# Patient Record
Sex: FEMALE | Race: WHITE | NOT HISPANIC OR LATINO | Employment: UNEMPLOYED | URBAN - METROPOLITAN AREA
[De-identification: names, ages, dates, MRNs, and addresses within clinical notes are randomized per-mention and may not be internally consistent; named-entity substitution may affect disease eponyms.]

---

## 2017-01-05 DIAGNOSIS — G89.29 CHRONIC MIDLINE THORACIC BACK PAIN: ICD-10-CM

## 2017-01-05 DIAGNOSIS — M54.5 CHRONIC LOW BACK PAIN, UNSPECIFIED BACK PAIN LATERALITY, WITH SCIATICA PRESENCE UNSPECIFIED: Primary | ICD-10-CM

## 2017-01-05 DIAGNOSIS — G89.29 CHRONIC LOW BACK PAIN, UNSPECIFIED BACK PAIN LATERALITY, WITH SCIATICA PRESENCE UNSPECIFIED: Primary | ICD-10-CM

## 2017-01-05 DIAGNOSIS — M54.6 CHRONIC MIDLINE THORACIC BACK PAIN: ICD-10-CM

## 2017-01-05 NOTE — TELEPHONE ENCOUNTER
ibuprofen (ADVIL,MOTRIN) 600 MG tablet      Last Written Prescription Date: 4/18/16  Last Quantity: 180, # refills: 1  Last Office Visit with G, P or [facility] prescribing provider: 11/10/16       CREATININE   Date Value Ref Range Status   12/23/2015 0.74 0.52 - 1.04 mg/dL Final     AST       67   12/22/2015  ALT      104   12/22/2015  BP Readings from Last 3 Encounters:   11/10/16 104/62   10/17/16 112/76   10/05/16 122/74

## 2017-01-06 RX ORDER — IBUPROFEN 600 MG/1
600 TABLET, FILM COATED ORAL 2 TIMES DAILY WITH MEALS
Qty: 180 TABLET | Refills: 0 | Status: SHIPPED | OUTPATIENT
Start: 2017-01-06 | End: 2017-04-04

## 2017-01-06 NOTE — TELEPHONE ENCOUNTER
Routing refill request to provider for review/approval because:  -Labs not current:  Creatinine, AST, ALT  -AST and ALT abnormal    Labs ordered.    Routing to PCP and Reception.    Sepideh-Please sign if agree.    Reception-Please call patient to schedule non-fasting lab only appt for monitoring labs for Ibuprofen.    Thank you!  JEANA Noble, BIENVENIDON, RN

## 2017-01-18 ENCOUNTER — TRANSFERRED RECORDS (OUTPATIENT)
Dept: HEALTH INFORMATION MANAGEMENT | Facility: CLINIC | Age: 37
End: 2017-01-18

## 2017-04-04 DIAGNOSIS — G89.29 CHRONIC MIDLINE THORACIC BACK PAIN: ICD-10-CM

## 2017-04-04 DIAGNOSIS — G89.29 CHRONIC LOW BACK PAIN, UNSPECIFIED BACK PAIN LATERALITY, WITH SCIATICA PRESENCE UNSPECIFIED: ICD-10-CM

## 2017-04-04 DIAGNOSIS — M54.6 CHRONIC MIDLINE THORACIC BACK PAIN: ICD-10-CM

## 2017-04-04 DIAGNOSIS — M54.5 CHRONIC LOW BACK PAIN, UNSPECIFIED BACK PAIN LATERALITY, WITH SCIATICA PRESENCE UNSPECIFIED: ICD-10-CM

## 2017-04-04 NOTE — TELEPHONE ENCOUNTER
Medication Detail      Disp Refills Start End TJ   ibuprofen (ADVIL/MOTRIN) 600 MG tablet 180 tablet 0 1/6/2017  No   Sig: Take 1 tablet (600 mg) by mouth 2 times daily (with meals)       Last Office Visit with G, P or [facility] prescribing provider: 11/10/16       Creatinine   Date Value Ref Range Status   12/23/2015 0.74 0.52 - 1.04 mg/dL Final     Lab Results   Component Value Date    AST 67 12/22/2015     Lab Results   Component Value Date     12/22/2015     BP Readings from Last 3 Encounters:   11/10/16 104/62   10/17/16 112/76   10/05/16 122/74

## 2017-04-06 RX ORDER — IBUPROFEN 600 MG/1
TABLET, FILM COATED ORAL
Qty: 180 TABLET | Refills: 0 | Status: SHIPPED | OUTPATIENT
Start: 2017-04-06 | End: 2017-06-23

## 2017-04-06 NOTE — TELEPHONE ENCOUNTER
Routing refill request to provider for review/approval because:  Labs not current:  Patient needs AST/ALT/Cr    Thanks! Shea Benjamin RN

## 2017-05-12 DIAGNOSIS — G89.29 CHRONIC MIDLINE LOW BACK PAIN WITH SCIATICA, SCIATICA LATERALITY UNSPECIFIED: ICD-10-CM

## 2017-05-12 DIAGNOSIS — M54.40 CHRONIC MIDLINE LOW BACK PAIN WITH SCIATICA, SCIATICA LATERALITY UNSPECIFIED: ICD-10-CM

## 2017-05-12 NOTE — TELEPHONE ENCOUNTER
Medication Detail      Disp Refills Start End TJ   methocarbamol (ROBAXIN) 750 MG tablet 180 tablet 5 11/7/2016  No   Sig: Take 2 tablets (1,500 mg) by mouth 3 times daily as needed for muscle spasms   Class: E-Prescribe   Route: Oral   Order: [id number]       Last [facility] Visit with [facility] or [facility] prescribing provider: 1/9/2017  Future Office visit:       Routing refill request to provider for review/approval because:  Drug not on the [facility], [facility] or [facility] refill protocol or controlled substance

## 2017-05-17 RX ORDER — METHOCARBAMOL 750 MG/1
TABLET, FILM COATED ORAL
Qty: 180 TABLET | Refills: 0 | Status: SHIPPED | OUTPATIENT
Start: 2017-05-17 | End: 2017-06-13

## 2017-06-13 DIAGNOSIS — M54.40 CHRONIC MIDLINE LOW BACK PAIN WITH SCIATICA, SCIATICA LATERALITY UNSPECIFIED: ICD-10-CM

## 2017-06-13 DIAGNOSIS — G89.29 CHRONIC MIDLINE LOW BACK PAIN WITH SCIATICA, SCIATICA LATERALITY UNSPECIFIED: ICD-10-CM

## 2017-06-13 RX ORDER — METHOCARBAMOL 750 MG/1
TABLET, FILM COATED ORAL
Qty: 180 TABLET | Refills: 0 | Status: SHIPPED | OUTPATIENT
Start: 2017-06-13 | End: 2017-06-23

## 2017-06-13 NOTE — TELEPHONE ENCOUNTER
Medication Detail      Disp Refills Start End JT   methocarbamol (ROBAXIN) 750 MG tablet 180 tablet 0 5/17/2017  No   Sig: TAKE 2 TABLETS(1500 MG) BY MOUTH THREE TIMES DAILY AS NEEDED FOR MUSCLE SPASMS       Last Office Visit with [facility], [facility] or [facility] prescribing provider: 1/9/17  Future Office visit:       Routing refill request to provider for review/approval because:  Drug not on the [facility], [facility] or [facility] refill protocol or controlled substance

## 2017-06-23 DIAGNOSIS — G89.29 CHRONIC MIDLINE THORACIC BACK PAIN: ICD-10-CM

## 2017-06-23 DIAGNOSIS — M54.6 CHRONIC MIDLINE THORACIC BACK PAIN: ICD-10-CM

## 2017-06-23 DIAGNOSIS — M54.40 CHRONIC MIDLINE LOW BACK PAIN WITH SCIATICA, SCIATICA LATERALITY UNSPECIFIED: ICD-10-CM

## 2017-06-23 DIAGNOSIS — M54.5 CHRONIC LOW BACK PAIN, UNSPECIFIED BACK PAIN LATERALITY, WITH SCIATICA PRESENCE UNSPECIFIED: ICD-10-CM

## 2017-06-23 DIAGNOSIS — G89.29 CHRONIC MIDLINE LOW BACK PAIN WITH SCIATICA, SCIATICA LATERALITY UNSPECIFIED: ICD-10-CM

## 2017-06-23 DIAGNOSIS — G89.29 CHRONIC LOW BACK PAIN, UNSPECIFIED BACK PAIN LATERALITY, WITH SCIATICA PRESENCE UNSPECIFIED: ICD-10-CM

## 2017-06-24 NOTE — TELEPHONE ENCOUNTER
Medication Detail      Disp Refills Start End TJ   ibuprofen (ADVIL/MOTRIN) 600 MG tablet 180 tablet 0 4/6/2017  No   Sig: TAKE 1 TABLET BY MOUTH TWICE DAILY WITH MEALS   Class: E-Prescribe   Order: [id number]       Last Office Visit with G, P or [facility] prescribing provider: 11/10/2016       Creatinine   Date Value Ref Range Status   12/23/2015 0.74 0.52 - 1.04 mg/dL Final     Lab Results   Component Value Date    AST 67 12/22/2015     Lab Results   Component Value Date     12/22/2015     BP Readings from Last 3 Encounters:   11/10/16 104/62   10/17/16 112/76   10/05/16 122/74

## 2017-06-26 RX ORDER — METHOCARBAMOL 750 MG/1
TABLET, FILM COATED ORAL
Qty: 180 TABLET | Refills: 0 | Status: SHIPPED | OUTPATIENT
Start: 2017-06-26 | End: 2017-07-31

## 2017-06-26 RX ORDER — IBUPROFEN 600 MG/1
TABLET, FILM COATED ORAL
Qty: 180 TABLET | Refills: 0 | Status: SHIPPED | OUTPATIENT
Start: 2017-06-26 | End: 2017-10-20

## 2017-06-26 NOTE — TELEPHONE ENCOUNTER
Routing refill request to provider for review/approval because:  Labs not current:  Creatinine, AST and ALT    Labs ordered.    Sepideh-Please sign if agree.    Team coordinators-Please contact patient to schedule non-fasting lab appt.  These are monitoring labs for Ibuprofen.    Thank you!  JAENA Noble, BSN, RN

## 2017-06-26 NOTE — TELEPHONE ENCOUNTER
Medication not on RN protocol.     Robaxin       Last Written Prescription Date:  6/13/17  Last Fill Quantity: 180,   # refills: 0  Last Office Visit with [facility], [facility] or [facility] prescribing provider: 11/10/16  Future Office visit:       Routing refill request to provider for review/approval because:  Drug not on the [facility], [facility] or [facility] refill protocol or controlled substance

## 2017-07-13 PROBLEM — F11.21 NARCOTIC DEPENDENCE, IN REMISSION (H): Status: ACTIVE | Noted: 2017-07-13

## 2017-07-14 ENCOUNTER — OFFICE VISIT (OUTPATIENT)
Dept: FAMILY MEDICINE | Facility: CLINIC | Age: 37
End: 2017-07-14
Payer: COMMERCIAL

## 2017-07-14 ENCOUNTER — RADIANT APPOINTMENT (OUTPATIENT)
Dept: GENERAL RADIOLOGY | Facility: CLINIC | Age: 37
End: 2017-07-14
Attending: FAMILY MEDICINE
Payer: COMMERCIAL

## 2017-07-14 VITALS
TEMPERATURE: 98.5 F | DIASTOLIC BLOOD PRESSURE: 77 MMHG | BODY MASS INDEX: 25.77 KG/M2 | SYSTOLIC BLOOD PRESSURE: 125 MMHG | HEART RATE: 88 BPM | WEIGHT: 122.75 LBS | OXYGEN SATURATION: 98 % | HEIGHT: 58 IN

## 2017-07-14 DIAGNOSIS — N89.8 VAGINAL IRRITATION: ICD-10-CM

## 2017-07-14 DIAGNOSIS — N92.0 EXCESSIVE OR FREQUENT MENSTRUATION: ICD-10-CM

## 2017-07-14 DIAGNOSIS — M54.40 CHRONIC MIDLINE LOW BACK PAIN WITH SCIATICA, SCIATICA LATERALITY UNSPECIFIED: ICD-10-CM

## 2017-07-14 DIAGNOSIS — Z98.82 H/O BREAST IMPLANT: ICD-10-CM

## 2017-07-14 DIAGNOSIS — N76.0 BACTERIAL VAGINOSIS: ICD-10-CM

## 2017-07-14 DIAGNOSIS — N94.6 DYSMENORRHEA: ICD-10-CM

## 2017-07-14 DIAGNOSIS — R19.7 DIARRHEA, UNSPECIFIED TYPE: ICD-10-CM

## 2017-07-14 DIAGNOSIS — F41.9 ANXIETY: ICD-10-CM

## 2017-07-14 DIAGNOSIS — Z11.3 SCREEN FOR STD (SEXUALLY TRANSMITTED DISEASE): ICD-10-CM

## 2017-07-14 DIAGNOSIS — Z13.1 SCREENING FOR DIABETES MELLITUS: ICD-10-CM

## 2017-07-14 DIAGNOSIS — Z00.00 ROUTINE GENERAL MEDICAL EXAMINATION AT A HEALTH CARE FACILITY: Primary | ICD-10-CM

## 2017-07-14 DIAGNOSIS — R05.9 COUGH: ICD-10-CM

## 2017-07-14 DIAGNOSIS — N63.0 LUMP OR MASS IN BREAST: ICD-10-CM

## 2017-07-14 DIAGNOSIS — F19.10 SUBSTANCE ABUSE (H): ICD-10-CM

## 2017-07-14 DIAGNOSIS — Z13.6 ENCOUNTER FOR SCREENING FOR CARDIOVASCULAR DISORDERS: ICD-10-CM

## 2017-07-14 DIAGNOSIS — F33.1 MODERATE RECURRENT MAJOR DEPRESSION (H): ICD-10-CM

## 2017-07-14 DIAGNOSIS — Z13.0 SCREENING, ANEMIA, DEFICIENCY, IRON: ICD-10-CM

## 2017-07-14 DIAGNOSIS — Z23 NEED FOR TDAP VACCINATION: ICD-10-CM

## 2017-07-14 DIAGNOSIS — G89.29 CHRONIC MIDLINE LOW BACK PAIN WITH SCIATICA, SCIATICA LATERALITY UNSPECIFIED: ICD-10-CM

## 2017-07-14 DIAGNOSIS — F17.200 TOBACCO USE DISORDER: ICD-10-CM

## 2017-07-14 DIAGNOSIS — F11.20 NARCOTIC DEPENDENCE (H): ICD-10-CM

## 2017-07-14 DIAGNOSIS — J45.20 INTERMITTENT ASTHMA, UNCOMPLICATED: ICD-10-CM

## 2017-07-14 DIAGNOSIS — G43.109 MIGRAINE WITH AURA AND WITHOUT STATUS MIGRAINOSUS, NOT INTRACTABLE: ICD-10-CM

## 2017-07-14 DIAGNOSIS — B96.89 BACTERIAL VAGINOSIS: ICD-10-CM

## 2017-07-14 LAB
BASOPHILS # BLD AUTO: 0 10E9/L (ref 0–0.2)
BASOPHILS NFR BLD AUTO: 0.2 %
DIFFERENTIAL METHOD BLD: NORMAL
EOSINOPHIL # BLD AUTO: 0.1 10E9/L (ref 0–0.7)
EOSINOPHIL NFR BLD AUTO: 0.8 %
ERYTHROCYTE [DISTWIDTH] IN BLOOD BY AUTOMATED COUNT: 14.3 % (ref 10–15)
HCT VFR BLD AUTO: 35.3 % (ref 35–47)
HGB BLD-MCNC: 12 G/DL (ref 11.7–15.7)
LYMPHOCYTES # BLD AUTO: 3.3 10E9/L (ref 0.8–5.3)
LYMPHOCYTES NFR BLD AUTO: 32.9 %
MCH RBC QN AUTO: 29.9 PG (ref 26.5–33)
MCHC RBC AUTO-ENTMCNC: 34 G/DL (ref 31.5–36.5)
MCV RBC AUTO: 88 FL (ref 78–100)
MICRO REPORT STATUS: ABNORMAL
MONOCYTES # BLD AUTO: 0.6 10E9/L (ref 0–1.3)
MONOCYTES NFR BLD AUTO: 5.6 %
NEUTROPHILS # BLD AUTO: 6.1 10E9/L (ref 1.6–8.3)
NEUTROPHILS NFR BLD AUTO: 60.5 %
PLATELET # BLD AUTO: 406 10E9/L (ref 150–450)
RBC # BLD AUTO: 4.01 10E12/L (ref 3.8–5.2)
SPECIMEN SOURCE: ABNORMAL
WBC # BLD AUTO: 10 10E9/L (ref 4–11)
WET PREP SPEC: ABNORMAL

## 2017-07-14 PROCEDURE — 87210 SMEAR WET MOUNT SALINE/INK: CPT | Performed by: FAMILY MEDICINE

## 2017-07-14 PROCEDURE — 86803 HEPATITIS C AB TEST: CPT | Performed by: FAMILY MEDICINE

## 2017-07-14 PROCEDURE — 87591 N.GONORRHOEAE DNA AMP PROB: CPT | Performed by: FAMILY MEDICINE

## 2017-07-14 PROCEDURE — 90715 TDAP VACCINE 7 YRS/> IM: CPT | Performed by: FAMILY MEDICINE

## 2017-07-14 PROCEDURE — 87340 HEPATITIS B SURFACE AG IA: CPT | Performed by: FAMILY MEDICINE

## 2017-07-14 PROCEDURE — 36415 COLL VENOUS BLD VENIPUNCTURE: CPT | Performed by: FAMILY MEDICINE

## 2017-07-14 PROCEDURE — 87389 HIV-1 AG W/HIV-1&-2 AB AG IA: CPT | Performed by: FAMILY MEDICINE

## 2017-07-14 PROCEDURE — 87491 CHLMYD TRACH DNA AMP PROBE: CPT | Performed by: FAMILY MEDICINE

## 2017-07-14 PROCEDURE — 86706 HEP B SURFACE ANTIBODY: CPT | Performed by: FAMILY MEDICINE

## 2017-07-14 PROCEDURE — 71020 XR CHEST 2 VW: CPT

## 2017-07-14 PROCEDURE — 86780 TREPONEMA PALLIDUM: CPT | Performed by: FAMILY MEDICINE

## 2017-07-14 PROCEDURE — 80061 LIPID PANEL: CPT | Performed by: FAMILY MEDICINE

## 2017-07-14 PROCEDURE — 99213 OFFICE O/P EST LOW 20 MIN: CPT | Mod: 25 | Performed by: FAMILY MEDICINE

## 2017-07-14 PROCEDURE — 99395 PREV VISIT EST AGE 18-39: CPT | Mod: 25 | Performed by: FAMILY MEDICINE

## 2017-07-14 PROCEDURE — 90471 IMMUNIZATION ADMIN: CPT | Performed by: FAMILY MEDICINE

## 2017-07-14 PROCEDURE — 84439 ASSAY OF FREE THYROXINE: CPT | Performed by: FAMILY MEDICINE

## 2017-07-14 PROCEDURE — 80050 GENERAL HEALTH PANEL: CPT | Performed by: FAMILY MEDICINE

## 2017-07-14 RX ORDER — METRONIDAZOLE 7.5 MG/G
1 GEL VAGINAL AT BEDTIME
Qty: 70 G | Refills: 0 | Status: SHIPPED | OUTPATIENT
Start: 2017-07-14 | End: 2017-08-29

## 2017-07-14 RX ORDER — FLUCONAZOLE 150 MG/1
150 TABLET ORAL ONCE
Qty: 1 TABLET | Refills: 0 | Status: CANCELLED | OUTPATIENT
Start: 2017-07-14 | End: 2017-07-14

## 2017-07-14 RX ORDER — LOPERAMIDE HYDROCHLORIDE 2 MG/1
TABLET ORAL
Qty: 60 TABLET | Refills: 0 | Status: SHIPPED | OUTPATIENT
Start: 2017-07-14 | End: 2019-05-15

## 2017-07-14 RX ORDER — METRONIDAZOLE 500 MG/1
500 TABLET ORAL 2 TIMES DAILY
Qty: 14 TABLET | Refills: 0 | Status: CANCELLED | OUTPATIENT
Start: 2017-07-14

## 2017-07-14 RX ORDER — PREDNISONE 20 MG/1
20 TABLET ORAL DAILY
Qty: 5 TABLET | Refills: 0 | Status: SHIPPED | OUTPATIENT
Start: 2017-07-14 | End: 2017-08-07

## 2017-07-14 RX ORDER — BENZOCAINE/MENTHOL 6 MG-10 MG
LOZENGE MUCOUS MEMBRANE 2 TIMES DAILY
Status: CANCELLED | OUTPATIENT
Start: 2017-07-14

## 2017-07-14 ASSESSMENT — PATIENT HEALTH QUESTIONNAIRE - PHQ9
SUM OF ALL RESPONSES TO PHQ QUESTIONS 1-9: 13
SUM OF ALL RESPONSES TO PHQ QUESTIONS 1-9: 13
10. IF YOU CHECKED OFF ANY PROBLEMS, HOW DIFFICULT HAVE THESE PROBLEMS MADE IT FOR YOU TO DO YOUR WORK, TAKE CARE OF THINGS AT HOME, OR GET ALONG WITH OTHER PEOPLE: VERY DIFFICULT

## 2017-07-14 NOTE — LETTER
My Depression Action Plan  Name: Alisia Lin   Date of Birth 1980  Date: 7/13/2017    My doctor: Sepideh Hines   My clinic: [address] [address] [phone]          GREEN    ZONE   Good Control    What it looks like:     Things are going generally well. You have normal up s and down s. You may even feel depressed from time to time, but bad moods usually last less than a day.   What you need to do:  1. Continue to care for yourself (see self care plan)  2. Check your depression survival kit and update it as needed  3. Follow your physician s recommendations including any medication.  4. Do not stop taking medication unless you consult with your physician first.           YELLOW         ZONE Getting Worse    What it looks like:     Depression is starting to interfere with your life.     It may be hard to get out of bed; you may be starting to isolate yourself from others.    Symptoms of depression are starting to last most all day and this has happened for several days.     You may have suicidal thoughts but they are not constant.   What you need to do:     1. Call your care team, your response to treatment will improve if you keep your care team informed of your progress. Yellow periods are signs an adjustment may need to be made.     2. Continue your self-care, even if you have to fake it!    3. Talk to someone in your support network    4. Open up your depression survival kit           RED    ZONE Medical Alert - Get Help    What it looks like:     Depression is seriously interfering with your life.     You may experience these or other symptoms: You can t get out of bed most days, can t work or engage in other necessary activities, you have trouble taking care of basic hygiene, or basic responsibilities, thoughts of suicide or death that will not go away, self-injurious behavior.     What you need to do:  1. Call your care  team and request a same-day appointment. If they are not available (weekends or after hours) call your local crisis line, emergency room or 911.      Electronically signed by: Reina Boles, July 13, 2017    Depression Self Care Plan / Survival Kit    Self-Care for Depression  Here s the deal. Your body and mind are really not as separate as most people think.  What you do and think affects how you feel and how you feel influences what you do and think. This means if you do things that people who feel good do, it will help you feel better.  Sometimes this is all it takes.  There is also a place for medication and therapy depending on how severe your depression is, so be sure to consult with your medical provider and/ or Behavioral Health Consultant if your symptoms are worsening or not improving.     In order to better manage my stress, I will:    Exercise  Get some form of exercise, every day. This will help reduce pain and release endorphins, the  feel good  chemicals in your brain. This is almost as good as taking antidepressants!  This is not the same as joining a gym and then never going! (they count on that by the way ) It can be as simple as just going for a walk or doing some gardening, anything that will get you moving.      Hygiene   Maintain good hygiene (Get out of bed in the morning, Make your bed, Brush your teeth, Take a shower, and Get dressed like you were going to work, even if you are unemployed).  If your clothes don't fit try to get ones that do.    Diet  I will strive to eat foods that are good for me, drink plenty of water, and avoid excessive sugar, caffeine, alcohol, and other mood-altering substances.  Some foods that are helpful in depression are: complex carbohydrates, B vitamins, flaxseed, fish or fish oil, fresh fruits and vegetables.    Psychotherapy  I agree to participate in Individual Therapy (if recommended).    Medication  If prescribed medications, I agree to take them.  Missing  doses can result in serious side effects.  I understand that drinking alcohol, or other illicit drug use, may cause potential side effects.  I will not stop my medication abruptly without first discussing it with my provider.    Staying Connected With Others  I will stay in touch with my friends, family members, and my primary care provider/team.    Use your imagination  Be creative.  We all have a creative side; it doesn t matter if it s oil painting, sand castles, or mud pies! This will also kick up the endorphins.    Witness Beauty  (AKA stop and smell the roses) Take a look outside, even in mid-winter. Notice colors, textures. Watch the squirrels and birds.     Service to others  Be of service to others.  There is always someone else in need.  By helping others we can  get out of ourselves  and remember the really important things.  This also provides opportunities for practicing all the other parts of the program.    Humor  Laugh and be silly!  Adjust your TV habits for less news and crime-drama and more comedy.    Control your stress  Try breathing deep, massage therapy, biofeedback, and meditation. Find time to relax each day.     My support system    Clinic Contact:  Phone number:    Contact 1:  Phone number:    Contact 2:  Phone number:    Jainism/:  Phone number:    Therapist:  Phone number:    Local crisis center:    Phone number:    Other community support:  Phone number:

## 2017-07-14 NOTE — LETTER
My Asthma Action Plan  Name: Alisia Lin   YOB: 1980  Date: 7/13/2017   My doctor: Reina Boles MD   My clinic: [facility] [place]        My Control Medicine: None  My Rescue Medicine: Albuterol (Proair/Ventolin/Proventil) inhaler 2 puffs every 6 hrs as needed   My Asthma Severity: intermittent  Avoid your asthma triggers:                GREEN ZONE   Good Control    I feel good    No cough or wheeze    Can work, sleep and play without asthma symptoms       Take your asthma control medicine every day.     1. If exercise triggers your asthma, take your rescue medication    15 minutes before exercise or sports, and    During exercise if you have asthma symptoms  2. Spacer to use with inhaler: If you have a spacer, make sure to use it with your inhaler             YELLOW ZONE Getting Worse  I have ANY of these:    I do not feel good    Cough or wheeze    Chest feels tight    Wake up at night   1. Keep taking your Green Zone medications  2. Start taking your rescue medicine:    every 20 minutes for up to 1 hour. Then every 4 hours for 24-48 hours.  3. If you stay in the Yellow Zone for more than 12-24 hours, contact your doctor.  4. If you do not return to the Green Zone in 12-24 hours or you get worse, start taking your oral steroid medicine if prescribed by your provider.           RED ZONE Medical Alert - Get Help  I have ANY of these:    I feel awful    Medicine is not helping    Breathing getting harder    Trouble walking or talking    Nose opens wide to breathe       1. Take your rescue medicine NOW  2. If your provider has prescribed an oral steroid medicine, start taking it NOW  3. Call your doctor NOW  4. If you are still in the Red Zone after 20 minutes and you have not reached your doctor:    Take your rescue medicine again and    Call 911 or go to the emergency room right away    See your regular doctor within 2 weeks of an Emergency Room or Urgent Care visit for follow-up  treatment.        Electronically signed by: Reina Boles, July 13, 2017    Annual Reminders:  Meet with Asthma Educator,  Flu Shot in the Fall, consider Pneumonia Vaccination for patients with asthma (aged 19 and older).    Pharmacy:    [id number] - [place], [place] - [address] AT [facility] & [address] [place] [address] [facility] [place] - [place], [place] - [address]  [id number] - [place], [place] - [address] [facility]  [id number] - [place], [place] - [address]  WRITTEN PRESCRIPTION REQUESTED  [facility]  [id number] - [place], [place] - [address] AT [address] [place] [address] & [facility]                    Asthma Triggers  How To Control Things That Make Your Asthma Worse    Triggers are things that make your asthma worse.  Look at the list below to help you find your triggers and what you can do about them.  You can help prevent asthma flare-ups by staying away from your triggers.      Trigger                                                          What you can do   Cigarette Smoke  Tobacco smoke can make asthma worse. Do not allow smoking in your home, car or around you.  Be sure no one smokes at a child s day care or school.  If you smoke, ask your health care provider for ways to help you quit.  Ask family members to quit too.  Ask your health care provider for a referral to Quit Plan to help you quit smoking, or call 2-051-692-PLAN.     Colds, Flu, Bronchitis  These are common triggers of asthma. Wash your hands often.  Don t touch your eyes, nose or mouth.  Get a flu shot every year.     Dust Mites  These are tiny bugs that live in cloth or carpet. They are too small to see. Wash sheets and blankets in hot water every week.   Encase pillows and mattress in dust mite proof covers.  Avoid having carpet if you can. If you have carpet, vacuum weekly.   Use a dust mask and HEPA vacuum.   Pollen and Outdoor Mold  Some people are allergic to  trees, grass, or weed pollen, or molds. Try to keep your windows closed.  Limit time out doors when pollen count is high.   Ask you health care provider about taking medicine during allergy season.     Animal Dander  Some people are allergic to skin flakes, urine or saliva from pets with fur or feathers. Keep pets with fur or feathers out of your home.    If you can t keep the pet outdoors, then keep the pet out of your bedroom.  Keep the bedroom door closed.  Keep pets off cloth furniture and away from stuffed toys.     Mice, Rats, and Cockroaches  Some people are allergic to the waste from these pests.   Cover food and garbage.  Clean up spills and food crumbs.  Store grease in the refrigerator.   Keep food out of the bedroom.   Indoor Mold  This can be a trigger if your home has high moisture. Fix leaking faucets, pipes, or other sources of water.   Clean moldy surfaces.  Dehumidify basement if it is damp and smelly.   Smoke, Strong Odors, and Sprays  These can reduce air quality. Stay away from strong odors and sprays, such as perfume, powder, hair spray, paints, smoke incense, paint, cleaning products, candles and new carpet.   Exercise or Sports  Some people with asthma have this trigger. Be active!  Ask your doctor about taking medicine before sports or exercise to prevent symptoms.    Warm up for 5-10 minutes before and after sports or exercise.     Other Triggers of Asthma  Cold air:  Cover your nose and mouth with a scarf.  Sometimes laughing or crying can be a trigger.  Some medicines and food can trigger asthma.

## 2017-07-14 NOTE — LETTER
[facility] [place][note 2[place]  [address] [facility] [address]  Phone: [phone]    July 14, 2017        Alisia Lin  [address]  [facility] [address]          To whom it may concern:    RE: Alisia Lin    Patient was seen and treated today at our clinic and missed work last two days and today     Please contact me for questions or concerns.      Sincerely,        Reina Boles MD

## 2017-07-14 NOTE — MR AVS SNAPSHOT
After Visit Summary   7/14/2017    Alisia Lin    MRN: [id number]           Patient Information     Date Of Birth          1980        Visit Information        Provider Department      7/14/2017 12:40 PM Reina Boles MD [facility]        Today's Diagnoses     Routine general medical examination at a health care facility    -  1    Lump or mass in breast        H/O breast implant        Moderate recurrent major depression (H)        Intermittent asthma, uncomplicated        Tobacco use disorder        Migraine with aura and without status migrainosus, not intractable        Encounter for surveillance of injectable contraceptive        Anxiety        Narcotic dependence (H)        Chronic midline low back pain with sciatica, sciatica laterality unspecified        Screening, anemia, deficiency, iron        Screening for diabetes mellitus        Encounter for screening for cardiovascular disorders        Need for Tdap vaccination        Dysmenorrhea        Excessive or frequent menstruation        Chronic diarrhea        Diarrhea, unspecified type        Screen for STD (sexually transmitted disease)        Cough        Bacterial vaginosis        Vaginal irritation          Care Instructions    Clue cells show bacterial vaginosi s  Flagyl gel at bedtime 5 days   Monistat prn at bedtime    Chest xray and lungs clear suspect viral bronchitis and form smoking  Prednisone 20 mg daily 5 days may help with food  No need for antiboitic right now  Topical antibiotic for left breast cyst antibiotic above should help too   Diagnostic mammogram and ultrasound to evaluate more   Pap due in 2018  Asthma action plan  Depression action plan  Labs/?std  Tdap due referral to pain and addiction med made made regarding suboxone  Chronic robaxcin can cause drowsiness muscle weakness and dependency and should be used sparingly especially if on clonazepam. Should not be driving  or operating machinery  on this medication Will give limited amount and further refills need to come from the pain doctor or your primary Sepideh Hines  Chronic ibuprofen not recommended to be used sparingly with food   Chronic Imodium not recommended as will make gut dependent on that. See GI for Chronic diarrhea suspect form chronic pain and imodium use.   Chronic Prilosec puts you at risk of atypical fractures, pneumonia, c diff, vit b 12, deficiency, magnesium deficiency and stroke, try to taper off while using zantac 150 mg twice a day instead if possible.   Continue care withpsyche for your routine chronic issues   Letter for work     Preventive Health Recommendations  Female Ages 26 - 39  Yearly exam:   See your health care provider every year in order to    Review health changes.     Discuss preventive care.      Review your medicines if you your doctor has prescribed any.    Until age 30: Get a Pap test every three years (more often if you have had an abnormal result).    After age 30: Talk to your doctor about whether you should have a Pap test every 3 years or have a Pap test with HPV screening every 5 years.   You do not need a Pap test if your uterus was removed (hysterectomy) and you have not had cancer.  You should be tested each year for STDs (sexually transmitted diseases), if you're at risk.   Talk to your provider about how often to have your cholesterol checked.  If you are at risk for diabetes, you should have a diabetes test (fasting glucose).  Shots: Get a flu shot each year. Get a tetanus shot every 10 years.   Nutrition:     Eat at least 5 servings of fruits and vegetables each day.    Eat whole-grain bread, whole-wheat pasta and brown rice instead of white grains and rice.    Talk to your provider about Calcium and Vitamin D.     Lifestyle    Exercise at least 150 minutes a week (30 minutes a day, 5 days of the week). This will help you control your weight and prevent disease.    Limit alcohol to one drink per  "day.    No smoking.     Wear sunscreen to prevent skin cancer.    See your dentist every six months for an exam and cleaning.    Recommended low salt diet, wt loss, exercise.   Eating a healthy diet can improve your health by reducing your risk for heart and vascular disease.  It can help you maintain a healthy weight which in turn decreases your risk for additional problems including diabetes and arthritis.  Eating well can improve your concentration and memory.  You'll also feel better.  Follow the advice of author Jose Vidal (In Defense of Food): \"Eat food.  Not too much.  Mostly plants.\"  Or follow the advice:  \"If it came from a plant, eat it.  If it was processed in a plant, don't.\"    A heart-healthy, Mediterranean Diet is low in saturated fat and includes the following:  Fruits and vegetables (fresh or frozen - especially berries and cruciferous vegetables)  Whole grains and legumes (whole grain bread, whole grain pasta, whole grain cereal, and brown rice)  Healthy fats:  Olive oil and Canola oil  Nuts (especially walnuts and almonds)  Fish  Avocado  Soy  Garlic  Dark chocolate    Foods to limit or avoid include:  Animal products and animal fats  Saturated fat (especially fried foods and trans fats)  Refined carbohydrates (white flour, white bread, white pasta, sugar, and white rice)  Red meat  Processed meat  Processed food including fast food    MYCHART SIGNUP FOR E-VISITS AND EASIER COMMUNICATION:  [email or web address]     Zipnosis:  [email or web address].  Sign up for e-visits for common illnesses!     RADIOLOGY:   [facility]:  [phone] to schedule any radiology tests at [facility]: [phone]    Mammograms/colonoscopies:  [phone]    CONSUMER PRICE LINE for estimates of test costs:  [phone]     You can also call [phone] if you are uninsured or underinsured to see if you qualify for a reduced cost mammogram or colonoscopy.     Please consider " using [facility] for better service and improved communication with your physician!            Follow-ups after your visit        Additional Services     ADDICTION MEDICINE REFERRAL       The Addiction Medicine Service is prepared to provide consultation for and, if necessary, ongoing care for patients with the disease of Addiction.  As defined by the American Society of Addiction Medicine, Addiction is a primary, chronic disease of brain reward, motivation, memory and related circuitry.       Common problems that may warrant referral to the Addiction Medicine Service are:  Alcohol use disorder - diagnosis, treatment referral, acute and protracted withdrawal management, and ongoing medication assisted treatment including Antabuse and Naltrexone.  Opoid Use Disorder - medication assisted treatment including Buprenorphine (Suboxone) or extended release Naltrexone (Vivitrol)  Benzodiazepine dependence - extended outpatient detoxification  Many other issues pertaining to addiction, relapse, and recovery    Referrals to the Addiction Medicine Service assume that the referring provider has discussed the referral with the patient.  Referral will be reviewed and if appropriate, the patient will be contacted to schedule appointment.    Please answer the following questions so we can better service your patient:    Drug of choice: street drugs and heroin   Need for detox: No not sure   Need for ongoing addiction treatment: No  Do they have a [place] PCP:  Yes   Are they willing to participate in a Suboxone group?  Yes    Please bring the following to your appointment:  >>   List of current medications   >>   Any relevant history            GASTROENTEROLOGY ADULT REF CONSULT ONLY       [place] [facility] [phone] and [facility] [phone]      Please be aware that coverage of these services is subject to the terms and limitations of your health insurance plan.  Call member services at your  health plan with any benefit or coverage questions.  Any procedures must be performed at a [facility] facility OR coordinated by your clinic's referral office.    Please bring the following with you to your appointment:    (1) Any X-Rays, CTs or MRIs which have been performed.  Contact the facility where they were done to arrange for  prior to your scheduled appointment.    (2) List of current medications   (3) This referral request   (4) Any documents/labs given to you for this referral            OB/GYN REFERRAL       Your provider has referred you to:  [facility]: [facility] [phone]   [email or web address]    Please be aware that coverage of these services is subject to the terms and limitations of your health insurance plan.  Call member services at your health plan with any benefit or coverage questions.      Please bring the following with you to your appointment:    (1) Any X-Rays, CTs or MRIs which have been performed.  Contact the facility where they were done to arrange for  prior to your scheduled appointment.  Any new CT, MRI or other procedures ordered by your specialist must be performed at a [facility] facility or coordinated by your clinic's referral office.    (2) List of current medications   (3) This referral request   (4) Any documents/labs given to you for this referral            PAIN MANAGEMENT EXTERNAL REFERRAL       Your provider has referred you to: [facility]: [facility] [phone]   [email or web address]  [facility]: [facility] - [facility] [phone])   [email or web address]  N: [place] [facility] [phone]   [email or web address]    DESIRES SUBOXONE TREATMENT     Please be aware that  coverage of these services is subject to the terms and limitations of your health insurance plan.  Call member services at your health plan with any benefit or coverage questions.      Please bring the following with you to your appointment:    (1) Any X-Rays, CTs or MRIs which have been performed.  Contact the facility where they were done to arrange for  prior to your scheduled appointment.  Any new CT, MRI or other procedures ordered by your specialist must be performed at a [place] [facility] facility or coordinated by your clinic's referral office.    (2) List of current medications   (3) This referral request   (4) Any documents/labs given to you for this referral                  Future tests that were ordered for you today     Open Future Orders        Priority Expected Expires Ordered    MA Diagnostic Digital Bilateral Routine  7/13/2018 7/14/2017    US Breast Bilateral Complete 4 Quadrants Routine  7/13/2018 7/14/2017            Who to contact     If you have questions or need follow up information about today's clinic visit or your schedule please contact [facility] directly at [phone].  Normal or non-critical lab and imaging results will be communicated to you by MyChart, letter or phone within 4 business days after the clinic has received the results. If you do not hear from us within 7 days, please contact the clinic through Applicasahart or phone. If you have a critical or abnormal lab result, we will notify you by phone as soon as possible.  Submit refill requests through Bitstamp or call your pharmacy and they will forward the refill request to us. Please allow 3 business days for your refill to be completed.          Additional Information About Your Visit        Bitstamp Information     Bitstamp gives you secure access to your electronic health record. If you see a primary care provider, you can also send messages to your care team and make appointments. If you have questions, please call  "your primary care clinic.  If you do not have a primary care provider, please call [phone] and they will assist you.        Care EveryWhere ID     This is your Care EveryWhere ID. This could be used by other organizations to access your [facility] medical records  [id number]        Your Vitals Were     Pulse Temperature Height Pulse Oximetry BMI (Body Mass Index)       88 98.5  F (36.9  C) (Oral) 4' 10\" (1.473 m) 98% 25.65 kg/m2        Blood Pressure from Last 3 Encounters:   07/14/17 125/77   11/10/16 104/62   10/17/16 112/76    Weight from Last 3 Encounters:   07/14/17 122 lb 12 oz (55.7 kg)   11/10/16 124 lb (56.2 kg)   10/05/16 122 lb (55.3 kg)              We Performed the Following     ADDICTION MEDICINE REFERRAL     Anti Treponema     Asthma Action Plan (AAP)     CBC with platelets differential     CHLAMYDIA TRACHOMATIS PCR     Comprehensive metabolic panel     DEPRESSION ACTION PLAN (DAP)     GASTROENTEROLOGY ADULT REF CONSULT ONLY     Hepatitis B Surface Antibody     Hepatitis B surface antigen     Hepatitis C Screen Reflex to HCV RNA Quant and Genotype     HIV Antigen Antibody Combo     Lipid panel reflex to direct LDL     NEISSERIA GONORRHOEA PCR     OB/GYN REFERRAL     OFFICE/OUTPT VISIT,EST,LEVL III     PAIN MANAGEMENT EXTERNAL REFERRAL     TDAP VACCINE ([facility])     TSH with free T4 reflex     VACCINE ADMINISTRATION, INITIAL     Wet prep          Today's Medication Changes          These changes are accurate as of: 7/14/17  2:12 PM.  If you have any questions, ask your nurse or doctor.               Start taking these medicines.        Dose/Directions    cetirizine 10 MG tablet   Commonly known as:  zyrTEC   Used for:  Seasonal allergic rhinitis, unspecified chronicity, unspecified trigger   Started by:  Sepideh Hines APRN CNP        TAKE 1 TABLET(10 MG) BY MOUTH EVERY EVENING   Quantity:  30 tablet   Refills:  0       clotrimazole 2 % Crea   Commonly known as:  RA CLOTRIMAZOLE 3   Used " for:  Vaginal irritation   Started by:  Reina Boles MD        Dose:  1 Application   Place 1 Application vaginally At Bedtime   Quantity:  21 g   Refills:  0       metroNIDAZOLE 0.75 % vaginal gel   Commonly known as:  METROGEL   Used for:  Bacterial vaginosis   Started by:  Reina Boles MD        Dose:  1 applicator   Place 1 applicator (5 g) vaginally At Bedtime   Quantity:  70 g   Refills:  0       predniSONE 20 MG tablet   Commonly known as:  DELTASONE   Used for:  Cough   Started by:  Reina Boles MD        Dose:  20 mg   Take 1 tablet (20 mg) by mouth daily   Quantity:  5 tablet   Refills:  0         These medicines have changed or have updated prescriptions.        Dose/Directions    MAPAP 500 MG tablet   This may have changed:  See the new instructions.   Used for:  Back pain, Shoulder pain, right   Generic drug:  acetaminophen   Changed by:  Sepideh Hines APRN CNP        TAKE 2 TABLETS(1000 MG) BY MOUTH TWICE DAILY AS NEEDED FOR MILD PAIN   Quantity:  100 tablet   Refills:  5            Where to get your medicines      [facility] [address] [place] [id number]    Hours:  24-hours Phone:  [phone]     cetirizine 10 MG tablet    clotrimazole 2 % Crea    loperamide 2 MG tablet    MAPAP 500 MG tablet    metroNIDAZOLE 0.75 % vaginal gel    predniSONE 20 MG tablet                Primary Care Provider Office Phone # Fax #    SONJA Meek [facility] [phone]       [place] [address] [place] [phone]        Equal Access to Services     [facility] AH: Hadii aad ku hadasho Soomaali, waaxda luqadaha, qaybta kaalmada adeegyada, waxay kamilain hayaan bruce fernandez. So [facility] [phone].    ATENCIÓN: Si habla español, tiene a randall disposición servicios gratuitos de asistencia lingüística. Llame al [phone].    We  comply with applicable federal civil rights laws and [place] laws. We do not discriminate on the basis of race, color, national origin, age, disability sex, sexual orientation or gender identity.            Thank you!     Thank you for choosing [facility]  for your care. Our goal is always to provide you with excellent care. Hearing back from our patients is one way we can continue to improve our services. Please take a few minutes to complete the written survey that you may receive in the mail after your visit with us. Thank you!             Your Updated Medication List - Protect others around you: Learn how to safely use, store and throw away your medicines at www.disposemymeds.org.          This list is accurate as of: 7/14/17  2:12 PM.  Always use your most recent med list.                   Brand Name Dispense Instructions for use Diagnosis    albuterol 108 (90 BASE) MCG/ACT Inhaler    PROAIR HFA/PROVENTIL HFA/VENTOLIN HFA    1 Inhaler    Inhale 2 puffs into the lungs every 4 hours as needed for shortness of breath / dyspnea or wheezing    Intermittent asthma, uncomplicated       cetirizine 10 MG tablet    zyrTEC    30 tablet    TAKE 1 TABLET(10 MG) BY MOUTH EVERY EVENING    Seasonal allergic rhinitis, unspecified chronicity, unspecified trigger       CLONAZEPAM PO      Take by mouth 4 times daily as needed for anxiety        clotrimazole 2 % Crea    RA CLOTRIMAZOLE 3    21 g    Place 1 Application vaginally At Bedtime    Vaginal irritation       FLUOXETINE HCL PO      Take 60 mg by mouth daily        hydrOXYzine 25 MG tablet    ATARAX    60 tablet    Take 1-2 tablets (25-50 mg) by mouth every 6 hours as needed for itching    Seasonal allergic rhinitis       ibuprofen 600 MG tablet    ADVIL/MOTRIN    180 tablet    TAKE 1 TABLET BY MOUTH TWICE DAILY WITH MEALS    Chronic low back pain, unspecified back pain laterality, with sciatica presence unspecified, Chronic midline thoracic back pain        loperamide 2 MG tablet    IMODIUM A-D    60 tablet    Start with 2 tabs (4 mg), then take one tab (2 mg) after each diarrheal stool.  Do not use more than  8 tabs (16 mg) per day.    Diarrhea, unspecified type       MAPAP 500 MG tablet   Generic drug:  acetaminophen     100 tablet    TAKE 2 TABLETS(1000 MG) BY MOUTH TWICE DAILY AS NEEDED FOR MILD PAIN    Back pain, Shoulder pain, right       Menthol (Topical Analgesic) 4 % Gel     150 mL    Apply three times a day as needed to affected area    Bilateral low back pain with left-sided sciatica, Right shoulder pain       methocarbamol 750 MG tablet    ROBAXIN    180 tablet    TAKE 2 TABLETS(1500 MG) BY MOUTH THREE TIMES DAILY AS NEEDED FOR MUSCLE SPASMS    Chronic midline low back pain with sciatica, sciatica laterality unspecified       metroNIDAZOLE 0.75 % vaginal gel    METROGEL    70 g    Place 1 applicator (5 g) vaginally At Bedtime    Bacterial vaginosis       multivitamin, therapeutic with minerals Tabs tablet     100 tablet    Take 1 tablet by mouth daily    Other fatigue       * order for DME     1 Device    Equipment being ordered: supportive back brace    Bilateral low back pain with left-sided sciatica       * order for DME     1 Units    Equipment being ordered: heating device and cryotherapy device.    Chronic low back pain, Midline thoracic back pain       pantoprazole 40 MG EC tablet    PROTONIX    90 tablet    Take 1 tablet (40 mg) by mouth daily    Gastroesophageal reflux disease without esophagitis       predniSONE 20 MG tablet    DELTASONE    5 tablet    Take 1 tablet (20 mg) by mouth daily    Cough       varenicline 1 MG tablet    CHANTIX    56 tablet    Take 1 tablet (1 mg) by mouth 2 times daily    Tobacco abuse       * Notice:  This list has 2 medication(s) that are the same as other medications prescribed for you. Read the directions carefully, and ask your doctor or other care provider to review them with you.

## 2017-07-14 NOTE — PATIENT INSTRUCTIONS
Clue cells show bacterial vaginosis S  Flagyl gel at bedtime 5 days   Monistat prn at bedtime    Chest xray and lungs clear suspect viral bronchitis and form smoking  Prednisone 20 mg daily 5 days may help with food  No need for antibiotic right now  Topical antibiotic for left breast cyst   Diagnostic mammogram and ultrasound to evaluate more   Pap due in 2018  Asthma action plan  Depression action plan  Labs/?std  Tdap due referral to pain and addiction med made regarding suboxone  Chronic Robaxin can cause drowsiness muscle weakness and dependency and should be used sparingly especially if on clonazepam. Should not be driving  or operating machinery on this medication Will give limited amount and further refills need to come from the pain doctor or your primary Sepideh Hines  Chronic ibuprofen not recommended to be used sparingly with food   Chronic Imodium not recommended as will make gut dependent on that. See GI for Chronic diarrhea suspect form chronic pain and imodium use.   Chronic Prilosec puts you at risk of atypical fractures, pneumonia, C diff, vit B 12, deficiency, magnesium deficiency and stroke, try to taper off while using zantac 150 mg twice a day instead if possible.   Continue care with[facility] for your routine chronic issues   Letter for work     Preventive Health Recommendations  Female Ages 26 - 39  Yearly exam:   See your health care provider every year in order to    Review health changes.     Discuss preventive care.      Review your medicines if you your doctor has prescribed any.    Until age 30: Get a Pap test every three years (more often if you have had an abnormal result).    After age 30: Talk to your doctor about whether you should have a Pap test every 3 years or have a Pap test with HPV screening every 5 years.   You do not need a Pap test if your uterus was removed (hysterectomy) and you have not had cancer.  You should be tested each year for Stds (sexually transmitted diseases),  "if you're at risk.   Talk to your provider about how often to have your cholesterol checked.  If you are at risk for diabetes, you should have a diabetes test (fasting glucose).  Shots: Get a flu shot each year. Get a tetanus shot every 10 years.   Nutrition:     Eat at least 5 servings of fruits and vegetables each day.    Eat whole-grain bread, whole-wheat pasta and brown rice instead of white grains and rice.    Talk to your provider about Calcium and Vitamin D.     Lifestyle    Exercise at least 150 minutes a week (30 minutes a day, 5 days of the week). This will help you control your weight and prevent disease.    Limit alcohol to one drink per day.    No smoking.     Wear sunscreen to prevent skin cancer.    See your dentist every six months for an exam and cleaning.    Recommended low salt diet, wt loss, exercise.   Eating a healthy diet can improve your health by reducing your risk for heart and vascular disease.  It can help you maintain a healthy weight which in turn decreases your risk for additional problems including diabetes and arthritis.  Eating well can improve your concentration and memory.  You'll also feel better.  Follow the advice of author Jose Vidal (In Defense of Food): \"Eat food.  Not too much.  Mostly plants.\"  Or follow the advice:  \"If it came from a plant, eat it.  If it was processed in a plant, don't.\"    A heart-healthy, Mediterranean Diet is low in saturated fat and includes the following:  Fruits and vegetables (fresh or frozen - especially berries and cruciferous vegetables)  Whole grains and legumes (whole grain bread, whole grain pasta, whole grain cereal, and brown rice)  Healthy fats:  Olive oil and Canola oil  Nuts (especially walnuts and almonds)  Fish  Avocado  Soy  Garlic  Dark chocolate    Foods to limit or avoid include:  Animal products and animal fats  Saturated fat (especially fried foods and trans fats)  Refined carbohydrates (white flour, white bread, white " pasta, sugar, and white rice)  Red meat  Processed meat  Processed food including fast food    MYCHART SIGNUP FOR E-VISITS AND EASIER COMMUNICATION:  [email or web address]     Zipnosis:  [employer].  Sign up for e-visits for common illnesses!     RADIOLOGY:   [facility]:  [phone] to schedule any radiology tests at [facility]: [phone]    Mammograms/colonoscopies:  [phone]    CONSUMER PRICE LINE for estimates of test costs:  [phone]     You can also call [phone] if you are uninsured or underinsured to see if you qualify for a reduced cost mammogram or colonoscopy.     Please consider using [facility] for better service and improved communication with your physician!

## 2017-07-14 NOTE — NURSING NOTE
Screening Questionnaire for Adult Immunization    Are you sick today?   No   Do you have allergies to medications, food, a vaccine component or latex?   No   Have you ever had a serious reaction after receiving a vaccination?   No   Do you have a long-term health problem with heart disease, lung disease, asthma, kidney disease, metabolic disease (e.g. diabetes), anemia, or other blood disorder?   Yes   Do you have cancer, leukemia, HIV/AIDS, or any other immune system problem?   No   In the past 3 months, have you taken medications that affect  your immune system, such as prednisone, other steroids, or anticancer drugs; drugs for the treatment of rheumatoid arthritis, Crohn s disease, or psoriasis; or have you had radiation treatments?   No   Have you had a seizure, or a brain or other nervous system problem?   No   During the past year, have you received a transfusion of blood or blood     products, or been given immune (gamma) globulin or antiviral drug?   No   For women: Are you pregnant or is there a chance you could become        pregnant during the next month?   No   Have you received any vaccinations in the past 4 weeks?   No     Immunization questionnaire was positive for at least one answer.  Notified MD Emil.      MNVFC doesn't apply on this patient    Per orders of Dr. Ramana MD, injection of tdap given by Alexa Gaviria. Patient instructed to remain in clinic for 15 minutes afterwards, and to report any adverse reaction to me immediately.       Screening performed by Alexa Gaviria on 7/14/2017 at 3:12 PM.

## 2017-07-14 NOTE — PROGRESS NOTES
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 depo stopped 6 months ago not pregnant. Refer to gyn.   - OB/GYN REFERRAL    22. Migraine with aura and without status migrainosus, not intractable  Least of her problems right now.       COUNSELING:   Reviewed preventive health counseling, as reflected in patient instructions       Regular exercise       Healthy diet/nutrition       Vision screening       Hearing screening       Immunizations    Vaccinated for: TDAP           Contraception       Safe sex practices/STD prevention       Consider Hep C screening for patients born between 1945 and        HIV screeninx in teen years, 1x in adult years, and at intervals if high risk     reports that she quit smoking about 9 months ago. Her smoking use included Cigarettes. She has a 15.00 pack-year smoking history. She uses smokeless tobacco.  Tobacco Cessation Action Plan: Information offered: Patient not interested at this time  Estimated body mass index is 25.65 kg/(m^2) as calculated from the following:    Height as of this encounter: 4' 10\" (1.473 m).    Weight as of this encounter: 122 lb 12 oz (55.7 kg).   Weight management plan: Discussed healthy diet and exercise guidelines and patient will " follow up in 12 months in clinic to re-evaluate.    Counseling Resources:  ATP IV Guidelines  Pooled Cohorts Equation Calculator  Breast Cancer Risk Calculator  FRAX Risk Assessment  ICSI Preventive Guidelines  Dietary Guidelines for Americans, 2010  [employer]'s My Plate  ASA Prophylaxis  Lung CA Screening    Reina Boles MD  [facility]

## 2017-07-15 ENCOUNTER — TELEPHONE (OUTPATIENT)
Dept: FAMILY MEDICINE | Facility: CLINIC | Age: 37
End: 2017-07-15

## 2017-07-15 DIAGNOSIS — R79.89 LOW TSH LEVEL: Primary | ICD-10-CM

## 2017-07-15 DIAGNOSIS — R19.7 DIARRHEA, UNSPECIFIED TYPE: ICD-10-CM

## 2017-07-15 DIAGNOSIS — F41.9 ANXIETY: ICD-10-CM

## 2017-07-15 LAB
ALBUMIN SERPL-MCNC: 3.3 G/DL (ref 3.4–5)
ALP SERPL-CCNC: 83 U/L (ref 40–150)
ALT SERPL W P-5'-P-CCNC: 18 U/L (ref 0–50)
ANION GAP SERPL CALCULATED.3IONS-SCNC: 9 MMOL/L (ref 3–14)
AST SERPL W P-5'-P-CCNC: 15 U/L (ref 0–45)
BILIRUB SERPL-MCNC: 0.2 MG/DL (ref 0.2–1.3)
BUN SERPL-MCNC: 7 MG/DL (ref 7–30)
CALCIUM SERPL-MCNC: 8.8 MG/DL (ref 8.5–10.1)
CHLORIDE SERPL-SCNC: 111 MMOL/L (ref 94–109)
CHOLEST SERPL-MCNC: 218 MG/DL
CO2 SERPL-SCNC: 25 MMOL/L (ref 20–32)
CREAT SERPL-MCNC: 0.56 MG/DL (ref 0.52–1.04)
GFR SERPL CREATININE-BSD FRML MDRD: ABNORMAL ML/MIN/1.7M2
GLUCOSE SERPL-MCNC: 83 MG/DL (ref 70–99)
HDLC SERPL-MCNC: 41 MG/DL
LDLC SERPL CALC-MCNC: 156 MG/DL
NONHDLC SERPL-MCNC: 177 MG/DL
POTASSIUM SERPL-SCNC: 3.4 MMOL/L (ref 3.4–5.3)
PROT SERPL-MCNC: 7 G/DL (ref 6.8–8.8)
SODIUM SERPL-SCNC: 145 MMOL/L (ref 133–144)
T PALLIDUM IGG+IGM SER QL: NEGATIVE
T4 FREE SERPL-MCNC: 0.8 NG/DL (ref 0.76–1.46)
TRIGL SERPL-MCNC: 105 MG/DL
TSH SERPL DL<=0.005 MIU/L-ACNC: 0.26 MU/L (ref 0.4–4)

## 2017-07-15 ASSESSMENT — PATIENT HEALTH QUESTIONNAIRE - PHQ9: SUM OF ALL RESPONSES TO PHQ QUESTIONS 1-9: 13

## 2017-07-15 NOTE — PROGRESS NOTES
Adithyajulia Ms. Lin,  Some of Your results came back as follows  . -Liver and gallbladder tests (ALT,AST, Alk phos,bilirubin) are normal.  -Kidney function (GFR) is normal.  Sodium is mildly increased, increase water intake   -Potassium is normal.  -Glucose (diabetic screening test) is normal.  -LDL(bad) cholesterol level is elevated, HDL(good) cholesterol level is low and your triglycerides are normal which can increase your heart disease risk.  A diet high in fat and simple carbohydrates, genetics and being overweight can contribute to this. ADVISE: Exercise, a low fat, low carbohydrate diet, weight control, and omega-3 fatty acids (fish oil) 0333-5414 mg daily are helpful to improve this.  Rechecking your fasting cholesterol panel in 12 months is recommended (Lipid w/ LDL reflex, DX: hyperlipidemia)  -TSH (thyroid stimulating hormone) level is abnormal but free t4 normal indicating possible subclinical  hyperthyroidism (an overactive thyroid).  This can cause a number of symptoms including palpitations, anxiety, agitation, tremor, insomnia, diarrhea, heat intolerance)  ADVISE: endocrine consult. Referral placed.  Your provider has referred you to: [facility]: [facility] [phone]   [email or web address]  [facility]: [facility] [phone]   [email or web address]  UM: [facility] Diabetes [facility] [phone]   [email or web address]  UM: [facility] [place] [phone]   [email or web address]    . If you have any further concerns please do not hesitate to contact us by message, phone or making an appointment.  Have a good day   Dr Ramana CARO

## 2017-07-16 LAB
C TRACH DNA SPEC QL NAA+PROBE: NORMAL
HCV AB SERPL QL IA: NORMAL
N GONORRHOEA DNA SPEC QL NAA+PROBE: NORMAL
SPECIMEN SOURCE: NORMAL
SPECIMEN SOURCE: NORMAL

## 2017-07-16 NOTE — PROGRESS NOTES
Janice Ms. Prebish,  Your results came back and are within acceptable limits. -Hepatitis C antibody screen test shows no signs of a previous hepatitis C infection.  -Chlamydia and gonnohrea tests are normal.. If you have any further concerns please do not hesitate to contact us by message, phone or making an appointment.  Have a good day   Dr Ramana CARO

## 2017-07-17 LAB
HBV SURFACE AB SERPL IA-ACNC: 0.26 M[IU]/ML
HBV SURFACE AG SERPL QL IA: NONREACTIVE
HIV 1+2 AB+HIV1 P24 AG SERPL QL IA: NORMAL

## 2017-07-17 NOTE — PROGRESS NOTES
Janice Ms. Prebish,  Your results came back negative for HIV and Hepatitis B. You are also not immune to hepatitis B so consider revaccination with series of 3 shots of Hepatitis B at 0, 2 and 6 months and can schedule any time with MA only apt.   If you have any further concerns please do not hesitate to contact us by message, phone or making an appointment.  Have a good day   Dr Ramana CARO

## 2017-07-18 ENCOUNTER — TELEPHONE (OUTPATIENT)
Dept: ADDICTION MEDICINE | Facility: CLINIC | Age: 37
End: 2017-07-18

## 2017-07-18 NOTE — TELEPHONE ENCOUNTER
Writer attempted to contact pt no answer, left VM for pt to call clinic for appt.        Abel Plunkett

## 2017-07-18 NOTE — TELEPHONE ENCOUNTER
----- Message from Homa Baum sent at 7/18/2017  7:29 AM CDT -----  Regarding: Addiction Med Referral  Please review.

## 2017-07-25 NOTE — TELEPHONE ENCOUNTER
Writer spoke with pt she's scheduled to see Dr. Shine on 8/1 @ 9:20 am.  Writer closing encounter no follow up needed.      Abel Plunkett

## 2017-07-31 DIAGNOSIS — G89.29 CHRONIC MIDLINE LOW BACK PAIN WITH SCIATICA, SCIATICA LATERALITY UNSPECIFIED: ICD-10-CM

## 2017-07-31 DIAGNOSIS — M54.40 CHRONIC MIDLINE LOW BACK PAIN WITH SCIATICA, SCIATICA LATERALITY UNSPECIFIED: ICD-10-CM

## 2017-07-31 NOTE — TELEPHONE ENCOUNTER
methocarbamol (ROBAXIN) 750 MG tablet      Last Written Prescription Date: 6/26/2017  Last Fill Quantity: 180,  # refills: 0   [place] Office Visit with [facility] or [facility] prescribing provider: 7/14/2017    Routing refill request to provider for review/approval because:  Drug not on the [facility] refill protocol     Thank you,  Lis East RN

## 2017-08-02 RX ORDER — METHOCARBAMOL 750 MG/1
TABLET, FILM COATED ORAL
Qty: 180 TABLET | Refills: 0 | Status: SHIPPED | OUTPATIENT
Start: 2017-08-02 | End: 2017-08-29

## 2017-08-03 ENCOUNTER — TELEPHONE (OUTPATIENT)
Dept: BEHAVIORAL HEALTH | Facility: CLINIC | Age: 37
End: 2017-08-03

## 2017-08-03 NOTE — TELEPHONE ENCOUNTER
[facility]  [facility] [place]      Social Work Care Navigator Note      Patient: Alisia Martins Prebish  Date: August 3, 2017  Preferred Name: Alisia    Previous PHQ-9:   PHQ-9 SCORE 10/5/2016 11/10/2016 7/14/2017   Total Score - - -   Total Score MyChart - - 13 (Moderate depression)   Total Score 12 7 13     Previous NATHANAEL-7:   NATHANAEL-7 SCORE 11/20/2015 7/7/2016 9/20/2016   Total Score - - -   Total Score 12 21 18     KYLE LEVEL:  No flowsheet data found.    Preferred Contact:  No Data Recorded    Type of Contact Today: Phone call (not reached/unavailable)      Data: (subjective / Objective):  Pt was previously interested in [facility] services and had DA completed, however was ns after this for the sw to enroll. SW attempted to reach patient to see if she was interested in pursuing [facility] again, but was unsuccessful.  SW left a detailed vm with her contact information.   ELVER Casarez  Social Work Care Coordinator  [facility]  [address] [place] [address]  Phone: [phone] Fax: [phone]  [email or web address]

## 2017-08-07 ENCOUNTER — TELEPHONE (OUTPATIENT)
Dept: FAMILY MEDICINE | Facility: CLINIC | Age: 37
End: 2017-08-07

## 2017-08-07 ENCOUNTER — RADIANT APPOINTMENT (OUTPATIENT)
Dept: GENERAL RADIOLOGY | Facility: CLINIC | Age: 37
End: 2017-08-07
Attending: FAMILY MEDICINE
Payer: COMMERCIAL

## 2017-08-07 ENCOUNTER — OFFICE VISIT (OUTPATIENT)
Dept: FAMILY MEDICINE | Facility: CLINIC | Age: 37
End: 2017-08-07
Payer: COMMERCIAL

## 2017-08-07 VITALS
RESPIRATION RATE: 16 BRPM | HEART RATE: 93 BPM | TEMPERATURE: 98.5 F | WEIGHT: 118 LBS | SYSTOLIC BLOOD PRESSURE: 102 MMHG | BODY MASS INDEX: 24.66 KG/M2 | DIASTOLIC BLOOD PRESSURE: 60 MMHG | OXYGEN SATURATION: 96 %

## 2017-08-07 DIAGNOSIS — F17.200 TOBACCO USE DISORDER: ICD-10-CM

## 2017-08-07 DIAGNOSIS — R94.6 ABNORMAL THYROID FUNCTION TEST: ICD-10-CM

## 2017-08-07 DIAGNOSIS — R05.9 COUGH: Primary | ICD-10-CM

## 2017-08-07 DIAGNOSIS — J45.20 UNCONTROLLED INTERMITTENT ASTHMA: ICD-10-CM

## 2017-08-07 DIAGNOSIS — R05.9 COUGH: ICD-10-CM

## 2017-08-07 PROCEDURE — 71020 XR CHEST 2 VW: CPT

## 2017-08-07 PROCEDURE — 99214 OFFICE O/P EST MOD 30 MIN: CPT | Performed by: FAMILY MEDICINE

## 2017-08-07 RX ORDER — PREDNISONE 20 MG/1
TABLET ORAL
Qty: 20 TABLET | Refills: 0 | Status: SHIPPED | OUTPATIENT
Start: 2017-08-07 | End: 2017-08-29

## 2017-08-07 NOTE — LETTER
Alisia Lin  [address]  [place] [address]        August 14, 2017          Dear ,    We are writing to inform you of your test results.    Your chest x-ray was reviewed by the radiologist and was normal - no pneumonia.  I strongly suspect that you have developed some emphysema in addition to asthma.  I recommend that you schedule a follow-up with Nurse Hines when you are feeling better to discuss further lung function testing and possible use of a controller medication.  I also strongly recommend smoking cessation to prevent further worsening of your lung disease.  Let us know if you'd like help with that.      Resulted Orders   XR Chest 2 Views    Narrative    CHEST TWO VIEWS   8/7/2017 7:01 PM      HISTORY: Cough.     COMPARISON: 7/14/2017.    FINDINGS: The heart size is normal. The lungs are clear. No  pneumothorax or pleural effusion.      Impression    IMPRESSION:  No acute abnormality.     MINDY DIA MD     If you have any questions or concerns, please call the clinic at the number listed above.     Sincerely,  Chanel Esparza MD/nr

## 2017-08-07 NOTE — PROGRESS NOTES
SUBJECTIVE:                                                    Alisia Lin is a 36 year old female who presents to clinic today for the following health issues:      RESPIRATORY SYMPTOMS      Duration: 3+ weeks     Description           Cough is dry and worsening.    Severity: moderate    Accompanying signs and symptoms: rhinorrhea, cough, fatigue/malaise, myalgias and diarrhea    History (predisposing factors):  Asthma, tobacco abuse    Precipitating or alleviating factors: None    Therapies tried and outcome:  Has taken steroids for URI - usually helpful    Saw Dr. Boles a few weeks ago who gave her a 5-day course of prednisone.  Symptoms improved while on prednisone but again worsened when she stopped it.  Carries diagnosis of intermittent asthma and only inhaler is albuterol.        Breast Lump      Duration: many weeks    Description (location/character/radiation): left breast    Intensity:  moderate    Accompanying signs and symptoms: leaking whitish fluid from nipple and site of cyst    History (similar episodes/previous evaluation): seen by Dr. Boles for same issue 3 weeks ago - was given referral for diagnostic mammo and ultrasound.  Has not scheduled that.    Precipitating or alleviating factors: None    Therapies tried and outcome: None       Pt would also like a note for work stating that she was seen here today (missed work yesterday).    She also wants more information on hyperthyroidism diagnosis. Symptoms, treatments, prognosis, etc.      Problem list and histories reviewed & adjusted, as indicated.  Additional history: as documented      BP Readings from Last 3 Encounters:   08/07/17 102/60   07/14/17 125/77   11/10/16 104/62    Wt Readings from Last 3 Encounters:   08/07/17 118 lb (53.5 kg)   07/14/17 122 lb 12 oz (55.7 kg)   11/10/16 124 lb (56.2 kg)             Reviewed and updated as needed this visit by clinical staffTobacco  Allergies  Meds  Med Hx  Surg Hx  Fam Hx  Soc Hx     "    ROS:  CONST: POSITIVE for \"low-grade\" fever (subjective - no temps taken)  ENT: NEGATIVE for ear pain    OBJECTIVE:     /60 (BP Location: Right arm, Patient Position: Chair, Cuff Size: Adult Regular)  Pulse 93  Temp 98.5  F (36.9  C) (Tympanic)  Resp 16  Wt 118 lb (53.5 kg)  LMP 07/31/2017  SpO2 96%  BMI 24.66 kg/m2  Body mass index is 24.66 kg/(m^2).  GEN:  no apparent distress, coughs frequently during visit  EYES: PERRL, conjunctivae and sclerae clear  ENT: external ears and nose without lesions or scars, TM's and canals clear bilaterally and oropharynx clear with moist mucus membranes and normal landmarks  LUNGS:  normal respiratory effort, and lungs with extensive inspiratory and expiratory wheezing throughout lung fields  CV: regular rate and rhythm, normal S1 S2, no S3 or S4 and no murmur, click or rub     Diagnostic Test Results:  CXR - within normal limits by my interpretation     ASSESSMENT/PLAN:       1. Cough  - XR Chest 2 Views; Future    2. Uncontrolled intermittent asthma  Possibly some COPD given her age and history of smoking.  We'll give her a longer prednisone taper.  Will need to Follow-Up with PCP to discuss possible PFT's.  I wrote her a work excuse note for the next 2 days.    - predniSONE (DELTASONE) 20 MG tablet; Take 3 tabs (60 mg) by mouth daily x 3 days, 2 tabs (40 mg) daily x 3 days, 1 tab (20 mg) daily x 3 days, then 1/2 tab (10 mg) x 3 days.  Dispense: 20 tablet; Refill: 0    3. Tobacco use disorder  Advised smoking cessation and discussed that this may represent COPD as well as asthma.    4. Abnormal thyroid function test   I also reviewed the recommendations and referrals Dr. Boles has already made regarding her other concerns.  I gave her info on getting scheduled at [facility] and Endocr[facility]ology and strongly encouraged her to do so.    Patient Instructions   Call [facility] appointment line [phone] to schedule the breast mammogram and " ultrasound that Dr. Boles ordered last month.    Start the prednisone taper.      Schedule with Endocrinology.      Chanel Esparza MD  [facility]

## 2017-08-07 NOTE — NURSING NOTE
"Chief Complaint   Patient presents with     Thyroid Problem     URI     Breast Mass       Initial /60 (BP Location: Right arm, Patient Position: Chair, Cuff Size: Adult Regular)  Pulse 93  Temp 98.5  F (36.9  C) (Tympanic)  Resp 16  Wt 118 lb (53.5 kg)  LMP 07/31/2017  SpO2 96%  BMI 24.66 kg/m2 Estimated body mass index is 24.66 kg/(m^2) as calculated from the following:    Height as of 7/14/17: 4' 10\" (1.473 m).    Weight as of this encounter: 118 lb (53.5 kg).  Medication Reconciliation: complete     Anupama Cazares, CMA      "

## 2017-08-07 NOTE — TELEPHONE ENCOUNTER
Call transferred live.  Patient calling  to schedule an appointment.  Does not want triage- I asked her several times and she declined.    She has URI that she wants evaluated.  She wants to discuss a new diagnosis of hyperthyroidism.  She wants to discuss lump on her breast.    Ifrah Diggs RN

## 2017-08-07 NOTE — PATIENT INSTRUCTIONS
Call [facility] appointment line [phone] to schedule the breast mammogram and ultrasound that Dr. Boles ordered last month.    Start the prednisone taper.      Schedule with Endocrinology.

## 2017-08-07 NOTE — LETTER
August 7, 2017        Alisia Lin  [address]  [place] [address]        To Whom It May Concern,     Alisia Lin was seen in clinic today with acute illness and has been prescribed medication.  She will not be able to work until she's feeling better which I anticipate will be in approximately 2 days.      Sincerely,        Chanel Esparza MD

## 2017-08-07 NOTE — MR AVS SNAPSHOT
After Visit Summary   8/7/2017    Alisia Lin    MRN: [id number]           Patient Information     Date Of Birth          1980        Visit Information        Provider Department      8/7/2017 6:00 PM Chanel Esparza MD [facility]        Today's Diagnoses     Cough    -  1    Uncontrolled intermittent asthma        Tobacco use disorder          Care Instructions    Call [facility] appointment line [phone] to schedule the breast mammogram and ultrasound that Dr. Boles ordered last month.    Start the prednisone taper.      Schedule with Endocrinology.          Follow-ups after your visit        Your next 10 appointments already scheduled     Sep 11, 2017  7:20 AM CDT   (Arrive by 7:05 AM)   New Patient Visit with Grupo Doran MD   [facility] ([facility])    [address] [place] [address] [phone]              Who to contact     If you have questions or need follow up information about today's clinic visit or your schedule please contact [facility] directly at [phone].  Normal or non-critical lab and imaging results will be communicated to you by MyChart, letter or phone within 4 business days after the clinic has received the results. If you do not hear from us within 7 days, please contact the clinic through MyChart or phone. If you have a critical or abnormal lab result, we will notify you by phone as soon as possible.  Submit refill requests through [employer] or call your pharmacy and they will forward the refill request to us. Please allow 3 business days for your refill to be completed.          Additional Information About Your Visit        Care EveryWhere ID     This is your Care EveryWhere ID. This could be used by other organizations to access your [facility] medical records  [id number]        Your Vitals Were     Pulse  Temperature Respirations Last Period Pulse Oximetry BMI (Body Mass Index)    93 98.5  F (36.9  C) (Tympanic) 16 07/31/2017 96% 24.66 kg/m2       Blood Pressure from Last 3 Encounters:   08/07/17 102/60   07/14/17 125/77   11/10/16 104/62    Weight from Last 3 Encounters:   08/07/17 118 lb (53.5 kg)   07/14/17 122 lb 12 oz (55.7 kg)   11/10/16 124 lb (56.2 kg)                 Today's Medication Changes          These changes are accurate as of: 8/7/17  7:01 PM.  If you have any questions, ask your nurse or doctor.               These medicines have changed or have updated prescriptions.        Dose/Directions    predniSONE 20 MG tablet   Commonly known as:  DELTASONE   This may have changed:    - how much to take  - how to take this  - when to take this  - additional instructions   Used for:  Uncontrolled intermittent asthma   Changed by:  Chanel Esparza MD        Take 3 tabs (60 mg) by mouth daily x 3 days, 2 tabs (40 mg) daily x 3 days, 1 tab (20 mg) daily x 3 days, then 1/2 tab (10 mg) x 3 days.   Quantity:  20 tablet   Refills:  0            Where to get your medicines      These medications were sent to [facility] [address] [place] [address] AT [address] [address]    Hours:  24-hours Phone:  [phone]     predniSONE 20 MG tablet                Primary Care Provider Office Phone # Fax #    Sepideh Rama Hines, SONJA [facility] [phone]       [place] [id number] [facility] [address]        Equal Access to Services     SAM CLARK AH: Batsheva Cabello, paul sultana, martha oliveira, shreyas fernandez. So [facility] [phone].    ATENCIÓN: Si habla español, tiene a randall disposición servicios gratuitos de asistencia lingüística. Llame al [phone].    We comply with applicable federal civil rights laws and [place] laws. We do not discriminate on  the basis of race, color, national origin, age, disability sex, sexual orientation or gender identity.            Thank you!     Thank you for choosing [facility]  for your care. Our goal is always to provide you with excellent care. Hearing back from our patients is one way we can continue to improve our services. Please take a few minutes to complete the written survey that you may receive in the mail after your visit with us. Thank you!             Your Updated Medication List - Protect others around you: Learn how to safely use, store and throw away your medicines at www.disposemymeds.org.          This list is accurate as of: 8/7/17  7:01 PM.  Always use your most recent med list.                   Brand Name Dispense Instructions for use Diagnosis    albuterol 108 (90 BASE) MCG/ACT Inhaler    PROAIR HFA/PROVENTIL HFA/VENTOLIN HFA    1 Inhaler    Inhale 2 puffs into the lungs every 4 hours as needed for shortness of breath / dyspnea or wheezing    Intermittent asthma, uncomplicated       cetirizine 10 MG tablet    zyrTEC    30 tablet    TAKE 1 TABLET(10 MG) BY MOUTH EVERY EVENING    Seasonal allergic rhinitis, unspecified chronicity, unspecified trigger       CLONAZEPAM PO      Take by mouth 4 times daily as needed for anxiety        clotrimazole 2 % Crea    RA CLOTRIMAZOLE 3    21 g    Place 1 Application vaginally At Bedtime    Vaginal irritation       FLUOXETINE HCL PO      Take 60 mg by mouth daily        hydrOXYzine 25 MG tablet    ATARAX    60 tablet    Take 1-2 tablets (25-50 mg) by mouth every 6 hours as needed for itching    Seasonal allergic rhinitis       ibuprofen 600 MG tablet    ADVIL/MOTRIN    180 tablet    TAKE 1 TABLET BY MOUTH TWICE DAILY WITH MEALS    Chronic low back pain, unspecified back pain laterality, with sciatica presence unspecified, Chronic midline thoracic back pain       loperamide 2 MG tablet    IMODIUM A-D    60 tablet    Start with 2 tabs (4 mg), then take one  tab (2 mg) after each diarrheal stool.  Do not use more than  8 tabs (16 mg) per day.    Diarrhea, unspecified type       MAPAP 500 MG tablet   Generic drug:  acetaminophen     100 tablet    TAKE 2 TABLETS(1000 MG) BY MOUTH TWICE DAILY AS NEEDED FOR MILD PAIN    Back pain, Shoulder pain, right       Menthol (Topical Analgesic) 4 % Gel     150 mL    Apply three times a day as needed to affected area    Bilateral low back pain with left-sided sciatica, Right shoulder pain       methocarbamol 750 MG tablet    ROBAXIN    180 tablet    TAKE 2 TABLETS(1500 MG) BY MOUTH THREE TIMES DAILY AS NEEDED FOR MUSCLE SPASMS    Chronic midline low back pain with sciatica, sciatica laterality unspecified       metroNIDAZOLE 0.75 % vaginal gel    METROGEL    70 g    Place 1 applicator (5 g) vaginally At Bedtime    Bacterial vaginosis       multivitamin, therapeutic with minerals Tabs tablet     100 tablet    Take 1 tablet by mouth daily    Other fatigue       * order for DME     1 Device    Equipment being ordered: supportive back brace    Bilateral low back pain with left-sided sciatica       * order for DME     1 Units    Equipment being ordered: heating device and cryotherapy device.    Chronic low back pain, Midline thoracic back pain       pantoprazole 40 MG EC tablet    PROTONIX    90 tablet    Take 1 tablet (40 mg) by mouth daily    Gastroesophageal reflux disease without esophagitis       predniSONE 20 MG tablet    DELTASONE    20 tablet    Take 3 tabs (60 mg) by mouth daily x 3 days, 2 tabs (40 mg) daily x 3 days, 1 tab (20 mg) daily x 3 days, then 1/2 tab (10 mg) x 3 days.    Uncontrolled intermittent asthma       varenicline 1 MG tablet    CHANTIX    56 tablet    Take 1 tablet (1 mg) by mouth 2 times daily    Tobacco abuse       * Notice:  This list has 2 medication(s) that are the same as other medications prescribed for you. Read the directions carefully, and ask your doctor or other care provider to review them with you.

## 2017-08-08 ENCOUNTER — TELEPHONE (OUTPATIENT)
Dept: FAMILY MEDICINE | Facility: CLINIC | Age: 37
End: 2017-08-08

## 2017-08-08 PROBLEM — R94.6 ABNORMAL THYROID FUNCTION TEST: Status: ACTIVE | Noted: 2017-08-08

## 2017-08-08 NOTE — TELEPHONE ENCOUNTER
Patient was seen yesterday by Dr. Esparza. Letter for work was written and faxed, but it was not received. Need to re-fax letter in. I was not able to find original letter so I printed a new letter. When patient calls back with fax number we will need to fax new letter in. Letter is on my desk.   Robbi Cifuentes, [facility]

## 2017-08-11 NOTE — PROGRESS NOTES
Please send results letter:  Your chest x-ray was reviewed by the radiologist and was normal - no pneumonia.  I strongly suspect that you have developed some emphysema in addition to asthma.  I recommend that you schedule a follow-up with Nurse Hines when you are feeling better to discuss further lung function testing and possible use of a controller medication.  I also strongly recommend smoking cessation to prevent further worsening of your lung disease.  Let us know if you'd like help with that.    Chanel Esparza MD

## 2017-08-29 ENCOUNTER — PRE VISIT (OUTPATIENT)
Dept: ANESTHESIOLOGY | Facility: CLINIC | Age: 37
End: 2017-08-29

## 2017-08-29 ENCOUNTER — OFFICE VISIT (OUTPATIENT)
Dept: ENDOCRINOLOGY | Facility: CLINIC | Age: 37
End: 2017-08-29

## 2017-08-29 VITALS
WEIGHT: 117.3 LBS | HEIGHT: 59 IN | DIASTOLIC BLOOD PRESSURE: 61 MMHG | SYSTOLIC BLOOD PRESSURE: 90 MMHG | HEART RATE: 109 BPM | BODY MASS INDEX: 23.65 KG/M2

## 2017-08-29 DIAGNOSIS — G89.29 CHRONIC MIDLINE LOW BACK PAIN WITH SCIATICA, SCIATICA LATERALITY UNSPECIFIED: ICD-10-CM

## 2017-08-29 DIAGNOSIS — R79.89 LOW TSH LEVEL: Primary | ICD-10-CM

## 2017-08-29 DIAGNOSIS — R79.89 LOW TSH LEVEL: ICD-10-CM

## 2017-08-29 DIAGNOSIS — M54.40 CHRONIC MIDLINE LOW BACK PAIN WITH SCIATICA, SCIATICA LATERALITY UNSPECIFIED: ICD-10-CM

## 2017-08-29 LAB
T3 SERPL-MCNC: 98 NG/DL (ref 60–181)
T4 FREE SERPL-MCNC: 0.78 NG/DL (ref 0.76–1.46)
TSH SERPL DL<=0.005 MIU/L-ACNC: 2.84 MU/L (ref 0.4–4)

## 2017-08-29 RX ORDER — DEXTROAMPHETAMINE SACCHARATE, AMPHETAMINE ASPARTATE, DEXTROAMPHETAMINE SULFATE AND AMPHETAMINE SULFATE 5; 5; 5; 5 MG/1; MG/1; MG/1; MG/1
20 TABLET ORAL
COMMUNITY
End: 2019-04-26

## 2017-08-29 RX ORDER — QUETIAPINE FUMARATE 50 MG/1
50 TABLET, FILM COATED ORAL
COMMUNITY
Start: 2015-04-28 | End: 2020-03-11

## 2017-08-29 RX ORDER — ALPRAZOLAM 1 MG
2 TABLET ORAL
COMMUNITY
End: 2019-04-26

## 2017-08-29 RX ORDER — METHOCARBAMOL 750 MG/1
TABLET, FILM COATED ORAL
Qty: 180 TABLET | Refills: 0 | Status: SHIPPED | OUTPATIENT
Start: 2017-08-29 | End: 2017-09-25

## 2017-08-29 ASSESSMENT — PAIN SCALES - GENERAL: PAINLEVEL: NO PAIN (0)

## 2017-08-29 NOTE — TELEPHONE ENCOUNTER
1.  Date/reason for appt: 9/11/17 7:20AM Narcotic Dependence   2.  Referring provider: KELIN BOLES MD   3.  Call to patient (Yes / No - short description): No, pt was referred.  4.  Previous care at / records requested from:  [facility] Guero Boles MD -- Recs are in [facility] ED note 10/17/16   [facility] Callie CASILLAS CNP -- Recs are in Epic

## 2017-08-29 NOTE — LETTER
"8/29/2017       RE: Alisia Lin  [address]  [facility] [address]     Dear Colleague,    Thank you for referring your patient, Alisia Lin, to the [facility] ENDOCRINOLOGY at [facility]. Please see a copy of my visit note below.    Endocrine Consult note    Attending Assessment/Plan :   1. Low TSH, solitary value, slightly below normal.  She had influenza A 10/16.  Perhaps this induced a transient thyroiditis.   She is clinically euthyroid.   Full TFTS today along with antibodies    Addendum: all TFTS performed on 8/29/17 following the appt are normal, including all antibodies.  No further work up.      2. Anxiety- we would conclude this is not due to the thyroid.    3.  Sleep disturbance- we would conclude this is not due to the thyroid.      4. Anorexia    Joann Calabrese MD      Chief complaint:  Alisia is a 36 year old female seen in consultation at the request of Dr Deyanira Boles for low TSH    HISTORY OF PRESENT ILLNESS  Alisia was seen by Dr Boles on 7/14/17.  At that time there were a number of issues including anxiety, depression, substance abuse, diarrhea and others.  TFTS were obtained which showed a low TSH 0.26 with free T4 0.8.  Prior TSH levels had beeen normal dating between 2006 and 3/15 when she hda TSH 1.21.     Alisia has no personal or family history of thyroid disease.   She is no history of IV contrast.    She tells us she had \"pneumonia\", treated as an outpaient. CXR 7/14 and 8/7 were clear.  10/17/16 CXR ([facility]) described interstitial prominence mid and lower lungs ? Atypical infectious vs pulmonary edema.   I see on Care everywhere that the diagnosis that day was influenza A infection with influenza pneumonia.  She was hospitalized 10/17/16 - 10/20/16 ([facility])    REVIEW OF SYSTEMS  Tired  Sleep is very poor - requires meds for sleep  No appetite; weight is stable this year  Mainly feels hot; sometimes feels cold  Recently feels something " in throat/ neck - a little pain with swallow  GI: watery BM x years for which she takes Imodium prn;   Dental pain - chipped tooth  Pain low substernal to back  10 system ROS otherwise as per the HPI or negative    Past Medical History  Past Medical History:   Diagnosis Date     Acute stress reaction 5/31/2014     Anxiety      Arthritis      Asthma     Flovent BID     Back pain 1/26/2016     Chemical dependency (H) 06/05/2014    heroin, Norco     Chronic low back pain 3/19/2012     Influenza A 10/17/2016    with influenza pneumonia.  Hospitalized [facility]     Migraine with aura, without mention of intractable migraine without mention of status migrainosus     occas, Last one 11/2013. 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     Other, mixed, or unspecified nondependent drug abuse, in remission 1/1/07    Treatment for narcotic use      Papanicolaou smear of cervix with atypical squamous cells of undetermined significance (ASC-US) 3/2008    colp - WNL, HPV 53     Pyelonephritis 12/23/2015     Tobacco use disorder     1-1.5 ppd, zyban unsuccessful     Unspecified contraceptive management     ortho tricyclen     Past Surgical History:   Procedure Laterality Date     HC ENLARGE BREAST WITH IMPLANT  2002    BILATERAL     HC REMOVE TONSILS/ADENOIDS,10+ Y/O  2006     HC TOOTH EXTRACTION W/FORCEP  16 yo    wisdom teeth     IUD PARAGARD  3/3/08    Removed with mirena placed. Mirena has now been removed as well.      LEEP TX, CERVICAL  age 15?     ORTHOPEDIC SURGERY      Lumbar fusion 2009       Medications    Current Outpatient Prescriptions   Medication Sig Dispense Refill     methocarbamol (ROBAXIN) 750 MG tablet TAKE 2 TABLETS(1500 MG) BY MOUTH THREE TIMES DAILY AS NEEDED FOR MUSCLE SPASMS 180 tablet 0      ALPRAZolam (XANAX) 1 MG tablet Take 2 mg by mouth       QUEtiapine (SEROQUEL) 50 MG tablet Take 50 mg by mouth       amphetamine-dextroamphetamine (ADDERALL) 20 MG per tablet Take 20 mg by mouth       Cyanocobalamin (VITAMIN B 12 PO) Patient reported taking two tabs daily- not sure of the dosage.       MAPAP 500 MG tablet TAKE 2 TABLETS(1000 MG) BY MOUTH TWICE DAILY AS NEEDED FOR MILD PAIN 100 tablet 5     cetirizine (ZYRTEC) 10 MG tablet TAKE 1 TABLET(10 MG) BY MOUTH EVERY EVENING 30 tablet 0     FLUOXETINE HCL PO Take 20 mg by mouth daily        loperamide (IMODIUM A-D) 2 MG tablet Start with 2 tabs (4 mg), then take one tab (2 mg) after each diarrheal stool.  Do not use more than  8 tabs (16 mg) per day. 60 tablet 0     ibuprofen (ADVIL/MOTRIN) 600 MG tablet TAKE 1 TABLET BY MOUTH TWICE DAILY WITH MEALS 180 tablet 0     albuterol (PROAIR HFA/PROVENTIL HFA/VENTOLIN HFA) 108 (90 BASE) MCG/ACT Inhaler Inhale 2 puffs into the lungs every 4 hours as needed for shortness of breath / dyspnea or wheezing 1 Inhaler 4     multivitamin, therapeutic with minerals (MULTI-VITAMIN) TABS Take 1 tablet by mouth daily 100 tablet 3     CLONAZEPAM PO Take by mouth 4 times daily as needed for anxiety       order for DME Equipment being ordered: heating device and cryotherapy device. 1 Units 0     Menthol, Topical Analgesic, 4 % GEL Apply three times a day as needed to affected area 150 mL 4     naloxone (NARCAN) nasal spray Spray 1 spray (4 mg) in nostril as needed for opioid reversal 2 each 1     buprenorphine-naloxone (SUBOXONE) 8-2 MG SUBL sublingual tablet Place 1 tablet under the tongue daily 10 each 0     pantoprazole (PROTONIX) 40 MG enteric coated tablet Take 1 tablet (40 mg) by mouth daily (Patient not taking: Reported on 8/29/2017) 90 tablet 3     order for DME Equipment being ordered: supportive back brace (Patient not taking: Reported on 8/29/2017) 1 Device 0     Vitamin B  Centrum  She was taking biotin until about 4  "months ago    Allergies  Allergies   Allergen Reactions     Compazine      Heart Problems/ Body went completley stiff for 8 hrs.     Nortriptyline      Skelaxin [Metaxalone]          Family History  family history includes Alcohol/Drug in her father and mother; Arthritis in her paternal grandmother; Asthma in her daughter; Breast Cancer in her paternal grandmother; C.A.D. in her paternal grandmother; CANCER in her paternal grandfather and paternal grandmother; DIABETES in her paternal grandmother; Depression in her mother; Hypertension in her mother; Lipids in her father and mother. There is no history of Thyroid Disease.    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  GENERAL  Young woman  In no apparent distress  SKIN: Normal color, normal temperature, texture.  No hirsutism, alopecia or purple striae.     EYES: PERRL, EOMI, No scleral icterus,  No proptosis, conjunctival redness, stare, retraction  MOUTH: Moist, pink; pharynx clear  NECK: No visible masses. No palpable adenopathy, or masses. No carotid bruits.   THYROID:  Not palpable  RESP: Lungs clear to auscultation bilaterally  CARDIAC: Regular rate and rhythm, normal S1 S2, without murmurs, rubs or gallops    ABDOMEN: Normal bowel sounds; soft, nontender, no HSM or masses       NEURO: awake, alert, responds appropriately to questions.  Cranial nerves intact.  Moves all " extremities; Gait normal.  No tremor of the outstretched hand.  DTRs 3 /4 ,   EXTREMITIES: No clubbing, cyanosis or edema.    DATA REVIEW    Results for ZIALISIA (MRN [id number]) as of 9/2/2017 12:11   Ref. Range 8/29/2017 15:34   T4 Free Latest Ref Range: 0.76 - 1.46 ng/dL 0.78   Thyroglobulin Antibody Latest Ref Range: <40 IU/mL <20   Thyroid Stim Immunog Latest Ref Range: <=1.3 TSI index <1.0   Triiodothyronine (T3) Latest Ref Range: 60 - 181 ng/dL 98   TSH Latest Ref Range: 0.40 - 4.00 mU/L 2.84   Thyroid Peroxidase Antibody Latest Ref Range: <35 IU/mL <10       ENDO THYROID LABS-[facility] Latest Ref Rng & Units 7/14/2017 3/13/2015   TSH 0.40 - 4.00 mU/L 0.26 (L) 1.21   T4 FREE 0.76 - 1.46 ng/dL 0.80      ENDO THYROID LABS-[facility] Latest Ref Rng & Units 6/2/2011 10/3/2006   TSH 0.40 - 4.00 mU/L 2.60 0.46   T4 FREE 0.76 - 1.46 ng/dL         ENDOCRINE CLINIC NOTE      Chief complaint:  Alisia is a 36 year old female seen in consultation at the request of Dr. Reina Boles.       HISTORY OF PRESENT ILLNESS  Alisia is a 37 yo female with asthma, migraine, anxiety, MDD, LBP and tobacco abuse who is here for abnormal TFT.    Pt has been in a lot of stress and feeling tired. Her appetite and sleep has been poor this year. She also reported recent pneumonia which she took antibiotic. She went to see her PCP for evaluation for her tiredness. TFT 7/14/17 showed mildly suppressed TSH and normal FT4.    No h/o thyroid problems in the past. No neck pain or recent contrast exposure. She has chronic diarrhea which has been taking imodium for that. She attributed to anxiety. No change in BM recently. She sometimes feel hot, sometimes feels cold.     Couple days prior to this visit, she started to feel neck pain and mild pain with swallowing at epigastric area.    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   Drink alcohol 1 shot 1 week ago, otherwise sober for 2 months.  Smoke 1 pack cigarette/day    No FH of thyroid      REVIEW OF  SYSTEMS  10 point negative except as mentioned in HPI    Past Medical/Surgical History:  Past Medical History:   Diagnosis Date     Acute stress reaction 5/31/2014     Anxiety      Arthritis      Asthma     Flovent BID     Back pain 1/26/2016     Chemical dependency (H) 06/05/2014    heroin, Norco     Chronic low back pain 3/19/2012     Migraine with aura, without mention of intractable migraine without mention of status migrainosus     occas, Last one 11/2013. 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     Other, mixed, or unspecified nondependent drug abuse, in remission 1/1/07    Treatment for narcotic use      Papanicolaou smear of cervix with atypical squamous cells of undetermined significance (ASC-US) 3/2008    colp - WNL, HPV 53     Pyelonephritis 12/23/2015     Tobacco use disorder     1-1.5 ppd, zyban unsuccessful     Unspecified contraceptive management     ortho tricyclen     Past Surgical History:   Procedure Laterality Date     HC ENLARGE BREAST WITH IMPLANT  2002    BILATERAL     HC REMOVE TONSILS/ADENOIDS,12+ Y/O  2006     HC TOOTH EXTRACTION W/FORCEP  16 yo    wisdom teeth     IUD PARAGARD  3/3/08    Removed with mirena placed. Mirena has now been removed as well.      LEEP TX, CERVICAL  age 15?     ORTHOPEDIC SURGERY      Lumbar fusion 2009       Medications  Current Outpatient Prescriptions   Medication     methocarbamol (ROBAXIN) 750 MG tablet     ALPRAZolam (XANAX) 1 MG tablet     QUEtiapine (SEROQUEL) 50 MG tablet     amphetamine-dextroamphetamine (ADDERALL) 20 MG per tablet     Cyanocobalamin (VITAMIN B 12 PO)     MAPAP 500 MG tablet     cetirizine (ZYRTEC) 10 MG tablet     FLUOXETINE HCL PO     loperamide (IMODIUM A-D) 2 MG tablet     ibuprofen (ADVIL/MOTRIN) 600 MG  "tablet     albuterol (PROAIR HFA/PROVENTIL HFA/VENTOLIN HFA) 108 (90 BASE) MCG/ACT Inhaler     multivitamin, therapeutic with minerals (MULTI-VITAMIN) TABS     CLONAZEPAM PO     order for DME     Menthol, Topical Analgesic, 4 % GEL     pantoprazole (PROTONIX) 40 MG enteric coated tablet     order for DME     No current facility-administered medications for this visit.        Allergies  Allergies   Allergen Reactions     Compazine      Heart Problems/ Body went completley stiff for 8 hrs.     Nortriptyline      Skelaxin [Metaxalone]          Family History  family history includes Alcohol/Drug in her father and mother; Arthritis in her paternal grandmother; Asthma in her daughter; Breast Cancer in her paternal grandmother; C.A.D. in her paternal grandmother; CANCER in her paternal grandfather and paternal grandmother; DIABETES in her paternal grandmother; Depression in her mother; Hypertension in her mother; Lipids in her father and mother.    Social History  Social History   Substance Use Topics     Smoking status: Current Every Day Smoker     Packs/day: 1.50     Years: 10.00     Types: Cigarettes     Last attempt to quit: 10/1/2016     Smokeless tobacco: Current User      Comment: less than 1/2 ppd     Alcohol use No      Comment: JAYA 18 days ago       Physical Exam  BP 90/61 (BP Location: Right arm, Patient Position: Sitting, Cuff Size: Adult Regular)  Pulse 109  Ht 1.486 m (4' 10.5\")  Wt 53.2 kg (117 lb 4.8 oz)  LMP 07/31/2017  BMI 24.1 kg/m2  Body mass index is 24.1 kg/(m^2).  GENERAL :  In mild anxiety and depress  SKIN: Normal color, normal temperature, texture.  No hirsutism, alopecia or purple striae.     EYES: PERRL, EOMI, No scleral icterus,  No proptosis, conjunctival redness, stare, retraction  MOUTH: Moist, pink; pharynx clear  NECK: No visible masses. No palpable adenopathy, or masses. No carotid bruits. THYROID:  Normal, nontender, smooth / firm texture,  no nodules, no Bruit . Mild tenderness at " Rt sternocleidomastoid  RESP: Lungs clear to auscultation bilaterally  CARDIAC: Regular rate and rhythm, normal S1 S2, without murmurs, rubs or gallops  ABDOMEN: Normal bowel sounds; soft, nontender, no HSM or masses       NEURO: awake, alert, responds appropriately to questions.  Cranial nerves intact.  Moves all extremities; Gait normal.  No tremor of the outstretched hand.  DTRs  3 /4 bilateral patella ,   EXTREMITIES: No clubbing, cyanosis or edema.    DATA REVIEW  Labs/Imaging  TSH   Date Value Ref Range Status   08/29/2017 2.84 0.40 - 4.00 mU/L Final   07/14/2017 0.26 (L) 0.40 - 4.00 mU/L Final   03/13/2015 1.21 0.40 - 4.00 mU/L Final     Comment:     Effective 7/30/2014, the reference range for this assay has changed to reflect   new instrumentation/methodology.     06/02/2011 2.60 0.4 - 5.0 mU/L Final   10/03/2006 0.46 0.4 - 5.0 mU/L Final     T4 Free   Date Value Ref Range Status   08/29/2017 0.78 0.76 - 1.46 ng/dL Final   07/14/2017 0.80 0.76 - 1.46 ng/dL Final   ]      ASSESSMENT/PLAN:   Alisia is a 37 yo female with asthma, migraine, anxiety, MDD, LBP and tobacco abuse who is here for abnormal TFT.    ## Abnormal TFT (mildly elevate TSH)  No h/o thyroid problems in the past, no autoimmune history and no FH of thyroid disease who presented with fatique and found to have mildly elevated TSH 7/14/17.  DDx subclinical hypothyroidism, thyroiditis, euthyroid sick syndrome. No neck pain or recent contrast exposure that would suggest thyroiditis.  Pt has been in a lot of stress and not eating this year which may cause elevated TSH. Recent pneumonia may cause euthyriod sick syndrome and elevated TSH. Exam today showed euthyroidism. Multiple symptoms (sleep/ anxious could be 2/2 her underlying depression and anxiety.  -- recheck TFT today, would expect to see normalization if it was euthyroid sick. If she had underlying thyroid disease, we should see persistent abnormalities  -- AntiTPO, anti Tg which likely  positive in Hashimoto thyroiditis  -- TSI which likely positive in Graves  -- pt could discuss mild epigastric pain with swallowing with PCP    There are no Patient Instructions on file for this visit.  Orders Placed This Encounter   Procedures     TSH     T4 free     T3 total     Anti thyroglobulin antibody     Thyroid peroxidase antibody     Thyroid stimulating immunoglobulin        Patient seen and examined with staff endocrinologist Dr Calabrese.     Eduardo Downey MD  Diabetes, Metabolism and Endocrinology Fellow  Pager: [phone]      Jyoti Calbarese MD

## 2017-08-29 NOTE — NURSING NOTE
"Chief Complaint   Patient presents with     Consult     ABNORMAL THYROID FUNCTION TEST       Initial BP 90/61 (BP Location: Right arm, Patient Position: Sitting, Cuff Size: Adult Regular)  Pulse 109  Ht 1.486 m (4' 10.5\")  Wt 53.2 kg (117 lb 4.8 oz)  LMP 07/31/2017  BMI 24.1 kg/m2 Estimated body mass index is 24.1 kg/(m^2) as calculated from the following:    Height as of this encounter: 1.486 m (4' 10.5\").    Weight as of this encounter: 53.2 kg (117 lb 4.8 oz).  Medication Reconciliation: complete         "

## 2017-08-29 NOTE — PROGRESS NOTES
"Endocrine Consult note    Attending Assessment/Plan :   1. Low TSH, solitary value, slightly below normal.  She had influenza A 10/16.  Perhaps this induced a transient thyroiditis.   She is clinically euthyroid.   Full TFTS today along with antibodies    Addendum: all TFTS performed on 8/29/17 following the appt are normal, including all antibodies.  No further work up.      2. Anxiety- we would conclude this is not due to the thyroid.    3.  Sleep disturbance- we would conclude this is not due to the thyroid.      4. Anorexia    Joann Calabrese MD      Chief complaint:  Alisia is a 36 year old female seen in consultation at the request of Dr Deyanira Boles for low TSH    HISTORY OF PRESENT ILLNESS  Alisia was seen by Dr Boles on 7/14/17.  At that time there were a number of issues including anxiety, depression, substance abuse, diarrhea and others.  TFTS were obtained which showed a low TSH 0.26 with free T4 0.8.  Prior TSH levels had beeen normal dating between 2006 and 3/15 when she hda TSH 1.21.     Alisia has no personal or family history of thyroid disease.   She is no history of IV contrast.    She tells us she had \"pneumonia\", treated as an outpaient. CXR 7/14 and 8/7 were clear.  10/17/16 CXR ([facility]) described interstitial prominence mid and lower lungs ? Atypical infectious vs pulmonary edema.   I see on Care everywhere that the diagnosis that day was influenza A infection with influenza pneumonia.  She was hospitalized 10/17/16 - 10/20/16 ([facility])    REVIEW OF SYSTEMS  Tired  Sleep is very poor - requires meds for sleep  No appetite; weight is stable this year  Mainly feels hot; sometimes feels cold  Recently feels something in throat/ neck - a little pain with swallow  GI: watery BM x years for which she takes Imodium prn;   Dental pain - chipped tooth  Pain low substernal to back  10 system ROS otherwise as per the HPI or negative    Past Medical History  Past Medical History:   Diagnosis Date     Acute " stress reaction 5/31/2014     Anxiety      Arthritis      Asthma     Flovent BID     Back pain 1/26/2016     Chemical dependency (H) 06/05/2014    heroin, Norco     Chronic low back pain 3/19/2012     Influenza A 10/17/2016    with influenza pneumonia.  Hospitalized [facility]     Migraine with aura, without mention of intractable migraine without mention of status migrainosus     occas, Last one 11/2013. 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     Other, mixed, or unspecified nondependent drug abuse, in remission 1/1/07    Treatment for narcotic use      Papanicolaou smear of cervix with atypical squamous cells of undetermined significance (ASC-US) 3/2008    colp - WNL, HPV 53     Pyelonephritis 12/23/2015     Tobacco use disorder     1-1.5 ppd, zyban unsuccessful     Unspecified contraceptive management     ortho tricyclen     Past Surgical History:   Procedure Laterality Date     HC ENLARGE BREAST WITH IMPLANT  2002    BILATERAL     HC REMOVE TONSILS/ADENOIDS,10+ Y/O  2006     HC TOOTH EXTRACTION W/FORCEP  14 yo    wisdom teeth     IUD PARAGARD  3/3/08    Removed with mirena placed. Mirena has now been removed as well.      LEEP TX, CERVICAL  age 15?     ORTHOPEDIC SURGERY      Lumbar fusion 2009       Medications    Current Outpatient Prescriptions   Medication Sig Dispense Refill     methocarbamol (ROBAXIN) 750 MG tablet TAKE 2 TABLETS(1500 MG) BY MOUTH THREE TIMES DAILY AS NEEDED FOR MUSCLE SPASMS 180 tablet 0     ALPRAZolam (XANAX) 1 MG tablet Take 2 mg by mouth       QUEtiapine (SEROQUEL) 50 MG tablet Take 50 mg by mouth       amphetamine-dextroamphetamine (ADDERALL) 20 MG per tablet Take 20 mg by mouth       Cyanocobalamin (VITAMIN B 12 PO) Patient reported taking two tabs daily- not  sure of the dosage.       MAPAP 500 MG tablet TAKE 2 TABLETS(1000 MG) BY MOUTH TWICE DAILY AS NEEDED FOR MILD PAIN 100 tablet 5     cetirizine (ZYRTEC) 10 MG tablet TAKE 1 TABLET(10 MG) BY MOUTH EVERY EVENING 30 tablet 0     FLUOXETINE HCL PO Take 20 mg by mouth daily        loperamide (IMODIUM A-D) 2 MG tablet Start with 2 tabs (4 mg), then take one tab (2 mg) after each diarrheal stool.  Do not use more than  8 tabs (16 mg) per day. 60 tablet 0     ibuprofen (ADVIL/MOTRIN) 600 MG tablet TAKE 1 TABLET BY MOUTH TWICE DAILY WITH MEALS 180 tablet 0     albuterol (PROAIR HFA/PROVENTIL HFA/VENTOLIN HFA) 108 (90 BASE) MCG/ACT Inhaler Inhale 2 puffs into the lungs every 4 hours as needed for shortness of breath / dyspnea or wheezing 1 Inhaler 4     multivitamin, therapeutic with minerals (MULTI-VITAMIN) TABS Take 1 tablet by mouth daily 100 tablet 3     CLONAZEPAM PO Take by mouth 4 times daily as needed for anxiety       order for DME Equipment being ordered: heating device and cryotherapy device. 1 Units 0     Menthol, Topical Analgesic, 4 % GEL Apply three times a day as needed to affected area 150 mL 4     naloxone (NARCAN) nasal spray Spray 1 spray (4 mg) in nostril as needed for opioid reversal 2 each 1     buprenorphine-naloxone (SUBOXONE) 8-2 MG SUBL sublingual tablet Place 1 tablet under the tongue daily 10 each 0     pantoprazole (PROTONIX) 40 MG enteric coated tablet Take 1 tablet (40 mg) by mouth daily (Patient not taking: Reported on 8/29/2017) 90 tablet 3     order for DME Equipment being ordered: supportive back brace (Patient not taking: Reported on 8/29/2017) 1 Device 0     Vitamin B  Centrum  She was taking biotin until about 4 months ago    Allergies  Allergies   Allergen Reactions     Compazine      Heart Problems/ Body went completley stiff for 8 hrs.     Nortriptyline      Skelaxin [Metaxalone]          Family History  family history includes Alcohol/Drug in her father and mother; Arthritis in her  "paternal grandmother; Asthma in her daughter; Breast Cancer in her paternal grandmother; C.A.D. in her paternal grandmother; CANCER in her paternal grandfather and paternal grandmother; DIABETES in her paternal grandmother; Depression in her mother; Hypertension in her mother; Lipids in her father and mother. There is no history of Thyroid Disease.    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  GENERAL  Young woman  In no apparent distress  SKIN: Normal color, normal temperature, texture.  No hirsutism, alopecia or purple striae.     EYES: PERRL, EOMI, No scleral icterus,  No proptosis, conjunctival redness, stare, retraction  MOUTH: Moist, pink; pharynx clear  NECK: No visible masses. No palpable adenopathy, or masses. No carotid bruits.   THYROID:  Not palpable  RESP: Lungs clear to auscultation bilaterally  CARDIAC: Regular rate and rhythm, normal S1 S2, without murmurs, rubs or gallops    ABDOMEN: Normal bowel sounds; soft, nontender, no HSM or masses       NEURO: awake, alert, responds appropriately to questions.  Cranial nerves intact.  Moves all extremities; Gait normal.  No tremor of the outstretched hand.  DTRs 3 /4 ,   EXTREMITIES: No clubbing, cyanosis or edema.    DATA REVIEW    Results for CHRISTIE VELAZQUEZ (MRN [id number]) as of 9/2/2017 12:11   Ref. Range 8/29/2017 15:34   T4 Free Latest Ref Range: 0.76 - 1.46 ng/dL " 0.78   Thyroglobulin Antibody Latest Ref Range: <40 IU/mL <20   Thyroid Stim Immunog Latest Ref Range: <=1.3 TSI index <1.0   Triiodothyronine (T3) Latest Ref Range: 60 - 181 ng/dL 98   TSH Latest Ref Range: 0.40 - 4.00 mU/L 2.84   Thyroid Peroxidase Antibody Latest Ref Range: <35 IU/mL <10       END[facility] THYROID LABS-[facility] Latest Ref Rng & Units 7/14/2017 3/13/2015   TSH 0.40 - 4.00 mU/L 0.26 (L) 1.21   T4 FREE 0.76 - 1.46 ng/dL 0.80      ENDO THYROID LABS[facility] Latest Ref Rng & Units 6/2/2011 10/3/2006   TSH 0.40 - 4.00 mU/L 2.60 0.46   T4 FREE 0.76 - 1.46 ng/dL

## 2017-08-29 NOTE — MR AVS SNAPSHOT
After Visit Summary   8/29/2017    Alisia Lin    MRN: [id number]           Patient Information     Date Of Birth          1980        Visit Information        Provider Department      8/29/2017 2:30 PM Jyoti Calabrese MD [facility]        Today's Diagnoses     Low TSH level    -  1       Follow-ups after your visit        Your next 10 appointments already scheduled     Aug 29, 2017  3:30 PM CDT   LAB with  LAB   [facility] ([facility])    [address]  1st Floor  [facility] [id number] [phone]           Patient must bring picture ID. Patient should be prepared to give a urine specimen  Please do not eat 10-12 hours before your appointment if you are coming in fasting for labs on lipids, cholesterol, or glucose (sugar). Pregnant women should follow their Care Team instructions. Water with medications is okay. Do not drink coffee or other fluids. If you have concerns about taking  your medications, please ask at office or if scheduling via [facility], send a message by clicking on Secure Messaging, Message Your Care Team.            Sep 01, 2017  2:20 PM CDT   New Visit with Valeria Olson MD   [facility] ([facility])    [address] [place] [address]  [address]  [facility] [id number] [phone]            Sep 11, 2017  7:20 AM CDT   (Arrive by 7:05 AM)   New Patient Visit with Grupo Doran MD   [facility] ([facility])    [address]  4th Floor  [facility] [id number] [phone]              Future tests that were ordered for you today     Open Future Orders        Priority Expected Expires Ordered    TSH Routine  8/29/2018 8/29/2017    T4 free Routine  8/29/2018 8/29/2017    T3 total Routine  8/29/2018 8/29/2017    Anti thyroglobulin antibody Routine  8/29/2018 8/29/2017    Thyroid peroxidase  "antibody Routine  8/29/2018 8/29/2017    Thyroid stimulating immunoglobulin Routine  8/29/2018 8/29/2017            Who to contact     Please call your clinic at [phone] to:    Ask questions about your health    Make or cancel appointments    Discuss your medicines    Learn about your test results    Speak to your doctor   If you have compliments or concerns about an experience at your clinic, or if you wish to file a complaint, please contact [facility] Patient Relations at [phone] or email us at [email or web address]         Additional Information About Your Visit        Care EveryWhere ID     This is your Care EveryWhere ID. This could be used by other organizations to access your [facility] medical records  [id number]        Your Vitals Were     Pulse Height Last Period BMI (Body Mass Index)          109 1.486 m (4' 10.5\") 07/31/2017 24.1 kg/m2         Blood Pressure from Last 3 Encounters:   08/29/17 90/61   08/07/17 102/60   07/14/17 125/77    Weight from Last 3 Encounters:   08/29/17 53.2 kg (117 lb 4.8 oz)   08/07/17 53.5 kg (118 lb)   07/14/17 55.7 kg (122 lb 12 oz)                 Today's Medication Changes          These changes are accurate as of: 8/29/17  3:23 PM.  If you have any questions, ask your nurse or doctor.               Stop taking these medicines if you haven't already. Please contact your care team if you have questions.     clotrimazole 2 % Crea   Commonly known as:  RUFINO CLOTRIMAZOLE 3   Stopped by:  Jyoti Calabrese MD           hydrOXYzine 25 MG tablet   Commonly known as:  ATARAX   Stopped by:  Jyoti Calabrese MD           metroNIDAZOLE 0.75 % vaginal gel   Commonly known as:  METROGEL   Stopped by:  Jyoti Calabrese MD           predniSONE 20 MG tablet   Commonly known as:  DELTASONE   Stopped by:  Jyoti Calabrese MD           varenicline 1 MG tablet   Commonly known as:  CHANTIX   Stopped by:  Jyoti Calabrese MD           "      Where to get your medicines      These medications were sent to [id number] - [address] AT [facility] [address]  [address] [place] [id number]    Hours:  24-hours Phone:  [phone]     methocarbamol 750 MG tablet                Primary Care Provider Office Phone # Fax #    Sepideh Hines, SONJA [facility] [phone] [address] [facility] [address]        Equal Access to Services     [facility]: Hadii aad ku hadasho Soomaali, waaxda luqadaha, qaybta kaalmada adeegyada, waxay idiin hayaan adeaubree gross . So [facility] [phone].    ATENCIÓN: Si habla español, tiene a randall disposición servicios gratuitos de asistencia lingüística. [facility] [phone].    We comply with applicable federal civil rights laws and [place] laws. We do not discriminate on the basis of race, color, national origin, age, disability sex, sexual orientation or gender identity.            Thank you!     Thank you for choosing [facility]  for your care. Our goal is always to provide you with excellent care. Hearing back from our patients is one way we can continue to improve our services. Please take a few minutes to complete the written survey that you may receive in the mail after your visit with us. Thank you!             Your Updated Medication List - Protect others around you: Learn how to safely use, store and throw away your medicines at www.disposemymeds.org.          This list is accurate as of: 8/29/17  3:23 PM.  Always use your most recent med list.                   Brand Name Dispense Instructions for use Diagnosis    albuterol 108 (90 BASE) MCG/ACT Inhaler    PROAIR HFA/PROVENTIL HFA/VENTOLIN HFA    1 Inhaler    Inhale 2 puffs into the lungs every 4 hours as needed for shortness of breath / dyspnea or wheezing    Intermittent asthma, uncomplicated       ALPRAZolam 1 MG tablet    XANAX     Take 2 mg by mouth    Low  TSH level       amphetamine-dextroamphetamine 20 MG per tablet    ADDERALL     Take 20 mg by mouth    Low TSH level       cetirizine 10 MG tablet    zyrTEC    30 tablet    TAKE 1 TABLET(10 MG) BY MOUTH EVERY EVENING    Seasonal allergic rhinitis, unspecified chronicity, unspecified trigger       CLONAZEPAM PO      Take by mouth 4 times daily as needed for anxiety        FLUOXETINE HCL PO      Take 20 mg by mouth daily        ibuprofen 600 MG tablet    ADVIL/MOTRIN    180 tablet    TAKE 1 TABLET BY MOUTH TWICE DAILY WITH MEALS    Chronic low back pain, unspecified back pain laterality, with sciatica presence unspecified, Chronic midline thoracic back pain       loperamide 2 MG tablet    IMODIUM A-D    60 tablet    Start with 2 tabs (4 mg), then take one tab (2 mg) after each diarrheal stool.  Do not use more than  8 tabs (16 mg) per day.    Diarrhea, unspecified type       MAPAP 500 MG tablet   Generic drug:  acetaminophen     100 tablet    TAKE 2 TABLETS(1000 MG) BY MOUTH TWICE DAILY AS NEEDED FOR MILD PAIN    Back pain, Shoulder pain, right       Menthol (Topical Analgesic) 4 % Gel     150 mL    Apply three times a day as needed to affected area    Bilateral low back pain with left-sided sciatica, Right shoulder pain       methocarbamol 750 MG tablet    ROBAXIN    180 tablet    TAKE 2 TABLETS(1500 MG) BY MOUTH THREE TIMES DAILY AS NEEDED FOR MUSCLE SPASMS    Chronic midline low back pain with sciatica, sciatica laterality unspecified       multivitamin, therapeutic with minerals Tabs tablet     100 tablet    Take 1 tablet by mouth daily    Other fatigue       * order for DME     1 Device    Equipment being ordered: supportive back brace    Bilateral low back pain with left-sided sciatica       * order for DME     1 Units    Equipment being ordered: heating device and cryotherapy device.    Chronic low back pain, Midline thoracic back pain       pantoprazole 40 MG EC tablet    PROTONIX    90 tablet    Take 1 tablet  (40 mg) by mouth daily    Gastroesophageal reflux disease without esophagitis       QUEtiapine 50 MG tablet    SEROquel     Take 50 mg by mouth    Low TSH level       VITAMIN B 12 PO      Patient reported taking two tabs daily- not sure of the dosage.    Low TSH level       * Notice:  This list has 2 medication(s) that are the same as other medications prescribed for you. Read the directions carefully, and ask your doctor or other care provider to review them with you.

## 2017-08-29 NOTE — PROGRESS NOTES
ENDOCRINE CLINIC NOTE      Chief complaint:  Alisia is a 36 year old female seen in consultation at the request of Dr. Reina Boles.       HISTORY OF PRESENT ILLNESS  Alisia is a 35 yo female with asthma, migraine, anxiety, MDD, LBP and tobacco abuse who is here for abnormal TFT.    Pt has been in a lot of stress and feeling tired. Her appetite and sleep has been poor this year. She also reported recent pneumonia which she took antibiotic. She went to see her PCP for evaluation for her tiredness. TFT 7/14/17 showed mildly suppressed TSH and normal FT4.    No h/o thyroid problems in the past. No neck pain or recent contrast exposure. She has chronic diarrhea which has been taking imodium for that. She attributed to anxiety. No change in BM recently. She sometimes feel hot, sometimes feels cold.     Couple days prior to this visit, she started to feel neck pain and mild pain with swallowing at epigastric area.    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   Drink alcohol 1 shot 1 week ago, otherwise sober for 2 months.  Smoke 1 pack cigarette/day    No FH of thyroid      REVIEW OF SYSTEMS  10 point negative except as mentioned in HPI    Past Medical/Surgical History:  Past Medical History:   Diagnosis Date     Acute stress reaction 5/31/2014     Anxiety      Arthritis      Asthma     Flovent BID     Back pain 1/26/2016     Chemical dependency (H) 06/05/2014    heroin, Norco     Chronic low back pain 3/19/2012     Migraine with aura, without mention of intractable migraine without mention of status migrainosus     occas, Last one 11/2013. 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     Other, mixed, or unspecified nondependent drug abuse, in remission 1/1/07    Treatment for narcotic use      Papanicolaou  smear of cervix with atypical squamous cells of undetermined significance (ASC-US) 3/2008    colp - WNL, HPV 53     Pyelonephritis 12/23/2015     Tobacco use disorder     1-1.5 ppd, zyban unsuccessful     Unspecified contraceptive management     ortho tricyclen     Past Surgical History:   Procedure Laterality Date     HC ENLARGE BREAST WITH IMPLANT  2002    BILATERAL     HC REMOVE TONSILS/ADENOIDS,10+ Y/O  2006     HC TOOTH EXTRACTION W/FORCEP  16 yo    wisdom teeth     IUD PARAGARD  3/3/08    Removed with mirena placed. Mirena has now been removed as well.      LEEP TX, CERVICAL  age 15?     ORTHOPEDIC SURGERY      Lumbar fusion 2009       Medications  Current Outpatient Prescriptions   Medication     methocarbamol (ROBAXIN) 750 MG tablet     ALPRAZolam (XANAX) 1 MG tablet     QUEtiapine (SEROQUEL) 50 MG tablet     amphetamine-dextroamphetamine (ADDERALL) 20 MG per tablet     Cyanocobalamin (VITAMIN B 12 PO)     MAPAP 500 MG tablet     cetirizine (ZYRTEC) 10 MG tablet     FLUOXETINE HCL PO     loperamide (IMODIUM A-D) 2 MG tablet     ibuprofen (ADVIL/MOTRIN) 600 MG tablet     albuterol (PROAIR HFA/PROVENTIL HFA/VENTOLIN HFA) 108 (90 BASE) MCG/ACT Inhaler     multivitamin, therapeutic with minerals (MULTI-VITAMIN) TABS     CLONAZEPAM PO     order for DME     Menthol, Topical Analgesic, 4 % GEL     pantoprazole (PROTONIX) 40 MG enteric coated tablet     order for DME     No current facility-administered medications for this visit.        Allergies  Allergies   Allergen Reactions     Compazine      Heart Problems/ Body went completley stiff for 8 hrs.     Nortriptyline      Skelaxin [Metaxalone]          Family History  family history includes Alcohol/Drug in her father and mother; Arthritis in her paternal grandmother; Asthma in her daughter; Breast Cancer in her paternal grandmother; C.A.D. in her paternal grandmother; CANCER in her paternal grandfather and paternal grandmother; DIABETES in her paternal  "grandmother; Depression in her mother; Hypertension in her mother; Lipids in her father and mother.    Social History  Social History   Substance Use Topics     Smoking status: Current Every Day Smoker     Packs/day: 1.50     Years: 10.00     Types: Cigarettes     Last attempt to quit: 10/1/2016     Smokeless tobacco: Current User      Comment: less than 1/2 ppd     Alcohol use No      Comment: JAYA 18 days ago       Physical Exam  BP 90/61 (BP Location: Right arm, Patient Position: Sitting, Cuff Size: Adult Regular)  Pulse 109  Ht 1.486 m (4' 10.5\")  Wt 53.2 kg (117 lb 4.8 oz)  LMP 07/31/2017  BMI 24.1 kg/m2  Body mass index is 24.1 kg/(m^2).  GENERAL :  In mild anxiety and depress  SKIN: Normal color, normal temperature, texture.  No hirsutism, alopecia or purple striae.     EYES: PERRL, EOMI, No scleral icterus,  No proptosis, conjunctival redness, stare, retraction  MOUTH: Moist, pink; pharynx clear  NECK: No visible masses. No palpable adenopathy, or masses. No carotid bruits. THYROID:  Normal, nontender, smooth / firm texture,  no nodules, no Bruit . Mild tenderness at Rt sternocleidomastoid  RESP: Lungs clear to auscultation bilaterally  CARDIAC: Regular rate and rhythm, normal S1 S2, without murmurs, rubs or gallops  ABDOMEN: Normal bowel sounds; soft, nontender, no HSM or masses       NEURO: awake, alert, responds appropriately to questions.  Cranial nerves intact.  Moves all extremities; Gait normal.  No tremor of the outstretched hand.  DTRs  3 /4 bilateral patella ,   EXTREMITIES: No clubbing, cyanosis or edema.    DATA REVIEW  Labs/Imaging  TSH   Date Value Ref Range Status   08/29/2017 2.84 0.40 - 4.00 mU/L Final   07/14/2017 0.26 (L) 0.40 - 4.00 mU/L Final   03/13/2015 1.21 0.40 - 4.00 mU/L Final     Comment:     Effective 7/30/2014, the reference range for this assay has changed to reflect   new instrumentation/methodology.     06/02/2011 2.60 0.4 - 5.0 mU/L Final   10/03/2006 0.46 0.4 - 5.0 mU/L " Final     T4 Free   Date Value Ref Range Status   08/29/2017 0.78 0.76 - 1.46 ng/dL Final   07/14/2017 0.80 0.76 - 1.46 ng/dL Final   ]      ASSESSMENT/PLAN:   Alisia is a 35 yo female with asthma, migraine, anxiety, MDD, LBP and tobacco abuse who is here for abnormal TFT.    ## Abnormal TFT (mildly elevate TSH)  No h/o thyroid problems in the past, no autoimmune history and no FH of thyroid disease who presented with fatique and found to have mildly elevated TSH 7/14/17.  DDx subclinical hypothyroidism, thyroiditis, euthyroid sick syndrome. No neck pain or recent contrast exposure that would suggest thyroiditis.  Pt has been in a lot of stress and not eating this year which may cause elevated TSH. Recent pneumonia may cause euthyriod sick syndrome and elevated TSH. Exam today showed euthyroidism. Multiple symptoms (sleep/ anxious could be 2/2 her underlying depression and anxiety.  -- recheck TFT today, would expect to see normalization if it was euthyroid sick. If she had underlying thyroid disease, we should see persistent abnormalities  -- AntiTPO, anti Tg which likely positive in Hashimoto thyroiditis  -- TSI which likely positive in Graves  -- pt could discuss mild epigastric pain with swallowing with PCP    There are no Patient Instructions on file for this visit.  Orders Placed This Encounter   Procedures     TSH     T4 free     T3 total     Anti thyroglobulin antibody     Thyroid peroxidase antibody     Thyroid stimulating immunoglobulin        Patient seen and examined with staff endocrinologist Dr Calabrese.     Eduardo Downey MD  Diabetes, Metabolism and Endocrinology Fellow  Pager: [phone]

## 2017-08-30 LAB
THYROGLOB AB SERPL IA-ACNC: <20 IU/ML (ref 0–40)
THYROPEROXIDASE AB SERPL-ACNC: <10 IU/ML

## 2017-08-31 LAB — TSI SER-ACNC: <1 TSI INDEX

## 2017-09-01 ENCOUNTER — OFFICE VISIT (OUTPATIENT)
Dept: ADDICTION MEDICINE | Facility: CLINIC | Age: 37
End: 2017-09-01
Payer: COMMERCIAL

## 2017-09-01 VITALS
BODY MASS INDEX: 24.39 KG/M2 | TEMPERATURE: 98.4 F | WEIGHT: 121 LBS | SYSTOLIC BLOOD PRESSURE: 128 MMHG | HEART RATE: 111 BPM | OXYGEN SATURATION: 95 % | DIASTOLIC BLOOD PRESSURE: 72 MMHG | HEIGHT: 59 IN | RESPIRATION RATE: 16 BRPM

## 2017-09-01 DIAGNOSIS — Z79.899 HIGH RISK MEDICATION USE: ICD-10-CM

## 2017-09-01 DIAGNOSIS — F11.20 UNCOMPLICATED OPIOID DEPENDENCE (H): Primary | ICD-10-CM

## 2017-09-01 DIAGNOSIS — F17.200 NICOTINE USE DISORDER: ICD-10-CM

## 2017-09-01 DIAGNOSIS — M54.40 CHRONIC MIDLINE LOW BACK PAIN WITH SCIATICA, SCIATICA LATERALITY UNSPECIFIED: ICD-10-CM

## 2017-09-01 DIAGNOSIS — F41.9 ANXIETY: ICD-10-CM

## 2017-09-01 DIAGNOSIS — G89.29 CHRONIC MIDLINE LOW BACK PAIN WITH SCIATICA, SCIATICA LATERALITY UNSPECIFIED: ICD-10-CM

## 2017-09-01 DIAGNOSIS — F90.2 ATTENTION DEFICIT HYPERACTIVITY DISORDER (ADHD), COMBINED TYPE: ICD-10-CM

## 2017-09-01 DIAGNOSIS — F33.1 MODERATE RECURRENT MAJOR DEPRESSION (H): ICD-10-CM

## 2017-09-01 LAB
AMPHETAMINES UR QL: NOT DETECTED NG/ML
BARBITURATES UR QL SCN: NOT DETECTED NG/ML
BENZODIAZ UR QL SCN: ABNORMAL NG/ML
BUPRENORPHINE UR QL: NOT DETECTED NG/ML
CANNABINOIDS UR QL: NOT DETECTED NG/ML
COCAINE UR QL SCN: NOT DETECTED NG/ML
D-METHAMPHET UR QL: NOT DETECTED NG/ML
METHADONE UR QL SCN: NOT DETECTED NG/ML
OPIATES UR QL SCN: ABNORMAL NG/ML
OXYCODONE UR QL SCN: NOT DETECTED NG/ML
PCP UR QL SCN: NOT DETECTED NG/ML
PROPOXYPH UR QL: NOT DETECTED NG/ML
TRICYCLICS UR QL SCN: NOT DETECTED NG/ML

## 2017-09-01 PROCEDURE — 99205 OFFICE O/P NEW HI 60 MIN: CPT | Performed by: PEDIATRICS

## 2017-09-01 PROCEDURE — 80306 DRUG TEST PRSMV INSTRMNT: CPT | Performed by: PEDIATRICS

## 2017-09-01 RX ORDER — BUPRENORPHINE AND NALOXONE 8; 2 MG/1; MG/1
1 FILM, SOLUBLE BUCCAL; SUBLINGUAL DAILY
Qty: 10 FILM | Refills: 0 | Status: SHIPPED | OUTPATIENT
Start: 2017-09-01 | End: 2017-09-01

## 2017-09-01 RX ORDER — BUPRENORPHINE HYDROCHLORIDE AND NALOXONE HYDROCHLORIDE DIHYDRATE 8; 2 MG/1; MG/1
1 TABLET SUBLINGUAL DAILY
Qty: 10 EACH | Refills: 0 | Status: SHIPPED | OUTPATIENT
Start: 2017-09-01 | End: 2017-09-07

## 2017-09-01 NOTE — PROGRESS NOTES
SUBJECTIVE:                                                        Alisia Lin is a 36 year old female who presents for  initial visit for addiction consultation and management referred by Dr. Boles     HPI:  Patient seen today to possible restart Suboxone.   She has a hx as listed below and did have about one year of sobriety at least from opioids.   FOB started using opioids and Heroin and then she started using again as well.     Using again for about four months Heroin (opioid pills for about a year)  Most recently 2 gm /day.   Last use today about 12pm -has been trying to self taper.  Currently using IV.       CD History:     DRUG OF CHOICE Heroin     LAST USE:  Today.    Hx oral opioid use about 10 yr ago started having back pain (disc slip) -exotic dancing.  Increased use and stopped working without using.   Feeling isolated.  Depression, adhd/, OCD.   Using for about 2 yr.  Went to detox 2 x and then treatment all at [facility].  One other for detox ([facility] for [facility]).       LONGEST PERIOD OF SOBRIETY-a year.  Suboxone for few mo.      PREVIOUS DETOX/TREATMENT PROGRAMS-[facility] as above.     HISTORY OF OVERDOSE-none    PREVIOUS MEDICATION ASSISTED TREATMENT:  Suboxone rx Dr. Danielle for about 3 mo and did well.  Stayed sober for about year total.   Was still RX Klonipin.            Other Substances:    ALCOHOL-binge drinking in past especially before opioids (for about 3 mo)  MARIJUANA-never  COCAINE/CRACK-none  METH/AMPHETAMINES-none  OPIATES-above  BENZODIAZEPINES-Rx Klonipin 1mg 4 x day (currently taking 1 2 x day) and trying to wean.     Methcarbamol for muscle relax.    Seroquel and Ambien Prn.         NICOTINE-PPD   Desire to quit maybe      PAST PSYCHIATRIC HISTORY  Depression , OCD, PTSD.  ADHD.  Hx of stimulants in past.  Not current.   Klonipin as above.      Patient Active Problem List    Diagnosis Date Noted     Low TSH level 08/29/2017     Priority: Medium     Abnormal thyroid function  test 08/08/2017     Priority: Medium     Narcotic dependence, in remission (H) 07/13/2017     Priority: Medium     Back pain 01/26/2016     Priority: Medium     Shoulder pain, right 01/26/2016     Priority: Medium     Intermittent asthma, uncomplicated 12/07/2015     Priority: Medium     Anxiety 05/21/2013     Priority: Medium     Benign neoplasm of colon 04/16/2013     Priority: Medium     Overview:   Found on colonoscopy 04/2013, repeat colonoscopy in 5 years, 04/2018.       Chronic low back pain 03/19/2012     Priority: Medium     On pain contract - [facility]       Degeneration of intervertebral disc 05/12/2010     Priority: Medium     Spondylosis 05/12/2010     Priority: Medium     Displacement of intervertebral disc 05/12/2010     Priority: Medium     ASCUS on Pap smear 03/03/2008     Priority: Medium     LEEP age 15 per notes in 2015  3/3/08: ASCUS, + HPV 53  4/17/08: [place] - No visible lesions.   11/7/08: NIL pap  2/1/12: NIL pap  1/2013 Pap NIL, neg HPV. Plan cotest pap & HPV in 1 yr  3/2015: NIL pap, neg HPV. Plan cotest pap & HPV in 3 years.         Migraine with aura      Priority: Medium     Occas migraines, none recently  Problem list name updated by automated process. Provider to review       Tobacco use disorder      Priority: Medium     1 PPD- interested in quitting       Moderate recurrent major depression (H) 08/16/2005     Priority: Medium       Problem list and histories reviewed & adjusted, as indicated.  Additional history: as documented     Past Medical History:   Diagnosis Date     Acute stress reaction 5/31/2014     Anxiety      Arthritis      Asthma     Flovent BID     Back pain 1/26/2016     Chemical dependency (H) 06/05/2014    heroin, Norco     Chronic low back pain 3/19/2012     Migraine with aura, without mention of intractable migraine without mention of status migrainosus     occas, Last one 11/2013. 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     Other, mixed, or unspecified nondependent drug abuse, in remission 1/1/07    Treatment for narcotic use      Papanicolaou smear of cervix with atypical squamous cells of undetermined significance (ASC-US) 3/2008    colp - WNL, HPV 53     Pyelonephritis 12/23/2015     Tobacco use disorder     1-1.5 ppd, zyban unsuccessful     Unspecified contraceptive management     ortho tricyclen       Past Surgical History:   Procedure Laterality Date     HC ENLARGE BREAST WITH IMPLANT  2002    BILATERAL     HC REMOVE TONSILS/ADENOIDS,10+ Y/O  2006     HC TOOTH EXTRACTION W/FORCEP  16 yo    wisdom teeth     IUD PARAGARD  3/3/08    Removed with mirena placed. Mirena has now been removed as well.      LEEP TX, CERVICAL  age 15?     ORTHOPEDIC SURGERY      Lumbar fusion 2009         Family History   Problem Relation Age of Onset     Hypertension Mother      Alcohol/Drug Mother      alcohol, sober for 19 years     Depression Mother      on medication     Lipids Mother      on medication     Alcohol/Drug Father      alcohol, still actively drinking     Lipids Father      on medication     C.A.D. Paternal Grandmother      DIABETES Paternal Grandmother      Breast Cancer Paternal Grandmother      Arthritis Paternal Grandmother      CANCER Paternal Grandmother      breast cancer     CANCER Paternal Grandfather      colon cancer     Asthma Daughter      x2         Social History     Social History Narrative            Living with Father of youngest child  (3 girls 22, 18 and 10 )   Will be grandma around April 2018    No legal issues currently (possible identity theft).     Recently job loss ([employer])            Current Outpatient Prescriptions   Medication Sig Dispense Refill     methocarbamol (ROBAXIN) 750 MG tablet TAKE 2 TABLETS(1500 MG) BY  MOUTH THREE TIMES DAILY AS NEEDED FOR MUSCLE SPASMS 180 tablet 0     ALPRAZolam (XANAX) 1 MG tablet Take 2 mg by mouth       QUEtiapine (SEROQUEL) 50 MG tablet Take 50 mg by mouth       amphetamine-dextroamphetamine (ADDERALL) 20 MG per tablet Take 20 mg by mouth       Cyanocobalamin (VITAMIN B 12 PO) Patient reported taking two tabs daily- not sure of the dosage.       MAPAP 500 MG tablet TAKE 2 TABLETS(1000 MG) BY MOUTH TWICE DAILY AS NEEDED FOR MILD PAIN 100 tablet 5     cetirizine (ZYRTEC) 10 MG tablet TAKE 1 TABLET(10 MG) BY MOUTH EVERY EVENING 30 tablet 0     FLUOXETINE HCL PO Take 20 mg by mouth daily        loperamide (IMODIUM A-D) 2 MG tablet Start with 2 tabs (4 mg), then take one tab (2 mg) after each diarrheal stool.  Do not use more than  8 tabs (16 mg) per day. 60 tablet 0     ibuprofen (ADVIL/MOTRIN) 600 MG tablet TAKE 1 TABLET BY MOUTH TWICE DAILY WITH MEALS 180 tablet 0     albuterol (PROAIR HFA/PROVENTIL HFA/VENTOLIN HFA) 108 (90 BASE) MCG/ACT Inhaler Inhale 2 puffs into the lungs every 4 hours as needed for shortness of breath / dyspnea or wheezing 1 Inhaler 4     multivitamin, therapeutic with minerals (MULTI-VITAMIN) TABS Take 1 tablet by mouth daily 100 tablet 3     CLONAZEPAM PO Take by mouth 4 times daily as needed for anxiety       pantoprazole (PROTONIX) 40 MG enteric coated tablet Take 1 tablet (40 mg) by mouth daily (Patient not taking: Reported on 8/29/2017) 90 tablet 3     order for DME Equipment being ordered: heating device and cryotherapy device. 1 Units 0     order for DME Equipment being ordered: supportive back brace (Patient not taking: Reported on 8/29/2017) 1 Device 0     Menthol, Topical Analgesic, 4 % GEL Apply three times a day as needed to affected area 150 mL 4         Allergies   Allergen Reactions     Compazine      Heart Problems/ Body went completley stiff for 8 hrs.     Nortriptyline      Skelaxin [Metaxalone]          [place] Board of Pharmacy Data Base Reviewed:    " YES; 8/29, 7/30  Dr. Stephenson  Klonipin 1mg #90    Adderall  30mg  #60  Dr. Stephenson  7/18  Xanax 1mg #30  Dr. Stephenson  7/6 Ambien 10mg #30   Dr. Stephenson        REVIEW OF SYSTEMS:    General: No acute withdrawal symptoms.  No recent infections or fever  Eyes: Negative for vision changes or eye problems  ENT: No problems with ears, nose or throat.  No difficulty swallowing.  Resp: No coughing, wheezing or shortness of breath  CV: No chest pains or palpitations  GI: No nausea, vomiting, abdominal pain, diarrhea, constipation  : No urinary frequency or dysuria,     Musculoskeletal: No significant muscle or joint pains, No edema  Neurologic: No numbness, tingling, weakness, problems with balance or coordination  Psychiatric: anxiety with withdrawal sx.    Skin: No rashes        OBJECTIVE:                                                      EXAM    Blood pressure 128/72, pulse 111, temperature 98.4  F (36.9  C), temperature source Oral, resp. rate 16, height 4' 10.5\" (1.486 m), weight 121 lb (54.9 kg), last menstrual period 07/31/2017, SpO2 95 %, not currently breastfeeding.    GENERAL APPEARANCE: healthy, alert and no distress  EYES: Eyes grossly normal to inspection, PERRL and no nystagmus   HENT: ear canals and TM's normal, nose and mouth without ulcers or lesions and normal cephalic/atraumatic  NECK: no adenopathy, thyromegaly or masses  RESP: lungs clear to auscultation - no rales, rhonchi or wheezes and no resp distress  CV: regular rates and rhythm, normal S1 S2, no S3 or S4 and no murmur, click or rub  ABDOMEN: soft, nontender, without hepatosplenomegaly or masses  MS: extremities normal- no gross deformities noted, gait normal, peripheral pulses normal and no edema  SKIN: no rashes, no jaundice, no obvious masses.   NEURO: Normal strength and tone, sensory exam grossly normal, no tremor  MENTAL STATUS EXAM:  Appearance/Behavior: Disheveled  Speech: pressured  Mood/Affect: anxiety  Insight: " Poor    Results for orders placed or performed in visit on 09/01/17   Urine Drugs of Abuse Screen Panel 13   Result Value Ref Range    Cannabinoids (28-[id number]-8-[id number]-9-THC) Not Detected NDET^Not Detected ng/mL    Phencyclidine (Phencyclidine) Not Detected NDET^Not Detected ng/mL    Cocaine (Benzoylecgonine) Not Detected NDET^Not Detected ng/mL    Methamphetamine (d-Methamphetamine) Not Detected NDET^Not Detected ng/mL    Opiates (Morphine) Detected, Abnormal Result (A) NDET^Not Detected ng/mL    Amphetamine (d-Amphetamine) Not Detected NDET^Not Detected ng/mL    Benzodiazepines (Nordiazepam) Detected, Abnormal Result (A) NDET^Not Detected ng/mL    Tricyclic Antidepressants (Desipramine) Not Detected NDET^Not Detected ng/mL    Methadone (Methadone) Not Detected NDET^Not Detected ng/mL    Barbiturates (Butalbital) Not Detected NDET^Not Detected ng/mL    Oxycodone (Oxycodone) Not Detected NDET^Not Detected ng/mL    Propoxyphene (Norpropoxyphene) Not Detected NDET^Not Detected ng/mL    Buprenorphine (Buprenorphine) Not Detected NDET^Not Detected ng/mL                        ASSESSMENT/PLAN:    (F11.20) Uncomplicated opioid dependence (H)  (primary encounter diagnosis)    Plan: Urine Drugs of Abuse Screen Panel 13,         buprenorphine HCl-naloxone HCl (SUBOXONE) 8-2         MG per film, naloxone (NARCAN) nasal spray        Patient interested in Suboxone induction.   Is unwilling to try detox at this time.  Feels she will be able to wait to start Suboxone until at least 24 hr without opioids/ withdrawal sx fully established.  Is aware of risk of preciptated withdrawal.  Would still like to proceed.  Possible need for PA discussed.    Begin with Suboxone 2 mg once withdrawal sx well established (>24 hr from last use) to avoid precipitated withdrawal.   Patient is aware of need for this.  May repeat dose in several hours if tolerated up to a total of 8mg  Then begin Suboxone  8mg daily following day.    Follow up 9/6/17  or sooner with concerns.     If unsuccessful or reconsiders would encourage detoxification in stable setting.   Call [facility] intake at [phone] at 0800 ( preferably prior to going to the ER to inquire about bed availability).      Suboxone risk/benefit/side effect and intended purposes reviewed.    Risk of relapse/overdose with abrupt discontinuation discussed.    Risk of use of other substances such as other opioids/other medications especially benzodiazepines/alcohol including risk of overdose/death reviewed  Encourage other services    Counseling   12 step or other self-help organizations     Refer to higher level of services as needed    Naloxone offered.  Patient given RX.       Opioid warning reviewed.  Risk of overdose following a period of abstinence due to decrease tolerance was discussed including risk of death.   Risk of overdose if using Suboxone with other substances particuarly benzodiazepines/alcohol was reviewed.           (F41.9) Anxiety  (F90.2) Attention deficit hyperactivity disorder (ADHD), combined type  (F33.1) Moderate recurrent major depression (H)  Comment: Currently RX Ambien, Klonipin  Plan: Risk of ongoing benzodiazepine use with Suboxone,  Increase risk of overdose, increase risk of relapse with ongoing use discussed.   Strongly encouraged mental health follow up for above conditions as well as follow up with PCP and psychiatry.      (F17.200) Nicotine use disorder  Plan: Encouraged Abstinence.  Increase risk of relapse with other substances with return to nicotine use discussed.  Risk of Ecig/Vaping also reviewed.        (M54.40,  G89.29) Chronic midline low back pain with sciatica, sciatica laterality unspecified  Plan: Suboxone as rx.   Non narcotic pain options discussed.  Will be explored further at future visit.     (Z67.425) High risk medication use      ENCOUNTER FOR LONG TERM USE OF HIGH RISK MEDICATION   High Risk Drug Monitoring?  YES   Drug being  monitored: Suboxone   Reason for drug: opioid Use Disorder   What is being monitored?: Dosage, Cravings, Triggers and side effects         Valeria Olson MD  [facility]IMARY [facility]

## 2017-09-01 NOTE — MR AVS SNAPSHOT
"              After Visit Summary   9/1/2017    Alisia Lin    MRN: [id number]           Patient Information     Date Of Birth          1980        Visit Information        Provider Department      9/1/2017 2:20 PM Valeria Olson MD [facility]        Today's Diagnoses     Uncomplicated opioid dependence (H)    -  1    Anxiety        Attention deficit hyperactivity disorder (ADHD), combined type        Moderate recurrent major depression (H)        Nicotine use disorder        Chronic midline low back pain with sciatica, sciatica laterality unspecified        High risk medication use           Follow-ups after your visit        Your next 10 appointments already scheduled     Sep 07, 2017 10:00 AM CDT   Return Visit with Valeria Olson MD   [facility] ([facility])    [address] [place] [address]  [address]  [facility] [id number] [phone]            Sep 08, 2017  1:30 PM CDT   (Arrive by 1:15 PM)   MA DIAGNOSTIC IMPLANTS BILATERAL W/ JOSE with [email or web address]   [facility] ([facility])    [address]  2nd Floor  [facility] [id number] [phone]           Do not use any powder, lotion or deodorant under your arms or on your breast. If you do, we will ask you to remove it before your exam.  Wear comfortable, two-piece clothing.  If you have any allergies, tell your care team.  Bring any previous mammograms from other facilities or have them mailed to the breast center.  Three-dimensional (3D) mammograms are available at [facility] locations in [facility], and [place]. [facility] locations include [place] and [facility] in [place]. Benefits of 3D mammograms include: - Improved rate of cancer detection - Decreases your chance of having to go back for more tests, which means fewer: - \"False-positive\" " results (This means that there is an abnormal area but it isn't cancer.) - Invasive testing procedures, such as a biopsy or surgery - Can provide clearer images of the breast if you have dense breast tissue. 3D mammography is an optional exam that anyone can have with a 2D mammogram. It doesn't replace or take the place of a 2D mammogram. 2D mammograms remain an effective screening test for all women.  Not all insurance companies cover the cost of a 3D mammogram. Check with your insurance.            Sep 08, 2017  2:00 PM CDT   US BREAST LEFT LIMITED 1-3 QUAD with [email or web address]   [facility] ([facility])    [address]  2nd Floor  [facility] [address] [phone]           Please bring a list of your medicines (including vitamins, minerals and over-the-counter drugs). Also, tell your doctor about any allergies you may have. Wear comfortable clothes and leave your valuables at home.  You do not need to do anything special to prepare for your exam.  Please call the Imaging Department at your exam site with any questions.            Sep 11, 2017  7:20 AM CDT   (Arrive by 7:05 AM)   New Patient Visit with Grupo Doran MD   [facility] ([facility])    [address]  4th Floor  [facility] [address] [phone]              Future tests that were ordered for you today     Open Standing Orders        Priority Remaining Interval Expires Ordered    Urine Drugs of Abuse Screen Panel 13 Routine 11/12 8/30/2018 8/30/2017            Who to contact     If you have questions or need follow up information about today's clinic visit or your schedule please contact [facility] directly at [phone].  Normal or non-critical lab and imaging results will be communicated to you by MyChart, letter or phone within 4 business days after the clinic has received the results.  "If you do not hear from us within 7 days, please contact the clinic through Grouper or phone. If you have a critical or abnormal lab result, we will notify you by phone as soon as possible.  Submit refill requests through Gr[employer]per or call your pharmacy and they will forward the refill request to us. Please allow 3 business days for your refill to be completed.          Additional Information About Your Visit        Care EveryWhere ID     This is your Care EveryWhere ID. This could be used by other organizations to access your [facility] medical records  [id number]        Your Vitals Were     Pulse Temperature Respirations Height Last Period Pulse Oximetry    111 98.4  F (36.9  C) (Oral) 16 4' 10.5\" (1.486 m) 07/31/2017 95%    BMI (Body Mass Index)                   24.86 kg/m2            Blood Pressure from Last 3 Encounters:   09/01/17 128/72   08/29/17 90/61   08/07/17 102/60    Weight from Last 3 Encounters:   09/01/17 121 lb (54.9 kg)   08/29/17 117 lb 4.8 oz (53.2 kg)   08/07/17 118 lb (53.5 kg)              We Performed the Following     Urine Drugs of Abuse Screen Panel 13          Today's Medication Changes          These changes are accurate as of: 9/1/17  3:33 PM.  If you have any questions, ask your nurse or doctor.               Start taking these medicines.        Dose/Directions    buprenorphine-naloxone 8-2 MG Subl sublingual tablet   Commonly known as:  SUBOXONE   Used for:  Uncomplicated opioid dependence (H)   Started by:  Valeria Olson MD        Dose:  1 tablet   Place 1 tablet under the tongue daily   Quantity:  10 each   Refills:  0       naloxone nasal spray   Commonly known as:  NARCAN   Used for:  Uncomplicated opioid dependence (H)   Started by:  Valeria Olson MD        Dose:  4 mg   Spray 1 spray (4 mg) in nostril as needed for opioid reversal   Quantity:  2 each   Refills:  1            Where to get your medicines      These medications were sent to [facility] " [place], [place] - [address]  [address], [facility] [address]     Phone:  [phone]     naloxone nasal spray         Some of these will need a paper prescription and others can be bought over the counter.  Ask your nurse if you have questions.     Bring a paper prescription for each of these medications     buprenorphine-naloxone 8-2 MG Subl sublingual tablet                Primary Care Provider Office Phone # Fax #    Sepideh Ontiveros Donny, APRN [facility] [phone]       [address]  [facility] [address]        Equal Access to Services     DONNA [employer] : Hadii aad ku hadasho Soomaali, waaxda luqadaha, qaybta kaalmada adeegyada, shreyas gross . So [facility] [phone].    ATENCIÓN: Si habla español, tiene a randall disposición servicios gratuitos de asistencia lingüística. [facility] [phone].    We comply with applicable federal civil rights laws and [place] laws. We do not discriminate on the basis of race, color, national origin, age, disability sex, sexual orientation or gender identity.            Thank you!     Thank you for choosing [facility]  for your care. Our goal is always to provide you with excellent care. Hearing back from our patients is one way we can continue to improve our services. Please take a few minutes to complete the written survey that you may receive in the mail after your visit with us. Thank you!             Your Updated Medication List - Protect others around you: Learn how to safely use, store and throw away your medicines at www.disposemymeds.org.          This list is accurate as of: 9/1/17  3:33 PM.  Always use your most recent med list.                   Brand Name Dispense Instructions for use Diagnosis    albuterol 108 (90 BASE) MCG/ACT Inhaler    PROAIR HFA/PROVENTIL HFA/VENTOLIN HFA    1 Inhaler    Inhale 2 puffs into the lungs every 4 hours as needed for shortness of breath /  dyspnea or wheezing    Intermittent asthma, uncomplicated       ALPRAZolam 1 MG tablet    XANAX     Take 2 mg by mouth    Low TSH level       amphetamine-dextroamphetamine 20 MG per tablet    ADDERALL     Take 20 mg by mouth    Low TSH level       buprenorphine-naloxone 8-2 MG Subl sublingual tablet    SUBOXONE    10 each    Place 1 tablet under the tongue daily    Uncomplicated opioid dependence (H)       cetirizine 10 MG tablet    zyrTEC    30 tablet    TAKE 1 TABLET(10 MG) BY MOUTH EVERY EVENING    Seasonal allergic rhinitis, unspecified chronicity, unspecified trigger       CLONAZEPAM PO      Take by mouth 4 times daily as needed for anxiety        FLUOXETINE HCL PO      Take 20 mg by mouth daily        ibuprofen 600 MG tablet    ADVIL/MOTRIN    180 tablet    TAKE 1 TABLET BY MOUTH TWICE DAILY WITH MEALS    Chronic low back pain, unspecified back pain laterality, with sciatica presence unspecified, Chronic midline thoracic back pain       loperamide 2 MG tablet    IMODIUM A-D    60 tablet    Start with 2 tabs (4 mg), then take one tab (2 mg) after each diarrheal stool.  Do not use more than  8 tabs (16 mg) per day.    Diarrhea, unspecified type       MAPAP 500 MG tablet   Generic drug:  acetaminophen     100 tablet    TAKE 2 TABLETS(1000 MG) BY MOUTH TWICE DAILY AS NEEDED FOR MILD PAIN    Back pain, Shoulder pain, right       Menthol (Topical Analgesic) 4 % Gel     150 mL    Apply three times a day as needed to affected area    Bilateral low back pain with left-sided sciatica, Right shoulder pain       methocarbamol 750 MG tablet    ROBAXIN    180 tablet    TAKE 2 TABLETS(1500 MG) BY MOUTH THREE TIMES DAILY AS NEEDED FOR MUSCLE SPASMS    Chronic midline low back pain with sciatica, sciatica laterality unspecified       multivitamin, therapeutic with minerals Tabs tablet     100 tablet    Take 1 tablet by mouth daily    Other fatigue       naloxone nasal spray    NARCAN    2 each    Spray 1 spray (4 mg) in  nostril as needed for opioid reversal    Uncomplicated opioid dependence (H)       * order for DME     1 Device    Equipment being ordered: supportive back brace    Bilateral low back pain with left-sided sciatica       * order for DME     1 Units    Equipment being ordered: heating device and cryotherapy device.    Chronic low back pain, Midline thoracic back pain       pantoprazole 40 MG EC tablet    PROTONIX    90 tablet    Take 1 tablet (40 mg) by mouth daily    Gastroesophageal reflux disease without esophagitis       QUEtiapine 50 MG tablet    SEROquel     Take 50 mg by mouth    Low TSH level       VITAMIN B 12 PO      Patient reported taking two tabs daily- not sure of the dosage.    Low TSH level       * Notice:  This list has 2 medication(s) that are the same as other medications prescribed for you. Read the directions carefully, and ask your doctor or other care provider to review them with you.

## 2017-09-07 ENCOUNTER — OFFICE VISIT (OUTPATIENT)
Dept: ADDICTION MEDICINE | Facility: CLINIC | Age: 37
End: 2017-09-07
Payer: COMMERCIAL

## 2017-09-07 VITALS
OXYGEN SATURATION: 100 % | DIASTOLIC BLOOD PRESSURE: 68 MMHG | BODY MASS INDEX: 23.99 KG/M2 | WEIGHT: 119 LBS | SYSTOLIC BLOOD PRESSURE: 102 MMHG | HEIGHT: 59 IN | HEART RATE: 115 BPM

## 2017-09-07 DIAGNOSIS — F90.2 ATTENTION DEFICIT HYPERACTIVITY DISORDER (ADHD), COMBINED TYPE: ICD-10-CM

## 2017-09-07 DIAGNOSIS — F33.1 MODERATE RECURRENT MAJOR DEPRESSION (H): ICD-10-CM

## 2017-09-07 DIAGNOSIS — F11.20 UNCOMPLICATED OPIOID DEPENDENCE (H): Primary | ICD-10-CM

## 2017-09-07 DIAGNOSIS — Z79.899 HIGH RISK MEDICATION USE: ICD-10-CM

## 2017-09-07 DIAGNOSIS — F17.200 NICOTINE USE DISORDER: ICD-10-CM

## 2017-09-07 DIAGNOSIS — F41.9 ANXIETY: ICD-10-CM

## 2017-09-07 LAB
AMPHETAMINES UR QL: NOT DETECTED NG/ML
BARBITURATES UR QL SCN: NOT DETECTED NG/ML
BENZODIAZ UR QL SCN: ABNORMAL NG/ML
BUPRENORPHINE UR QL: ABNORMAL NG/ML
CANNABINOIDS UR QL: NOT DETECTED NG/ML
COCAINE UR QL SCN: NOT DETECTED NG/ML
D-METHAMPHET UR QL: NOT DETECTED NG/ML
METHADONE UR QL SCN: NOT DETECTED NG/ML
OPIATES UR QL SCN: ABNORMAL NG/ML
OXYCODONE UR QL SCN: NOT DETECTED NG/ML
PCP UR QL SCN: NOT DETECTED NG/ML
PROPOXYPH UR QL: NOT DETECTED NG/ML
TRICYCLICS UR QL SCN: NOT DETECTED NG/ML

## 2017-09-07 PROCEDURE — 99214 OFFICE O/P EST MOD 30 MIN: CPT | Performed by: PEDIATRICS

## 2017-09-07 PROCEDURE — 80306 DRUG TEST PRSMV INSTRMNT: CPT | Performed by: PEDIATRICS

## 2017-09-07 RX ORDER — BUPRENORPHINE HYDROCHLORIDE AND NALOXONE HYDROCHLORIDE DIHYDRATE 8; 2 MG/1; MG/1
1 TABLET SUBLINGUAL 2 TIMES DAILY
Qty: 20 EACH | Refills: 0 | Status: SHIPPED | OUTPATIENT
Start: 2017-09-07 | End: 2017-11-30

## 2017-09-07 NOTE — PROGRESS NOTES
SUBJECTIVE:                                                    OPIOID USE DISORDER - SUBOXONE FOLLOW UP:    Alisia Lin is a 36 year old female who presents to clinic today for follow up of Suboxone MAT for opioid use disorder.     Date of last visit:  9/1/2017      Plan at last visit:   Suboxone 8mg daily    [place] Board of Pharmacy Data Base Reviewed:    YES;     HPI:  Living with FOB who is in treatment currently.   She is currently not in treatment but doing counseling weekly.  Is currently prescribed Klonipin 1mg up to four times/day (usually taking about 3 x day).   Adderall  30mg once/day.  Hasn't been taking recently from psychiatrist.   No meetings currently.   Really no other sober support.  Motivated for recovery by desire to be a good grandmother to upcoming grandchild.    Did admit to finding a bag of what turned out to be small amount of Heroin at home.  At first only admitted to touching but with further discussion obviously did use it.  UTOX pos OPI.   Reviewed powerful nature of disease.        Status since last visit:    Since last visit patient has been: stable.     Intensity:     There has been: moderate craving.      Suboxone Dose: too little.   Progression of Symptoms:     Cues to use and relapse triggers: finding substances.     Recovery program has been: ignored.   Accompanying Signs & Symptoms:    Side Effects: none.    Sobriety:     Status: patient has relapsed.      Drug Screen: obtained.   Precipitating factors:    Triggers have been: moderate.   Alleviating factors:    Contact with sponsor has been: no sponsor.     Family and support system has been: neutral.   Other Therapies Tried :     Patient has been going to recovery meetings:not at all.      Patient has been participating in professional counseling/therapy: NO            Social History     Social History Narrative            Living with Father of youngest child  (3 girls 22, 18 and 10 )   Will be grandma around April  2018    No legal issues currently (possible identity theft).     Recently job loss ([employer])          Patient Active Problem List    Diagnosis Date Noted     Low TSH level 08/29/2017     Priority: Medium     Abnormal thyroid function test 08/08/2017     Priority: Medium     Narcotic dependence, in remission (H) 07/13/2017     Priority: Medium     Back pain 01/26/2016     Priority: Medium     Shoulder pain, right 01/26/2016     Priority: Medium     Intermittent asthma, uncomplicated 12/07/2015     Priority: Medium     Anxiety 05/21/2013     Priority: Medium     Benign neoplasm of colon 04/16/2013     Priority: Medium     Overview:   Found on colonoscopy 04/2013, repeat colonoscopy in 5 years, 04/2018.       Chronic low back pain 03/19/2012     Priority: Medium     On pain contract - [facility]       Degeneration of intervertebral disc 05/12/2010     Priority: Medium     Spondylosis 05/12/2010     Priority: Medium     Displacement of intervertebral disc 05/12/2010     Priority: Medium     ASCUS on Pap smear 03/03/2008     Priority: Medium     LEEP age 15 per notes in 2015  3/3/08: ASCUS, + HPV 53  4/17/08: [place] - No visible lesions.   11/7/08: NIL pap  2/1/12: NIL pap  1/2013 Pap NIL, neg HPV. Plan cotest pap & HPV in 1 yr  3/2015: NIL pap, neg HPV. Plan cotest pap & HPV in 3 years.         Migraine with aura      Priority: Medium     Occas migraines, none recently  Problem list name updated by automated process. Provider to review       Tobacco use disorder      Priority: Medium     1 PPD- interested in quitting       Moderate recurrent major depression (H) 08/16/2005     Priority: Medium     Problem list and histories reviewed & adjusted, as indicated.  Additional history: as documented        Current Outpatient Prescriptions on File Prior to Visit:  naloxone (NARCAN) nasal spray Spray 1 spray (4 mg) in nostril as needed for opioid reversal   buprenorphine-naloxone (SUBOXONE) 8-2 MG SUBL sublingual tablet  Place 1 tablet under the tongue daily   methocarbamol (ROBAXIN) 750 MG tablet TAKE 2 TABLETS(1500 MG) BY MOUTH THREE TIMES DAILY AS NEEDED FOR MUSCLE SPASMS   ALPRAZolam (XANAX) 1 MG tablet Take 2 mg by mouth   QUEtiapine (SEROQUEL) 50 MG tablet Take 50 mg by mouth   amphetamine-dextroamphetamine (ADDERALL) 20 MG per tablet Take 20 mg by mouth   Cyanocobalamin (VITAMIN B 12 PO) Patient reported taking two tabs daily- not sure of the dosage.   MAPAP 500 MG tablet TAKE 2 TABLETS(1000 MG) BY MOUTH TWICE DAILY AS NEEDED FOR MILD PAIN   cetirizine (ZYRTEC) 10 MG tablet TAKE 1 TABLET(10 MG) BY MOUTH EVERY EVENING   FLUOXETINE HCL PO Take 20 mg by mouth daily    loperamide (IMODIUM A-D) 2 MG tablet Start with 2 tabs (4 mg), then take one tab (2 mg) after each diarrheal stool.  Do not use more than  8 tabs (16 mg) per day.   ibuprofen (ADVIL/MOTRIN) 600 MG tablet TAKE 1 TABLET BY MOUTH TWICE DAILY WITH MEALS   albuterol (PROAIR HFA/PROVENTIL HFA/VENTOLIN HFA) 108 (90 BASE) MCG/ACT Inhaler Inhale 2 puffs into the lungs every 4 hours as needed for shortness of breath / dyspnea or wheezing   multivitamin, therapeutic with minerals (MULTI-VITAMIN) TABS Take 1 tablet by mouth daily   CLONAZEPAM PO Take by mouth 4 times daily as needed for anxiety   pantoprazole (PROTONIX) 40 MG enteric coated tablet Take 1 tablet (40 mg) by mouth daily (Patient not taking: Reported on 8/29/2017)   order for DME Equipment being ordered: heating device and cryotherapy device.   order for DME Equipment being ordered: supportive back brace (Patient not taking: Reported on 8/29/2017)   Menthol, Topical Analgesic, 4 % GEL Apply three times a day as needed to affected area     No current facility-administered medications on file prior to visit.        Allergies   Allergen Reactions     Compazine      Heart Problems/ Body went completley stiff for 8 hrs.     Nortriptyline      Skelaxin [Metaxalone]            REVIEW OF SYSTEMS:  General: No acute  "withdrawal symptoms.  No recent infections or fever  Resp: No coughing, wheezing or shortness of breath  CV: No chest pains or palpitations  GI: No nausea, vomiting, abdominal pain, diarrhea, constipation  : No urinary frequency or dysuria,     Musculoskeletal: No significant muscle or joint pains, No edema  Neurologic: No numbness, tingling, weakness, problems with balance or coordination  Psychiatric: see above  High anxiety.   Skin: No rashes    OBJECTIVE:    PHYSICAL EXAM:  /68  Pulse 115  Ht 4' 10.5\" (1.486 m)  Wt 119 lb (54 kg)  SpO2 100%  BMI 24.45 kg/m2    GENERAL APPEARANCE: healthy, alert and mild distress  EYES: Eyes grossly normal to inspection, PERRL and conjunctivae and sclerae normal  NEURO:  Gait normal.  No tremor. Coordination intact.   MENTAL STATUS EXAM:  Appearance/Behavior: No apparent distress  Speech: Normal  Mood/Affect: depressed affect and tearful  Insight: Fair      Results for orders placed or performed in visit on 09/07/17   Urine Drugs of Abuse Screen Panel 13   Result Value Ref Range    Cannabinoids (48-jop-8-carboxy-9-THC) Not Detected NDET^Not Detected ng/mL    Phencyclidine (Phencyclidine) Not Detected NDET^Not Detected ng/mL    Cocaine (Benzoylecgonine) Not Detected NDET^Not Detected ng/mL    Methamphetamine (d-Methamphetamine) Not Detected NDET^Not Detected ng/mL    Opiates (Morphine) Detected, Abnormal Result (A) NDET^Not Detected ng/mL    Amphetamine (d-Amphetamine) Not Detected NDET^Not Detected ng/mL    Benzodiazepines (Nordiazepam) Detected, Abnormal Result (A) NDET^Not Detected ng/mL    Tricyclic Antidepressants (Desipramine) Not Detected NDET^Not Detected ng/mL    Methadone (Methadone) Not Detected NDET^Not Detected ng/mL    Barbiturates (Butalbital) Not Detected NDET^Not Detected ng/mL    Oxycodone (Oxycodone) Not Detected NDET^Not Detected ng/mL    Propoxyphene (Norpropoxyphene) Not Detected NDET^Not Detected ng/mL    Buprenorphine (Buprenorphine) Detected, " Abnormal Result (A) NDET^Not Detected ng/mL       ASSESSMENT/PLAN:    (F11.20) Uncomplicated opioid dependence (H)  (primary encounter diagnosis)  Comment: Recent use as above.  Pos UTOX opi and BZ (rx)  Plan: buprenorphine-naloxone (SUBOXONE) 8-2 MG SUBL         sublingual tablet        Increase to 8mg bid.  #20  Follow up one week.     Strongly encouraged some type of treatment and or recovery support for best chance at recovery  NA meeting list printed.  Encouraged at least trying a few meetings.   Info for treatment options discussed.     Opioid warning reviewed.  Risk of overdose following a period of abstinence due to decrease tolerance was discussed including risk of death.   Risk of overdose if using Suboxone with other substances particuarly benzodiazepines/alcohol was reviewed.     (F41.9) Anxiety  (F90.2) Attention deficit hyperactivity disorder (ADHD), combined type  (F33.1) Moderate recurrent major depression (H)  Plan:  Discussed lack of long term efficacy of benzoidazepine and increase risk of relapse.  Need for taper with hx of long acting daily Klonipin discussed and encouraged her to talk with psychiatrist about this.  Risk of stimulant medications also reviewed.     Continue therapy for mental health needs and likely PTSD    (F17.200) Nicotine use disorder  Plan: Encouraged Abstinence.  Increase risk of relapse with other substances with return to nicotine use discussed.  Risk of Ecig/Vaping also reviewed.        (Z79.899) High risk medication use    ENCOUNTER FOR LONG TERM USE OF HIGH RISK MEDICATION   High Risk Drug Monitoring?  YES   Drug being monitored: Suboxone   Reason for drug: Opioid Use Disorder   What is being monitored?: Dosage, Cravings, Trigger, side effects, and continued abstinence.      Opioid warning reviewed.  Risk of overdose following a period of abstinence due to decrease tolerance was discussed including risk of death.   Risk of overdose if using Suboxone with other  substances particuarly benzodiazepines/alcohol was reviewed.           Valeria Olson MD  [facility]

## 2017-09-07 NOTE — MR AVS SNAPSHOT
"              After Visit Summary   9/7/2017    Alisia Lin    MRN: [id number]           Patient Information     Date Of Birth          1980        Visit Information        Provider Department      9/7/2017 10:00 AM Valeria Olson MD [facility]        Today's Diagnoses     Uncomplicated opioid dependence (H)    -  1    Anxiety        Attention deficit hyperactivity disorder (ADHD), combined type        Moderate recurrent major depression (H)        Nicotine use disorder        High risk medication use           Follow-ups after your visit        Your next 10 appointments already scheduled     Sep 08, 2017  1:30 PM CDT   (Arrive by 1:15 PM)   MA DIAGNOSTIC IMPLANTS BILATERAL W/ JOSE with [email or web address]   [facility] ([facility])    [address]  2nd Floor  [place] [address] [phone]           Do not use any powder, lotion or deodorant under your arms or on your breast. If you do, we will ask you to remove it before your exam.  Wear comfortable, two-piece clothing.  If you have any allergies, tell your care team.  Bring any previous mammograms from other facilities or have them mailed to the breast center.  Three-dimensional (3D) mammograms are available at [place] locations in [facility] [place], and [place]. [facility] locations include [place] and [facility] in [place]. Benefits of 3D mammograms include: - Improved rate of cancer detection - Decreases your chance of having to go back for more tests, which means fewer: - \"False-positive\" results (This means that there is an abnormal area but it isn't cancer.) - Invasive testing procedures, such as a biopsy or surgery - Can provide clearer images of the breast if you have dense breast tissue. 3D mammography is an optional exam that anyone can have with a 2D mammogram. It doesn't replace or take the place of a " 2D mammogram. 2D mammograms remain an effective screening test for all women.  Not all insurance companies cover the cost of a 3D mammogram. Check with your insurance.            Sep 08, 2017  2:00 PM CDT   US BREAST LEFT LIMITED 1-3 QUAD with [email or web address]   [facility] ([facility])    [address]  2nd Floor  [place] [facility] [id number] [phone]           Please bring a list of your medicines (including vitamins, minerals and over-the-counter drugs). Also, tell your doctor about any allergies you may have. Wear comfortable clothes and leave your valuables at home.  You do not need to do anything special to prepare for your exam.  Please call the Imaging Department at your exam site with any questions.            Sep 11, 2017  7:20 AM CDT   (Arrive by 7:05 AM)   New Patient Visit with Grupo Doran MD   [facility] ([facility])    [address]  4th Floor  [facility] [address] [phone]            Sep 15, 2017  9:40 AM CDT   Return Visit with Valeria Olson MD   [facility] ([facility])    [address] [place] [address] So  [address]  [facility] [address] [phone]              Who to contact     If you have questions or need follow up information about today's clinic visit or your schedule please contact [facility] directly at [phone].  Normal or non-critical lab and imaging results will be communicated to you by MyChart, letter or phone within 4 business days after the clinic has received the results. If you do not hear from us within 7 days, please contact the clinic through MyChart or phone. If you have a critical or abnormal lab result, we will notify you by phone as soon as possible.  Submit refill requests through [employer] or call your pharmacy and they will forward the refill request to  "us. Please allow 3 business days for your refill to be completed.          Additional Information About Your Visit        Care EveryWhere ID     This is your Care EveryWhere ID. This could be used by other organizations to access your [place] medical records  [id number]        Your Vitals Were     Pulse Height Pulse Oximetry BMI (Body Mass Index)          115 4' 10.5\" (1.486 m) 100% 24.45 kg/m2         Blood Pressure from Last 3 Encounters:   09/07/17 102/68   09/01/17 128/72   08/29/17 90/61    Weight from Last 3 Encounters:   09/07/17 119 lb (54 kg)   09/01/17 121 lb (54.9 kg)   08/29/17 117 lb 4.8 oz (53.2 kg)              We Performed the Following     Urine Drugs of Abuse Screen Panel 13          Today's Medication Changes          These changes are accurate as of: 9/7/17 11:03 AM.  If you have any questions, ask your nurse or doctor.               These medicines have changed or have updated prescriptions.        Dose/Directions    buprenorphine-naloxone 8-2 MG Subl sublingual tablet   Commonly known as:  SUBOXONE   This may have changed:  when to take this   Used for:  Uncomplicated opioid dependence (H)   Changed by:  Valeria Olson MD        Dose:  1 tablet   Place 1 tablet under the tongue 2 times daily   Quantity:  20 each   Refills:  0            Where to get your medicines      Some of these will need a paper prescription and others can be bought over the counter.  Ask your nurse if you have questions.     Bring a paper prescription for each of these medications     buprenorphine-naloxone 8-2 MG Subl sublingual tablet                Primary Care Provider Office Phone # Fax #    Sepideh SONJA Erazo [facility] [phone] [address]  [facility] [address]        Equal Access to Services     [facility]: Hadsylwia Cabello, paul sultana, shreyas antonio. So [facility] [phone].    ATENCIÓN: Si habla " español, tiene a randall disposición servicios gratuitos de asistencia lingüística. Norma vann [phone].    We comply with applicable federal civil rights laws and [place] laws. We do not discriminate on the basis of race, color, national origin, age, disability sex, sexual orientation or gender identity.            Thank you!     Thank you for choosing [facility]  for your care. Our goal is always to provide you with excellent care. Hearing back from our patients is one way we can continue to improve our services. Please take a few minutes to complete the written survey that you may receive in the mail after your visit with us. Thank you!             Your Updated Medication List - Protect others around you: Learn how to safely use, store and throw away your medicines at www.disposemymeds.org.          This list is accurate as of: 9/7/17 11:03 AM.  Always use your most recent med list.                   Brand Name Dispense Instructions for use Diagnosis    albuterol 108 (90 BASE) MCG/ACT Inhaler    PROAIR HFA/PROVENTIL HFA/VENTOLIN HFA    1 Inhaler    Inhale 2 puffs into the lungs every 4 hours as needed for shortness of breath / dyspnea or wheezing    Intermittent asthma, uncomplicated       ALPRAZolam 1 MG tablet    XANAX     Take 2 mg by mouth    Low TSH level       amphetamine-dextroamphetamine 20 MG per tablet    ADDERALL     Take 20 mg by mouth    Low TSH level       buprenorphine-naloxone 8-2 MG Subl sublingual tablet    SUBOXONE    20 each    Place 1 tablet under the tongue 2 times daily    Uncomplicated opioid dependence (H)       cetirizine 10 MG tablet    zyrTEC    30 tablet    TAKE 1 TABLET(10 MG) BY MOUTH EVERY EVENING    Seasonal allergic rhinitis, unspecified chronicity, unspecified trigger       CLONAZEPAM PO      Take by mouth 4 times daily as needed for anxiety        FLUOXETINE HCL PO      Take 20 mg by mouth daily        ibuprofen 600 MG tablet    ADVIL/MOTRIN    180  tablet    TAKE 1 TABLET BY MOUTH TWICE DAILY WITH MEALS    Chronic low back pain, unspecified back pain laterality, with sciatica presence unspecified, Chronic midline thoracic back pain       loperamide 2 MG tablet    IMODIUM A-D    60 tablet    Start with 2 tabs (4 mg), then take one tab (2 mg) after each diarrheal stool.  Do not use more than  8 tabs (16 mg) per day.    Diarrhea, unspecified type       MAPAP 500 MG tablet   Generic drug:  acetaminophen     100 tablet    TAKE 2 TABLETS(1000 MG) BY MOUTH TWICE DAILY AS NEEDED FOR MILD PAIN    Back pain, Shoulder pain, right       Menthol (Topical Analgesic) 4 % Gel     150 mL    Apply three times a day as needed to affected area    Bilateral low back pain with left-sided sciatica, Right shoulder pain       methocarbamol 750 MG tablet    ROBAXIN    180 tablet    TAKE 2 TABLETS(1500 MG) BY MOUTH THREE TIMES DAILY AS NEEDED FOR MUSCLE SPASMS    Chronic midline low back pain with sciatica, sciatica laterality unspecified       multivitamin, therapeutic with minerals Tabs tablet     100 tablet    Take 1 tablet by mouth daily    Other fatigue       naloxone nasal spray    NARCAN    2 each    Spray 1 spray (4 mg) in nostril as needed for opioid reversal    Uncomplicated opioid dependence (H)       * order for DME     1 Device    Equipment being ordered: supportive back brace    Bilateral low back pain with left-sided sciatica       * order for DME     1 Units    Equipment being ordered: heating device and cryotherapy device.    Chronic low back pain, Midline thoracic back pain       pantoprazole 40 MG EC tablet    PROTONIX    90 tablet    Take 1 tablet (40 mg) by mouth daily    Gastroesophageal reflux disease without esophagitis       QUEtiapine 50 MG tablet    SEROquel     Take 50 mg by mouth    Low TSH level       VITAMIN B 12 PO      Patient reported taking two tabs daily- not sure of the dosage.    Low TSH level       * Notice:  This list has 2 medication(s) that are  the same as other medications prescribed for you. Read the directions carefully, and ask your doctor or other care provider to review them with you.

## 2017-09-20 ENCOUNTER — TELEPHONE (OUTPATIENT)
Dept: ADDICTION MEDICINE | Facility: CLINIC | Age: 37
End: 2017-09-20

## 2017-09-20 NOTE — TELEPHONE ENCOUNTER
Reason for Call:  Other call back    Detailed comments: Pt would like a call back from Dr. Olson. No reason given    Phone Number Patient can be reached at: Home number on file [phone] (home)    Best Time: Anytime    Can we leave a detailed message on this number? YES    Call taken on 9/20/2017 at 3:37 PM by Polly Springer

## 2017-09-25 DIAGNOSIS — G89.29 CHRONIC MIDLINE LOW BACK PAIN WITH SCIATICA, SCIATICA LATERALITY UNSPECIFIED: ICD-10-CM

## 2017-09-25 DIAGNOSIS — M54.40 CHRONIC MIDLINE LOW BACK PAIN WITH SCIATICA, SCIATICA LATERALITY UNSPECIFIED: ICD-10-CM

## 2017-09-25 NOTE — TELEPHONE ENCOUNTER
Medication Detail      Disp Refills Start End TJ   methocarbamol (ROBAXIN) 750 MG tablet 180 tablet 0 8/29/2017  No   Sig: TAKE 2 TABLETS(1500 MG) BY MOUTH THREE TIMES DAILY AS NEEDED FOR MUSCLE SPASMS       Last Office Visit with [facility] prescribing provider: 8/7/17  Future Office visit:    Next 5 appointments (look out 90 days)     Oct 04, 2017 11:40 AM CDT   Return Visit with Valeria Olson MD   [facility] ([facility])    [address] [place] [address]  [address]  [facility] [address]   [phone]                   Routing refill request to provider for review/approval because:  Drug not on the [facility], [facility] or  Health refill protocol or controlled substance

## 2017-09-26 RX ORDER — METHOCARBAMOL 750 MG/1
TABLET, FILM COATED ORAL
Qty: 180 TABLET | Refills: 0 | Status: SHIPPED | OUTPATIENT
Start: 2017-09-26 | End: 2017-10-20

## 2017-09-26 NOTE — TELEPHONE ENCOUNTER
Pt wanted an Appt.  Pt is scheduled to see Dr. Morales on 10/4/17 at 11:40.  Writer reviewed Appt time with Pt.    Milton Hudson RN

## 2017-10-20 DIAGNOSIS — M54.40 CHRONIC MIDLINE LOW BACK PAIN WITH SCIATICA, SCIATICA LATERALITY UNSPECIFIED: ICD-10-CM

## 2017-10-20 DIAGNOSIS — M54.6 CHRONIC MIDLINE THORACIC BACK PAIN: ICD-10-CM

## 2017-10-20 DIAGNOSIS — G89.29 CHRONIC MIDLINE LOW BACK PAIN WITH SCIATICA, SCIATICA LATERALITY UNSPECIFIED: ICD-10-CM

## 2017-10-20 DIAGNOSIS — G89.29 CHRONIC LOW BACK PAIN, UNSPECIFIED BACK PAIN LATERALITY, WITH SCIATICA PRESENCE UNSPECIFIED: ICD-10-CM

## 2017-10-20 DIAGNOSIS — M54.5 CHRONIC LOW BACK PAIN, UNSPECIFIED BACK PAIN LATERALITY, WITH SCIATICA PRESENCE UNSPECIFIED: ICD-10-CM

## 2017-10-20 DIAGNOSIS — G89.29 CHRONIC MIDLINE THORACIC BACK PAIN: ICD-10-CM

## 2017-10-24 ENCOUNTER — TRANSFERRED RECORDS (OUTPATIENT)
Dept: HEALTH INFORMATION MANAGEMENT | Facility: CLINIC | Age: 37
End: 2017-10-24

## 2017-10-24 LAB
ALT SERPL-CCNC: 12 IU/L (ref 8–45)
AST SERPL-CCNC: 38 IU/L (ref 2–40)
CREAT SERPL-MCNC: 0.63 MG/DL (ref 0.57–1.11)
EJECTION FRACTION: 58
EJECTION FRACTION: 63
GFR SERPL CREATININE-BSD FRML MDRD: >60 ML/MIN/1.73M2
GLUCOSE SERPL-MCNC: 112 MG/DL (ref 65–100)
POTASSIUM SERPL-SCNC: 3 MMOL/L (ref 3.5–5)

## 2017-10-24 RX ORDER — METHOCARBAMOL 750 MG/1
TABLET, FILM COATED ORAL
Qty: 180 TABLET | Refills: 0 | Status: SHIPPED | OUTPATIENT
Start: 2017-10-24 | End: 2017-12-07

## 2017-10-24 RX ORDER — IBUPROFEN 600 MG/1
TABLET, FILM COATED ORAL
Qty: 180 TABLET | Refills: 0 | Status: SHIPPED | OUTPATIENT
Start: 2017-10-24 | End: 2018-03-13

## 2017-10-24 NOTE — TELEPHONE ENCOUNTER
Routing refill request to provider for review/approval because:  Drug not on the [facility] refill protocol: Methocarbamol    Ibuprofen refilled according to [facility] refill protocol.    Sepideh-Please sign if agree.    Thank you!  JEANA Noble, BIENVENIDON, RN

## 2017-10-30 ENCOUNTER — TELEPHONE (OUTPATIENT)
Dept: CARE COORDINATION | Facility: CLINIC | Age: 37
End: 2017-10-30

## 2017-10-30 DIAGNOSIS — J96.01 ACUTE HYPOXEMIC RESPIRATORY FAILURE (H): Primary | ICD-10-CM

## 2017-10-30 NOTE — TELEPHONE ENCOUNTER
DC'd from [place] on 10/28 to home self care   Primary Problem: Acute hypoxemic respiratory failure  LOS: 4.1

## 2017-11-03 ENCOUNTER — ALLIED HEALTH/NURSE VISIT (OUTPATIENT)
Dept: NURSING | Facility: CLINIC | Age: 37
End: 2017-11-03
Payer: COMMERCIAL

## 2017-11-03 DIAGNOSIS — Z23 NEED FOR PROPHYLACTIC VACCINATION AND INOCULATION AGAINST INFLUENZA: Primary | ICD-10-CM

## 2017-11-03 PROCEDURE — 90686 IIV4 VACC NO PRSV 0.5 ML IM: CPT

## 2017-11-03 PROCEDURE — 90670 PCV13 VACCINE IM: CPT

## 2017-11-03 PROCEDURE — 90471 IMMUNIZATION ADMIN: CPT

## 2017-11-03 PROCEDURE — 90472 IMMUNIZATION ADMIN EACH ADD: CPT

## 2017-11-03 NOTE — PROGRESS NOTES
Injectable Influenza Immunization Documentation    1.  Is the person to be vaccinated sick today?   No    2. Does the person to be vaccinated have an allergy to a component   of the vaccine?   No  Egg Allergy Algorithm Link    3. Has the person to be vaccinated ever had a serious reaction   to influenza vaccine in the past?   No    4. Has the person to be vaccinated ever had Guillain-Barré syndrome?   No    Form completed by patient    Prior to injection verified patient identity using patient's name and date of birth.    January Michelle MA

## 2017-11-03 NOTE — MR AVS SNAPSHOT
After Visit Summary   11/3/2017    Alisia Lin    MRN: [id number]           Patient Information     Date Of Birth          1980        Visit Information        Provider Department      11/3/2017 9:00 AM [facility]        Today's Diagnoses     Need for prophylactic vaccination and inoculation against influenza    -  1       Follow-ups after your visit        Who to contact     If you have questions or need follow up information about today's clinic visit or your schedule please contact [facility] directly at [phone].  Normal or non-critical lab and imaging results will be communicated to you by MyChart, letter or phone within 4 business days after the clinic has received the results. If you do not hear from us within 7 days, please contact the clinic through MyChart or phone. If you have a critical or abnormal lab result, we will notify you by phone as soon as possible.  Submit refill requests through [employer] or call your pharmacy and they will forward the refill request to us. Please allow 3 business days for your refill to be completed.          Additional Information About Your Visit        Care EveryWhere ID     This is your Care EveryWhere ID. This could be used by other organizations to access your [place] medical records  [id number]         Blood Pressure from Last 3 Encounters:   09/07/17 102/68   09/01/17 128/72   08/29/17 90/61    Weight from Last 3 Encounters:   09/07/17 119 lb (54 kg)   09/01/17 121 lb (54.9 kg)   08/29/17 117 lb 4.8 oz (53.2 kg)              We Performed the Following     FLU VAC, SPLIT VIRUS IM > 3 YO (QUADRIVALENT) [04162]     Pneumococcal vaccine 13 valent PCV13 IM (Prevnar) [62771]     Vaccine Administration, Each Additional [58968]        Primary Care Provider Office Phone # Fax #    Michael Lemos [phone] [address] [place] [address]        Equal Access  to Services     [facility] : Batsheva Cabello, wanabor sultana, qadomenicawatson kaalmashreyas larsen. So [facility] [phone].    ATENCIÓN: Si habla tien, tiene a randall disposición servicios gratuitos de asistencia lingüística. Llame al [phone].    We comply with applicable federal civil rights laws and [place] laws. We do not discriminate on the basis of race, color, national origin, age, disability, sex, sexual orientation, or gender identity.            Thank you!     Thank you for choosing [facility]  for your care. Our goal is always to provide you with excellent care. Hearing back from our patients is one way we can continue to improve our services. Please take a few minutes to complete the written survey that you may receive in the mail after your visit with us. Thank you!             Your Updated Medication List - Protect others around you: Learn how to safely use, store and throw away your medicines at www.disposemymeds.org.          This list is accurate as of: 11/3/17  9:29 AM.  Always use your most recent med list.                   Brand Name Dispense Instructions for use Diagnosis    albuterol 108 (90 BASE) MCG/ACT Inhaler    PROAIR HFA/PROVENTIL HFA/VENTOLIN HFA    1 Inhaler    Inhale 2 puffs into the lungs every 4 hours as needed for shortness of breath / dyspnea or wheezing    Intermittent asthma, uncomplicated       ALPRAZolam 1 MG tablet    XANAX     Take 2 mg by mouth    Low TSH level       amphetamine-dextroamphetamine 20 MG per tablet    ADDERALL     Take 20 mg by mouth    Low TSH level       buprenorphine-naloxone 8-2 MG Subl sublingual tablet    SUBOXONE    20 each    Place 1 tablet under the tongue 2 times daily    Uncomplicated opioid dependence (H)       cetirizine 10 MG tablet    zyrTEC    30 tablet    TAKE 1 TABLET(10 MG) BY MOUTH EVERY EVENING    Seasonal allergic rhinitis, unspecified chronicity, unspecified trigger        CLONAZEPAM PO      Take by mouth 4 times daily as needed for anxiety        FLUOXETINE HCL PO      Take 20 mg by mouth daily        ibuprofen 600 MG tablet    ADVIL/MOTRIN    180 tablet    TAKE 1 TABLET BY MOUTH TWICE DAILY WITH MEALS    Chronic low back pain, unspecified back pain laterality, with sciatica presence unspecified, Chronic midline thoracic back pain       loperamide 2 MG tablet    IMODIUM A-D    60 tablet    Start with 2 tabs (4 mg), then take one tab (2 mg) after each diarrheal stool.  Do not use more than  8 tabs (16 mg) per day.    Diarrhea, unspecified type       MAPAP 500 MG tablet   Generic drug:  acetaminophen     100 tablet    TAKE 2 TABLETS(1000 MG) BY MOUTH TWICE DAILY AS NEEDED FOR MILD PAIN    Back pain, Shoulder pain, right       Menthol (Topical Analgesic) 4 % Gel     150 mL    Apply three times a day as needed to affected area    Bilateral low back pain with left-sided sciatica, Right shoulder pain       methocarbamol 750 MG tablet    ROBAXIN    180 tablet    TAKE 2 TABLETS(1500 MG) BY MOUTH THREE TIMES DAILY AS NEEDED FOR MUSCLE SPASMS    Chronic midline low back pain with sciatica, sciatica laterality unspecified       multivitamin, therapeutic with minerals Tabs tablet     100 tablet    Take 1 tablet by mouth daily    Other fatigue       naloxone nasal spray    NARCAN    2 each    Spray 1 spray (4 mg) in nostril as needed for opioid reversal    Uncomplicated opioid dependence (H)       * order for DME     1 Device    Equipment being ordered: supportive back brace    Bilateral low back pain with left-sided sciatica       * order for DME     1 Units    Equipment being ordered: heating device and cryotherapy device.    Chronic low back pain, Midline thoracic back pain       pantoprazole 40 MG EC tablet    PROTONIX    90 tablet    Take 1 tablet (40 mg) by mouth daily    Gastroesophageal reflux disease without esophagitis       QUEtiapine 50 MG tablet    SEROquel     Take 50 mg by mouth     Low TSH level       VITAMIN B 12 PO      Patient reported taking two tabs daily- not sure of the dosage.    Low TSH level       * Notice:  This list has 2 medication(s) that are the same as other medications prescribed for you. Read the directions carefully, and ask your doctor or other care provider to review them with you.

## 2017-11-14 ENCOUNTER — CARE COORDINATION (OUTPATIENT)
Dept: CARE COORDINATION | Facility: CLINIC | Age: 37
End: 2017-11-14

## 2017-11-14 NOTE — PROGRESS NOTES
Clinic Care Coordination Contact  [facility]/Voicemail    Referral Source: Care Team  Clinical Data: Care Coordinator Outreach  Outreach attempted x 1.  No answer  Plan:  Care Coordinator will try to reach patient again in 1-2 business days.

## 2017-11-27 ENCOUNTER — TELEPHONE (OUTPATIENT)
Dept: ADDICTION MEDICINE | Facility: CLINIC | Age: 37
End: 2017-11-27

## 2017-11-27 NOTE — TELEPHONE ENCOUNTER
Reason for Call:  Other appointment    Detailed comments: Pt would like an appt with Dr. Olson as soon as possible; pt is wondering if Dr. Olson can fit her in. Pt states that she relapsed and would like to be seen sooner than later.     Phone Number Patient can be reached at: Home number on file [phone] (home)    Best Time: Anytime    Can we leave a detailed message on this number? YES    Call taken on 11/27/2017 at 12:19 PM by Polly Sprniger

## 2017-11-30 ENCOUNTER — OFFICE VISIT (OUTPATIENT)
Dept: ADDICTION MEDICINE | Facility: CLINIC | Age: 37
End: 2017-11-30
Payer: COMMERCIAL

## 2017-11-30 VITALS
RESPIRATION RATE: 14 BRPM | OXYGEN SATURATION: 99 % | BODY MASS INDEX: 24.45 KG/M2 | HEART RATE: 102 BPM | SYSTOLIC BLOOD PRESSURE: 132 MMHG | DIASTOLIC BLOOD PRESSURE: 84 MMHG | WEIGHT: 119 LBS

## 2017-11-30 DIAGNOSIS — F11.20 UNCOMPLICATED OPIOID DEPENDENCE (H): ICD-10-CM

## 2017-11-30 DIAGNOSIS — F41.9 ANXIETY: Primary | ICD-10-CM

## 2017-11-30 DIAGNOSIS — F33.1 MODERATE RECURRENT MAJOR DEPRESSION (H): ICD-10-CM

## 2017-11-30 DIAGNOSIS — F17.200 NICOTINE USE DISORDER: ICD-10-CM

## 2017-11-30 DIAGNOSIS — F90.2 ATTENTION DEFICIT HYPERACTIVITY DISORDER (ADHD), COMBINED TYPE: ICD-10-CM

## 2017-11-30 LAB
AMPHETAMINES UR QL: ABNORMAL NG/ML
BARBITURATES UR QL SCN: NOT DETECTED NG/ML
BENZODIAZ UR QL SCN: ABNORMAL NG/ML
BUPRENORPHINE UR QL: NOT DETECTED NG/ML
CANNABINOIDS UR QL: NOT DETECTED NG/ML
COCAINE UR QL SCN: ABNORMAL NG/ML
D-METHAMPHET UR QL: ABNORMAL NG/ML
METHADONE UR QL SCN: NOT DETECTED NG/ML
OPIATES UR QL SCN: ABNORMAL NG/ML
OXYCODONE UR QL SCN: NOT DETECTED NG/ML
PCP UR QL SCN: NOT DETECTED NG/ML
PROPOXYPH UR QL: NOT DETECTED NG/ML
TRICYCLICS UR QL SCN: ABNORMAL NG/ML

## 2017-11-30 PROCEDURE — 80306 DRUG TEST PRSMV INSTRMNT: CPT | Performed by: PEDIATRICS

## 2017-11-30 PROCEDURE — 99214 OFFICE O/P EST MOD 30 MIN: CPT | Performed by: PEDIATRICS

## 2017-11-30 RX ORDER — BUPRENORPHINE HYDROCHLORIDE AND NALOXONE HYDROCHLORIDE DIHYDRATE 8; 2 MG/1; MG/1
1 TABLET SUBLINGUAL 2 TIMES DAILY
Qty: 28 EACH | Refills: 0 | Status: SHIPPED | OUTPATIENT
Start: 2017-11-30 | End: 2017-12-14

## 2017-11-30 NOTE — MR AVS SNAPSHOT
"              After Visit Summary   11/30/2017    Alisia Lin    MRN: [id number]           Patient Information     Date Of Birth          1980        Visit Information        Provider Department      11/30/2017 3:40 PM Valeria Olson MD [facility]        Today's Diagnoses     Anxiety    -  1    Uncomplicated opioid dependence (H)        Attention deficit hyperactivity disorder (ADHD), combined type        Moderate recurrent major depression (H)        Nicotine use disorder           Follow-ups after your visit        Who to contact     If you have questions or need follow up information about today's clinic visit or your schedule please contact [facility] directly at [phone].  Normal or non-critical lab and imaging results will be communicated to you by MyChart, letter or phone within 4 business days after the clinic has received the results. If you do not hear from us within 7 days, please contact the clinic through MyChart or phone. If you have a critical or abnormal lab result, we will notify you by phone as soon as possible.  Submit refill requests through Mov[employer] or call your pharmacy and they will forward the refill request to us. Please allow 3 business days for your refill to be completed.          Additional Information About Your Visit        MyChart Information     [employer]li lets you send messages to your doctor, view your test results, renew your prescriptions, schedule appointments and more. To sign up, go to [email or web address] . Click on \"Log in\" on the left side of the screen, which will take you to the Welcome page. Then click on \"Sign up Now\" on the right side of the page.     You will be asked to enter the access code listed below, as well as some personal information. Please follow the directions to create your username and password.     Your access code is: [email or web address]  Expires: 2/27/2018  1:48 PM     Your " access code will  in 90 days. If you need help or a new code, please call your [facility] clinic or [phone].        Care EveryWhere ID     This is your Care EveryWhere ID. This could be used by other organizations to access your [facility] medical records  [id number]        Your Vitals Were     Pulse Respirations Pulse Oximetry BMI (Body Mass Index)          102 14 99% 24.45 kg/m2         Blood Pressure from Last 3 Encounters:   17 132/84   17 102/68   17 128/72    Weight from Last 3 Encounters:   17 119 lb (54 kg)   17 119 lb (54 kg)   17 121 lb (54.9 kg)              We Performed the Following     Urine Drugs of Abuse Screen Panel 13          Where to get your medicines      Some of these will need a paper prescription and others can be bought over the counter.  Ask your nurse if you have questions.     Bring a paper prescription for each of these medications     buprenorphine-naloxone 8-2 MG Subl sublingual tablet          Primary Care Provider Office Phone # Fax #    Michael Meena Lemos [phone]       [address] [place] [address]        Equal Access to Services     SAM CLARK : Hadii bakari Cabello, waaprilda kayy, qaosorio oliveira, shreyas fernandez. So [facility] [phone].    ATENCIÓN: Si habla español, tiene a randall disposición servicios gratuitos de asistencia lingüística. Norma al [phone].    We comply with applicable federal civil rights laws and [place] laws. We do not discriminate on the basis of race, color, national origin, age, disability, sex, sexual orientation, or gender identity.            Thank you!     Thank you for choosing [facility]  for your care. Our goal is always to provide you with excellent care. Hearing back from our patients is one way we can continue to improve our services. Please take a few minutes to complete the written  survey that you may receive in the mail after your visit with us. Thank you!             Your Updated Medication List - Protect others around you: Learn how to safely use, store and throw away your medicines at www.disposemymeds.org.          This list is accurate as of: 11/30/17 11:59 PM.  Always use your most recent med list.                   Brand Name Dispense Instructions for use Diagnosis    albuterol 108 (90 BASE) MCG/ACT Inhaler    PROAIR HFA/PROVENTIL HFA/VENTOLIN HFA    1 Inhaler    Inhale 2 puffs into the lungs every 4 hours as needed for shortness of breath / dyspnea or wheezing    Intermittent asthma, uncomplicated       ALPRAZolam 1 MG tablet    XANAX     Take 2 mg by mouth    Low TSH level       amphetamine-dextroamphetamine 20 MG per tablet    ADDERALL     Take 20 mg by mouth    Low TSH level       buprenorphine-naloxone 8-2 MG Subl sublingual tablet    SUBOXONE    28 each    Place 1 tablet under the tongue 2 times daily    Uncomplicated opioid dependence (H)       cetirizine 10 MG tablet    zyrTEC    30 tablet    TAKE 1 TABLET(10 MG) BY MOUTH EVERY EVENING    Seasonal allergic rhinitis, unspecified chronicity, unspecified trigger       CLONAZEPAM PO      Take by mouth 4 times daily as needed for anxiety        FLUOXETINE HCL PO      Take 20 mg by mouth daily        ibuprofen 600 MG tablet    ADVIL/MOTRIN    180 tablet    TAKE 1 TABLET BY MOUTH TWICE DAILY WITH MEALS    Chronic low back pain, unspecified back pain laterality, with sciatica presence unspecified, Chronic midline thoracic back pain       loperamide 2 MG tablet    IMODIUM A-D    60 tablet    Start with 2 tabs (4 mg), then take one tab (2 mg) after each diarrheal stool.  Do not use more than  8 tabs (16 mg) per day.    Diarrhea, unspecified type       MAPAP 500 MG tablet   Generic drug:  acetaminophen     100 tablet    TAKE 2 TABLETS(1000 MG) BY MOUTH TWICE DAILY AS NEEDED FOR MILD PAIN    Back pain, Shoulder pain, right       Menthol  (Topical Analgesic) 4 % Gel     150 mL    Apply three times a day as needed to affected area    Bilateral low back pain with left-sided sciatica, Right shoulder pain       methocarbamol 750 MG tablet    ROBAXIN    180 tablet    TAKE 2 TABLETS(1500 MG) BY MOUTH THREE TIMES DAILY AS NEEDED FOR MUSCLE SPASMS    Chronic midline low back pain with sciatica, sciatica laterality unspecified       multivitamin, therapeutic with minerals Tabs tablet     100 tablet    Take 1 tablet by mouth daily    Other fatigue       naloxone nasal spray    NARCAN    2 each    Spray 1 spray (4 mg) in nostril as needed for opioid reversal    Uncomplicated opioid dependence (H)       * order for DME     1 Device    Equipment being ordered: supportive back brace    Bilateral low back pain with left-sided sciatica       * order for DME     1 Units    Equipment being ordered: heating device and cryotherapy device.    Chronic low back pain, Midline thoracic back pain       pantoprazole 40 MG EC tablet    PROTONIX    90 tablet    Take 1 tablet (40 mg) by mouth daily    Gastroesophageal reflux disease without esophagitis       QUEtiapine 50 MG tablet    SEROquel     Take 50 mg by mouth    Low TSH level       VITAMIN B 12 PO      Patient reported taking two tabs daily- not sure of the dosage.    Low TSH level       * Notice:  This list has 2 medication(s) that are the same as other medications prescribed for you. Read the directions carefully, and ask your doctor or other care provider to review them with you.

## 2017-11-30 NOTE — PROGRESS NOTES
SUBJECTIVE:                                                    OPIOID USE DISORDER - SUBOXONE FOLLOW UP:    Alisia Lin is a 37 year old female who presents to clinic today for follow up of Suboxone MAT for opioid use disorder.     Date of last visit:  11/29/2017      Plan at last visit:   Suboxone 8mg bid.     [place] Board of Pharmacy Data Base Reviewed:    YES; No Concerns Noted   12/1/2017      HPI:  Patient returns for first visit in several months after peroid of relapse.   Had done well briefly on Suboxone 8mg bid but was not really doing any other recovery activities.  Has had multiple deaths in family including nephew who was stabbed.  Brought back a lot of PTSD over previous abuse.  Lost job end of august and this started downhill using spiral.   Started using at that time using Heroin daily 2pt or more/day.  Also using cocaine or alcohol mostly if Heroin not available.   Had not done methamphetamine in past but thinks she may have gotten some in past week based on effect.  Has only been snorting. Denies any IV use.   Got a new job at [employer].  Still taking Klonipin 1mg tid and 1mg Xanax bid but hasn't taken any Xanax other than one dose with suspected meth and only using Klonipin 1-2x day.  Starting to be more open to continued weaning.  Due for follow up with psychiatry.     Previous was on Suboxone 8mg bid.  Going to meetings on phone.  Last use of Heroin about 0800 this am.   No current withdrawal sx.   Wanting to start doing more recovery activity/meetings.  Willing to attend counseling.          Status since last visit:      Since last visit patient has been:has relapsed    There has been: moderate craving.      Suboxone Dose: was doing well on previous dose until relapse.   Progression of Symptoms:     Cues to use and relapse triggers: ongoing use    Recovery program has been: ignored.   Accompanying Signs & Symptoms:    Side Effects: none.    Sobriety:     Status: patient has relapsed.       Drug Screen: obtained.   Precipitating factors:    Triggers have been: moderate.   Alleviating factors:    Contact with sponsor has been: no sponsor.     Family and support system has been: helpful.   Other Therapies Tried :     Patient has been going to recovery meetings:sporadically.      Patient has been participating in professional counseling/therapy: NO            Social History     Social History Narrative            Living with Father of youngest child  (3 girls 22, 18 and 10 )   Will be grandma around April 2018    No legal issues currently (possible identity theft).     Recently job loss ([employer])          Patient Active Problem List    Diagnosis Date Noted     Low TSH level 08/29/2017     Priority: Medium     Abnormal thyroid function test 08/08/2017     Priority: Medium     Narcotic dependence, in remission (H) 07/13/2017     Priority: Medium     Back pain 01/26/2016     Priority: Medium     Shoulder pain, right 01/26/2016     Priority: Medium     Intermittent asthma, uncomplicated 12/07/2015     Priority: Medium     Anxiety 05/21/2013     Priority: Medium     Benign neoplasm of colon 04/16/2013     Priority: Medium     Overview:   Found on colonoscopy 04/2013, repeat colonoscopy in 5 years, 04/2018.       Chronic low back pain 03/19/2012     Priority: Medium     On pain contract - [facility]       Degeneration of intervertebral disc 05/12/2010     Priority: Medium     Spondylosis 05/12/2010     Priority: Medium     Displacement of intervertebral disc 05/12/2010     Priority: Medium     ASCUS on Pap smear 03/03/2008     Priority: Medium     LEEP age 15 per notes in 2015  3/3/08: ASCUS, + HPV 53  4/17/08: [facility] - No visible lesions.   11/7/08: NIL pap  2/1/12: NIL pap  1/2013 Pap NIL, neg HPV. Plan cotest pap & HPV in 1 yr  3/2015: NIL pap, neg HPV. Plan cotest pap & HPV in 3 years.         Migraine with aura      Priority: Medium     Occas migraines, none recently  Problem list name updated  by automated process. Provider to review       Tobacco use disorder      Priority: Medium     1 PPD- interested in quitting       Moderate recurrent major depression (H) 08/16/2005     Priority: Medium     Problem list and histories reviewed & adjusted, as indicated.  Additional history: as documented        Current Outpatient Prescriptions on File Prior to Visit:  ibuprofen (ADVIL/MOTRIN) 600 MG tablet TAKE 1 TABLET BY MOUTH TWICE DAILY WITH MEALS   methocarbamol (ROBAXIN) 750 MG tablet TAKE 2 TABLETS(1500 MG) BY MOUTH THREE TIMES DAILY AS NEEDED FOR MUSCLE SPASMS   buprenorphine-naloxone (SUBOXONE) 8-2 MG SUBL sublingual tablet Place 1 tablet under the tongue 2 times daily   naloxone (NARCAN) nasal spray Spray 1 spray (4 mg) in nostril as needed for opioid reversal   ALPRAZolam (XANAX) 1 MG tablet Take 2 mg by mouth   QUEtiapine (SEROQUEL) 50 MG tablet Take 50 mg by mouth   amphetamine-dextroamphetamine (ADDERALL) 20 MG per tablet Take 20 mg by mouth   Cyanocobalamin (VITAMIN B 12 PO) Patient reported taking two tabs daily- not sure of the dosage.   MAPAP 500 MG tablet TAKE 2 TABLETS(1000 MG) BY MOUTH TWICE DAILY AS NEEDED FOR MILD PAIN   cetirizine (ZYRTEC) 10 MG tablet TAKE 1 TABLET(10 MG) BY MOUTH EVERY EVENING   FLUOXETINE HCL PO Take 20 mg by mouth daily    loperamide (IMODIUM A-D) 2 MG tablet Start with 2 tabs (4 mg), then take one tab (2 mg) after each diarrheal stool.  Do not use more than  8 tabs (16 mg) per day.   albuterol (PROAIR HFA/PROVENTIL HFA/VENTOLIN HFA) 108 (90 BASE) MCG/ACT Inhaler Inhale 2 puffs into the lungs every 4 hours as needed for shortness of breath / dyspnea or wheezing   multivitamin, therapeutic with minerals (MULTI-VITAMIN) TABS Take 1 tablet by mouth daily   CLONAZEPAM PO Take by mouth 4 times daily as needed for anxiety   pantoprazole (PROTONIX) 40 MG enteric coated tablet Take 1 tablet (40 mg) by mouth daily (Patient not taking: Reported on 8/29/2017)   order for DME Equipment  being ordered: heating device and cryotherapy device.   order for DME Equipment being ordered: supportive back brace (Patient not taking: Reported on 8/29/2017)   Menthol, Topical Analgesic, 4 % GEL Apply three times a day as needed to affected area     No current facility-administered medications on file prior to visit.        Allergies   Allergen Reactions     Compazine      Heart Problems/ Body went completley stiff for 8 hrs.     Nortriptyline      Skelaxin [Metaxalone]            REVIEW OF SYSTEMS:  General: No acute withdrawal symptoms.  No recent infections or fever  Resp: No coughing, wheezing or shortness of breath  CV: No chest pains or palpitations  GI: No nausea, vomiting, abdominal pain, diarrhea, constipation  : No urinary frequency or dysuria,     Musculoskeletal: No significant muscle or joint pains, No edema  Neurologic: No numbness, tingling, weakness, problems with balance or coordination  Psychiatric: see above  Skin: No rashes    OBJECTIVE:    PHYSICAL EXAM:  /84  Pulse 102  Resp 14  Wt 119 lb (54 kg)  SpO2 99%  BMI 24.45 kg/m2    GENERAL APPEARANCE: healthy, alert and no distress  EYES: Eyes grossly normal to inspection, PERRL and conjunctivae and sclerae normal  NEURO:  Gait normal.  No tremor. Coordination intact.   MENTAL STATUS EXAM:  Appearance/Behavior: No apparent distress  Speech: Normal  Mood/Affect: normal affect  Insight: Adequate      Results for orders placed or performed in visit on 11/30/17   Urine Drugs of Abuse Screen Panel 13   Result Value Ref Range    Cannabinoids (33-sin-4-carboxy-9-THC) Not Detected NDET^Not Detected ng/mL    Phencyclidine (Phencyclidine) Not Detected NDET^Not Detected ng/mL    Cocaine (Benzoylecgonine) Detected, Abnormal Result (A) NDET^Not Detected ng/mL    Methamphetamine (d-Methamphetamine) Detected, Abnormal Result (A) NDET^Not Detected ng/mL    Opiates (Morphine) Detected, Abnormal Result (A) NDET^Not Detected ng/mL    Amphetamine  (d-Amphetamine) Detected, Abnormal Result (A) NDET^Not Detected ng/mL    Benzodiazepines (Nordiazepam) Detected, Abnormal Result (A) NDET^Not Detected ng/mL    Tricyclic Antidepressants (Desipramine) Detected, Abnormal Result (A) NDET^Not Detected ng/mL    Methadone (Methadone) Not Detected NDET^Not Detected ng/mL    Barbiturates (Butalbital) Not Detected NDET^Not Detected ng/mL    Oxycodone (Oxycodone) Not Detected NDET^Not Detected ng/mL    Propoxyphene (Norpropoxyphene) Not Detected NDET^Not Detected ng/mL    Buprenorphine (Buprenorphine) Not Detected NDET^Not Detected ng/mL       ASSESSMENT/PLAN:       (F11.20) Uncomplicated opioid dependence (H)  (primary encounter diagnosis)  Comment: Recent use as above.  Pos UTOX opi and BZ (rx)  Plan: buprenorphine-naloxone (SUBOXONE) 8-2 MG SUBL         sublingual tablet   #28       Resume Suboxone.  Begin with Suboxone 4 mg once withdrawal sx well established (>24 hr from last use) to avoid precipitated withdrawal.   Patient is aware of need for this.  May repeat dose in several hours if tolerated.   Then begin Suboxone 8 mg daily for 3 days then resume 8mg bid  Follow up 1-2 wk.  Sooner with concerns.  Has narcan available.   Strongly encouraged some type of treatment and or recovery support for best chance at recovery  Info for treatment options discussed.      Opioid warning reviewed.  Risk of overdose following a period of abstinence due to decrease tolerance was discussed including risk of death.   Risk of overdose if using Suboxone with other substances particuarly benzodiazepines/alcohol was reviewed.      (F41.9) Anxiety  (F90.2) Attention deficit hyperactivity disorder (ADHD), combined type  (F33.1) Moderate recurrent major depression (H)  Plan:  Discussed lack of long term efficacy of benzoidazepine and increase risk of relapse.  Need for taper with hx of long acting daily Klonipin discussed and encouraged her to continue to work with psychiatrist about this.   Risk of stimulant medications also reviewed.     Continue therapy for mental health needs and likely PTSD     (F17.200) Nicotine use disorder  Plan: Encouraged Abstinence.  Increase risk of relapse with other substances with return to nicotine use discussed.  Risk of Ecig/Vaping also reviewed.          (Z79.486) High risk medication use           ENCOUNTER FOR LONG TERM USE OF HIGH RISK MEDICATION   High Risk Drug Monitoring?  YES   Drug being monitored: Suboxone   Reason for drug: Opioid Use Disorder   What is being monitored?: Dosage, Cravings, Trigger, side effects, and continued abstinence.      Opioid warning reviewed.  Risk of overdose following a period of abstinence due to decrease tolerance was discussed including risk of death.   Risk of overdose if using Suboxone with other substances particuarly benzodiazepines/alcohol was reviewed.           Valeria Olson MD  [facility]  [phone]

## 2017-12-07 DIAGNOSIS — G89.29 CHRONIC MIDLINE LOW BACK PAIN WITH SCIATICA, SCIATICA LATERALITY UNSPECIFIED: ICD-10-CM

## 2017-12-07 DIAGNOSIS — M54.40 CHRONIC MIDLINE LOW BACK PAIN WITH SCIATICA, SCIATICA LATERALITY UNSPECIFIED: ICD-10-CM

## 2017-12-07 RX ORDER — METHOCARBAMOL 750 MG/1
TABLET, FILM COATED ORAL
Qty: 180 TABLET | Refills: 0 | Status: SHIPPED | OUTPATIENT
Start: 2017-12-07 | End: 2018-01-18

## 2017-12-14 ENCOUNTER — OFFICE VISIT (OUTPATIENT)
Dept: ADDICTION MEDICINE | Facility: CLINIC | Age: 37
End: 2017-12-14
Payer: COMMERCIAL

## 2017-12-14 VITALS
DIASTOLIC BLOOD PRESSURE: 78 MMHG | HEART RATE: 112 BPM | OXYGEN SATURATION: 98 % | SYSTOLIC BLOOD PRESSURE: 128 MMHG | BODY MASS INDEX: 24.86 KG/M2 | WEIGHT: 121 LBS | RESPIRATION RATE: 16 BRPM

## 2017-12-14 DIAGNOSIS — F11.20 UNCOMPLICATED OPIOID DEPENDENCE (H): Primary | ICD-10-CM

## 2017-12-14 DIAGNOSIS — G89.29 CHRONIC MIDLINE LOW BACK PAIN WITH SCIATICA, SCIATICA LATERALITY UNSPECIFIED: ICD-10-CM

## 2017-12-14 DIAGNOSIS — F41.9 ANXIETY: ICD-10-CM

## 2017-12-14 DIAGNOSIS — F33.1 MODERATE RECURRENT MAJOR DEPRESSION (H): ICD-10-CM

## 2017-12-14 DIAGNOSIS — M54.40 CHRONIC MIDLINE LOW BACK PAIN WITH SCIATICA, SCIATICA LATERALITY UNSPECIFIED: ICD-10-CM

## 2017-12-14 DIAGNOSIS — F17.200 NICOTINE USE DISORDER: ICD-10-CM

## 2017-12-14 DIAGNOSIS — F90.2 ATTENTION DEFICIT HYPERACTIVITY DISORDER (ADHD), COMBINED TYPE: ICD-10-CM

## 2017-12-14 DIAGNOSIS — Z79.899 HIGH RISK MEDICATION USE: ICD-10-CM

## 2017-12-14 LAB
AMPHETAMINES UR QL: NOT DETECTED NG/ML
BARBITURATES UR QL SCN: NOT DETECTED NG/ML
BENZODIAZ UR QL SCN: ABNORMAL NG/ML
BUPRENORPHINE UR QL: ABNORMAL NG/ML
CANNABINOIDS UR QL: NOT DETECTED NG/ML
COCAINE UR QL SCN: ABNORMAL NG/ML
D-METHAMPHET UR QL: ABNORMAL NG/ML
METHADONE UR QL SCN: NOT DETECTED NG/ML
OPIATES UR QL SCN: NOT DETECTED NG/ML
OXYCODONE UR QL SCN: NOT DETECTED NG/ML
PCP UR QL SCN: NOT DETECTED NG/ML
PROPOXYPH UR QL: NOT DETECTED NG/ML
TRICYCLICS UR QL SCN: ABNORMAL NG/ML

## 2017-12-14 PROCEDURE — 99215 OFFICE O/P EST HI 40 MIN: CPT | Performed by: PEDIATRICS

## 2017-12-14 PROCEDURE — 80306 DRUG TEST PRSMV INSTRMNT: CPT | Performed by: PEDIATRICS

## 2017-12-14 RX ORDER — FLUOXETINE 10 MG/1
60 CAPSULE ORAL DAILY
Qty: 30 CAPSULE | COMMUNITY
Start: 2017-12-14 | End: 2017-12-14

## 2017-12-14 RX ORDER — BUPRENORPHINE HYDROCHLORIDE AND NALOXONE HYDROCHLORIDE DIHYDRATE 8; 2 MG/1; MG/1
1 TABLET SUBLINGUAL 2 TIMES DAILY
Qty: 60 EACH | Refills: 0 | Status: SHIPPED | OUTPATIENT
Start: 2017-12-14 | End: 2018-02-08

## 2017-12-14 RX ORDER — ZOLPIDEM TARTRATE 10 MG/1
10 TABLET ORAL
Qty: 30 TABLET | Refills: 1 | COMMUNITY
Start: 2017-12-14 | End: 2019-05-15

## 2017-12-14 NOTE — MR AVS SNAPSHOT
After Visit Summary   12/14/2017    Alisia Lin    MRN: [id number]           Patient Information     Date Of Birth          1980        Visit Information        Provider Department      12/14/2017 8:40 AM Valeria Olson MD [facility]        Today's Diagnoses     Uncomplicated opioid dependence (H)    -  1    Chronic midline low back pain with sciatica, sciatica laterality unspecified        Moderate recurrent major depression (H)        Anxiety        Attention deficit hyperactivity disorder (ADHD), combined type        Nicotine use disorder        High risk medication use           Follow-ups after your visit        Your next 10 appointments already scheduled     Jan 04, 2018  8:40 AM CST   Return Visit with Valeria Olson MD   [facility] ([facility])    [address] [place] [address]  [address]  [facility] [address] [phone]              Who to contact     If you have questions or need follow up information about today's clinic visit or your schedule please contact [facility] directly at [phone].  Normal or non-critical lab and imaging results will be communicated to you by [employer]art, letter or phone within 4 business days after the clinic has received the results. If you do not hear from us within 7 days, please contact the clinic through ScaleMPhart or phone. If you have a critical or abnormal lab result, we will notify you by phone as soon as possible.  Submit refill requests through [employer] or call your pharmacy and they will forward the refill request to us. Please allow 3 business days for your refill to be completed.          Additional Information About Your Visit        ScaleMPhart Information     [employer] lets you send messages to your doctor, view your test results, renew your prescriptions, schedule appointments and more. To sign up, go to  "[email or web address] . Click on \"Log in\" on the left side of the screen, which will take you to the Welcome page. Then click on \"Sign up Now\" on the right side of the page.     You will be asked to enter the access code listed below, as well as some personal information. Please follow the directions to create your username and password.     Your access code is: [email or web address]  Expires: 2018  1:48 PM     Your access code will  in 90 days. If you need help or a new code, please call your [facility] clinic or [phone].        Care EveryWhere ID     This is your Care EveryWhere ID. This could be used by other organizations to access your [facility] medical records  [id number]        Your Vitals Were     Pulse Respirations Pulse Oximetry BMI (Body Mass Index)          112 16 98% 24.86 kg/m2         Blood Pressure from Last 3 Encounters:   17 128/78   17 132/84   17 102/68    Weight from Last 3 Encounters:   17 121 lb (54.9 kg)   17 119 lb (54 kg)   17 119 lb (54 kg)              We Performed the Following     Urine Drugs of Abuse Screen Panel 13          Today's Medication Changes          These changes are accurate as of: 17 12:50 PM.  If you have any questions, ask your nurse or doctor.               Start taking these medicines.        Dose/Directions    FLUoxetine 20 MG capsule   Commonly known as:  PROzac   Used for:  Moderate recurrent major depression (H), Anxiety   Started by:  Valeria Olson MD        Dose:  60 mg   Take 3 capsules (60 mg) by mouth daily   Refills:  0            Where to get your medicines      Some of these will need a paper prescription and others can be bought over the counter.  Ask your nurse if you have questions.     Bring a paper prescription for each of these medications     buprenorphine-naloxone 8-2 MG Subl sublingual tablet                Primary Care Provider Office Phone # Fax #    Michael Lemos MD " [phone]       [address] [place] [address]        Equal Access to Services     SAM CLARK : Hadsylwia aad ku haddemetrius Cabello, waaprilda luqadaha, qaybta kaalmada roxanne, shreyas jain chrisaubree leigh laDonavanonesimo fernandez. So [facility] [phone].    ATENCIÓN: Si habla español, tiene a randall disposición servicios gratuitos de asistencia lingüística. Norma al [phone].    We comply with applicable federal civil rights laws and [place] laws. We do not discriminate on the basis of race, color, national origin, age, disability, sex, sexual orientation, or gender identity.            Thank you!     Thank you for choosing [facility] PRIMARY [facility]  for your care. Our goal is always to provide you with excellent care. Hearing back from our patients is one way we can continue to improve our services. Please take a few minutes to complete the written survey that you may receive in the mail after your visit with us. Thank you!             Your Updated Medication List - Protect others around you: Learn how to safely use, store and throw away your medicines at www.disposemymeds.org.          This list is accurate as of: 12/14/17 12:50 PM.  Always use your most recent med list.                   Brand Name Dispense Instructions for use Diagnosis    albuterol 108 (90 BASE) MCG/ACT Inhaler    PROAIR HFA/PROVENTIL HFA/VENTOLIN HFA    1 Inhaler    Inhale 2 puffs into the lungs every 4 hours as needed for shortness of breath / dyspnea or wheezing    Intermittent asthma, uncomplicated       ALPRAZolam 1 MG tablet    XANAX     Take 2 mg by mouth    Low TSH level       AMBIEN 10 MG tablet   Generic drug:  zolpidem     30 tablet    Take 1 tablet (10 mg) by mouth nightly as needed for sleep        amphetamine-dextroamphetamine 20 MG per tablet    ADDERALL     Take 20 mg by mouth    Low TSH level       buprenorphine-naloxone 8-2 MG Subl sublingual tablet    SUBOXONE    60 each    Place 1 tablet under the  tongue 2 times daily    Uncomplicated opioid dependence (H)       cetirizine 10 MG tablet    zyrTEC    30 tablet    TAKE 1 TABLET(10 MG) BY MOUTH EVERY EVENING    Seasonal allergic rhinitis, unspecified chronicity, unspecified trigger       CLONAZEPAM PO      Take by mouth 4 times daily as needed for anxiety        FLUoxetine 20 MG capsule    PROzac     Take 3 capsules (60 mg) by mouth daily    Moderate recurrent major depression (H), Anxiety       ibuprofen 600 MG tablet    ADVIL/MOTRIN    180 tablet    TAKE 1 TABLET BY MOUTH TWICE DAILY WITH MEALS    Chronic low back pain, unspecified back pain laterality, with sciatica presence unspecified, Chronic midline thoracic back pain       loperamide 2 MG tablet    IMODIUM A-D    60 tablet    Start with 2 tabs (4 mg), then take one tab (2 mg) after each diarrheal stool.  Do not use more than  8 tabs (16 mg) per day.    Diarrhea, unspecified type       MAPAP 500 MG tablet   Generic drug:  acetaminophen     100 tablet    TAKE 2 TABLETS(1000 MG) BY MOUTH TWICE DAILY AS NEEDED FOR MILD PAIN    Back pain, Shoulder pain, right       Menthol (Topical Analgesic) 4 % Gel     150 mL    Apply three times a day as needed to affected area    Bilateral low back pain with left-sided sciatica, Right shoulder pain       methocarbamol 750 MG tablet    ROBAXIN    180 tablet    TAKE 2 TABLETS(1500 MG) BY MOUTH THREE TIMES DAILY AS NEEDED FOR MUSCLE SPASMS    Chronic midline low back pain with sciatica, sciatica laterality unspecified       multivitamin, therapeutic with minerals Tabs tablet     100 tablet    Take 1 tablet by mouth daily    Other fatigue       naloxone nasal spray    NARCAN    2 each    Spray 1 spray (4 mg) in nostril as needed for opioid reversal    Uncomplicated opioid dependence (H)       * order for DME     1 Device    Equipment being ordered: supportive back brace    Bilateral low back pain with left-sided sciatica       * order for DME     1 Units    Equipment being  ordered: heating device and cryotherapy device.    Chronic low back pain, Midline thoracic back pain       pantoprazole 40 MG EC tablet    PROTONIX    90 tablet    Take 1 tablet (40 mg) by mouth daily    Gastroesophageal reflux disease without esophagitis       QUEtiapine 50 MG tablet    SEROquel     Take 50 mg by mouth    Low TSH level       VITAMIN B 12 PO      Patient reported taking two tabs daily- not sure of the dosage.    Low TSH level       * Notice:  This list has 2 medication(s) that are the same as other medications prescribed for you. Read the directions carefully, and ask your doctor or other care provider to review them with you.

## 2017-12-14 NOTE — PROGRESS NOTES
SUBJECTIVE:                                                    OPIOID USE DISORDER - BUPRENORPHINE FOLLOW UP:    Alisia Lin is a 37 year old female who presents to clinic today for follow up of  MAT for opioid use disorder.     Date of last visit:  11/30/2017    Dose at last visit:   Suboxone     [place] Board of Pharmacy Data Base Reviewed:    YES;     11/30 Suboxone #28    HPI: Cocaine and methamphetamine 2 x since last visit.  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       Klonipin Rx 1mg up 3 x day.  Usually only taking 1mg.   Xanax for in am panic attack  0.5 mg up to 2mg /day.  Trying to take less. Wakes with feelings of high anxiety/panic. ususally better later in day  Prozac 20mg 2-3 mo.  Dose increase to 60mg yesterday.     Outpatient 3 days /wk.   Minimal meeting attendance.   Still smoking about same.      Status since last visit: Since last visit patient has been:struggling    Intensity:     There has been: mild intermittent craving    Suboxone Dose: adequate.   Progression of Symptoms:     Cues to use and relapse triggers: anxiety    Recovery program has been: sporadic  Accompanying Signs & Symptoms:    Side Effects: none  Sobriety:     Status: patient has not had opioid use but has had use of  Cocaine/methamphetamine     Drug Screen: obtained  Precipitating factors:    Triggers have been: moderate.   Alleviating factors:    Contact with sponsor has been: no sponsor.     Family and support system has been: neutral.   Other Therapies Tried :     Patient has been going to recovery meetings: sporadically    Patient has been participating in professional counseling/therapy: YES        Social History     Social History Narrative            Living with Father of youngest child  (3 girls 22, 18 and 10 )   Will be grandma around April 2018    No legal issues currently (possible identity theft).     Recently  job loss ([employer])          Patient Active Problem List    Diagnosis Date Noted     Low TSH level 08/29/2017     Priority: Medium     Abnormal thyroid function test 08/08/2017     Priority: Medium     Narcotic dependence, in remission (H) 07/13/2017     Priority: Medium     Back pain 01/26/2016     Priority: Medium     Shoulder pain, right 01/26/2016     Priority: Medium     Intermittent asthma, uncomplicated 12/07/2015     Priority: Medium     Anxiety 05/21/2013     Priority: Medium     Benign neoplasm of colon 04/16/2013     Priority: Medium     Overview:   Found on colonoscopy 04/2013, repeat colonoscopy in 5 years, 04/2018.       Chronic low back pain 03/19/2012     Priority: Medium     On pain contract - [facility]       Degeneration of intervertebral disc 05/12/2010     Priority: Medium     Spondylosis 05/12/2010     Priority: Medium     Displacement of intervertebral disc 05/12/2010     Priority: Medium     ASCUS on Pap smear 03/03/2008     Priority: Medium     LEEP age 15 per notes in 2015  3/3/08: ASCUS, + HPV 53  4/17/08: [place] - No visible lesions.   11/7/08: NIL pap  2/1/12: NIL pap  1/2013 Pap NIL, neg HPV. Plan cotest pap & HPV in 1 yr  3/2015: NIL pap, neg HPV. Plan cotest pap & HPV in 3 years.         Migraine with aura      Priority: Medium     Occas migraines, none recently  Problem list name updated by automated process. Provider to review       Tobacco use disorder      Priority: Medium     1 PPD- interested in quitting       Moderate recurrent major depression (H) 08/16/2005     Priority: Medium       Problem list and histories reviewed & adjusted, as indicated.  Additional history: as documented        Current Outpatient Prescriptions on File Prior to Visit:  methocarbamol (ROBAXIN) 750 MG tablet TAKE 2 TABLETS(1500 MG) BY MOUTH THREE TIMES DAILY AS NEEDED FOR MUSCLE SPASMS   buprenorphine-naloxone (SUBOXONE) 8-2 MG SUBL sublingual tablet Place 1 tablet under the tongue 2 times daily    ibuprofen (ADVIL/MOTRIN) 600 MG tablet TAKE 1 TABLET BY MOUTH TWICE DAILY WITH MEALS   naloxone (NARCAN) nasal spray Spray 1 spray (4 mg) in nostril as needed for opioid reversal   ALPRAZolam (XANAX) 1 MG tablet Take 2 mg by mouth   QUEtiapine (SEROQUEL) 50 MG tablet Take 50 mg by mouth   amphetamine-dextroamphetamine (ADDERALL) 20 MG per tablet Take 20 mg by mouth   Cyanocobalamin (VITAMIN B 12 PO) Patient reported taking two tabs daily- not sure of the dosage.   MAPAP 500 MG tablet TAKE 2 TABLETS(1000 MG) BY MOUTH TWICE DAILY AS NEEDED FOR MILD PAIN   cetirizine (ZYRTEC) 10 MG tablet TAKE 1 TABLET(10 MG) BY MOUTH EVERY EVENING   FLUOXETINE HCL PO Take 20 mg by mouth daily    loperamide (IMODIUM A-D) 2 MG tablet Start with 2 tabs (4 mg), then take one tab (2 mg) after each diarrheal stool.  Do not use more than  8 tabs (16 mg) per day.   albuterol (PROAIR HFA/PROVENTIL HFA/VENTOLIN HFA) 108 (90 BASE) MCG/ACT Inhaler Inhale 2 puffs into the lungs every 4 hours as needed for shortness of breath / dyspnea or wheezing   multivitamin, therapeutic with minerals (MULTI-VITAMIN) TABS Take 1 tablet by mouth daily   CLONAZEPAM PO Take by mouth 4 times daily as needed for anxiety   pantoprazole (PROTONIX) 40 MG enteric coated tablet Take 1 tablet (40 mg) by mouth daily   order for DME Equipment being ordered: heating device and cryotherapy device.   order for DME Equipment being ordered: supportive back brace (Patient not taking: Reported on 8/29/2017)   Menthol, Topical Analgesic, 4 % GEL Apply three times a day as needed to affected area     No current facility-administered medications on file prior to visit.        Allergies   Allergen Reactions     Compazine      Heart Problems/ Body went completley stiff for 8 hrs.     Nortriptyline      Skelaxin [Metaxalone]          REVIEW OF SYSTEMS:  General: No acute withdrawal symptoms.  No recent infections or fever  Resp: No coughing, wheezing or shortness of breath  CV: No  chest pains or palpitations  GI: No nausea, vomiting, abdominal pain, diarrhea, constipation  : No urinary frequency or dysuria,     Musculoskeletal: No significant muscle or joint pains, No edema  Neurologic: No numbness, tingling, weakness, problems with balance or coordination  Psychiatric: No acute concerns  Skin: No rashes    OBJECTIVE:    PHYSICAL EXAM:  /78  Pulse 112  Resp 16  Wt 121 lb (54.9 kg)  SpO2 98%  BMI 24.86 kg/m2    GENERAL APPEARANCE:  alert, comfortable appearing  EYES:Eyes grossly normal to inspection  NEURO:  Gait normal.  No tremor. Coordination intact.   MENTAL STATUS EXAM:  Appearance/Behavior: No appearant distress  Speech: Normal  Mood/Affect: normal affect  Insight: Adequate      Results for orders placed or performed in visit on 12/14/17   Urine Drugs of Abuse Screen Panel 13   Result Value Ref Range    Cannabinoids (40-bmk-6-carboxy-9-THC) Not Detected NDET^Not Detected ng/mL    Phencyclidine (Phencyclidine) Not Detected NDET^Not Detected ng/mL    Cocaine (Benzoylecgonine) Detected, Abnormal Result (A) NDET^Not Detected ng/mL    Methamphetamine (d-Methamphetamine) Detected, Abnormal Result (A) NDET^Not Detected ng/mL    Opiates (Morphine) Not Detected NDET^Not Detected ng/mL    Amphetamine (d-Amphetamine) Not Detected NDET^Not Detected ng/mL    Benzodiazepines (Nordiazepam) Detected, Abnormal Result (A) NDET^Not Detected ng/mL    Tricyclic Antidepressants (Desipramine) Detected, Abnormal Result (A) NDET^Not Detected ng/mL    Methadone (Methadone) Not Detected NDET^Not Detected ng/mL    Barbiturates (Butalbital) Not Detected NDET^Not Detected ng/mL    Oxycodone (Oxycodone) Not Detected NDET^Not Detected ng/mL    Propoxyphene (Norpropoxyphene) Not Detected NDET^Not Detected ng/mL    Buprenorphine (Buprenorphine) Detected, Abnormal Result (A) NDET^Not Detected ng/mL           ASSESSMENT/PLAN:    (F11.20) Uncomplicated opioid dependence (H)  (primary encounter  diagnosis)  Comment: Recent use as above.  Pos UTOX opi and BZ (rx)  Plan: buprenorphine-naloxone (SUBOXONE) 8-2 MG SUBL         sublingual tablet   #60        Suboxone 8mg bid  Follow up 2-3wk.  Sooner with concerns.  Has narcan available.   Strongly encouraged some type of treatment and or recovery support for best chance at recovery  Info for treatment options discussed.       Opioid warning reviewed.  Risk of overdose following a period of abstinence due to decrease tolerance was discussed including risk of death.   Risk of overdose if using Suboxone with other substances particuarly benzodiazepines/alcohol was reviewed.       (F41.9) Anxiety  (F90.2) Attention deficit hyperactivity disorder (ADHD), combined type  (F33.1) Moderate recurrent major depression (H)  Plan:  Discussed lack of long term efficacy of benzoidazepine and increase risk of relapse.  Need for taper with hx of long acting daily Klonipin discussed and encouraged her to continue to work with psychiatrist about this.  Risk of stimulant medications also reviewed.     Continue therapy for mental health needs and likely PTSD  Medical Cannabis discussed by another provider also reviewed.  Use discouraged as can lead to increase risk of relapse.       (F17.200) Nicotine use disorder  Plan: Encouraged Abstinence.  Increase risk of relapse with other substances with return to nicotine use discussed.  Risk of Ecig/Vaping also reviewed.            (Z79.079) High risk medication use            ENCOUNTER FOR LONG TERM USE OF HIGH RISK MEDICATION   High Risk Drug Monitoring?  YES   Drug being monitored: Buprenorphine   Reason for drug: Opioid Use Disorder   What is being monitored?: Dosage, Cravings, Trigger, side effects, and continued abstinence.      Opioid warning reviewed.  Risk of overdose following a period of abstinence due to decrease tolerance was discussed including risk of death.   Risk of overdose if using Suboxone with other substances  particuarly benzodiazepines/alcohol was reviewed.    Prescription refills for Suboxone are ONLY done at in person patient visits.  If you cannot make your appointment please reschedule immediately.  The addiction medicine clinic number is [phone].  A Suboxone medication bridge will not be given until your appointment is rescheduled.  Our clinic is open from M-Friday 0800-4:30pm and there is not an ON CALL after hours service.  If medical care is needed on the weekend you will need to contact your primary care physician or go to an Urgent Care or ER.          Valeria Olson MD  [facility] Addiction Medicine  [phone]

## 2018-01-11 ENCOUNTER — TELEPHONE (OUTPATIENT)
Dept: ADDICTION MEDICINE | Facility: CLINIC | Age: 38
End: 2018-01-11

## 2018-01-11 DIAGNOSIS — F11.20 UNCOMPLICATED OPIOID DEPENDENCE (H): ICD-10-CM

## 2018-01-11 RX ORDER — BUPRENORPHINE HYDROCHLORIDE AND NALOXONE HYDROCHLORIDE DIHYDRATE 8; 2 MG/1; MG/1
1 TABLET SUBLINGUAL 2 TIMES DAILY
Qty: 60 EACH | Refills: 0 | OUTPATIENT
Start: 2018-01-11

## 2018-01-11 NOTE — TELEPHONE ENCOUNTER
"Called patient to discuss further, patient states that she got a 2 week prescription on Decmeber 14th, very vague, states he will just go out and use not to get sick. Writer enocuraged patient that provider wants to work with patient to get the medication needed but patient needs to be honest. Patient denies picking up 60 tablets on 1/3. Ends up stating that she \"found\" an additional 14 tablets that she didnt see. Writer states that would still only be a little over a weeks worth. Writer asked pt if she was taking more then prescribed or lost some of her medication, encouraged patient that we will work with patient to get her medication if she is honest with provider, patient again denies any of this and is continually interrupting writer. Writer offered to make pt appt tomorrow with Dr Olson to discuss further. Pt declined. Attempts to schedule an appt further out however writer states it should not be enough medication to last until 2/1. Pt states she will \"try and make the appt\" and she wont be able to fill a refill until 2/1 anyways.   Forwarding to Dr Olson as JOSE G.  Nelly Unger RN     "

## 2018-01-11 NOTE — TELEPHONE ENCOUNTER
"Pt states that she has 5 tablets left, patient claims that she filled it in December. Per [facility] site patient patient picked up a 30 day supply on 1/3/18 (the prescription that was faxed in on 12/14/17), called pharmacy and pharmacy states that it was picked up 1/4, processed 1/3, as well. Writer encouraged patient if something happened or she has been taking her medication differently then prescribed to be honest and let us know, patient adamantly denies and states she picked the script up in December. Patient states that if she cannot get a bridge she will \"just have to go back to using other substances that she is not supposed to use.\"    Controlled Substance Refill Request for Suboxone     Last refill: 1/3/18, 60 tablets per [facility]    Last clinic visit: 12/14/17     Next appt:1/18/18    Controlled substance agreement on file: No.    Documentation in problem list reviewed:  Yes    Processing:  Fax Rx to pt's pharmacy    [place] reviewed[place]  reviewed- recommend provider review  [place]:  [email or web address]    Thank you!  Nelly Unger RN     "

## 2018-01-11 NOTE — TELEPHONE ENCOUNTER
Reason for Call:  Other appointment and prescription    Detailed comments: pt called she can't make her appt today due to car trouble, pt will be out of meds tomorrow and she's requesting a Subx bridge until her next appt 1/18.    Phone Number Patient can be reached at: Home number on file [phone] (home)    Best Time: anytime    Can we leave a detailed message on this number? YES    Call taken on 1/11/2018 at 8:51 AM by Abel Plunkett

## 2018-01-18 DIAGNOSIS — M54.40 CHRONIC MIDLINE LOW BACK PAIN WITH SCIATICA, SCIATICA LATERALITY UNSPECIFIED: ICD-10-CM

## 2018-01-18 DIAGNOSIS — G89.29 CHRONIC MIDLINE LOW BACK PAIN WITH SCIATICA, SCIATICA LATERALITY UNSPECIFIED: ICD-10-CM

## 2018-01-18 RX ORDER — METHOCARBAMOL 750 MG/1
TABLET, FILM COATED ORAL
Qty: 180 TABLET | Refills: 0 | Status: SHIPPED | OUTPATIENT
Start: 2018-01-18 | End: 2018-02-14

## 2018-01-18 NOTE — TELEPHONE ENCOUNTER
Methocarbamol      Last Written Prescription Date:  12/7/17  Last Fill Quantity: 180,   # refills: 0  Last Office Visit: 8/7/17  Future Office visit:       Routing refill request to provider for review/approval because:  Drug not on the [facility], [facility] or [facility] refill protocol or controlled substance

## 2018-02-05 ENCOUNTER — CARE COORDINATION (OUTPATIENT)
Dept: CARE COORDINATION | Facility: CLINIC | Age: 38
End: 2018-02-05

## 2018-02-05 NOTE — PROGRESS NOTES
Clinic Care Coordination Contact  [facility]       Clinical Data: Care Coordinator Outreach  Outreach attempted     Plan: Care Coordinator will mail out care coordination introduction letter with care coordinator contact information and explanation of care coordination services. Care Coordinator will try to reach patient again in 3-5 business days.

## 2018-02-05 NOTE — LETTER
[facility] CARE COORDINATION  [address] [facility] [address]  Phone: [phone]      February 5, 2018      Alisia Martins Prebish  [address]  [place] [address]    Dear Alisia,    We have been trying to reach you to introduce you to [place] s Care Coordination program.  The goal of care coordination is to help you manage your health and improve access to the [place] system in the most efficient manner.  The Care Coordinator is a nurse who understands the healthcare system and will assist you in improving your access to care.     As your Physician and Care Coordinator we partner to help you achieve your health care goals.     We will continue to reach out; however, if you are able to call your Care Coordinator at [phone], that would be appreciated.  We at [place] [facility] are focused on providing you with the highest-quality healthcare experience possible.      It is a pleasure to partner with you as we work towards achieving your optimal state of wellness.        Sincerely,        Silvia Grullon, RN  Clinic Care Coordinator   [place], [place] and [facility]   Phone: [phone]

## 2018-02-08 ENCOUNTER — OFFICE VISIT (OUTPATIENT)
Dept: ADDICTION MEDICINE | Facility: CLINIC | Age: 38
End: 2018-02-08
Payer: COMMERCIAL

## 2018-02-08 VITALS
DIASTOLIC BLOOD PRESSURE: 80 MMHG | RESPIRATION RATE: 16 BRPM | BODY MASS INDEX: 24.04 KG/M2 | WEIGHT: 117 LBS | HEART RATE: 109 BPM | SYSTOLIC BLOOD PRESSURE: 142 MMHG | OXYGEN SATURATION: 97 % | TEMPERATURE: 98.5 F

## 2018-02-08 DIAGNOSIS — K04.7 DENTAL ABSCESS: ICD-10-CM

## 2018-02-08 DIAGNOSIS — F90.2 ATTENTION DEFICIT HYPERACTIVITY DISORDER (ADHD), COMBINED TYPE: ICD-10-CM

## 2018-02-08 DIAGNOSIS — N76.0 BACTERIAL VAGINITIS: ICD-10-CM

## 2018-02-08 DIAGNOSIS — B96.89 BACTERIAL VAGINITIS: ICD-10-CM

## 2018-02-08 DIAGNOSIS — F41.9 ANXIETY: ICD-10-CM

## 2018-02-08 DIAGNOSIS — N91.2 AMENORRHEA: ICD-10-CM

## 2018-02-08 DIAGNOSIS — F11.20 UNCOMPLICATED OPIOID DEPENDENCE (H): Primary | ICD-10-CM

## 2018-02-08 DIAGNOSIS — F33.1 MODERATE RECURRENT MAJOR DEPRESSION (H): ICD-10-CM

## 2018-02-08 DIAGNOSIS — F17.200 NICOTINE USE DISORDER: ICD-10-CM

## 2018-02-08 DIAGNOSIS — B37.31 YEAST INFECTION OF THE VAGINA: ICD-10-CM

## 2018-02-08 LAB
AMPHETAMINES UR QL: NOT DETECTED NG/ML
BARBITURATES UR QL SCN: NOT DETECTED NG/ML
BENZODIAZ UR QL SCN: ABNORMAL NG/ML
BETA HCG QUAL IFA URINE: NEGATIVE
BUPRENORPHINE UR QL: NOT DETECTED NG/ML
CANNABINOIDS UR QL: NOT DETECTED NG/ML
COCAINE UR QL SCN: ABNORMAL NG/ML
D-METHAMPHET UR QL: NOT DETECTED NG/ML
METHADONE UR QL SCN: NOT DETECTED NG/ML
OPIATES UR QL SCN: ABNORMAL NG/ML
OXYCODONE UR QL SCN: NOT DETECTED NG/ML
PCP UR QL SCN: NOT DETECTED NG/ML
PROPOXYPH UR QL: NOT DETECTED NG/ML
TRICYCLICS UR QL SCN: NOT DETECTED NG/ML

## 2018-02-08 PROCEDURE — 99215 OFFICE O/P EST HI 40 MIN: CPT | Performed by: PEDIATRICS

## 2018-02-08 PROCEDURE — 84703 CHORIONIC GONADOTROPIN ASSAY: CPT | Performed by: PEDIATRICS

## 2018-02-08 PROCEDURE — 80306 DRUG TEST PRSMV INSTRMNT: CPT | Performed by: PEDIATRICS

## 2018-02-08 RX ORDER — FLUCONAZOLE 150 MG/1
150 TABLET ORAL ONCE
Qty: 1 TABLET | Refills: 0 | Status: SHIPPED | OUTPATIENT
Start: 2018-02-08 | End: 2018-02-08

## 2018-02-08 RX ORDER — METRONIDAZOLE 500 MG/1
500 TABLET ORAL 2 TIMES DAILY
Qty: 14 TABLET | Refills: 0 | Status: SHIPPED | OUTPATIENT
Start: 2018-02-08 | End: 2018-07-18

## 2018-02-08 RX ORDER — AMOXICILLIN 500 MG/1
500 CAPSULE ORAL 3 TIMES DAILY
Qty: 30 CAPSULE | Refills: 0 | Status: SHIPPED | OUTPATIENT
Start: 2018-02-08 | End: 2018-07-18

## 2018-02-08 RX ORDER — BUPRENORPHINE HYDROCHLORIDE AND NALOXONE HYDROCHLORIDE DIHYDRATE 8; 2 MG/1; MG/1
1 TABLET SUBLINGUAL 2 TIMES DAILY
Qty: 30 EACH | Refills: 0 | Status: SHIPPED | OUTPATIENT
Start: 2018-02-08 | End: 2018-07-18

## 2018-02-08 RX ORDER — BUPRENORPHINE HYDROCHLORIDE AND NALOXONE HYDROCHLORIDE DIHYDRATE 8; 2 MG/1; MG/1
1 TABLET SUBLINGUAL 2 TIMES DAILY
Qty: 60 EACH | Refills: 0 | Status: SHIPPED | OUTPATIENT
Start: 2018-02-08 | End: 2018-02-08

## 2018-02-08 NOTE — MR AVS SNAPSHOT
After Visit Summary   2/8/2018    Alisia Lin    MRN: [id number]           Patient Information     Date Of Birth          1980        Visit Information        Provider Department      2/8/2018 3:40 PM Valeria Olson MD [facility]        Today's Diagnoses     Uncomplicated opioid dependence (H)    -  1    Moderate recurrent major depression (H)        Anxiety        Dental abscess        Yeast infection of the vagina        Amenorrhea        Bacterial vaginitis        Attention deficit hyperactivity disorder (ADHD), combined type        Nicotine use disorder           Follow-ups after your visit        Additional Services     DENTAL REFERRAL       Your provider has referred you to: [facility]: Dental Clinic [facility] [phone]   [email or web address]      Urgency: Routine  Area: bilateral upper and lower      Please be aware that coverage of these services is subject to the terms and limitations of your health insurance plan.  Call member services at your health plan with any benefit or coverage questions.      Please bring the following with you to your appointment:    (1) Any X-Rays, CTs or MRIs which have been performed.  Contact the facility where they were done to arrange for  prior to your scheduled appointment.  Any new CT, MRI or other procedures ordered by your specialist must be performed at a [facility] facility or coordinated by your clinic's referral office.    (2) List of current medications   (3) This referral request   (4) Any documents/labs given to you for this referral                  Your next 10 appointments already scheduled     Feb 21, 2018  3:40 PM CST   Return Visit with Valeria Olson MD   [facility] ([facility])    [address]  [address]  [facility] [address] [phone]              Who to contact     If you have  "questions or need follow up information about today's clinic visit or your schedule please contact [facility] directly at [phone].  Normal or non-critical lab and imaging results will be communicated to you by MyChart, letter or phone within 4 business days after the clinic has received the results. If you do not hear from us within 7 days, please contact the clinic through [email or web address] or phone. If you have a critical or abnormal lab result, we will notify you by phone as soon as possible.  Submit refill requests through [employer] or call your pharmacy and they will forward the refill request to us. Please allow 3 business days for your refill to be completed.          Additional Information About Your Visit        [email or web address] Information     [employer]avia lets you send messages to your doctor, view your test results, renew your prescriptions, schedule appointments and more. To sign up, go to [email or web address] . Click on \"Log in\" on the left side of the screen, which will take you to the Welcome page. Then click on \"Sign up Now\" on the right side of the page.     You will be asked to enter the access code listed below, as well as some personal information. Please follow the directions to create your username and password.     Your access code is: [email or web address]  Expires: 2018  1:48 PM     Your access code will  in 90 days. If you need help or a new code, please call your [facility] clinic or [phone].        Care EveryWhere ID     This is your Care EveryWhere ID. This could be used by other organizations to access your [facility] medical records  [id number]        Your Vitals Were     Pulse Temperature Respirations Pulse Oximetry BMI (Body Mass Index)       109 98.5  F (36.9  C) (Oral) 16 97% 24.04 kg/m2        Blood Pressure from Last 3 Encounters:   18 142/80   17 128/78   17 132/84    Weight from Last 3 Encounters:   18 117 lb (53.1 kg)   17 121 lb " (54.9 kg)   11/30/17 119 lb (54 kg)              We Performed the Following     Beta HCG qual IFA urine     DENTAL REFERRAL     Urine Drugs of Abuse Screen Panel 13          Today's Medication Changes          These changes are accurate as of 2/8/18  5:14 PM.  If you have any questions, ask your nurse or doctor.               Start taking these medicines.        Dose/Directions    amoxicillin 500 MG capsule   Commonly known as:  AMOXIL   Used for:  Dental abscess        Dose:  500 mg   Take 1 capsule (500 mg) by mouth 3 times daily   Quantity:  30 capsule   Refills:  0       buprenorphine-naloxone 8-2 MG Subl sublingual tablet   Commonly known as:  SUBOXONE   Used for:  Uncomplicated opioid dependence (H)        Dose:  1 tablet   Place 1 tablet under the tongue 2 times daily   Quantity:  30 each   Refills:  0       fluconazole 150 MG tablet   Commonly known as:  DIFLUCAN   Used for:  Yeast infection of the vagina        Dose:  150 mg   Take 1 tablet (150 mg) by mouth once for 1 dose   Quantity:  1 tablet   Refills:  0       metroNIDAZOLE 500 MG tablet   Commonly known as:  FLAGYL   Used for:  Bacterial vaginitis        Dose:  500 mg   Take 1 tablet (500 mg) by mouth 2 times daily   Quantity:  14 tablet   Refills:  0         These medicines have changed or have updated prescriptions.        Dose/Directions    naloxone nasal spray   Commonly known as:  NARCAN   This may have changed:  how to take this   Used for:  Uncomplicated opioid dependence (H)        Dose:  4 mg   Spray 1 spray (4 mg) into one nostril alternating nostrils as needed for opioid reversal   Quantity:  2 each   Refills:  1            Where to get your medicines      These medications were sent to [facility] [place], [place] - [address]  [address] [address]     Phone:  [phone]     amoxicillin 500 MG capsule    fluconazole 150 MG tablet    metroNIDAZOLE 500 MG tablet    naloxone nasal spray          Some of these will need a paper prescription and others can be bought over the counter.  Ask your nurse if you have questions.     Bring a paper prescription for each of these medications     buprenorphine-naloxone 8-2 MG Subl sublingual tablet                Primary Care Provider Office Phone # Fax #    Michael Meena Lemos [phone] [address] [place] [address]        Equal Access to Services     SAM CLARK : Hadii aad ku hadasho Soomaali, waaxda luqadaha, qaybta kaalmada adeegyada, waxay idiin hayaan adeaubree castillosh lakellee . So [facility] [phone].    ATENCIÓN: Si kasia chauhan, tiene a randall disposición servicios gratuitos de asistencia lingüística. Llame al [phone].    We comply with applicable federal civil rights laws and [place] laws. We do not discriminate on the basis of race, color, national origin, age, disability, sex, sexual orientation, or gender identity.            Thank you!     Thank you for choosing [facility]  for your care. Our goal is always to provide you with excellent care. Hearing back from our patients is one way we can continue to improve our services. Please take a few minutes to complete the written survey that you may receive in the mail after your visit with us. Thank you!             Your Updated Medication List - Protect others around you: Learn how to safely use, store and throw away your medicines at www.disposemymeds.org.          This list is accurate as of 2/8/18  5:14 PM.  Always use your most recent med list.                   Brand Name Dispense Instructions for use Diagnosis    albuterol 108 (90 BASE) MCG/ACT Inhaler    PROAIR HFA/PROVENTIL HFA/VENTOLIN HFA    1 Inhaler    Inhale 2 puffs into the lungs every 4 hours as needed for shortness of breath / dyspnea or wheezing    Intermittent asthma, uncomplicated       ALPRAZolam 1 MG tablet    XANAX     Take 2 mg by mouth    Low TSH level       AMBIEN 10  MG tablet   Generic drug:  zolpidem     30 tablet    Take 1 tablet (10 mg) by mouth nightly as needed for sleep        amoxicillin 500 MG capsule    AMOXIL    30 capsule    Take 1 capsule (500 mg) by mouth 3 times daily    Dental abscess       amphetamine-dextroamphetamine 20 MG per tablet    ADDERALL     Take 20 mg by mouth    Low TSH level       buprenorphine-naloxone 8-2 MG Subl sublingual tablet    SUBOXONE    30 each    Place 1 tablet under the tongue 2 times daily    Uncomplicated opioid dependence (H)       cetirizine 10 MG tablet    zyrTEC    30 tablet    TAKE 1 TABLET(10 MG) BY MOUTH EVERY EVENING    Seasonal allergic rhinitis, unspecified chronicity, unspecified trigger       CLONAZEPAM PO      Take by mouth 4 times daily as needed for anxiety        fluconazole 150 MG tablet    DIFLUCAN    1 tablet    Take 1 tablet (150 mg) by mouth once for 1 dose    Yeast infection of the vagina       FLUoxetine 20 MG capsule    PROzac     Take 3 capsules (60 mg) by mouth daily    Moderate recurrent major depression (H), Anxiety       ibuprofen 600 MG tablet    ADVIL/MOTRIN    180 tablet    TAKE 1 TABLET BY MOUTH TWICE DAILY WITH MEALS    Chronic low back pain, unspecified back pain laterality, with sciatica presence unspecified, Chronic midline thoracic back pain       loperamide 2 MG tablet    IMODIUM A-D    60 tablet    Start with 2 tabs (4 mg), then take one tab (2 mg) after each diarrheal stool.  Do not use more than  8 tabs (16 mg) per day.    Diarrhea, unspecified type       MAPAP 500 MG tablet   Generic drug:  acetaminophen     100 tablet    TAKE 2 TABLETS(1000 MG) BY MOUTH TWICE DAILY AS NEEDED FOR MILD PAIN    Back pain, Shoulder pain, right       Menthol (Topical Analgesic) 4 % Gel     150 mL    Apply three times a day as needed to affected area    Bilateral low back pain with left-sided sciatica, Right shoulder pain       methocarbamol 750 MG tablet    ROBAXIN    180 tablet    TAKE 2 TABLETS(1500 MG) BY  MOUTH THREE TIMES DAILY AS NEEDED FOR MUSCLE SPASMS    Chronic midline low back pain with sciatica, sciatica laterality unspecified       metroNIDAZOLE 500 MG tablet    FLAGYL    14 tablet    Take 1 tablet (500 mg) by mouth 2 times daily    Bacterial vaginitis       multivitamin, therapeutic with minerals Tabs tablet     100 tablet    Take 1 tablet by mouth daily    Other fatigue       naloxone nasal spray    NARCAN    2 each    Spray 1 spray (4 mg) into one nostril alternating nostrils as needed for opioid reversal    Uncomplicated opioid dependence (H)       * order for DME     1 Device    Equipment being ordered: supportive back brace    Bilateral low back pain with left-sided sciatica       * order for DME     1 Units    Equipment being ordered: heating device and cryotherapy device.    Chronic low back pain, Midline thoracic back pain       pantoprazole 40 MG EC tablet    PROTONIX    90 tablet    Take 1 tablet (40 mg) by mouth daily    Gastroesophageal reflux disease without esophagitis       QUEtiapine 50 MG tablet    SEROquel     Take 50 mg by mouth    Low TSH level       VITAMIN B 12 PO      Patient reported taking two tabs daily- not sure of the dosage.    Low TSH level       * Notice:  This list has 2 medication(s) that are the same as other medications prescribed for you. Read the directions carefully, and ask your doctor or other care provider to review them with you.
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
## 2018-02-14 DIAGNOSIS — M54.40 CHRONIC MIDLINE LOW BACK PAIN WITH SCIATICA, SCIATICA LATERALITY UNSPECIFIED: ICD-10-CM

## 2018-02-14 DIAGNOSIS — G89.29 CHRONIC MIDLINE LOW BACK PAIN WITH SCIATICA, SCIATICA LATERALITY UNSPECIFIED: ICD-10-CM

## 2018-02-15 RX ORDER — METHOCARBAMOL 750 MG/1
TABLET, FILM COATED ORAL
Qty: 180 TABLET | Refills: 0 | Status: SHIPPED | OUTPATIENT
Start: 2018-02-15 | End: 2018-04-07

## 2018-02-15 NOTE — TELEPHONE ENCOUNTER
methocarbamol (ROBAXIN) 750 MG tablet      Last Written Prescription Date:  1/18/2018  Last Fill Quantity: 180,   # refills: 0  Last Office Visit: 8/7/2017  Future Office visit:    Next 5 appointments (look out 90 days)     Feb 21, 2018  3:40 PM CST   Return Visit with Valeria Olson MD   [facility] ([facility] [place])    [address] [facility]  [address]  [facility] [address]   [phone]                   Routing refill request to provider for review/approval because:  Drug not on the [facility], [facility] or [facility] refill protocol or controlled substance    Thank you,  Lis East RN

## 2018-02-19 ENCOUNTER — TELEPHONE (OUTPATIENT)
Dept: ADDICTION MEDICINE | Facility: CLINIC | Age: 38
End: 2018-02-19

## 2018-02-19 NOTE — TELEPHONE ENCOUNTER
Reason for Call:  Subutex     Detailed comments: pt called requesting to speak with Dr. Gandhi about switching from Subx to Subutex. Pt stated she's heard great things about Subutex and would like more info about this med. Pt want Dr. Gandhi to know that she's doing fabulous     Phone Number Patient can be reached at: Home number on file [phone] (home)    Best Time: anytmie    Can we leave a detailed message on this number? YES    Call taken on 2/19/2018 at 2:33 PM by Abel Plunkett

## 2018-02-19 NOTE — TELEPHONE ENCOUNTER
"Spoke with pt and advised that subutex is prescribed for women in pregnancy, pt states she has a friend who is on subutex and that her friend says \"it is great, she can snort a line of heroin and take subutex and she doesn't get sick\". Pt states that sounds great and she wants subutex too. Advised that pt could discuss this more at next OV with provider. Patient sounds very energetic and excited. Almost pressured speech.  Nelly Unger RN     "

## 2018-03-09 DIAGNOSIS — G89.29 CHRONIC LOW BACK PAIN, UNSPECIFIED BACK PAIN LATERALITY, WITH SCIATICA PRESENCE UNSPECIFIED: ICD-10-CM

## 2018-03-09 DIAGNOSIS — M54.6 CHRONIC MIDLINE THORACIC BACK PAIN: ICD-10-CM

## 2018-03-09 DIAGNOSIS — M54.5 CHRONIC LOW BACK PAIN, UNSPECIFIED BACK PAIN LATERALITY, WITH SCIATICA PRESENCE UNSPECIFIED: ICD-10-CM

## 2018-03-09 DIAGNOSIS — J45.20 INTERMITTENT ASTHMA, UNCOMPLICATED: ICD-10-CM

## 2018-03-09 DIAGNOSIS — G89.29 CHRONIC MIDLINE THORACIC BACK PAIN: ICD-10-CM

## 2018-03-10 NOTE — TELEPHONE ENCOUNTER
"Requested Prescriptions   Pending Prescriptions Disp Refills     ibuprofen (ADVIL/MOTRIN) 600 MG tablet [Pharmacy Med Name: IBUPROFEN 600MG TABLETS]  Last Written Prescription Date:  10/24/17  Last Fill Quantity: 180,  # refills: 0   Last office visit: 8/7/2017 with prescribing provider:  8/7/17   Future Office Visit:     180 tablet 0     Sig: TAKE 1 TABLET BY MOUTH TWICE DAILY WITH MEALS    NSAID Medications Failed    3/9/2018  7:08 PM       Failed - Blood pressure under 140/90 in past 12 months    BP Readings from Last 3 Encounters:   02/08/18 142/80   12/14/17 128/78   11/30/17 132/84          Passed - Normal ALT on file in past 12 months    Recent Labs   Lab Test 10/24/17   ALT  12          Passed - Normal AST on file in past 12 months    Recent Labs   Lab Test 10/24/17   AST  38          Passed - Recent (12 mo) or future (30 days) visit within the authorizing provider's specialty    Patient had office visit in the last 12 months or has a visit in the next 30 days with authorizing provider or within the authorizing provider's specialty.  See \"Patient Info\" tab in inbasket, or \"Choose Columns\" in Meds & Orders section of the refill encounter.           Passed - Patient is age 6-64 years       Passed - Normal CBC on file in past 12 months    Recent Labs   Lab Test  07/14/17   1335   WBC  10.0   RBC  4.01   HGB  12.0   HCT  35.3   PLT  406            Passed - No active pregnancy on record       Passed - Normal serum creatinine on file in past 12 months    Recent Labs   Lab Test 10/24/17   CR  0.63          Passed - No positive pregnancy test in past 12 months        VENTOLIN  (90 BASE) MCG/ACT Inhaler [Pharmacy Med Name: VENTOLIN HFA INH W/DOS CTR 200PUFFS] 18 g 0     Sig: INHALE 2 PUFFS INTO THE LUNGS EVERY 4 HOURS AS NEEDED FOR SHORTNESS OF BREATH OR DIFFICULT BREATHING OR WHEEZING    Asthma Maintenance Inhalers - Anticholinergics Failed    3/9/2018  7:08 PM       Failed - Asthma control assessment score " "within normal limits in last 6 months    Please review ACT score.          Failed - Recent (6 mo) or future (30 days) visit within the authorizing provider's specialty    Patient had office visit in the last 6 months or has a visit in the next 30 days with authorizing provider or within the authorizing provider's specialty.  See \"Patient Info\" tab in inbasket, or \"Choose Columns\" in Meds & Orders section of the refill encounter.           Passed - Patient is age 12 years or older        "

## 2018-03-12 RX ORDER — ALBUTEROL SULFATE 90 UG/1
AEROSOL, METERED RESPIRATORY (INHALATION)
Qty: 18 G | Refills: 0 | Status: SHIPPED | OUTPATIENT
Start: 2018-03-12 | End: 2023-08-22

## 2018-03-12 RX ORDER — IBUPROFEN 600 MG/1
TABLET, FILM COATED ORAL
Qty: 180 TABLET | Refills: 0 | OUTPATIENT
Start: 2018-03-12

## 2018-03-12 NOTE — TELEPHONE ENCOUNTER
I have not seen her since 2016.  Signed albuterol but declined ibuprofen. Recommend her to see me in the clinic or update PCP in [facility].

## 2018-03-12 NOTE — TELEPHONE ENCOUNTER
LOV: 8/7/2017  Refills for ibuprofen (ADVIL/MOTRIN) 600 MG tabletand Ventolin inhaler  TC: Please call patient---she needs updated ACT--last completed on 7/7/2016        BP Readings from Last 3 Encounters:   02/08/18 142/80   12/14/17 128/78   11/30/17 132/84     Last BP >140/90  Routing to PCP for review      Thanks! Shea Benjamin RN

## 2018-03-13 DIAGNOSIS — M54.6 CHRONIC MIDLINE THORACIC BACK PAIN: ICD-10-CM

## 2018-03-13 DIAGNOSIS — G89.29 CHRONIC MIDLINE THORACIC BACK PAIN: ICD-10-CM

## 2018-03-13 DIAGNOSIS — M54.5 CHRONIC LOW BACK PAIN, UNSPECIFIED BACK PAIN LATERALITY, WITH SCIATICA PRESENCE UNSPECIFIED: ICD-10-CM

## 2018-03-13 DIAGNOSIS — G89.29 CHRONIC LOW BACK PAIN, UNSPECIFIED BACK PAIN LATERALITY, WITH SCIATICA PRESENCE UNSPECIFIED: ICD-10-CM

## 2018-03-13 NOTE — TELEPHONE ENCOUNTER
Reason for Call:  Medication or medication refill:    Do you use a [facility]?  Name of the pharmacy and phone number for the current request:  [facility] [phone]    Name of the medication requested: Ibuprofen 600 mg    Other request:     Can we leave a detailed message on this number? YES    Phone number patient can be reached at: Home number on file [phone] (home)    Best Time:     Call taken on 3/13/2018 at 12:42 PM by Nola Niño

## 2018-03-14 NOTE — TELEPHONE ENCOUNTER
"Requested Prescriptions   Pending Prescriptions Disp Refills     ibuprofen (ADVIL/MOTRIN) 600 MG tablet  Last Written Prescription Date:  10/24/2017  Last Fill Quantity: 180 tablet,  # refills: 0   Last Office Visit: 8/7/2017   Future Office Visit:      180 tablet 0    NSAID Medications Failed    3/13/2018 12:44 PM       Failed - Blood pressure under 140/90 in past 12 months    BP Readings from Last 3 Encounters:   02/08/18 142/80   12/14/17 128/78   11/30/17 132/84          Passed - Normal ALT on file in past 12 months    Recent Labs   Lab Test 10/24/17   ALT  12          Passed - Normal AST on file in past 12 months    Recent Labs   Lab Test 10/24/17   AST  38          Passed - Recent (12 mo) or future (30 days) visit within the authorizing provider's specialty    Patient had office visit in the last 12 months or has a visit in the next 30 days with authorizing provider or within the authorizing provider's specialty.  See \"Patient Info\" tab in inbasket, or \"Choose Columns\" in Meds & Orders section of the refill encounter.           Passed - Patient is age 6-64 years       Passed - Normal CBC on file in past 12 months    Recent Labs   Lab Test  07/14/17   1335   WBC  10.0   RBC  4.01   HGB  12.0   HCT  35.3   PLT  406          Passed - No active pregnancy on record       Passed - Normal serum creatinine on file in past 12 months    Recent Labs   Lab Test 10/24/17   CR  0.63          Passed - No positive pregnancy test in past 12 months          "

## 2018-03-15 RX ORDER — IBUPROFEN 600 MG/1
TABLET, FILM COATED ORAL
Qty: 180 TABLET | Refills: 0 | Status: SHIPPED | OUTPATIENT
Start: 2018-03-15 | End: 2018-06-06

## 2018-03-15 NOTE — TELEPHONE ENCOUNTER
Prescription approved per [facility] Refill Protocol.      Creatinine   Date Value Ref Range Status   10/24/2017 0.63 0.57 - 1.11 mg/dL Final   ]    Lab Results   Component Value Date    ALT 12 10/24/2017     BECYK Faustin, RN  [facility]

## 2018-04-01 DIAGNOSIS — J45.20 INTERMITTENT ASTHMA, UNCOMPLICATED: ICD-10-CM

## 2018-04-02 NOTE — TELEPHONE ENCOUNTER
"Requested Prescriptions   Pending Prescriptions Disp Refills     VENTOLIN  (90 BASE) MCG/ACT Inhaler [Pharmacy Med Name: VENTOLIN HFA INH W/DOS CTR 200PUFFS]  Last Written Prescription Date:  3/12/2018  Last Fill Quantity: 18g,  # refills: 0   Last Office Visit: 8/7/2017   Future Office Visit:      18 g 0     Sig: INHALE 2 PUFFS INTO THE LUNGS EVERY 4 HOURS AS NEEDED FOR SHORTNESS OF BREATH OR DIFFICULT BREATHING OR WHEEZING    Asthma Maintenance Inhalers - Anticholinergics Failed    4/1/2018  3:34 AM       Failed - Asthma control assessment score within normal limits in last 6 months    Please review ACT score.   ACT Total Scores 5/4/2015 1/5/2016 7/7/2016   ACT TOTAL SCORE 11 - -   ASTHMA ER VISITS 0 = None - -   ASTHMA HOSPITALIZATIONS 0 = None - -   ACT TOTAL SCORE (Goal Greater than or Equal to 20) - 22 20   In the past 12 months, how many times did you visit the emergency room for your asthma without being admitted to the hospital? - 0 0   In the past 12 months, how many times were you hospitalized overnight because of your asthma? - 0 0          Failed - Recent (6 mo) or future (30 days) visit within the authorizing provider's specialty    Patient had office visit in the last 6 months or has a visit in the next 30 days with authorizing provider or within the authorizing provider's specialty.  See \"Patient Info\" tab in inbasket, or \"Choose Columns\" in Meds & Orders section of the refill encounter.           Passed - Patient is age 12 years or older          "

## 2018-04-07 DIAGNOSIS — M54.40 CHRONIC MIDLINE LOW BACK PAIN WITH SCIATICA, SCIATICA LATERALITY UNSPECIFIED: ICD-10-CM

## 2018-04-07 DIAGNOSIS — G89.29 CHRONIC MIDLINE LOW BACK PAIN WITH SCIATICA, SCIATICA LATERALITY UNSPECIFIED: ICD-10-CM

## 2018-04-09 RX ORDER — METHOCARBAMOL 750 MG/1
TABLET, FILM COATED ORAL
Qty: 180 TABLET | Refills: 0 | Status: SHIPPED | OUTPATIENT
Start: 2018-04-09 | End: 2018-05-09

## 2018-04-09 NOTE — TELEPHONE ENCOUNTER
methocarbamol (ROBAXIN) 750 MG tablet      Last Written Prescription Date:  2/15/2018  Last Fill Quantity: 180,   # refills: 0  Last Office Visit: 8/7/2017  Future Office visit:       Routing refill request to provider for review/approval because:  Drug not on the [facility], [facility] or [facility] refill protocol or controlled substance    Thank you,  Lis East RN

## 2018-04-22 ENCOUNTER — TELEPHONE (OUTPATIENT)
Dept: ADDICTION MEDICINE | Facility: CLINIC | Age: 38
End: 2018-04-22

## 2018-04-23 NOTE — TELEPHONE ENCOUNTER
Reason for Call:  Other call back    Detailed comments: Patient states she is on the verge of having a break down to where she might need to bring herself to the hospital. She wants a call back from Valeria barry, denied FNA. Only wants to speak to Dr. Olson. She states to please keep calling her until she picks up, although I told her that you might leave a message instead of making multiple attempts but I will include that in the notes. Please give patient a call at your earliest convenience.      Phone Number Patient can be reached at: Home number on file [phone] (home)    Best Time: Asap, after 9am.    Can we leave a detailed message on this number? YES    Call taken on 4/22/2018 at 11:00 PM by Zachariah Stephenson

## 2018-04-23 NOTE — TELEPHONE ENCOUNTER
Spoke with patient.  Last seen 2 months ago.  Has been out of Suboxone and has been using Heroin.  Continues benzodiazepines as prescribed and reports having called her psychiatrist.  Denies SI.   Does not feel she needs detox or hospitalization at this time.  Is interested in subutex vs. Suboxone due to things she has heard.  This can be discussed further when seen.  Patient scheduled for Thus 4/26 at 0800.   Patient has narcan.  Reviewed detox information.   Risk of benzodiazepine and other substances and high risk of overdose due to lowered tolerance reviewed.

## 2018-04-24 NOTE — TELEPHONE ENCOUNTER
I have attempted to contact this patient by phone with the following results: left message to return my call on answering machine.      2nd attempt.    Kelin Cabello MA

## 2018-05-04 NOTE — TELEPHONE ENCOUNTER
I have attempted to contact this patient by phone with the following results:  Left a detail VM about this this encounter, ACT need to be completed for a refill.    3rd attempt. If we do not hear from her will be closing the encounter.     Kelin Cabello MA

## 2018-05-09 DIAGNOSIS — G89.29 CHRONIC MIDLINE LOW BACK PAIN WITH SCIATICA, SCIATICA LATERALITY UNSPECIFIED: ICD-10-CM

## 2018-05-09 DIAGNOSIS — M54.40 CHRONIC MIDLINE LOW BACK PAIN WITH SCIATICA, SCIATICA LATERALITY UNSPECIFIED: ICD-10-CM

## 2018-05-10 RX ORDER — METHOCARBAMOL 750 MG/1
TABLET, FILM COATED ORAL
Qty: 180 TABLET | Refills: 0 | Status: SHIPPED | OUTPATIENT
Start: 2018-05-10 | End: 2018-06-05

## 2018-05-10 NOTE — TELEPHONE ENCOUNTER
Mthocarbamol      Last Written Prescription Date:  4/9/18  Last Fill Quantity: 180,   # refills: 0  Last Office Visit: 8/7/17  Future Office visit:       Routing refill request to provider for review/approval because:  Drug not on the [facility], P or [facility] refill protocol or controlled substance

## 2018-05-19 ENCOUNTER — HEALTH MAINTENANCE LETTER (OUTPATIENT)
Age: 38
End: 2018-05-19

## 2018-05-22 ENCOUNTER — DOCUMENTATION ONLY (OUTPATIENT)
Dept: ADDICTION MEDICINE | Facility: CLINIC | Age: 38
End: 2018-05-22

## 2018-05-22 DIAGNOSIS — F11.20 UNCOMPLICATED OPIOID DEPENDENCE (H): ICD-10-CM

## 2018-05-22 LAB
AMPHETAMINES UR QL: NOT DETECTED NG/ML
BARBITURATES UR QL SCN: NOT DETECTED NG/ML
BENZODIAZ UR QL SCN: ABNORMAL NG/ML
BUPRENORPHINE UR QL: ABNORMAL NG/ML
CANNABINOIDS UR QL: NOT DETECTED NG/ML
COCAINE UR QL SCN: ABNORMAL NG/ML
D-METHAMPHET UR QL: ABNORMAL NG/ML
METHADONE UR QL SCN: NOT DETECTED NG/ML
OPIATES UR QL SCN: ABNORMAL NG/ML
OXYCODONE UR QL SCN: NOT DETECTED NG/ML
PCP UR QL SCN: NOT DETECTED NG/ML
PROPOXYPH UR QL: NOT DETECTED NG/ML
TRICYCLICS UR QL SCN: ABNORMAL NG/ML

## 2018-05-22 PROCEDURE — 80306 DRUG TEST PRSMV INSTRMNT: CPT | Performed by: PEDIATRICS

## 2018-05-22 RX ORDER — ALBUTEROL SULFATE 90 UG/1
AEROSOL, METERED RESPIRATORY (INHALATION)
Qty: 18 G | Refills: 0 | OUTPATIENT
Start: 2018-05-22

## 2018-05-22 NOTE — PROGRESS NOTES
Patient came into clinic for appt. she thought she had today. No appointment scheduled.  Upset and feels  that the staff made a mistake and didn't make one.    Requested another appt. With Dr. Olson.  Appt. made for 5-23-18.  Also requested  to leave a urine specimen.  Writer consulted with clinic R.N.s, they stated was a reasonable request.  Urine specimen obtained, sent to Lab.    Anupama Stubbs R.N.  [facility]

## 2018-05-23 ENCOUNTER — OFFICE VISIT (OUTPATIENT)
Dept: ADDICTION MEDICINE | Facility: CLINIC | Age: 38
End: 2018-05-23
Payer: COMMERCIAL

## 2018-05-23 VITALS
BODY MASS INDEX: 25.58 KG/M2 | TEMPERATURE: 98.2 F | SYSTOLIC BLOOD PRESSURE: 104 MMHG | WEIGHT: 124.5 LBS | RESPIRATION RATE: 16 BRPM | DIASTOLIC BLOOD PRESSURE: 68 MMHG | HEART RATE: 108 BPM | OXYGEN SATURATION: 96 %

## 2018-05-23 DIAGNOSIS — Z79.899 HIGH RISK MEDICATION USE: ICD-10-CM

## 2018-05-23 DIAGNOSIS — F11.20 UNCOMPLICATED OPIOID DEPENDENCE (H): Primary | ICD-10-CM

## 2018-05-23 DIAGNOSIS — F33.1 MODERATE RECURRENT MAJOR DEPRESSION (H): ICD-10-CM

## 2018-05-23 DIAGNOSIS — F90.2 ATTENTION DEFICIT HYPERACTIVITY DISORDER (ADHD), COMBINED TYPE: ICD-10-CM

## 2018-05-23 DIAGNOSIS — F41.9 ANXIETY: ICD-10-CM

## 2018-05-23 DIAGNOSIS — F17.200 NICOTINE USE DISORDER: ICD-10-CM

## 2018-05-23 LAB
AMPHETAMINES UR QL: NOT DETECTED NG/ML
BARBITURATES UR QL SCN: NOT DETECTED NG/ML
BENZODIAZ UR QL SCN: ABNORMAL NG/ML
BUPRENORPHINE UR QL: ABNORMAL NG/ML
CANNABINOIDS UR QL: NOT DETECTED NG/ML
COCAINE UR QL SCN: ABNORMAL NG/ML
D-METHAMPHET UR QL: NOT DETECTED NG/ML
METHADONE UR QL SCN: NOT DETECTED NG/ML
OPIATES UR QL SCN: ABNORMAL NG/ML
OXYCODONE UR QL SCN: NOT DETECTED NG/ML
PCP UR QL SCN: NOT DETECTED NG/ML
PROPOXYPH UR QL: NOT DETECTED NG/ML
TRICYCLICS UR QL SCN: ABNORMAL NG/ML

## 2018-05-23 PROCEDURE — 99215 OFFICE O/P EST HI 40 MIN: CPT | Performed by: PEDIATRICS

## 2018-05-23 PROCEDURE — 80306 DRUG TEST PRSMV INSTRMNT: CPT | Performed by: PEDIATRICS

## 2018-05-23 NOTE — MR AVS SNAPSHOT
"              After Visit Summary   5/23/2018    Alisia Lin    MRN: [id number]           Patient Information     Date Of Birth          1980        Visit Information        Provider Department      5/23/2018 4:00 PM Valeria Olson MD [facility]        Today's Diagnoses     Uncomplicated opioid dependence (H)    -  1    Moderate recurrent major depression (H)        Anxiety        Attention deficit hyperactivity disorder (ADHD), combined type        Nicotine use disorder        High risk medication use           Follow-ups after your visit        Who to contact     If you have questions or need follow up information about today's clinic visit or your schedule please contact [facility] directly at [phone].  Normal or non-critical lab and imaging results will be communicated to you by MyChart, letter or phone within 4 business days after the clinic has received the results. If you do not hear from us within 7 days, please contact the clinic through MyChart or phone. If you have a critical or abnormal lab result, we will notify you by phone as soon as possible.  Submit refill requests through [email or web address] or call your pharmacy and they will forward the refill request to us. Please allow 3 business days for your refill to be completed.          Additional Information About Your Visit        MyChart Information     [email or web address] lets you send messages to your doctor, view your test results, renew your prescriptions, schedule appointments and more. To sign up, go to [email or web address] . Click on \"Log in\" on the left side of the screen, which will take you to the Welcome page. Then click on \"Sign up Now\" on the right side of the page.     You will be asked to enter the access code listed below, as well as some personal information. Please follow the directions to create your username and password.     Your access code is: [email or web address]  Expires: " 2018  5:16 PM     Your access code will  in 90 days. If you need help or a new code, please call your [facility] clinic or [phone].        Care EveryWhere ID     This is your Care EveryWhere ID. This could be used by other organizations to access your [facility] medical records  [id number]        Your Vitals Were     Pulse Temperature Respirations Pulse Oximetry BMI (Body Mass Index)       108 98.2  F (36.8  C) (Oral) 16 96% 25.58 kg/m2        Blood Pressure from Last 3 Encounters:   18 98/64   18 104/68   18 142/80    Weight from Last 3 Encounters:   18 128 lb (58.1 kg)   18 124 lb 8 oz (56.5 kg)   18 117 lb (53.1 kg)              We Performed the Following     Urine Drugs of Abuse Screen Panel 13        Primary Care Provider Office Phone # Fax #    Sepideh Rama Hines, APRN [facility] [phone]       [address] [facility] [address]        Equal Access to Services     [facility]: Hadii aad ku hadasho Soomaali, waaxda luqadaha, qaybta kaalmada adeaubreeyakirk, shreyas gross . So [facility] [phone].    ATENCIÓN: Si habla español, tiene a randall disposición servicios gratuitos de asistencia lingüística. [facility] [phone].    We comply with applicable federal civil rights laws and [place] laws. We do not discriminate on the basis of race, color, national origin, age, disability, sex, sexual orientation, or gender identity.            Thank you!     Thank you for choosing [facility]  for your care. Our goal is always to provide you with excellent care. Hearing back from our patients is one way we can continue to improve our services. Please take a few minutes to complete the written survey that you may receive in the mail after your visit with us. Thank you!             Your Updated Medication List - Protect others around you: Learn how to safely use, store and throw away your medicines at  www.disposemymeds.org.          This list is accurate as of 5/23/18 11:59 PM.  Always use your most recent med list.                   Brand Name Dispense Instructions for use Diagnosis    ALPRAZolam 1 MG tablet    XANAX     Take 2 mg by mouth    Low TSH level       AMBIEN 10 MG tablet   Generic drug:  zolpidem     30 tablet    Take 1 tablet (10 mg) by mouth nightly as needed for sleep        amoxicillin 500 MG capsule    AMOXIL    30 capsule    Take 1 capsule (500 mg) by mouth 3 times daily    Dental abscess       amphetamine-dextroamphetamine 20 MG per tablet    ADDERALL     Take 20 mg by mouth    Low TSH level       buprenorphine-naloxone 8-2 MG Subl sublingual tablet    SUBOXONE    30 each    Place 1 tablet under the tongue 2 times daily    Uncomplicated opioid dependence (H)       cetirizine 10 MG tablet    zyrTEC    30 tablet    TAKE 1 TABLET(10 MG) BY MOUTH EVERY EVENING    Seasonal allergic rhinitis, unspecified chronicity, unspecified trigger       CLONAZEPAM PO      Take by mouth 4 times daily as needed for anxiety        FLUoxetine 20 MG capsule    PROzac     Take 3 capsules (60 mg) by mouth daily    Moderate recurrent major depression (H), Anxiety       ibuprofen 600 MG tablet    ADVIL/MOTRIN    180 tablet    TAKE 1 TABLET BY MOUTH TWICE DAILY WITH MEALS    Chronic low back pain, unspecified back pain laterality, with sciatica presence unspecified, Chronic midline thoracic back pain       loperamide 2 MG tablet    IMODIUM A-D    60 tablet    Start with 2 tabs (4 mg), then take one tab (2 mg) after each diarrheal stool.  Do not use more than  8 tabs (16 mg) per day.    Diarrhea, unspecified type       MAPAP 500 MG tablet   Generic drug:  acetaminophen     100 tablet    TAKE 2 TABLETS(1000 MG) BY MOUTH TWICE DAILY AS NEEDED FOR MILD PAIN    Back pain, Shoulder pain, right       Menthol (Topical Analgesic) 4 % Gel     150 mL    Apply three times a day as needed to affected area    Bilateral low back pain  with left-sided sciatica, Right shoulder pain       methocarbamol 750 MG tablet    ROBAXIN    180 tablet    TAKE 2 TABLETS(1500 MG) BY MOUTH THREE TIMES DAILY AS NEEDED FOR MUSCLE SPASMS    Chronic midline low back pain with sciatica, sciatica laterality unspecified       metroNIDAZOLE 500 MG tablet    FLAGYL    14 tablet    Take 1 tablet (500 mg) by mouth 2 times daily    Bacterial vaginitis       multivitamin, therapeutic with minerals Tabs tablet     100 tablet    Take 1 tablet by mouth daily    Other fatigue       naloxone nasal spray    NARCAN    2 each    Spray 1 spray (4 mg) into one nostril alternating nostrils as needed for opioid reversal    Uncomplicated opioid dependence (H)       * order for DME     1 Device    Equipment being ordered: supportive back brace    Bilateral low back pain with left-sided sciatica       * order for DME     1 Units    Equipment being ordered: heating device and cryotherapy device.    Chronic low back pain, Midline thoracic back pain       pantoprazole 40 MG EC tablet    PROTONIX    90 tablet    Take 1 tablet (40 mg) by mouth daily    Gastroesophageal reflux disease without esophagitis       QUEtiapine 50 MG tablet    SEROquel     Take 50 mg by mouth    Low TSH level       VENTOLIN  (90 Base) MCG/ACT Inhaler   Generic drug:  albuterol     18 g    INHALE 2 PUFFS INTO THE LUNGS EVERY 4 HOURS AS NEEDED FOR SHORTNESS OF BREATH OR DIFFICULT BREATHING OR WHEEZING    Intermittent asthma, uncomplicated       VITAMIN B 12 PO      Patient reported taking two tabs daily- not sure of the dosage.    Low TSH level       * Notice:  This list has 2 medication(s) that are the same as other medications prescribed for you. Read the directions carefully, and ask your doctor or other care provider to review them with you.

## 2018-05-23 NOTE — PROGRESS NOTES
SUBJECTIVE:                                                    OPIOID USE DISORDER - BUPRENORPHINE FOLLOW UP:    Alisia Lin is a 37 year old female who presents to clinic today for follow up of  MAT for opioid use disorder.       Date of last visit:  4/22/2018    [place] Board of Pharmacy Data Base Reviewed:    YES;     2/8/ Suboxone 8mg tab #30  5/7 Ambiern 10mg #30  5/7 Xanax 1mg #30   5/7 Klonipin 1mg #90  3/10 Adderall ER 30mg #60    Brief History:  Initial visit 9/17 hx of opioid use 10yr progressing to IV Heroin.  Several previous treatments.  Rx. Suboxone at initial visit. Ongoing relpase with heroin, cocaine, alcoholcontinued prescribed benzodiazepines.  Limited appointment attendance.       HPI:  Patient seen today for follow up.  Has not been seen since February. At that time was restarted on Suboxone.  Unclear how long it was taken.  Has been using Heroin, cocaine and prescribed benzodiazepines.  utox positive for above as well as Buprenorphine although last Rx was 2/8.  Patient is intoxicated and history is scattered.  Reports having new job starting next week and wanting to be able to pass Urine screen and not be in withdrawal for this.  Not attending meetings or any treatment.      Status since last visit: Since last visit patient has been:has relapsed    Intensity:     There has been: has been using substances    Suboxone Dose: has not been taking     Progression of Symptoms/Precipitating Factors:     Cues to use and relapse triggers:Pain, Anxiety, Outside stressors   new job and Contact with people using substances    Trigger have been:  moderate    Accompanying Signs & Symptoms:    Side Effects: none    Sobriety:     Status: patient has had opioid use and use of other substances as above     Drug Screen: obtained    Alleviating factors/Other Therapies Tried:    Contact with sponsor has been: no sponsor    Family and support system has been: neutral    Patient has been going to recovery  meetings: not at all    Recovery program has been : minimal    Patient has been participating in professional counseling /therapy: NO    Patient is following with psychiatry: NO    Patient has established PCP: YES        Social History     Social History Narrative            Living with Father of youngest child  (3 girls 22, 18 and 10 )   Will be grandma around April 2018    No legal issues currently (possible identity theft).     Recently job loss ([employer])          Patient Active Problem List    Diagnosis Date Noted     Low TSH level 08/29/2017     Priority: Medium     Abnormal thyroid function test 08/08/2017     Priority: Medium     Narcotic dependence, in remission (H) 07/13/2017     Priority: Medium     Back pain 01/26/2016     Priority: Medium     Shoulder pain, right 01/26/2016     Priority: Medium     Intermittent asthma, uncomplicated 12/07/2015     Priority: Medium     Anxiety 05/21/2013     Priority: Medium     Benign neoplasm of colon 04/16/2013     Priority: Medium     Overview:   Found on colonoscopy 04/2013, repeat colonoscopy in 5 years, 04/2018.       Chronic low back pain 03/19/2012     Priority: Medium     On pain contract - [facility]       Degeneration of intervertebral disc 05/12/2010     Priority: Medium     Spondylosis 05/12/2010     Priority: Medium     Displacement of intervertebral disc 05/12/2010     Priority: Medium     ASCUS on Pap smear 03/03/2008     Priority: Medium     LEEP age 15 per notes in 2015  3/3/08: ASCUS, + HPV 53  4/17/08: [facility] - No visible lesions.   11/7/08: NIL pap  2/1/12: NIL pap  1/2013 Pap NIL, neg HPV. Plan cotest pap & HPV in 1 yr  3/2015: NIL pap, neg HPV. Plan cotest pap & HPV in 3 years.         Migraine with aura      Priority: Medium     Occas migraines, none recently  Problem list name updated by automated process. Provider to review       Tobacco use disorder      Priority: Medium     1 PPD- interested in quitting       Moderate recurrent major  depression (H) 08/16/2005     Priority: Medium       Problem list and histories reviewed & adjusted, as indicated.  Additional history: as documented        Current Outpatient Prescriptions on File Prior to Visit:  ALPRAZolam (XANAX) 1 MG tablet Take 2 mg by mouth   amoxicillin (AMOXIL) 500 MG capsule Take 1 capsule (500 mg) by mouth 3 times daily   amphetamine-dextroamphetamine (ADDERALL) 20 MG per tablet Take 20 mg by mouth   buprenorphine-naloxone (SUBOXONE) 8-2 MG SUBL sublingual tablet Place 1 tablet under the tongue 2 times daily   cetirizine (ZYRTEC) 10 MG tablet TAKE 1 TABLET(10 MG) BY MOUTH EVERY EVENING   CLONAZEPAM PO Take by mouth 4 times daily as needed for anxiety   Cyanocobalamin (VITAMIN B 12 PO) Patient reported taking two tabs daily- not sure of the dosage.   FLUoxetine (PROZAC) 20 MG capsule Take 3 capsules (60 mg) by mouth daily   ibuprofen (ADVIL/MOTRIN) 600 MG tablet TAKE 1 TABLET BY MOUTH TWICE DAILY WITH MEALS   loperamide (IMODIUM A-D) 2 MG tablet Start with 2 tabs (4 mg), then take one tab (2 mg) after each diarrheal stool.  Do not use more than  8 tabs (16 mg) per day.   MAPAP 500 MG tablet TAKE 2 TABLETS(1000 MG) BY MOUTH TWICE DAILY AS NEEDED FOR MILD PAIN   Menthol, Topical Analgesic, 4 % GEL Apply three times a day as needed to affected area   methocarbamol (ROBAXIN) 750 MG tablet TAKE 2 TABLETS(1500 MG) BY MOUTH THREE TIMES DAILY AS NEEDED FOR MUSCLE SPASMS   metroNIDAZOLE (FLAGYL) 500 MG tablet Take 1 tablet (500 mg) by mouth 2 times daily   multivitamin, therapeutic with minerals (MULTI-VITAMIN) TABS Take 1 tablet by mouth daily   naloxone (NARCAN) nasal spray Spray 1 spray (4 mg) into one nostril alternating nostrils as needed for opioid reversal   order for DME Equipment being ordered: heating device and cryotherapy device.   order for DME Equipment being ordered: supportive back brace (Patient not taking: Reported on 8/29/2017)   pantoprazole (PROTONIX) 40 MG enteric coated  tablet Take 1 tablet (40 mg) by mouth daily   QUEtiapine (SEROQUEL) 50 MG tablet Take 50 mg by mouth   VENTOLIN  (90 BASE) MCG/ACT Inhaler INHALE 2 PUFFS INTO THE LUNGS EVERY 4 HOURS AS NEEDED FOR SHORTNESS OF BREATH OR DIFFICULT BREATHING OR WHEEZING   zolpidem (AMBIEN) 10 MG tablet Take 1 tablet (10 mg) by mouth nightly as needed for sleep     No current facility-administered medications on file prior to visit.        Allergies   Allergen Reactions     Compazine      Heart Problems/ Body went completley stiff for 8 hrs.     Nortriptyline      Skelaxin [Metaxalone]          REVIEW OF SYSTEMS:  General: No acute withdrawal symptoms.  No recent infections or fever  Resp: No coughing, wheezing or shortness of breath  CV: No chest pains or palpitations  GI: No nausea, vomiting, abdominal pain, diarrhea, constipation  : No urinary frequency or dysuria,     Musculoskeletal: No significant muscle or joint pains, No edema  Neurologic: No numbness, tingling, weakness, problems with balance or coordination  Psychiatric: No acute concerns  Skin: No rashes    OBJECTIVE:    PHYSICAL EXAM:  /68 (BP Location: Right arm)  Pulse 108  Temp 98.2  F (36.8  C) (Oral)  Resp 16  Wt 124 lb 8 oz (56.5 kg)  SpO2 96%  BMI 25.58 kg/m2    GENERAL APPEARANCE: intoxicated appearing.   EYES:Eyes grossly normal to inspection  NEURO:  Gait normal.  No tremor. Coordination intact.   MENTAL STATUS EXAM:  Appearance/Behavior: Restless and Agitated  Speech: Normal and Slurred  Mood/Affect: anxiety  Insight: Limited    ASSESSMENT/PLAN:  (F11.20) Uncomplicated opioid dependence (H)  (primary encounter diagnosis)  Plan: naloxone (NARCAN) nasal spray,         Encouraged hospital detoxification with polysubstance use, current intoxication.  Indications for clinic induction reviewed.    To check the status of detox bed availability at [facility] call:  [phone] (call at 0800 prior to going to the ER)       Opioid warning reviewed.   Risk of overdose following a period of abstinence due to decrease tolerance was discussed including risk of death.   Risk of overdose if using Suboxone with other substances particuarly benzodiazepines/alcohol was reviewed.      Counseled the patient on the importance of having a recovery program in addition to Medication assisted treatment.  Components include having some type of sober network, avoiding isolating, having willingness  to change, avoiding triggers and managing cravings.    Encouraged other services such as counseling, 12 step or other self-help organizations.       Strongly recommended abstain from alcohol, benzodiazepines, THC, opioids and other drugs of abuse.  Increased risk of relapse for opioids with use of these substances discussed.  Increased risk of overdose/death with use of other substances particularly benzodiazepines/alcohol reviewed.     Refer to higher level of service encouraged and CD intake info given      Naloxone offered.  Patient states she has          (F33.1) Moderate recurrent major depression (H)  (F41.9) Anxiety  Plan: strongly recommended follow up with PCP and ongoing mental health therapy.         (F17.200) Nicotine use disorder  Plan: Encouraged Abstinence.  Increase risk of relapse with other substances with return to nicotine use discussed.  Risk of Ecig/Vaping also reviewed.        ENCOUNTER FOR LONG TERM USE OF HIGH RISK MEDICATION   High Risk Drug Monitoring?  YES   Drug being monitored: Buprenorphine   Reason for drug: Opioid Use Disorder   What is being monitored?: Dosage, Cravings, Trigger, side effects, and continued abstinence.      Opioid warning reviewed.  Risk of overdose following a period of abstinence due to decrease tolerance was discussed including risk of death.   Risk of overdose if using Suboxone with other substances particuarly benzodiazepines/alcohol was reviewed.          Valeria Olson MD  [facility]  Medicine  [phone]

## 2018-05-24 ENCOUNTER — TELEPHONE (OUTPATIENT)
Dept: ADDICTION MEDICINE | Facility: CLINIC | Age: 38
End: 2018-05-24

## 2018-05-25 ENCOUNTER — OFFICE VISIT (OUTPATIENT)
Dept: ADDICTION MEDICINE | Facility: CLINIC | Age: 38
End: 2018-05-25
Payer: COMMERCIAL

## 2018-05-25 VITALS
HEART RATE: 87 BPM | SYSTOLIC BLOOD PRESSURE: 98 MMHG | TEMPERATURE: 98.3 F | RESPIRATION RATE: 18 BRPM | BODY MASS INDEX: 26.3 KG/M2 | WEIGHT: 128 LBS | OXYGEN SATURATION: 96 % | DIASTOLIC BLOOD PRESSURE: 64 MMHG

## 2018-05-25 DIAGNOSIS — F11.20 UNCOMPLICATED OPIOID DEPENDENCE (H): ICD-10-CM

## 2018-05-25 PROCEDURE — 80306 DRUG TEST PRSMV INSTRMNT: CPT | Performed by: PEDIATRICS

## 2018-05-25 PROCEDURE — 99215 OFFICE O/P EST HI 40 MIN: CPT | Performed by: PEDIATRICS

## 2018-05-25 RX ORDER — CLONIDINE HYDROCHLORIDE 0.1 MG/1
0.1 TABLET ORAL 2 TIMES DAILY
Qty: 30 TABLET | Refills: 0 | Status: SHIPPED | OUTPATIENT
Start: 2018-05-25 | End: 2018-07-20

## 2018-05-25 RX ORDER — GABAPENTIN 100 MG/1
100 CAPSULE ORAL 3 TIMES DAILY
Qty: 42 CAPSULE | Refills: 0 | Status: SHIPPED | OUTPATIENT
Start: 2018-05-25 | End: 2018-07-18

## 2018-05-25 NOTE — MR AVS SNAPSHOT
"              After Visit Summary   2018    Alisia Lin    MRN: [id number]           Patient Information     Date Of Birth          1980        Visit Information        Provider Department      2018 9:00 AM Valeria Olson MD [facility]        Today's Diagnoses     Uncomplicated opioid dependence (H)           Follow-ups after your visit        Who to contact     If you have questions or need follow up information about today's clinic visit or your schedule please contact [facility] directly at [phone].  Normal or non-critical lab and imaging results will be communicated to you by [employer], letter or phone within 4 business days after the clinic has received the results. If you do not hear from us within 7 days, please contact the clinic through [employer] or phone. If you have a critical or abnormal lab result, we will notify you by phone as soon as possible.  Submit refill requests through [employer] or call your pharmacy and they will forward the refill request to us. Please allow 3 business days for your refill to be completed.          Additional Information About Your Visit        MyChart Information     [employer] lets you send messages to your doctor, view your test results, renew your prescriptions, schedule appointments and more. To sign up, go to [email or web address] . Click on \"Log in\" on the left side of the screen, which will take you to the Welcome page. Then click on \"Sign up Now\" on the right side of the page.     You will be asked to enter the access code listed below, as well as some personal information. Please follow the directions to create your username and password.     Your access code is: [email or web address]  Expires: 2018  5:16 PM     Your access code will  in 90 days. If you need help or a new code, please call your [facility] or [phone].        Care EveryWhere ID     This is your Care " EveryWhere ID. This could be used by other organizations to access your [facility] medical records  [id number]        Your Vitals Were     Pulse Temperature Respirations Pulse Oximetry BMI (Body Mass Index)       87 98.3  F (36.8  C) (Oral) 18 96% 26.3 kg/m2        Blood Pressure from Last 3 Encounters:   05/25/18 98/64   05/23/18 104/68   02/08/18 142/80    Weight from Last 3 Encounters:   05/25/18 128 lb (58.1 kg)   05/23/18 124 lb 8 oz (56.5 kg)   02/08/18 117 lb (53.1 kg)              We Performed the Following     Urine Drugs of Abuse Screen Panel 13          Today's Medication Changes          These changes are accurate as of 5/25/18 10:23 AM.  If you have any questions, ask your nurse or doctor.               Start taking these medicines.        Dose/Directions    cloNIDine 0.1 MG tablet   Commonly known as:  CATAPRES   Used for:  Uncomplicated opioid dependence (H)        Dose:  0.1 mg   Take 1 tablet (0.1 mg) by mouth 2 times daily   Quantity:  30 tablet   Refills:  0       gabapentin 100 MG capsule   Commonly known as:  NEURONTIN   Used for:  Uncomplicated opioid dependence (H)        Dose:  100 mg   Take 1 capsule (100 mg) by mouth 3 times daily   Quantity:  42 capsule   Refills:  0            Where to get your medicines      These medications were sent to [facility] [place], [place] - [address]  [address] [facility] [address] [address]     Phone:  [phone]     cloNIDine 0.1 MG tablet    gabapentin 100 MG capsule                Primary Care Provider Office Phone # Fax #    Sepideh Rmaa Hines, APRN [phone] [address]  [facility] [address]        Equal Access to Services     SAM CLARK AH: Hadii aad ku hadboweno Soomaali, waaxda luqadaha, qaybta kaalmada adeegyada, shreyas redding hayreinan bruce fernandez. So [facility] [phone].    ATENCIÓN: Si habla español, tiene a randall disposición servicios gratuitos de asistencia lingüística. Llame al  [phone].    We comply with applicable federal civil rights laws and [place] laws. We do not discriminate on the basis of race, color, national origin, age, disability, sex, sexual orientation, or gender identity.            Thank you!     Thank you for choosing [facility]  for your care. Our goal is always to provide you with excellent care. Hearing back from our patients is one way we can continue to improve our services. Please take a few minutes to complete the written survey that you may receive in the mail after your visit with us. Thank you!             Your Updated Medication List - Protect others around you: Learn how to safely use, store and throw away your medicines at www.disposemymeds.org.          This list is accurate as of 5/25/18 10:23 AM.  Always use your most recent med list.                   Brand Name Dispense Instructions for use Diagnosis    ALPRAZolam 1 MG tablet    XANAX     Take 2 mg by mouth    Low TSH level       AMBIEN 10 MG tablet   Generic drug:  zolpidem     30 tablet    Take 1 tablet (10 mg) by mouth nightly as needed for sleep        amoxicillin 500 MG capsule    AMOXIL    30 capsule    Take 1 capsule (500 mg) by mouth 3 times daily    Dental abscess       amphetamine-dextroamphetamine 20 MG per tablet    ADDERALL     Take 20 mg by mouth    Low TSH level       buprenorphine-naloxone 8-2 MG Subl sublingual tablet    SUBOXONE    30 each    Place 1 tablet under the tongue 2 times daily    Uncomplicated opioid dependence (H)       cetirizine 10 MG tablet    zyrTEC    30 tablet    TAKE 1 TABLET(10 MG) BY MOUTH EVERY EVENING    Seasonal allergic rhinitis, unspecified chronicity, unspecified trigger       CLONAZEPAM PO      Take by mouth 4 times daily as needed for anxiety        cloNIDine 0.1 MG tablet    CATAPRES    30 tablet    Take 1 tablet (0.1 mg) by mouth 2 times daily    Uncomplicated opioid dependence (H)       FLUoxetine 20 MG capsule     PROzac     Take 3 capsules (60 mg) by mouth daily    Moderate recurrent major depression (H), Anxiety       gabapentin 100 MG capsule    NEURONTIN    42 capsule    Take 1 capsule (100 mg) by mouth 3 times daily    Uncomplicated opioid dependence (H)       ibuprofen 600 MG tablet    ADVIL/MOTRIN    180 tablet    TAKE 1 TABLET BY MOUTH TWICE DAILY WITH MEALS    Chronic low back pain, unspecified back pain laterality, with sciatica presence unspecified, Chronic midline thoracic back pain       loperamide 2 MG tablet    IMODIUM A-D    60 tablet    Start with 2 tabs (4 mg), then take one tab (2 mg) after each diarrheal stool.  Do not use more than  8 tabs (16 mg) per day.    Diarrhea, unspecified type       MAPAP 500 MG tablet   Generic drug:  acetaminophen     100 tablet    TAKE 2 TABLETS(1000 MG) BY MOUTH TWICE DAILY AS NEEDED FOR MILD PAIN    Back pain, Shoulder pain, right       Menthol (Topical Analgesic) 4 % Gel     150 mL    Apply three times a day as needed to affected area    Bilateral low back pain with left-sided sciatica, Right shoulder pain       methocarbamol 750 MG tablet    ROBAXIN    180 tablet    TAKE 2 TABLETS(1500 MG) BY MOUTH THREE TIMES DAILY AS NEEDED FOR MUSCLE SPASMS    Chronic midline low back pain with sciatica, sciatica laterality unspecified       metroNIDAZOLE 500 MG tablet    FLAGYL    14 tablet    Take 1 tablet (500 mg) by mouth 2 times daily    Bacterial vaginitis       multivitamin, therapeutic with minerals Tabs tablet     100 tablet    Take 1 tablet by mouth daily    Other fatigue       naloxone nasal spray    NARCAN    2 each    Spray 1 spray (4 mg) into one nostril alternating nostrils as needed for opioid reversal    Uncomplicated opioid dependence (H)       * order for DME     1 Device    Equipment being ordered: supportive back brace    Bilateral low back pain with left-sided sciatica       * order for DME     1 Units    Equipment being ordered: heating device and cryotherapy  device.    Chronic low back pain, Midline thoracic back pain       pantoprazole 40 MG EC tablet    PROTONIX    90 tablet    Take 1 tablet (40 mg) by mouth daily    Gastroesophageal reflux disease without esophagitis       QUEtiapine 50 MG tablet    SEROquel     Take 50 mg by mouth    Low TSH level       VENTOLIN  (90 Base) MCG/ACT Inhaler   Generic drug:  albuterol     18 g    INHALE 2 PUFFS INTO THE LUNGS EVERY 4 HOURS AS NEEDED FOR SHORTNESS OF BREATH OR DIFFICULT BREATHING OR WHEEZING    Intermittent asthma, uncomplicated       VITAMIN B 12 PO      Patient reported taking two tabs daily- not sure of the dosage.    Low TSH level       * Notice:  This list has 2 medication(s) that are the same as other medications prescribed for you. Read the directions carefully, and ask your doctor or other care provider to review them with you.

## 2018-05-25 NOTE — PROGRESS NOTES
SUBJECTIVE:                                                    OPIOID USE DISORDER - BUPRENORPHINE FOLLOW UP:    Alisia Lin is a 37 year old female who presents to clinic today for follow up of  MAT for opioid use disorder.       Date of last visit:  5/24/2018    [place] Board of Pharmacy Data Base Reviewed:    YES;     2/8/18 Suboxone 8mg #30  3/10 Adderall XR 30 #60  5/6 Xanax 1 mg #30  %/7 ambien 10mg #30 and Klonipin 1mg #90    Brief History:  Initial visit 9/17 hx of opioid use 10yr progressing to IV Heroin.  Several previous treatments.  Rx. Suboxone at initial visit. Ongoing relpase with heroin, cocaine, alcoholcontinued prescribed benzodiazepines.  Limited appointment attendance.       HPI:  Seen today for follow up.  Patient reports last Heroin use at midnight.  Denies taking any benzodiazepines but then later does say she has taken Klonipin.  Is focused on job next week as solution to her use and need to be off Heroin to start job/take UA.  Complains of withdrawal sx starting (body ache).  Has not obtained bed at detox.  Ambivalent about pursuing.  Feels she can do this on her own.  Risk of not tapering benzodiazepine reviewed.  Patient utox today still positive for cocaine, opioids, benzodiazepine and Buprenorphine. Denies taking buprenorphine yet continues to be positive.  Not attending meetings or any recovery programs.  Says she might go to AA.  Has narcan available.   Discussed still not appropriate for clinic /at home induction with polysubstance use and ongoing use.        Status since last visit: Since last visit patient has been:has relapsed    Intensity:     There has been: has been using substances    Suboxone Dose: unclear how she is taking     Progression of Symptoms/Precipitating Factors:     Cues to use and relapse triggers:Anxiety, Depression, Outside stressors   new job and Contact with people using substances    Trigger have been:  moderate    Accompanying Signs &  Symptoms:    Side Effects: none    Sobriety:     Status: patient has had opioid use and use of cocaine, benzodiazepine, buprenorphine, heroin     Drug Screen: obtained    Alleviating factors/Other Therapies Tried:    Contact with sponsor has been: no sponsor    Family and support system has been: neutral    Patient has been going to recovery meetings: not at all    Recovery program has been : minimal    Patient has been participating in professional counseling /therapy: NO    Patient is following with psychiatry: YES    Patient has established PCP: NO        Social History     Social History Narrative            Living with Father of youngest child  (3 girls 22, 18 and 10 )   Will be grandma around April 2018    No legal issues currently (possible identity theft).     Recently job loss ([employer])          Patient Active Problem List    Diagnosis Date Noted     Low TSH level 08/29/2017     Priority: Medium     Abnormal thyroid function test 08/08/2017     Priority: Medium     Narcotic dependence, in remission (H) 07/13/2017     Priority: Medium     Back pain 01/26/2016     Priority: Medium     Shoulder pain, right 01/26/2016     Priority: Medium     Intermittent asthma, uncomplicated 12/07/2015     Priority: Medium     Anxiety 05/21/2013     Priority: Medium     Benign neoplasm of colon 04/16/2013     Priority: Medium     Overview:   Found on colonoscopy 04/2013, repeat colonoscopy in 5 years, 04/2018.       Chronic low back pain 03/19/2012     Priority: Medium     On pain contract - [facility]       Degeneration of intervertebral disc 05/12/2010     Priority: Medium     Spondylosis 05/12/2010     Priority: Medium     Displacement of intervertebral disc 05/12/2010     Priority: Medium     ASCUS on Pap smear 03/03/2008     Priority: Medium     LEEP age 15 per notes in 2015  3/3/08: ASCUS, + HPV 53  4/17/08: [facility] - No visible lesions.   11/7/08: NIL pap  2/1/12: NIL pap  1/2013 Pap NIL, neg HPV. Plan cotest pap  & HPV in 1 yr  3/2015: NIL pap, neg HPV. Plan cotest pap & HPV in 3 years.         Migraine with aura      Priority: Medium     Occas migraines, none recently  Problem list name updated by automated process. Provider to review       Tobacco use disorder      Priority: Medium     1 PPD- interested in quitting       Moderate recurrent major depression (H) 08/16/2005     Priority: Medium       Problem list and histories reviewed & adjusted, as indicated.  Additional history: as documented        Current Outpatient Prescriptions on File Prior to Visit:  ALPRAZolam (XANAX) 1 MG tablet Take 2 mg by mouth   amoxicillin (AMOXIL) 500 MG capsule Take 1 capsule (500 mg) by mouth 3 times daily   amphetamine-dextroamphetamine (ADDERALL) 20 MG per tablet Take 20 mg by mouth   buprenorphine-naloxone (SUBOXONE) 8-2 MG SUBL sublingual tablet Place 1 tablet under the tongue 2 times daily   cetirizine (ZYRTEC) 10 MG tablet TAKE 1 TABLET(10 MG) BY MOUTH EVERY EVENING   CLONAZEPAM PO Take by mouth 4 times daily as needed for anxiety   Cyanocobalamin (VITAMIN B 12 PO) Patient reported taking two tabs daily- not sure of the dosage.   FLUoxetine (PROZAC) 20 MG capsule Take 3 capsules (60 mg) by mouth daily   ibuprofen (ADVIL/MOTRIN) 600 MG tablet TAKE 1 TABLET BY MOUTH TWICE DAILY WITH MEALS   loperamide (IMODIUM A-D) 2 MG tablet Start with 2 tabs (4 mg), then take one tab (2 mg) after each diarrheal stool.  Do not use more than  8 tabs (16 mg) per day.   MAPAP 500 MG tablet TAKE 2 TABLETS(1000 MG) BY MOUTH TWICE DAILY AS NEEDED FOR MILD PAIN   Menthol, Topical Analgesic, 4 % GEL Apply three times a day as needed to affected area   methocarbamol (ROBAXIN) 750 MG tablet TAKE 2 TABLETS(1500 MG) BY MOUTH THREE TIMES DAILY AS NEEDED FOR MUSCLE SPASMS   metroNIDAZOLE (FLAGYL) 500 MG tablet Take 1 tablet (500 mg) by mouth 2 times daily   multivitamin, therapeutic with minerals (MULTI-VITAMIN) TABS Take 1 tablet by mouth daily   naloxone (NARCAN)  nasal spray Spray 1 spray (4 mg) into one nostril alternating nostrils as needed for opioid reversal   order for DME Equipment being ordered: heating device and cryotherapy device.   order for DME Equipment being ordered: supportive back brace   pantoprazole (PROTONIX) 40 MG enteric coated tablet Take 1 tablet (40 mg) by mouth daily   QUEtiapine (SEROQUEL) 50 MG tablet Take 50 mg by mouth   VENTOLIN  (90 BASE) MCG/ACT Inhaler INHALE 2 PUFFS INTO THE LUNGS EVERY 4 HOURS AS NEEDED FOR SHORTNESS OF BREATH OR DIFFICULT BREATHING OR WHEEZING   zolpidem (AMBIEN) 10 MG tablet Take 1 tablet (10 mg) by mouth nightly as needed for sleep     No current facility-administered medications on file prior to visit.        Allergies   Allergen Reactions     Compazine      Heart Problems/ Body went completley stiff for 8 hrs.     Nortriptyline      Skelaxin [Metaxalone]          REVIEW OF SYSTEMS:  General: No acute withdrawal symptoms.  No recent infections or fever  Resp: No coughing, wheezing or shortness of breath  CV: No chest pains or palpitations  GI: No nausea, vomiting, abdominal pain, diarrhea, constipation  : No urinary frequency or dysuria,     Musculoskeletal: No significant muscle or joint pains, No edema  Neurologic: No numbness, tingling, weakness, problems with balance or coordination  Psychiatric: No acute concerns  Skin: No rashes    OBJECTIVE:    PHYSICAL EXAM:  BP 98/64 (BP Location: Left arm)  Pulse 87  Temp 98.3  F (36.8  C) (Oral)  Resp 18  Wt 128 lb (58.1 kg)  SpO2 96%  BMI 26.3 kg/m2    GENERAL APPEARANCE:  alert, comfortable appearing  EYES:Eyes grossly normal to inspection  NEURO:  Gait normal.  No tremor. Coordination intact.   MENTAL STATUS EXAM:  Appearance/Behavior: Restless  Speech: Normal  Mood/Affect: agitation  Insight: poor      Results for orders placed or performed in visit on 05/25/18   Urine Drugs of Abuse Screen Panel 13   Result Value Ref Range    Cannabinoids  (02-qmp-3-carboxy-9-THC) Not Detected NDET^Not Detected ng/mL    Phencyclidine (Phencyclidine) Not Detected NDET^Not Detected ng/mL    Cocaine (Benzoylecgonine) Detected, Abnormal Result (A) NDET^Not Detected ng/mL    Methamphetamine (d-Methamphetamine) Not Detected NDET^Not Detected ng/mL    Opiates (Morphine) Detected, Abnormal Result (A) NDET^Not Detected ng/mL    Amphetamine (d-Amphetamine) Not Detected NDET^Not Detected ng/mL    Benzodiazepines (Nordiazepam) Detected, Abnormal Result (A) NDET^Not Detected ng/mL    Tricyclic Antidepressants (Desipramine) Detected, Abnormal Result (A) NDET^Not Detected ng/mL    Methadone (Methadone) Not Detected NDET^Not Detected ng/mL    Barbiturates (Butalbital) Not Detected NDET^Not Detected ng/mL    Oxycodone (Oxycodone) Not Detected NDET^Not Detected ng/mL    Propoxyphene (Norpropoxyphene) Not Detected NDET^Not Detected ng/mL    Buprenorphine (Buprenorphine) Detected, Abnormal Result (A) NDET^Not Detected ng/mL           ASSESSMENT/PLAN:  (F11.20) Uncomplicated opioid dependence (H)  (primary encounter diagnosis)  Plan: naloxone (NARCAN) nasal spray,      Encouraged hospital detoxification with polysubstance use, current intoxication.  Indications for clinic induction reviewed.    To check the status of detox bed availability at [facility] call:  [phone] (call at 0800 prior to going to the ER)         Opioid warning reviewed.  Risk of overdose following a period of abstinence due to decrease tolerance was discussed including risk of death.   Risk of overdose if using Suboxone with other substances particuarly benzodiazepines/alcohol was reviewed.       Counseled the patient on the importance of having a recovery program in addition to Medication assisted treatment.  Components include having some type of sober network, avoiding isolating, having willingness  to change, avoiding triggers and managing cravings.    Encouraged other services such as counseling, 12 step or  "other self-help organizations.        Strongly recommended abstain from alcohol, benzodiazepines, THC, opioids and other drugs of abuse.  Increased risk of relapse for opioids with use of these substances discussed.  Increased risk of overdose/death with use of other substances particularly benzodiazepines/alcohol reviewed.      Refer to higher level of service encouraged and CD intake info given      Naloxone offered.  Patient states she has      Return appt 5/29 [facility].  Patient feels she will be able to demonstrate no use at that time and possible start for buprenophine.  Patient insistant on trial of Subutex as she feels suboxone tablets are causing mouth sores and GI side effects and has great misunderstanding about role of Naloxone in Suboxone which has been clarified several times.  Patient feels she is still able to \"push through \" and use heroin on Suboxone but would not be able to with Subutex.  Reviewed differences in medication.             (F33.1) Moderate recurrent major depression (H)  (F41.9) Anxiety  Plan: strongly recommended follow up with PCP and ongoing mental health therapy.           (F17.200) Nicotine use disorder  Plan: Encouraged Abstinence.  Increase risk of relapse with other substances with return to nicotine use discussed.  Risk of Ecig/Vaping also reviewed.           ENCOUNTER FOR LONG TERM USE OF HIGH RISK MEDICATION   High Risk Drug Monitoring?  YES   Drug being monitored: Buprenorphine   Reason for drug: Opioid Use Disorder   What is being monitored?: Dosage, Cravings, Trigger, side effects, and continued abstinence.      Opioid warning reviewed.  Risk of overdose following a period of abstinence due to decrease tolerance was discussed including risk of death.   Risk of overdose if using Suboxone with other substances particuarly benzodiazepines/alcohol was reviewed.          Valeria Olson MD  [facility]  [phone]    "

## 2018-05-29 ENCOUNTER — OFFICE VISIT (OUTPATIENT)
Dept: ADDICTION MEDICINE | Facility: CLINIC | Age: 38
End: 2018-05-29
Payer: COMMERCIAL

## 2018-05-29 VITALS
SYSTOLIC BLOOD PRESSURE: 128 MMHG | HEART RATE: 133 BPM | OXYGEN SATURATION: 97 % | BODY MASS INDEX: 25.52 KG/M2 | WEIGHT: 124.2 LBS | DIASTOLIC BLOOD PRESSURE: 83 MMHG | RESPIRATION RATE: 18 BRPM | TEMPERATURE: 98.1 F

## 2018-05-29 DIAGNOSIS — F11.20 UNCOMPLICATED OPIOID DEPENDENCE (H): ICD-10-CM

## 2018-05-29 LAB
AMPHETAMINES UR QL: NOT DETECTED NG/ML
BARBITURATES UR QL SCN: NOT DETECTED NG/ML
BENZODIAZ UR QL SCN: NOT DETECTED NG/ML
BUPRENORPHINE UR QL: ABNORMAL NG/ML
CANNABINOIDS UR QL: NOT DETECTED NG/ML
COCAINE UR QL SCN: NOT DETECTED NG/ML
D-METHAMPHET UR QL: NOT DETECTED NG/ML
METHADONE UR QL SCN: NOT DETECTED NG/ML
OPIATES UR QL SCN: ABNORMAL NG/ML
OXYCODONE UR QL SCN: NOT DETECTED NG/ML
PCP UR QL SCN: NOT DETECTED NG/ML
PROPOXYPH UR QL: NOT DETECTED NG/ML
TRICYCLICS UR QL SCN: ABNORMAL NG/ML

## 2018-05-29 PROCEDURE — 80361 OPIATES 1 OR MORE: CPT | Mod: 90 | Performed by: PEDIATRICS

## 2018-05-29 PROCEDURE — 99214 OFFICE O/P EST MOD 30 MIN: CPT | Performed by: PEDIATRICS

## 2018-05-29 PROCEDURE — 99000 SPECIMEN HANDLING OFFICE-LAB: CPT | Performed by: PEDIATRICS

## 2018-05-29 PROCEDURE — 80306 DRUG TEST PRSMV INSTRMNT: CPT | Performed by: PEDIATRICS

## 2018-05-29 RX ORDER — BUPRENORPHINE 8 MG/1
8 TABLET SUBLINGUAL 2 TIMES DAILY
Qty: 14 EACH | Refills: 0 | Status: SHIPPED | OUTPATIENT
Start: 2018-05-29 | End: 2018-07-18

## 2018-05-29 ASSESSMENT — PAIN SCALES - GENERAL: PAINLEVEL: NO PAIN (0)

## 2018-05-29 NOTE — MR AVS SNAPSHOT
"              After Visit Summary   2018    Alisia Lin    MRN: [id number]           Patient Information     Date Of Birth          1980        Visit Information        Provider Department      2018 9:40 AM Valeria Olson MD [facility]        Today's Diagnoses     Uncomplicated opioid dependence (H)           Follow-ups after your visit        Who to contact     If you have questions or need follow up information about today's clinic visit or your schedule please contact [facility] directly at [phone].  Normal or non-critical lab and imaging results will be communicated to you by [employer], letter or phone within 4 business days after the clinic has received the results. If you do not hear from us within 7 days, please contact the clinic through [employer] or phone. If you have a critical or abnormal lab result, we will notify you by phone as soon as possible.  Submit refill requests through [employer] or call your pharmacy and they will forward the refill request to us. Please allow 3 business days for your refill to be completed.          Additional Information About Your Visit        MyChart Information     [employer] lets you send messages to your doctor, view your test results, renew your prescriptions, schedule appointments and more. To sign up, go to [email or web address] . Click on \"Log in\" on the left side of the screen, which will take you to the Welcome page. Then click on \"Sign up Now\" on the right side of the page.     You will be asked to enter the access code listed below, as well as some personal information. Please follow the directions to create your username and password.     Your access code is: [email or web address]  Expires: 2018  5:16 PM     Your access code will  in 90 days. If you need help or a new code, please call your [facility] or [phone].        Care EveryWhere ID     This is your Care EveryWhere ID. This could be used by " other organizations to access your [facility] medical records  [id number]        Your Vitals Were     Pulse Temperature Respirations Last Period Pulse Oximetry BMI (Body Mass Index)    133 98.1  F (36.7  C) (Oral) 18 05/09/2018 97% 25.52 kg/m2       Blood Pressure from Last 3 Encounters:   05/29/18 128/83   05/25/18 98/64   05/23/18 104/68    Weight from Last 3 Encounters:   05/29/18 124 lb 3.2 oz (56.3 kg)   05/25/18 128 lb (58.1 kg)   05/23/18 124 lb 8 oz (56.5 kg)              We Performed the Following     Opiates quantitative urine     Urine Drugs of Abuse Screen Panel 13          Today's Medication Changes          These changes are accurate as of 5/29/18 10:35 AM.  If you have any questions, ask your nurse or doctor.               Start taking these medicines.        Dose/Directions    buprenorphine 8 MG Subl sublingual tablet   Commonly known as:  SUBUTEX   Used for:  Uncomplicated opioid dependence (H)   Started by:  Valeria Olson MD        Dose:  8 mg   Place 1 tablet (8 mg) under the tongue 2 times daily   Quantity:  14 each   Refills:  0            Where to get your medicines      Some of these will need a paper prescription and others can be bought over the counter.  Ask your nurse if you have questions.     Bring a paper prescription for each of these medications     buprenorphine 8 MG Subl sublingual tablet                Primary Care Provider Office Phone # Fax #    Sepideh Rama Hines, APRN [facility] [phone]       [address]  [facility] [address]        Equal Access to Services     SAM CLARK AH: Hadii bakari ku hadasho Soomaali, waaxda luqadaha, qaybta kaalmada adeegyada, waxay vahid gross . So [facility] [phone].    ATENCIÓN: Si habla español, tiene a randall disposición servicios gratuitos de asistencia lingüística. Llame al [phone].    We comply with applicable federal civil rights laws and [place] laws. We do not discriminate on the basis of race,  color, national origin, age, disability, sex, sexual orientation, or gender identity.            Thank you!     Thank you for choosing [facility]  for your care. Our goal is always to provide you with excellent care. Hearing back from our patients is one way we can continue to improve our services. Please take a few minutes to complete the written survey that you may receive in the mail after your visit with us. Thank you!             Your Updated Medication List - Protect others around you: Learn how to safely use, store and throw away your medicines at www.disposemymeds.org.          This list is accurate as of 5/29/18 10:35 AM.  Always use your most recent med list.                   Brand Name Dispense Instructions for use Diagnosis    ALPRAZolam 1 MG tablet    XANAX     Take 2 mg by mouth    Low TSH level       AMBIEN 10 MG tablet   Generic drug:  zolpidem     30 tablet    Take 1 tablet (10 mg) by mouth nightly as needed for sleep        amoxicillin 500 MG capsule    AMOXIL    30 capsule    Take 1 capsule (500 mg) by mouth 3 times daily    Dental abscess       amphetamine-dextroamphetamine 20 MG per tablet    ADDERALL     Take 20 mg by mouth    Low TSH level       buprenorphine 8 MG Subl sublingual tablet    SUBUTEX    14 each    Place 1 tablet (8 mg) under the tongue 2 times daily    Uncomplicated opioid dependence (H)       buprenorphine-naloxone 8-2 MG Subl sublingual tablet    SUBOXONE    30 each    Place 1 tablet under the tongue 2 times daily    Uncomplicated opioid dependence (H)       cetirizine 10 MG tablet    zyrTEC    30 tablet    TAKE 1 TABLET(10 MG) BY MOUTH EVERY EVENING    Seasonal allergic rhinitis, unspecified chronicity, unspecified trigger       CLONAZEPAM PO      Take by mouth 4 times daily as needed for anxiety        cloNIDine 0.1 MG tablet    CATAPRES    30 tablet    Take 1 tablet (0.1 mg) by mouth 2 times daily    Uncomplicated opioid dependence (H)       FLUoxetine 20 MG  capsule    PROzac     Take 3 capsules (60 mg) by mouth daily    Moderate recurrent major depression (H), Anxiety       gabapentin 100 MG capsule    NEURONTIN    42 capsule    Take 1 capsule (100 mg) by mouth 3 times daily    Uncomplicated opioid dependence (H)       ibuprofen 600 MG tablet    ADVIL/MOTRIN    180 tablet    TAKE 1 TABLET BY MOUTH TWICE DAILY WITH MEALS    Chronic low back pain, unspecified back pain laterality, with sciatica presence unspecified, Chronic midline thoracic back pain       loperamide 2 MG tablet    IMODIUM A-D    60 tablet    Start with 2 tabs (4 mg), then take one tab (2 mg) after each diarrheal stool.  Do not use more than  8 tabs (16 mg) per day.    Diarrhea, unspecified type       MAPAP 500 MG tablet   Generic drug:  acetaminophen     100 tablet    TAKE 2 TABLETS(1000 MG) BY MOUTH TWICE DAILY AS NEEDED FOR MILD PAIN    Back pain, Shoulder pain, right       Menthol (Topical Analgesic) 4 % Gel     150 mL    Apply three times a day as needed to affected area    Bilateral low back pain with left-sided sciatica, Right shoulder pain       methocarbamol 750 MG tablet    ROBAXIN    180 tablet    TAKE 2 TABLETS(1500 MG) BY MOUTH THREE TIMES DAILY AS NEEDED FOR MUSCLE SPASMS    Chronic midline low back pain with sciatica, sciatica laterality unspecified       metroNIDAZOLE 500 MG tablet    FLAGYL    14 tablet    Take 1 tablet (500 mg) by mouth 2 times daily    Bacterial vaginitis       multivitamin, therapeutic with minerals Tabs tablet     100 tablet    Take 1 tablet by mouth daily    Other fatigue       naloxone nasal spray    NARCAN    2 each    Spray 1 spray (4 mg) into one nostril alternating nostrils as needed for opioid reversal    Uncomplicated opioid dependence (H)       * order for DME     1 Device    Equipment being ordered: supportive back brace    Bilateral low back pain with left-sided sciatica       * order for DME     1 Units    Equipment being ordered: heating device and  cryotherapy device.    Chronic low back pain, Midline thoracic back pain       pantoprazole 40 MG EC tablet    PROTONIX    90 tablet    Take 1 tablet (40 mg) by mouth daily    Gastroesophageal reflux disease without esophagitis       QUEtiapine 50 MG tablet    SEROquel     Take 50 mg by mouth    Low TSH level       VENTOLIN  (90 Base) MCG/ACT Inhaler   Generic drug:  albuterol     18 g    INHALE 2 PUFFS INTO THE LUNGS EVERY 4 HOURS AS NEEDED FOR SHORTNESS OF BREATH OR DIFFICULT BREATHING OR WHEEZING    Intermittent asthma, uncomplicated       VITAMIN B 12 PO      Patient reported taking two tabs daily- not sure of the dosage.    Low TSH level       * Notice:  This list has 2 medication(s) that are the same as other medications prescribed for you. Read the directions carefully, and ask your doctor or other care provider to review them with you.

## 2018-05-29 NOTE — PROGRESS NOTES
"  SUBJECTIVE:                                                    OPIOID USE DISORDER - BUPRENORPHINE FOLLOW UP:    Alisia Lin is a 37 year old female who presents to clinic today for follow up of  MAT for opioid use disorder.       Date of last visit:  Visit date not found    [place] [facility] Base Reviewed:    YES;     No Concerns Noted   5/29/2018      Brief History:  Brief History:  Initial visit 9/17 hx of opioid use 10yr progressing to IV Heroin.  Several previous treatments.  Rx. Suboxone at initial visit. Ongoing relpase with heroin, cocaine, alcoholcontinued prescribed benzodiazepines.  Limited appointment attendance.          HPI:  Returns today for follow up.  Was seen several times last week with ongoing use of Heroin and continued use of prescribed benzodiazepine.  Patient has not taken any of prescribed Clonazepam or Alprazolam since last visit.  Denies benzodiazepine withdrawal sx although BP mildly elevated.  No tremor or visual or auditiory disturbance.  Anxiety stable.  Patient states last opioid Heroin snorting  use was Thursday 5/17 at  midnight.  Did \"find\" one Suboxone 8mg tab taking about 2 mg pieces over weekend.  Last dose of that late afternoon.  Did take Nyquil  and benadryl daily past 3 days  trying to stop runny nose.  Utox pos today for OPI - (use claimed as above-confirmatory testing pending with recent Nyquil) Has new job intake Thursday.  Is complaining of moderate withdrawal over weekend.  Not taking gabapentin as she doesn't like how it makes her feels.   Plans to attend meetings but wasn't feeling well enough to currently.  Has narcan.      Status since last visit: Since last visit patient has been:struggling    Intensity:     There has been: moderate craving    Suboxone Dose: was adequate when taking regularly    Progression of Symptoms/Precipitating Factors:     Cues to use and relapse triggers:Anxiety, Depression and withdrawal sx.     Trigger have been:  " moderate    Accompanying Signs & Symptoms:  Side Effects: mouth sores and GI upset from Suboxone in past.  Has been adamant about trying Subutex.    Sobriety:     Status: No substance use reported since last visit    Drug Screen: obtained    Alleviating factors/Other Therapies Tried:    Contact with sponsor has been: no sponsor    Family and support system has been: helpful    Patient has been going to recovery meetings: not at all    Recovery program has been : sporadic    Patient has been participating in professional counseling /therapy: NO    Patient is following with psychiatry: NO    Patient has established PCP: NO        Social History     Social History Narrative            Living with Father of youngest child  (3 girls 22, 18 and 10 )   Will be grandma around April 2018    No legal issues currently (possible identity theft).     Recently job loss ([employer])          Patient Active Problem List    Diagnosis Date Noted     Low TSH level 08/29/2017     Priority: Medium     Abnormal thyroid function test 08/08/2017     Priority: Medium     Narcotic dependence, in remission (H) 07/13/2017     Priority: Medium     Back pain 01/26/2016     Priority: Medium     Shoulder pain, right 01/26/2016     Priority: Medium     Intermittent asthma, uncomplicated 12/07/2015     Priority: Medium     Anxiety 05/21/2013     Priority: Medium     Benign neoplasm of colon 04/16/2013     Priority: Medium     Overview:   Found on colonoscopy 04/2013, repeat colonoscopy in 5 years, 04/2018.       Chronic low back pain 03/19/2012     Priority: Medium     On pain contract - [facility]       Degeneration of intervertebral disc 05/12/2010     Priority: Medium     Spondylosis 05/12/2010     Priority: Medium     Displacement of intervertebral disc 05/12/2010     Priority: Medium     ASCUS on Pap smear 03/03/2008     Priority: Medium     LEEP age 15 per notes in 2015  3/3/08: ASCUS, + HPV 53  4/17/08: [place] - No visible lesions.    11/7/08: NIL pap  2/1/12: NIL pap  1/2013 Pap NIL, neg HPV. Plan cotest pap & HPV in 1 yr  3/2015: NIL pap, neg HPV. Plan cotest pap & HPV in 3 years.         Migraine with aura      Priority: Medium     Occas migraines, none recently  Problem list name updated by automated process. Provider to review       Tobacco use disorder      Priority: Medium     1 PPD- interested in quitting       Moderate recurrent major depression (H) 08/16/2005     Priority: Medium       Problem list and histories reviewed & adjusted, as indicated.  Additional history: as documented        Current Outpatient Prescriptions on File Prior to Visit:  ALPRAZolam (XANAX) 1 MG tablet Take 2 mg by mouth   amoxicillin (AMOXIL) 500 MG capsule Take 1 capsule (500 mg) by mouth 3 times daily   amphetamine-dextroamphetamine (ADDERALL) 20 MG per tablet Take 20 mg by mouth   buprenorphine-naloxone (SUBOXONE) 8-2 MG SUBL sublingual tablet Place 1 tablet under the tongue 2 times daily   cetirizine (ZYRTEC) 10 MG tablet TAKE 1 TABLET(10 MG) BY MOUTH EVERY EVENING   CLONAZEPAM PO Take by mouth 4 times daily as needed for anxiety   cloNIDine (CATAPRES) 0.1 MG tablet Take 1 tablet (0.1 mg) by mouth 2 times daily   Cyanocobalamin (VITAMIN B 12 PO) Patient reported taking two tabs daily- not sure of the dosage.   FLUoxetine (PROZAC) 20 MG capsule Take 3 capsules (60 mg) by mouth daily   gabapentin (NEURONTIN) 100 MG capsule Take 1 capsule (100 mg) by mouth 3 times daily   ibuprofen (ADVIL/MOTRIN) 600 MG tablet TAKE 1 TABLET BY MOUTH TWICE DAILY WITH MEALS   loperamide (IMODIUM A-D) 2 MG tablet Start with 2 tabs (4 mg), then take one tab (2 mg) after each diarrheal stool.  Do not use more than  8 tabs (16 mg) per day.   MAPAP 500 MG tablet TAKE 2 TABLETS(1000 MG) BY MOUTH TWICE DAILY AS NEEDED FOR MILD PAIN   Menthol, Topical Analgesic, 4 % GEL Apply three times a day as needed to affected area   methocarbamol (ROBAXIN) 750 MG tablet TAKE 2 TABLETS(1500 MG) BY  MOUTH THREE TIMES DAILY AS NEEDED FOR MUSCLE SPASMS   metroNIDAZOLE (FLAGYL) 500 MG tablet Take 1 tablet (500 mg) by mouth 2 times daily   multivitamin, therapeutic with minerals (MULTI-VITAMIN) TABS Take 1 tablet by mouth daily   naloxone (NARCAN) nasal spray Spray 1 spray (4 mg) into one nostril alternating nostrils as needed for opioid reversal   order for DME Equipment being ordered: heating device and cryotherapy device.   order for DME Equipment being ordered: supportive back brace   pantoprazole (PROTONIX) 40 MG enteric coated tablet Take 1 tablet (40 mg) by mouth daily   QUEtiapine (SEROQUEL) 50 MG tablet Take 50 mg by mouth   VENTOLIN  (90 BASE) MCG/ACT Inhaler INHALE 2 PUFFS INTO THE LUNGS EVERY 4 HOURS AS NEEDED FOR SHORTNESS OF BREATH OR DIFFICULT BREATHING OR WHEEZING   zolpidem (AMBIEN) 10 MG tablet Take 1 tablet (10 mg) by mouth nightly as needed for sleep     No current facility-administered medications on file prior to visit.        Allergies   Allergen Reactions     Compazine      Heart Problems/ Body went completley stiff for 8 hrs.     Nortriptyline      Skelaxin [Metaxalone]          REVIEW OF SYSTEMS:  General: No acute withdrawal symptoms.  No recent infections or fever  Resp: No coughing, wheezing or shortness of breath  CV: No chest pains or palpitations  GI: No nausea, vomiting, abdominal pain, diarrhea, constipation  : No urinary frequency or dysuria,     Musculoskeletal: No significant muscle or joint pains, No edema  Neurologic: No numbness, tingling, weakness, problems with balance or coordination  Psychiatric: No acute concerns  Skin: No rashes    OBJECTIVE:    PHYSICAL EXAM:  /83 (BP Location: Left arm, Patient Position: Sitting, Cuff Size: Adult Regular)  Pulse 133  Temp 98.1  F (36.7  C) (Oral)  Resp 18  Wt 124 lb 3.2 oz (56.3 kg)  LMP 05/09/2018  SpO2 97%  BMI 25.52 kg/m2    GENERAL APPEARANCE:  alert, comfortable appearing, runny nose  EYES:Eyes grossly  normal to inspection and pupils dialated  NEURO:  Gait normal.  No tremor. Coordination intact.   MENTAL STATUS EXAM:  Appearance/Behavior: Restless  Speech: Normal  Mood/Affect: anxiety  Insight: Fair      Results for orders placed or performed in visit on 05/29/18   Urine Drugs of Abuse Screen Panel 13   Result Value Ref Range    Cannabinoids (08-xna-7-carboxy-9-THC) Not Detected NDET^Not Detected ng/mL    Phencyclidine (Phencyclidine) Not Detected NDET^Not Detected ng/mL    Cocaine (Benzoylecgonine) Not Detected NDET^Not Detected ng/mL    Methamphetamine (d-Methamphetamine) Not Detected NDET^Not Detected ng/mL    Opiates (Morphine) Detected, Abnormal Result (A) NDET^Not Detected ng/mL    Amphetamine (d-Amphetamine) Not Detected NDET^Not Detected ng/mL    Benzodiazepines (Nordiazepam) Not Detected NDET^Not Detected ng/mL    Tricyclic Antidepressants (Desipramine) Detected, Abnormal Result (A) NDET^Not Detected ng/mL    Methadone (Methadone) Not Detected NDET^Not Detected ng/mL    Barbiturates (Butalbital) Not Detected NDET^Not Detected ng/mL    Oxycodone (Oxycodone) Not Detected NDET^Not Detected ng/mL    Propoxyphene (Norpropoxyphene) Not Detected NDET^Not Detected ng/mL    Buprenorphine (Buprenorphine) Detected, Abnormal Result (A) NDET^Not Detected ng/mL           ASSESSMENT/PLAN:  ASSESSMENT/PLAN:  (F11.20) Uncomplicated opioid dependence (H)  (primary encounter diagnosis)  Plan: naloxone (NARCAN) nasal spray,   Subutex (GI intolerance and mouth sores from Suboxone tab)  -again reviewed differences of Suboxone and subutex and small to minimal absorbtion of Naloxone and the withdrawal and effectiveness with both product equal.  Patient still skeptical based on her reading.     Begin with Subutex  4 mg once withdrawal sx well established (>24 hr from last use) to avoid precipitated withdrawal.   Patient is aware of need for this.  May repeat dose in several hours if tolerated.   Then begin Suboxone  8mg daily  for one day then resume 8mg bid.    Follow up 6/6/18 3:40 pm    Confirmatory testing for OPI preliminary pos for utox discussed and ordered    Avoid OTC cold and cough products.     Opioid warning reviewed.  Risk of overdose following a period of abstinence due to decrease tolerance was discussed including risk of death.   Risk of overdose if using Suboxone with other substances particuarly benzodiazepines/alcohol was reviewed.       Counseled the patient on the importance of having a recovery program in addition to Medication assisted treatment.  Components include having some type of sober network, avoiding isolating, having willingness  to change, avoiding triggers and managing cravings.    Encouraged other services such as counseling, 12 step or other self-help organizations.        Strongly recommended abstain from alcohol, benzodiazepines, THC, opioids and other drugs of abuse.  Increased risk of relapse for opioids with use of these substances discussed.  Increased risk of overdose/death with use of other substances particularly benzodiazepines/alcohol reviewed.            Naloxone offered.  Patient states she has             (F33.1) Moderate recurrent major depression (H)  (F41.9) Anxiety  Plan: strongly recommended follow up with PCP and ongoing mental health therapy.           (F17.200) Nicotine use disorder  Plan: Encouraged Abstinence.  Increase risk of relapse with other substances with return to nicotine use discussed.  Risk of Ecig/Vaping also reviewed.                 ENCOUNTER FOR LONG TERM USE OF HIGH RISK MEDICATION   High Risk Drug Monitoring?  YES   Drug being monitored: Buprenorphine   Reason for drug: Opioid Use Disorder   What is being monitored?: Dosage, Cravings, Trigger, side effects, and continued abstinence.      Opioid warning reviewed.  Risk of overdose following a period of abstinence due to decrease tolerance was discussed including risk of death.   Risk of overdose if using Suboxone  with other substances particuarly benzodiazepines/alcohol was reviewed.          Valeria Olson MD  [facility]  [phone]

## 2018-06-01 LAB
6MAM SERPL-MCNC: NEGATIVE NG/ML
CODEINE UR CFM-MCNC: NEGATIVE NG/ML
MORPHINE UR CFM-MCNC: 505 NG/ML

## 2018-06-06 DIAGNOSIS — G89.29 CHRONIC LOW BACK PAIN, UNSPECIFIED BACK PAIN LATERALITY, WITH SCIATICA PRESENCE UNSPECIFIED: ICD-10-CM

## 2018-06-06 DIAGNOSIS — G89.29 CHRONIC MIDLINE THORACIC BACK PAIN: ICD-10-CM

## 2018-06-06 DIAGNOSIS — M54.5 CHRONIC LOW BACK PAIN, UNSPECIFIED BACK PAIN LATERALITY, WITH SCIATICA PRESENCE UNSPECIFIED: ICD-10-CM

## 2018-06-06 DIAGNOSIS — M54.6 CHRONIC MIDLINE THORACIC BACK PAIN: ICD-10-CM

## 2018-06-06 NOTE — TELEPHONE ENCOUNTER
"Requested Prescriptions   Pending Prescriptions Disp Refills     ibuprofen (ADVIL/MOTRIN) 600 MG tablet [Pharmacy Med Name: IBUPROFEN 600MG TABLETS]  Last Written Prescription Date:  3/15/2018  Last Fill Quantity: 180 tablet,  # refills: 0   Last Office Visit: 8/7/2017   Future Office Visit:    Next 5 appointments (look out 90 days)     Jun 13, 2018  3:40 PM CDT   Return Visit with Valeria Olson MD   [facility]    [address] [place] [address]  [address]  [facility] [address]   [phone]                180 tablet 0     Sig: TAKE 1 TABLET BY MOUTH TWICE DAILY WITH MEALS    NSAID Medications Passed    6/6/2018  3:34 AM       Passed - Blood pressure under 140/90 in past 12 months    BP Readings from Last 3 Encounters:   05/29/18 128/83   05/25/18 98/64   05/23/18 104/68          Passed - Normal ALT on file in past 12 months    Recent Labs   Lab Test 10/24/17   ALT  12          Passed - Normal AST on file in past 12 months    Recent Labs   Lab Test 10/24/17   AST  38          Passed - Recent (12 mo) or future (30 days) visit within the authorizing provider's specialty    Patient had office visit in the last 12 months or has a visit in the next 30 days with authorizing provider or within the authorizing provider's specialty.  See \"Patient Info\" tab in inbasket, or \"Choose Columns\" in Meds & Orders section of the refill encounter.           Passed - Patient is age 6-64 years       Passed - Normal CBC on file in past 12 months    Recent Labs   Lab Test  07/14/17   1335   WBC  10.0   RBC  4.01   HGB  12.0   HCT  35.3   PLT  406     For [facility] ONLY: [id number] = WBC, [id number] = RBC         Passed - No active pregnancy on record       Passed - Normal serum creatinine on file in past 12 months    Recent Labs   Lab Test 10/24/17   CR  0.63          Passed - No positive pregnancy test in past 12 months          "

## 2018-06-11 RX ORDER — IBUPROFEN 600 MG/1
TABLET, FILM COATED ORAL
Qty: 180 TABLET | Refills: 0 | Status: SHIPPED | OUTPATIENT
Start: 2018-06-11 | End: 2018-09-12

## 2018-06-11 NOTE — TELEPHONE ENCOUNTER
Prescription approved per [facility] Refill Protocol.  Hardeep Higgins, RN, BSN

## 2018-07-18 ENCOUNTER — OFFICE VISIT (OUTPATIENT)
Dept: FAMILY MEDICINE | Facility: CLINIC | Age: 38
End: 2018-07-18
Payer: COMMERCIAL

## 2018-07-18 VITALS
RESPIRATION RATE: 18 BRPM | DIASTOLIC BLOOD PRESSURE: 80 MMHG | OXYGEN SATURATION: 98 % | BODY MASS INDEX: 23.63 KG/M2 | SYSTOLIC BLOOD PRESSURE: 122 MMHG | WEIGHT: 115 LBS | TEMPERATURE: 99.3 F | HEART RATE: 115 BPM

## 2018-07-18 DIAGNOSIS — F41.9 ANXIETY: ICD-10-CM

## 2018-07-18 DIAGNOSIS — J45.20 INTERMITTENT ASTHMA, UNCOMPLICATED: ICD-10-CM

## 2018-07-18 DIAGNOSIS — F17.200 TOBACCO USE DISORDER: ICD-10-CM

## 2018-07-18 DIAGNOSIS — J20.9 ACUTE BRONCHITIS WITH SYMPTOMS > 10 DAYS: Primary | ICD-10-CM

## 2018-07-18 DIAGNOSIS — N76.0 BACTERIAL VAGINOSIS: ICD-10-CM

## 2018-07-18 DIAGNOSIS — F19.10 POLYSUBSTANCE ABUSE (H): ICD-10-CM

## 2018-07-18 DIAGNOSIS — B96.89 BACTERIAL VAGINOSIS: ICD-10-CM

## 2018-07-18 DIAGNOSIS — F33.1 MODERATE RECURRENT MAJOR DEPRESSION (H): ICD-10-CM

## 2018-07-18 PROCEDURE — 99214 OFFICE O/P EST MOD 30 MIN: CPT | Performed by: NURSE PRACTITIONER

## 2018-07-18 RX ORDER — METRONIDAZOLE 7.5 MG/G
1 GEL VAGINAL AT BEDTIME
Qty: 70 G | Refills: 0 | Status: SHIPPED | OUTPATIENT
Start: 2018-07-18 | End: 2018-07-23

## 2018-07-18 RX ORDER — AZITHROMYCIN 250 MG/1
TABLET, FILM COATED ORAL
Qty: 6 TABLET | Refills: 0 | Status: SHIPPED | OUTPATIENT
Start: 2018-07-18 | End: 2018-10-01

## 2018-07-18 ASSESSMENT — ANXIETY QUESTIONNAIRES
GAD7 TOTAL SCORE: 5
7. FEELING AFRAID AS IF SOMETHING AWFUL MIGHT HAPPEN: SEVERAL DAYS
IF YOU CHECKED OFF ANY PROBLEMS ON THIS QUESTIONNAIRE, HOW DIFFICULT HAVE THESE PROBLEMS MADE IT FOR YOU TO DO YOUR WORK, TAKE CARE OF THINGS AT HOME, OR GET ALONG WITH OTHER PEOPLE: NOT DIFFICULT AT ALL
5. BEING SO RESTLESS THAT IT IS HARD TO SIT STILL: NOT AT ALL
3. WORRYING TOO MUCH ABOUT DIFFERENT THINGS: SEVERAL DAYS
2. NOT BEING ABLE TO STOP OR CONTROL WORRYING: SEVERAL DAYS
1. FEELING NERVOUS, ANXIOUS, OR ON EDGE: SEVERAL DAYS
6. BECOMING EASILY ANNOYED OR IRRITABLE: NOT AT ALL

## 2018-07-18 ASSESSMENT — PATIENT HEALTH QUESTIONNAIRE - PHQ9: 5. POOR APPETITE OR OVEREATING: SEVERAL DAYS

## 2018-07-18 NOTE — LETTER
My Depression Action Plan  Name: Alisia Lin   Date of Birth 1980  Date: 7/18/2018    My doctor: Sepideh Hines   My clinic: [address] [address] [phone]          GREEN    ZONE   Good Control    What it looks like:     Things are going generally well. You have normal up s and down s. You may even feel depressed from time to time, but bad moods usually last less than a day.   What you need to do:  1. Continue to care for yourself (see self care plan)  2. Check your depression survival kit and update it as needed  3. Follow your physician s recommendations including any medication.  4. Do not stop taking medication unless you consult with your physician first.           YELLOW         ZONE Getting Worse    What it looks like:     Depression is starting to interfere with your life.     It may be hard to get out of bed; you may be starting to isolate yourself from others.    Symptoms of depression are starting to last most all day and this has happened for several days.     You may have suicidal thoughts but they are not constant.   What you need to do:     1. Call your care team, your response to treatment will improve if you keep your care team informed of your progress. Yellow periods are signs an adjustment may need to be made.     2. Continue your self-care, even if you have to fake it!    3. Talk to someone in your support network    4. Open up your depression survival kit           RED    ZONE Medical Alert - Get Help    What it looks like:     Depression is seriously interfering with your life.     You may experience these or other symptoms: You can t get out of bed most days, can t work or engage in other necessary activities, you have trouble taking care of basic hygiene, or basic responsibilities, thoughts of suicide or death that will not go away, self-injurious behavior.     What you need to do:  1. Call your care  team and request a same-day appointment. If they are not available (weekends or after hours) call your local crisis line, emergency room or 911.            Depression Self Care Plan / Survival Kit    Self-Care for Depression  Here s the deal. Your body and mind are really not as separate as most people think.  What you do and think affects how you feel and how you feel influences what you do and think. This means if you do things that people who feel good do, it will help you feel better.  Sometimes this is all it takes.  There is also a place for medication and therapy depending on how severe your depression is, so be sure to consult with your medical provider and/ or Behavioral Health Consultant if your symptoms are worsening or not improving.     In order to better manage my stress, I will:    Exercise  Get some form of exercise, every day. This will help reduce pain and release endorphins, the  feel good  chemicals in your brain. This is almost as good as taking antidepressants!  This is not the same as joining a gym and then never going! (they count on that by the way ) It can be as simple as just going for a walk or doing some gardening, anything that will get you moving.      Hygiene   Maintain good hygiene (Get out of bed in the morning, Make your bed, Brush your teeth, Take a shower, and Get dressed like you were going to work, even if you are unemployed).  If your clothes don't fit try to get ones that do.    Diet  I will strive to eat foods that are good for me, drink plenty of water, and avoid excessive sugar, caffeine, alcohol, and other mood-altering substances.  Some foods that are helpful in depression are: complex carbohydrates, B vitamins, flaxseed, fish or fish oil, fresh fruits and vegetables.    Psychotherapy  I agree to participate in Individual Therapy (if recommended).    Medication  If prescribed medications, I agree to take them.  Missing doses can result in serious side effects.  I  understand that drinking alcohol, or other illicit drug use, may cause potential side effects.  I will not stop my medication abruptly without first discussing it with my provider.    Staying Connected With Others  I will stay in touch with my friends, family members, and my primary care provider/team.    Use your imagination  Be creative.  We all have a creative side; it doesn t matter if it s oil painting, sand castles, or mud pies! This will also kick up the endorphins.    Witness Beauty  (AKA stop and smell the roses) Take a look outside, even in mid-winter. Notice colors, textures. Watch the squirrels and birds.     Service to others  Be of service to others.  There is always someone else in need.  By helping others we can  get out of ourselves  and remember the really important things.  This also provides opportunities for practicing all the other parts of the program.    Humor  Laugh and be silly!  Adjust your TV habits for less news and crime-drama and more comedy.    Control your stress  Try breathing deep, massage therapy, biofeedback, and meditation. Find time to relax each day.     My support system    Clinic Contact:  Phone number:    Contact 1:  Phone number:    Contact 2:  Phone number:    Church/:  Phone number:    Therapist:  Phone number:    Local crisis center:    Phone number:    Other community support:  Phone number:

## 2018-07-18 NOTE — MR AVS SNAPSHOT
After Visit Summary   7/18/2018    Alisia Lin    MRN: [id number]           Patient Information     Date Of Birth          1980        Visit Information        Provider Department      7/18/2018 9:20 AM Sepideh Hines APRN CNP [facility]        Today's Diagnoses     Acute bronchitis with symptoms > 10 days    -  1    Bacterial vaginosis          Care Instructions    1.  For bronchitis start zpack. Continue inhaler every 5h as needed for cough.  Recommend continuing to wean off vaping, increases risk respiratory infections.  Follow up if not improving in 1 week    2.  Schedule physical to update pap.            Follow-ups after your visit        Who to contact     If you have questions or need follow up information about today's clinic visit or your schedule please contact [facility] directly at [phone].  Normal or non-critical lab and imaging results will be communicated to you by MyChart, letter or phone within 4 business days after the clinic has received the results. If you do not hear from us within 7 days, please contact the clinic through MyChart or phone. If you have a critical or abnormal lab result, we will notify you by phone as soon as possible.  Submit refill requests through [employer] or call your pharmacy and they will forward the refill request to us. Please allow 3 business days for your refill to be completed.          Additional Information About Your Visit        Care EveryWhere ID     This is your Care EveryWhere ID. This could be used by other organizations to access your [place] medical records  [id number]        Your Vitals Were     Pulse Temperature Respirations Last Period Pulse Oximetry BMI (Body Mass Index)    115 99.3  F (37.4  C) (Oral) 18 07/11/2018 (Exact Date) 98% 23.63 kg/m2       Blood Pressure from Last 3 Encounters:   07/18/18 122/80   05/29/18 128/83   05/25/18 98/64    Weight from Last 3 Encounters:   07/18/18  115 lb (52.2 kg)   05/29/18 124 lb 3.2 oz (56.3 kg)   05/25/18 128 lb (58.1 kg)              We Performed the Following     Asthma Action Plan (AAP)     DEPRESSION ACTION PLAN (DAP)          Today's Medication Changes          These changes are accurate as of 7/18/18  9:41 AM.  If you have any questions, ask your nurse or doctor.               Start taking these medicines.        Dose/Directions    azithromycin 250 MG tablet   Commonly known as:  ZITHROMAX   Used for:  Acute bronchitis with symptoms > 10 days   Started by:  Sepideh Hines APRN CNP        Two tablets first day, then one tablet daily for four days.   Quantity:  6 tablet   Refills:  0       metroNIDAZOLE 0.75 % vaginal gel   Commonly known as:  METROGEL   Used for:  Bacterial vaginosis   Started by:  Sepideh Hines APRN CNP        Dose:  1 applicator   Place 1 applicator (5 g) vaginally At Bedtime for 5 days   Quantity:  70 g   Refills:  0         Stop taking these medicines if you haven't already. Please contact your care team if you have questions.     amoxicillin 500 MG capsule   Commonly known as:  AMOXIL   Stopped by:  Sepideh Hines APRN CNP           buprenorphine 8 MG Subl sublingual tablet   Commonly known as:  SUBUTEX   Stopped by:  Sepideh Hines APRN CNP           buprenorphine-naloxone 8-2 MG Subl sublingual tablet   Commonly known as:  SUBOXONE   Stopped by:  Sepideh Hines APRN CNP           gabapentin 100 MG capsule   Commonly known as:  NEURONTIN   Stopped by:  Sepideh Hines APRN CNP           metroNIDAZOLE 500 MG tablet   Commonly known as:  FLAGYL   Stopped by:  Sepideh Hines APRN CNP           order for DME   Stopped by:  Sepideh Hines APRN CNP                Where to get your medicines      These medications were sent to [facility] [address] [place] [address] AT [address]  [address]    Hours:  24-hours Phone:  [phone]     azithromycin 250 MG tablet    metroNIDAZOLE 0.75 % vaginal gel                Primary Care Provider Office Phone # Fax #    SONJA Meek RADHA [phone] [address]  [facility] [address]        Equal Access to Services     SAM CLARK : Hadii aad ku hadasho Soomaali, waaxda luqadaha, qaybta kaalmada adeegyada, waxay idiin hayaan adeeg khreesesh laDonavanaan . So [facility] [phone].    ATENCIÓN: Si habla español, tiene a randall disposición servicios gratuitos de asistencia lingüística. [facility] [phone].    We comply with applicable federal civil rights laws and [place] laws. We do not discriminate on the basis of race, color, national origin, age, disability, sex, sexual orientation, or gender identity.            Thank you!     Thank you for choosing [facility]  for your care. Our goal is always to provide you with excellent care. Hearing back from our patients is one way we can continue to improve our services. Please take a few minutes to complete the written survey that you may receive in the mail after your visit with us. Thank you!             Your Updated Medication List - Protect others around you: Learn how to safely use, store and throw away your medicines at www.disposemymeds.org.          This list is accurate as of 7/18/18  9:41 AM.  Always use your most recent med list.                   Brand Name Dispense Instructions for use Diagnosis    ALPRAZolam 1 MG tablet    XANAX     Take 2 mg by mouth    Low TSH level       AMBIEN 10 MG tablet   Generic drug:  zolpidem     30 tablet    Take 1 tablet (10 mg) by mouth nightly as needed for sleep        amphetamine-dextroamphetamine 20 MG per tablet    ADDERALL     Take 20 mg by mouth    Low TSH level       azithromycin 250 MG tablet    ZITHROMAX    6 tablet    Two tablets first day, then one tablet daily for four days.    Acute bronchitis with symptoms > 10  days       cetirizine 10 MG tablet    zyrTEC    30 tablet    TAKE 1 TABLET(10 MG) BY MOUTH EVERY EVENING    Seasonal allergic rhinitis, unspecified chronicity, unspecified trigger       CLONAZEPAM PO      Take by mouth 4 times daily as needed for anxiety        cloNIDine 0.1 MG tablet    CATAPRES    30 tablet    Take 1 tablet (0.1 mg) by mouth 2 times daily    Uncomplicated opioid dependence (H)       FLUoxetine 20 MG capsule    PROzac     Take 3 capsules (60 mg) by mouth daily    Moderate recurrent major depression (H), Anxiety       ibuprofen 600 MG tablet    ADVIL/MOTRIN    180 tablet    TAKE 1 TABLET BY MOUTH TWICE DAILY WITH MEALS    Chronic low back pain, unspecified back pain laterality, with sciatica presence unspecified, Chronic midline thoracic back pain       loperamide 2 MG tablet    IMODIUM A-D    60 tablet    Start with 2 tabs (4 mg), then take one tab (2 mg) after each diarrheal stool.  Do not use more than  8 tabs (16 mg) per day.    Diarrhea, unspecified type       MAPAP 500 MG tablet   Generic drug:  acetaminophen     100 tablet    TAKE 2 TABLETS(1000 MG) BY MOUTH TWICE DAILY AS NEEDED FOR MILD PAIN    Chronic midline low back pain with sciatica, sciatica laterality unspecified, Chronic right shoulder pain       Menthol (Topical Analgesic) 4 % Gel     150 mL    Apply three times a day as needed to affected area    Bilateral low back pain with left-sided sciatica, Right shoulder pain       methocarbamol 750 MG tablet    ROBAXIN    180 tablet    TAKE 2 TABLETS(1500 MG) BY MOUTH THREE TIMES DAILY AS NEEDED FOR MUSCLE SPASMS    Chronic midline low back pain with sciatica, sciatica laterality unspecified, Chronic right shoulder pain       metroNIDAZOLE 0.75 % vaginal gel    METROGEL    70 g    Place 1 applicator (5 g) vaginally At Bedtime for 5 days    Bacterial vaginosis       multivitamin, therapeutic with minerals Tabs tablet     100 tablet    Take 1 tablet by mouth daily    Other fatigue        naloxone nasal spray    NARCAN    2 each    Spray 1 spray (4 mg) into one nostril alternating nostrils as needed for opioid reversal    Uncomplicated opioid dependence (H)       pantoprazole 40 MG EC tablet    PROTONIX    90 tablet    Take 1 tablet (40 mg) by mouth daily    Gastroesophageal reflux disease without esophagitis       QUEtiapine 50 MG tablet    SEROquel     Take 50 mg by mouth    Low TSH level       VENTOLIN  (90 Base) MCG/ACT Inhaler   Generic drug:  albuterol     18 g    INHALE 2 PUFFS INTO THE LUNGS EVERY 4 HOURS AS NEEDED FOR SHORTNESS OF BREATH OR DIFFICULT BREATHING OR WHEEZING    Intermittent asthma, uncomplicated       VITAMIN B 12 PO      Patient reported taking two tabs daily- not sure of the dosage.    Low TSH level

## 2018-07-18 NOTE — PROGRESS NOTES
SUBJECTIVE:   Alisia Lin is a 37 year old female who presents to clinic today for the following health issues:      RESPIRATORY SYMPTOMS      Duration: 3 weeks    Description  nasal congestion, cough, fever, chills and headache    Severity: moderate    Accompanying signs and symptoms: coughing up phlegm-brownish color    History (predisposing factors):  none    Precipitating or alleviating factors: Pt works in nursing care    Therapies tried and outcome:  Guaifenesin-not effective.     Started 6/29 as bad cough and cold.  URI symptoms resolved but cough continued.  Coughing up thick brown mucous.  Fever 102 yesterday.  Having chest pain when she coughs.  She is having wheezing and shortness of breath. Inhaler does help her cough stuff up.  She is vaping.      Hx of asthma, not on controller.    She is living with daughter's father and her middle daughter (with her once a week, otherwise at her dads), 9yo lives with them full time.      Following with addiction medicine for hx of heroin, alcohol, and cocaine abuse, she is on suboxone.  Last visit 5/29/18, was no show for 6/2018 appt.  Not currently on suboxone, doesn't feel she needs it anymore.  Reports sober since 1 time use in May (oxycodone).      History of PTSD, anxiety, depression, [facility].   Feels depression is well controlled on prozac, she does continue xanax 1mg as needed for panic attacks, hasnt taken any this month.  She takes clonazepam up to three times a day as needed for anxiety, taking it less than she used too.  She takes seroquel for sleep, hasnt taken ambien this month.  She is still on adderall but hasnt used it this month.      Feels like depression is well controlled, feeling like things are finally at a manageable place.  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      Patient Active Problem List   Diagnosis     Moderate recurrent major depression (H)     Migraine with aura     Tobacco use disorder     ASCUS on Pap smear      Chronic low back pain     Anxiety     Benign neoplasm of colon     Degeneration of intervertebral disc     Spondylosis     Displacement of intervertebral disc     Intermittent asthma, uncomplicated     Back pain     Shoulder pain, right     Narcotic dependence, in remission (H)     Abnormal thyroid function test     Low TSH level     Past Surgical History:   Procedure Laterality Date     HC ENLARGE BREAST WITH IMPLANT  2002    BILATERAL     HC REMOVE TONSILS/ADENOIDS,10+ Y/O  2006     HC TOOTH EXTRACTION W/FORCEP  16 yo    wisdom teeth     IUD PARAGARD  3/3/08    Removed with mirena placed. Mirena has now been removed as well.      LEEP TX, CERVICAL  age 15?     ORTHOPEDIC SURGERY      Lumbar fusion 2009       Social History   Substance Use Topics     Smoking status: Current Every Day Smoker     Packs/day: 1.50     Years: 10.00     Types: Cigarettes, Other     Last attempt to quit: 10/1/2016     Smokeless tobacco: Current User      Comment: less than 1/2 ppd     Alcohol use No      Comment: JAYA 18 days ago     Family History   Problem Relation Age of Onset     Hypertension Mother      Alcohol/Drug Mother      alcohol, sober for 19 years     Depression Mother      on medication     Lipids Mother      on medication     Alcohol/Drug Father      alcohol, still actively drinking     Lipids Father      on medication     C.A.D. Paternal Grandmother      Diabetes Paternal Grandmother      Breast Cancer Paternal Grandmother      Arthritis Paternal Grandmother      Cancer Paternal Grandmother      breast cancer     Cancer Paternal Grandfather      colon cancer     Asthma Daughter      x2     Thyroid Disease No family hx of          Current Outpatient Prescriptions   Medication Sig Dispense Refill     ALPRAZolam (XANAX) 1 MG tablet Take 2 mg by mouth       amphetamine-dextroamphetamine (ADDERALL) 20 MG per tablet Take 20 mg by mouth       azithromycin (ZITHROMAX) 250 MG tablet Two tablets first day, then one tablet daily  for four days. 6 tablet 0     cetirizine (ZYRTEC) 10 MG tablet TAKE 1 TABLET(10 MG) BY MOUTH EVERY EVENING 30 tablet 0     CLONAZEPAM PO Take by mouth 4 times daily as needed for anxiety       cloNIDine (CATAPRES) 0.1 MG tablet Take 1 tablet (0.1 mg) by mouth 2 times daily 30 tablet 0     Cyanocobalamin (VITAMIN B 12 PO) Patient reported taking two tabs daily- not sure of the dosage.       FLUoxetine (PROZAC) 20 MG capsule Take 3 capsules (60 mg) by mouth daily       ibuprofen (ADVIL/MOTRIN) 600 MG tablet TAKE 1 TABLET BY MOUTH TWICE DAILY WITH MEALS 180 tablet 0     loperamide (IMODIUM A-D) 2 MG tablet Start with 2 tabs (4 mg), then take one tab (2 mg) after each diarrheal stool.  Do not use more than  8 tabs (16 mg) per day. 60 tablet 0     MAPAP 500 MG tablet TAKE 2 TABLETS(1000 MG) BY MOUTH TWICE DAILY AS NEEDED FOR MILD PAIN 100 tablet 0     Menthol, Topical Analgesic, 4 % GEL Apply three times a day as needed to affected area 150 mL 4     methocarbamol (ROBAXIN) 750 MG tablet TAKE 2 TABLETS(1500 MG) BY MOUTH THREE TIMES DAILY AS NEEDED FOR MUSCLE SPASMS 180 tablet 0     metroNIDAZOLE (METROGEL) 0.75 % vaginal gel Place 1 applicator (5 g) vaginally At Bedtime for 5 days 70 g 0     multivitamin, therapeutic with minerals (MULTI-VITAMIN) TABS Take 1 tablet by mouth daily 100 tablet 3     naloxone (NARCAN) nasal spray Spray 1 spray (4 mg) into one nostril alternating nostrils as needed for opioid reversal 2 each 1     pantoprazole (PROTONIX) 40 MG enteric coated tablet Take 1 tablet (40 mg) by mouth daily 90 tablet 3     QUEtiapine (SEROQUEL) 50 MG tablet Take 50 mg by mouth       VENTOLIN  (90 BASE) MCG/ACT Inhaler INHALE 2 PUFFS INTO THE LUNGS EVERY 4 HOURS AS NEEDED FOR SHORTNESS OF BREATH OR DIFFICULT BREATHING OR WHEEZING 18 g 0     zolpidem (AMBIEN) 10 MG tablet Take 1 tablet (10 mg) by mouth nightly as needed for sleep 30 tablet 1       ROS:  Const, HEENT, Resp, Psych as above, otherwise negative        OBJECTIVE:                                                    /80 (BP Location: Right arm, Patient Position: Sitting, Cuff Size: Adult Regular)  Pulse 115  Temp 99.3  F (37.4  C) (Oral)  Resp 18  Wt 115 lb (52.2 kg)  LMP 07/11/2018 (Exact Date)  SpO2 98%  BMI 23.63 kg/m2   GENERAL APPEARANCE: healthy, alert and no distress  EYES: Eyes grossly normal to inspection, PERRL and conjunctivae and sclerae normal  HENT: ear canals and TM's normal and nose and mouth without ulcers or lesions  NECK: no adenopathy  RESP: lungs clear to auscultation - no rales, rhonchi or wheezes  CV: regular rates and rhythm, normal S1 S2, no S3 or S4 and no murmur, click or rub  PSYCH: mentation appears normal and affect normal/bright        ASSESSMENT/PLAN:                                                    (J20.9) Acute bronchitis with symptoms > 10 days  (primary encounter diagnosis)  Comment: x 2 weeks with fever yesterday, LS CTA today, afebrile today with normal O2 sats.    Plan: azithromycin (ZITHROMAX) 250 MG tablet        Given duration and fever recommend treat with zpack, cont inhaler, follow up if not improving in 1 week.  Recommend CXR at that time.      (N76.0,  B96.89) Bacterial vaginosis  Comment: reports BV infections with antibiotics  Plan: metroNIDAZOLE (METROGEL) 0.75 % vaginal gel        Will use metrogel if symptoms develop    (F17.200) Tobacco use disorder  Comment:   Plan: urged cessation to lower risk resp infections     (J45.20) Intermittent asthma, uncomplicated  Comment: no wheezing on exam today to suggest acute flare though likely contributing to above  Plan: monitor, cont inhaler as above    (F19.10) Polysubstance abuse  Comment: previously on suboxone and followed by addiction medicine, no longer on suboxone.  Her reported use is not consistent with what was reported at last addiction medicine visit 5/2018  Plan: she does plan to follow up with addiction medicine    (F41.9) Anxiety  (F33.1)  Moderate recurrent major depression (H)  Comment: feels mood is stable, I do have concern about her ongoing benzo use with hx of polysubstance abuyse  Plan: ongoing management per psych         See Patient Instructions    Sepideh Hines CNP  [facility]    Patient Instructions   1.  For bronchitis start zpack. Continue inhaler every 5h as needed for cough.  Recommend continuing to wean off vaping, increases risk respiratory infections.  Follow up if not improving in 1 week    2.  Schedule physical to update pap.

## 2018-07-18 NOTE — LETTER
My Asthma Action Plan  Name: Alisia Lin   YOB: 1980  Date: 7/18/2018   My doctor: SONJA Meek CNP   My clinic: [facility]   Good Control    I feel good    No cough or wheeze    Can work, sleep and play without asthma symptoms       Take your asthma control medicine every day.     1. If exercise triggers your asthma, take your rescue medication    15 minutes before exercise or sports, and    During exercise if you have asthma symptoms  2. Spacer to use with inhaler: If you have a spacer, make sure to use it with your inhaler             YELLOW ZONE Getting Worse  I have ANY of these:    I do not feel good    Cough or wheeze    Chest feels tight    Wake up at night   1. Keep taking your Green Zone medications  2. Start taking your rescue medicine:    every 20 minutes for up to 1 hour. Then every 4 hours for 24-48 hours.  3. If you stay in the Yellow Zone for more than 12-24 hours, contact your doctor.  4. If you do not return to the Green Zone in 12-24 hours or you get worse, start taking your oral steroid medicine if prescribed by your provider.           RED ZONE Medical Alert - Get Help  I have ANY of these:    I feel awful    Medicine is not helping    Breathing getting harder    Trouble walking or talking    Nose opens wide to breathe       1. Take your rescue medicine NOW  2. If your provider has prescribed an oral steroid medicine, start taking it NOW  3. Call your doctor NOW  4. If you are still in the Red Zone after 20 minutes and you have not reached your doctor:    Take your rescue medicine again and    Call 911 or go to the emergency room right away    See your regular doctor within 2 weeks of an Emergency Room or Urgent Care visit for follow-up treatment.          Annual Reminders:  Meet with Asthma Educator,  Flu Shot in the Fall, consider Pneumonia Vaccination for patients with asthma (aged 19 and older).    Pharmacy:     [facility] [address] [place], [place] - [address] AT [facility] & [address], [place] - [address]  [facility]                      Asthma Triggers  How To Control Things That Make Your Asthma Worse    Triggers are things that make your asthma worse.  Look at the list below to help you find your triggers and what you can do about them.  You can help prevent asthma flare-ups by staying away from your triggers.      Trigger                                                          What you can do   Cigarette Smoke  Tobacco smoke can make asthma worse. Do not allow smoking in your home, car or around you.  Be sure no one smokes at a child s day care or school.  If you smoke, ask your health care provider for ways to help you quit.  Ask family members to quit too.  Ask your health care provider for a referral to Quit Plan to help you quit smoking, or call 2-770-086-PLAN.     Colds, Flu, Bronchitis  These are common triggers of asthma. Wash your hands often.  Don t touch your eyes, nose or mouth.  Get a flu shot every year.     Dust Mites  These are tiny bugs that live in cloth or carpet. They are too small to see. Wash sheets and blankets in hot water every week.   Encase pillows and mattress in dust mite proof covers.  Avoid having carpet if you can. If you have carpet, vacuum weekly.   Use a dust mask and HEPA vacuum.   Pollen and Outdoor Mold  Some people are allergic to trees, grass, or weed pollen, or molds. Try to keep your windows closed.  Limit time out doors when pollen count is high.   Ask you health care provider about taking medicine during allergy season.     Animal Dander  Some people are allergic to skin flakes, urine or saliva from pets with fur or feathers. Keep pets with fur or feathers out of your home.    If you can t keep the pet outdoors, then keep the pet out of your bedroom.  Keep the bedroom door closed.  Keep pets off  cloth furniture and away from stuffed toys.     Mice, Rats, and Cockroaches  Some people are allergic to the waste from these pests.   Cover food and garbage.  Clean up spills and food crumbs.  Store grease in the refrigerator.   Keep food out of the bedroom.   Indoor Mold  This can be a trigger if your home has high moisture. Fix leaking faucets, pipes, or other sources of water.   Clean moldy surfaces.  Dehumidify basement if it is damp and smelly.   Smoke, Strong Odors, and Sprays  These can reduce air quality. Stay away from strong odors and sprays, such as perfume, powder, hair spray, paints, smoke incense, paint, cleaning products, candles and new carpet.   Exercise or Sports  Some people with asthma have this trigger. Be active!  Ask your doctor about taking medicine before sports or exercise to prevent symptoms.    Warm up for 5-10 minutes before and after sports or exercise.     Other Triggers of Asthma  Cold air:  Cover your nose and mouth with a scarf.  Sometimes laughing or crying can be a trigger.  Some medicines and food can trigger asthma.

## 2018-07-18 NOTE — PATIENT INSTRUCTIONS
1.  For bronchitis start zpack. Continue inhaler every 5h as needed for cough.  Recommend continuing to wean off vaping, increases risk respiratory infections.  Follow up if not improving in 1 week    2.  Schedule physical to update pap.

## 2018-07-19 ASSESSMENT — ANXIETY QUESTIONNAIRES: GAD7 TOTAL SCORE: 5

## 2018-07-19 ASSESSMENT — PATIENT HEALTH QUESTIONNAIRE - PHQ9: SUM OF ALL RESPONSES TO PHQ QUESTIONS 1-9: 4

## 2018-07-19 ASSESSMENT — ASTHMA QUESTIONNAIRES: ACT_TOTALSCORE: 12

## 2018-07-20 ENCOUNTER — OFFICE VISIT (OUTPATIENT)
Dept: ADDICTION MEDICINE | Facility: CLINIC | Age: 38
End: 2018-07-20
Payer: COMMERCIAL

## 2018-07-20 VITALS
OXYGEN SATURATION: 100 % | HEART RATE: 138 BPM | BODY MASS INDEX: 24.09 KG/M2 | TEMPERATURE: 99.1 F | RESPIRATION RATE: 16 BRPM | HEIGHT: 59 IN | WEIGHT: 119.5 LBS | SYSTOLIC BLOOD PRESSURE: 120 MMHG | DIASTOLIC BLOOD PRESSURE: 58 MMHG

## 2018-07-20 DIAGNOSIS — F11.20 UNCOMPLICATED OPIOID DEPENDENCE (H): Primary | ICD-10-CM

## 2018-07-20 DIAGNOSIS — F17.200 NICOTINE USE DISORDER: ICD-10-CM

## 2018-07-20 DIAGNOSIS — F90.2 ATTENTION DEFICIT HYPERACTIVITY DISORDER (ADHD), COMBINED TYPE: ICD-10-CM

## 2018-07-20 DIAGNOSIS — F41.9 ANXIETY: ICD-10-CM

## 2018-07-20 DIAGNOSIS — F33.1 MODERATE RECURRENT MAJOR DEPRESSION (H): ICD-10-CM

## 2018-07-20 DIAGNOSIS — Z79.899 HIGH RISK MEDICATION USE: ICD-10-CM

## 2018-07-20 LAB
AMPHETAMINES UR QL: NOT DETECTED NG/ML
BARBITURATES UR QL SCN: NOT DETECTED NG/ML
BENZODIAZ UR QL SCN: ABNORMAL NG/ML
BUPRENORPHINE UR QL: NOT DETECTED NG/ML
CANNABINOIDS UR QL: NOT DETECTED NG/ML
COCAINE UR QL SCN: ABNORMAL NG/ML
D-METHAMPHET UR QL: NOT DETECTED NG/ML
METHADONE UR QL SCN: NOT DETECTED NG/ML
OPIATES UR QL SCN: ABNORMAL NG/ML
OXYCODONE UR QL SCN: NOT DETECTED NG/ML
PCP UR QL SCN: NOT DETECTED NG/ML
PROPOXYPH UR QL: NOT DETECTED NG/ML
TRICYCLICS UR QL SCN: NOT DETECTED NG/ML

## 2018-07-20 PROCEDURE — 99215 OFFICE O/P EST HI 40 MIN: CPT | Performed by: PEDIATRICS

## 2018-07-20 PROCEDURE — 80306 DRUG TEST PRSMV INSTRMNT: CPT | Performed by: PEDIATRICS

## 2018-07-20 RX ORDER — BUPRENORPHINE 8 MG/1
8 TABLET SUBLINGUAL 2 TIMES DAILY
Qty: 14 EACH | Refills: 0 | Status: SHIPPED | OUTPATIENT
Start: 2018-07-20 | End: 2018-07-27

## 2018-07-20 NOTE — MR AVS SNAPSHOT
After Visit Summary   7/20/2018    Alisia Lin    MRN: [id number]           Patient Information     Date Of Birth          1980        Visit Information        Provider Department      7/20/2018 2:40 PM Valeria Olson MD [facility]        Today's Diagnoses     Uncomplicated opioid dependence (H)    -  1    Moderate recurrent major depression (H)        Anxiety        Attention deficit hyperactivity disorder (ADHD), combined type        Nicotine use disorder        High risk medication use           Follow-ups after your visit        Your next 10 appointments already scheduled     Jul 27, 2018  2:00 PM CDT   Return Visit with Valeria Olson MD   [facility] ([facility])    [address] [facility]  [address] [place] [id number] [phone]              Who to contact     If you have questions or need follow up information about today's clinic visit or your schedule please contact [facility] directly at [phone].  Normal or non-critical lab and imaging results will be communicated to you by MyChart, letter or phone within 4 business days after the clinic has received the results. If you do not hear from us within 7 days, please contact the clinic through MyChart or phone. If you have a critical or abnormal lab result, we will notify you by phone as soon as possible.  Submit refill requests through [employer] or call your pharmacy and they will forward the refill request to us. Please allow 3 business days for your refill to be completed.          Additional Information About Your Visit        Care EveryWhere ID     This is your Care EveryWhere ID. This could be used by other organizations to access your [facility] medical records  [id number]        Your Vitals Were     Pulse Temperature Respirations Height Last Period Pulse Oximetry    138 99.1  F (37.3  C)  "(Oral) 16 4' 10.5\" (1.486 m) 07/11/2018 (Exact Date) 100%    BMI (Body Mass Index)                   24.55 kg/m2            Blood Pressure from Last 3 Encounters:   07/20/18 120/58   07/18/18 122/80   05/29/18 128/83    Weight from Last 3 Encounters:   07/20/18 119 lb 8 oz (54.2 kg)   07/18/18 115 lb (52.2 kg)   05/29/18 124 lb 3.2 oz (56.3 kg)              We Performed the Following     Urine Drugs of Abuse Screen Panel 13          Today's Medication Changes          These changes are accurate as of 7/20/18  4:45 PM.  If you have any questions, ask your nurse or doctor.               Start taking these medicines.        Dose/Directions    buprenorphine 8 MG Subl sublingual tablet   Commonly known as:  SUBUTEX   Used for:  Uncomplicated opioid dependence (H)        Dose:  8 mg   Place 1 tablet (8 mg) under the tongue 2 times daily   Quantity:  14 each   Refills:  0         Stop taking these medicines if you haven't already. Please contact your care team if you have questions.     cloNIDine 0.1 MG tablet   Commonly known as:  CATAPRES                Where to get your medicines      Some of these will need a paper prescription and others can be bought over the counter.  Ask your nurse if you have questions.     Bring a paper prescription for each of these medications     buprenorphine 8 MG Subl sublingual tablet                Primary Care Provider Office Phone # Fax #    Sepideh Rama Hines, APRN [facility] [phone]       [address]  [facility] [address]        Equal Access to Services     SAM CLARK AH: Hadii bakari alexander hadasho Soomaali, waaxda luqadaha, qaybta kaalmada adeegyada, shreyas gross . So [facility] [phone].    ATENCIÓN: Si habla español, tiene a randall disposición servicios gratuitos de asistencia lingüística. Llame al [phone].    We comply with applicable federal civil rights laws and [place] laws. We do not discriminate on the basis of race, color, national " origin, age, disability, sex, sexual orientation, or gender identity.            Thank you!     Thank you for choosing [facility]  for your care. Our goal is always to provide you with excellent care. Hearing back from our patients is one way we can continue to improve our services. Please take a few minutes to complete the written survey that you may receive in the mail after your visit with us. Thank you!             Your Updated Medication List - Protect others around you: Learn how to safely use, store and throw away your medicines at www.disposemymeds.org.          This list is accurate as of 7/20/18  4:45 PM.  Always use your most recent med list.                   Brand Name Dispense Instructions for use Diagnosis    ALPRAZolam 1 MG tablet    XANAX     Take 2 mg by mouth    Low TSH level       AMBIEN 10 MG tablet   Generic drug:  zolpidem     30 tablet    Take 1 tablet (10 mg) by mouth nightly as needed for sleep        amphetamine-dextroamphetamine 20 MG per tablet    ADDERALL     Take 20 mg by mouth    Low TSH level       azithromycin 250 MG tablet    ZITHROMAX    6 tablet    Two tablets first day, then one tablet daily for four days.    Acute bronchitis with symptoms > 10 days       buprenorphine 8 MG Subl sublingual tablet    SUBUTEX    14 each    Place 1 tablet (8 mg) under the tongue 2 times daily    Uncomplicated opioid dependence (H)       cetirizine 10 MG tablet    zyrTEC    30 tablet    TAKE 1 TABLET(10 MG) BY MOUTH EVERY EVENING    Seasonal allergic rhinitis, unspecified chronicity, unspecified trigger       CLONAZEPAM PO      Take by mouth 4 times daily as needed for anxiety        FLUoxetine 20 MG capsule    PROzac     Take 3 capsules (60 mg) by mouth daily    Moderate recurrent major depression (H), Anxiety       ibuprofen 600 MG tablet    ADVIL/MOTRIN    180 tablet    TAKE 1 TABLET BY MOUTH TWICE DAILY WITH MEALS    Chronic low back pain, unspecified back pain  laterality, with sciatica presence unspecified, Chronic midline thoracic back pain       loperamide 2 MG tablet    IMODIUM A-D    60 tablet    Start with 2 tabs (4 mg), then take one tab (2 mg) after each diarrheal stool.  Do not use more than  8 tabs (16 mg) per day.    Diarrhea, unspecified type       MAPAP 500 MG tablet   Generic drug:  acetaminophen     100 tablet    TAKE 2 TABLETS(1000 MG) BY MOUTH TWICE DAILY AS NEEDED FOR MILD PAIN    Chronic midline low back pain with sciatica, sciatica laterality unspecified, Chronic right shoulder pain       Menthol (Topical Analgesic) 4 % Gel     150 mL    Apply three times a day as needed to affected area    Bilateral low back pain with left-sided sciatica, Right shoulder pain       methocarbamol 750 MG tablet    ROBAXIN    180 tablet    TAKE 2 TABLETS(1500 MG) BY MOUTH THREE TIMES DAILY AS NEEDED FOR MUSCLE SPASMS    Chronic midline low back pain with sciatica, sciatica laterality unspecified, Chronic right shoulder pain       metroNIDAZOLE 0.75 % vaginal gel    METROGEL    70 g    Place 1 applicator (5 g) vaginally At Bedtime for 5 days    Bacterial vaginosis       multivitamin, therapeutic with minerals Tabs tablet     100 tablet    Take 1 tablet by mouth daily    Other fatigue       naloxone nasal spray    NARCAN    2 each    Spray 1 spray (4 mg) into one nostril alternating nostrils as needed for opioid reversal    Uncomplicated opioid dependence (H)       pantoprazole 40 MG EC tablet    PROTONIX    90 tablet    Take 1 tablet (40 mg) by mouth daily    Gastroesophageal reflux disease without esophagitis       QUEtiapine 50 MG tablet    SEROquel     Take 50 mg by mouth    Low TSH level       VENTOLIN  (90 Base) MCG/ACT Inhaler   Generic drug:  albuterol     18 g    INHALE 2 PUFFS INTO THE LUNGS EVERY 4 HOURS AS NEEDED FOR SHORTNESS OF BREATH OR DIFFICULT BREATHING OR WHEEZING    Intermittent asthma, uncomplicated       VITAMIN B 12 PO      Patient reported  taking two tabs daily- not sure of the dosage.    Low TSH level

## 2018-07-20 NOTE — PROGRESS NOTES
SUBJECTIVE:                                                    OPIOID USE DISORDER - BUPRENORPHINE FOLLOW UP:    Alisia Lin is a 37 year old female who presents to clinic today for follow up of  MAT for opioid use disorder.       Date of last visit:  6/13/2018 [facility] Board of Pharmacy Data Base Reviewed:    YES;     5/29/18 Suboxone 8mg tab #14  6/7/18 Ambien 10mg #30  7/5 Klonipin 1 mg #15  7/11 #75    6/21/18 Alprazolam 1mg #30      Brief History:  nitial visit 9/17 hx of opioid use 10yr progressing to IV Heroin.  Several previous treatments.  Rx. Suboxone at initial visit. Ongoing relpase with heroin, cocaine, alcoholcontinued prescribed benzodiazepines.  Limited appointment attendance.          HPI:  Last visit rx #14 5/29/18  Previously has been off Subutex for several weeks (had one left and took it the other day). Did not return because she thought she was doing well.   Cravings were increasing more recently but developed fever, cough and chest pain with cough.   Started antibiotics for cough.    Off Xanax by her report.  Taking Klonipin 1mg daily.  (was previous up to 4mg /day)   Alcohol use once with hot drink with dayanara.  No THC.   Used Heroin 2 days ago 1pt snort and cocaine.  That day was feeling sick, had day off. Decided she felt so bad it justified use.     Depression and anxiety improved.   Working 12 hr days at [employer].     no meeting attendance or treatment      Status since last visit: Since last visit patient has been:has relapsed  Intensity:     There has been: moderate craving    Suboxone Dose: adequate    Progression of Symptoms/Precipitating Factors:     Cues to use and relapse triggers:Pain and Depression    Trigger have been:  moderate    Accompanying Signs & Symptoms:    Side Effects: none    Sobriety:     Status: patient has had opioid use and use of benzodiazepine (RX) and cocaine     Drug Screen: obtained    Alleviating factors/Other Therapies Tried:    Contact with  sponsor has been: no sponsor    Family and support system has been: neutral    Patient has been going to recovery meetings: not at all    Recovery program has been : minimal    Patient has been participating in professional counseling /therapy: NO    Patient is following with psychiatry: NO    Patient has established PCP: NO        Social History     Social History Narrative            Living with Father of youngest child  (3 girls 22, 18 and 10 )   Will be grandma around April 2018    No legal issues currently (possible identity theft).     Recently job loss ([employer])          Patient Active Problem List    Diagnosis Date Noted     Low TSH level 08/29/2017     Priority: Medium     Abnormal thyroid function test 08/08/2017     Priority: Medium     Narcotic dependence, in remission (H) 07/13/2017     Priority: Medium     Back pain 01/26/2016     Priority: Medium     Shoulder pain, right 01/26/2016     Priority: Medium     Intermittent asthma, uncomplicated 12/07/2015     Priority: Medium     Anxiety 05/21/2013     Priority: Medium     Benign neoplasm of colon 04/16/2013     Priority: Medium     Overview:   Found on colonoscopy 04/2013, repeat colonoscopy in 5 years, 04/2018.       Chronic low back pain 03/19/2012     Priority: Medium     On pain contract - [facility]       Degeneration of intervertebral disc 05/12/2010     Priority: Medium     Spondylosis 05/12/2010     Priority: Medium     Displacement of intervertebral disc 05/12/2010     Priority: Medium     ASCUS on Pap smear 03/03/2008     Priority: Medium     LEEP age 15 per notes in 2015  3/3/08: ASCUS, + HPV 53  4/17/08: [place] - No visible lesions.   11/7/08: NIL pap  2/1/12: NIL pap  1/2013 Pap NIL, neg HPV. Plan cotest pap & HPV in 1 yr  3/2015: NIL pap, neg HPV. Plan cotest pap & HPV in 3 years.         Migraine with aura      Priority: Medium     Occas migraines, none recently  Problem list name updated by automated process. Provider to review        Tobacco use disorder      Priority: Medium     1 PPD- interested in quitting       Moderate recurrent major depression (H) 08/16/2005     Priority: Medium       Problem list and histories reviewed & adjusted, as indicated.  Additional history: as documented        Current Outpatient Prescriptions on File Prior to Visit:  ALPRAZolam (XANAX) 1 MG tablet Take 2 mg by mouth   amphetamine-dextroamphetamine (ADDERALL) 20 MG per tablet Take 20 mg by mouth   azithromycin (ZITHROMAX) 250 MG tablet Two tablets first day, then one tablet daily for four days.   cetirizine (ZYRTEC) 10 MG tablet TAKE 1 TABLET(10 MG) BY MOUTH EVERY EVENING   CLONAZEPAM PO Take by mouth 4 times daily as needed for anxiety   cloNIDine (CATAPRES) 0.1 MG tablet Take 1 tablet (0.1 mg) by mouth 2 times daily   Cyanocobalamin (VITAMIN B 12 PO) Patient reported taking two tabs daily- not sure of the dosage.   FLUoxetine (PROZAC) 20 MG capsule Take 3 capsules (60 mg) by mouth daily   ibuprofen (ADVIL/MOTRIN) 600 MG tablet TAKE 1 TABLET BY MOUTH TWICE DAILY WITH MEALS   loperamide (IMODIUM A-D) 2 MG tablet Start with 2 tabs (4 mg), then take one tab (2 mg) after each diarrheal stool.  Do not use more than  8 tabs (16 mg) per day.   MAPAP 500 MG tablet TAKE 2 TABLETS(1000 MG) BY MOUTH TWICE DAILY AS NEEDED FOR MILD PAIN   Menthol, Topical Analgesic, 4 % GEL Apply three times a day as needed to affected area   methocarbamol (ROBAXIN) 750 MG tablet TAKE 2 TABLETS(1500 MG) BY MOUTH THREE TIMES DAILY AS NEEDED FOR MUSCLE SPASMS   metroNIDAZOLE (METROGEL) 0.75 % vaginal gel Place 1 applicator (5 g) vaginally At Bedtime for 5 days   multivitamin, therapeutic with minerals (MULTI-VITAMIN) TABS Take 1 tablet by mouth daily   naloxone (NARCAN) nasal spray Spray 1 spray (4 mg) into one nostril alternating nostrils as needed for opioid reversal   pantoprazole (PROTONIX) 40 MG enteric coated tablet Take 1 tablet (40 mg) by mouth daily   QUEtiapine (SEROQUEL) 50 MG  tablet Take 50 mg by mouth   VENTOLIN  (90 BASE) MCG/ACT Inhaler INHALE 2 PUFFS INTO THE LUNGS EVERY 4 HOURS AS NEEDED FOR SHORTNESS OF BREATH OR DIFFICULT BREATHING OR WHEEZING   zolpidem (AMBIEN) 10 MG tablet Take 1 tablet (10 mg) by mouth nightly as needed for sleep     No current facility-administered medications on file prior to visit.        Allergies   Allergen Reactions     Compazine      Heart Problems/ Body went completley stiff for 8 hrs.     Nortriptyline      Skelaxin [Metaxalone]          REVIEW OF SYSTEMS:  General: No acute withdrawal symptoms.  No recent infections or fever  Resp: No coughing, wheezing or shortness of breath  CV: No chest pains or palpitations  GI: No nausea, vomiting, abdominal pain, diarrhea, constipation  : No urinary frequency or dysuria,     Musculoskeletal: No significant muscle or joint pains, No edema  Neurologic: No numbness, tingling, weakness, problems with balance or coordination  Psychiatric: No acute concerns  Skin: No rashes    OBJECTIVE:    PHYSICAL EXAM:  [facility] 07/11/2018 (Exact Date)    GENERAL APPEARANCE:  alert, comfortable appearing  EYES:Eyes grossly normal to inspection  NEURO:  Gait normal.  No tremor. Coordination intact.   MENTAL STATUS EXAM:  Appearance/Behavior: Agitated  Speech: Normal and Pressured  Mood/Affect: normal affect  Insight: Adequate      Results for orders placed or performed in visit on 07/20/18   Urine Drugs of Abuse Screen Panel 13   Result Value Ref Range    Cannabinoids (04-qli-2-carboxy-9-THC) Not Detected NDET^Not Detected ng/mL    Phencyclidine (Phencyclidine) Not Detected NDET^Not Detected ng/mL    Cocaine (Benzoylecgonine) Detected, Abnormal Result (A) NDET^Not Detected ng/mL    Methamphetamine (d-Methamphetamine) Not Detected NDET^Not Detected ng/mL    Opiates (Morphine) Detected, Abnormal Result (A) NDET^Not Detected ng/mL    Amphetamine (d-Amphetamine) Not Detected NDET^Not Detected ng/mL    Benzodiazepines (Nordiazepam)  Detected, Abnormal Result (A) NDET^Not Detected ng/mL    Tricyclic Antidepressants (Desipramine) Not Detected NDET^Not Detected ng/mL    Methadone (Methadone) Not Detected NDET^Not Detected ng/mL    Barbiturates (Butalbital) Not Detected NDET^Not Detected ng/mL    Oxycodone (Oxycodone) Not Detected NDET^Not Detected ng/mL    Propoxyphene (Norpropoxyphene) Not Detected NDET^Not Detected ng/mL    Buprenorphine (Buprenorphine) Not Detected NDET^Not Detected ng/mL           ASSESSMENT/PLAN:      (F11.20) Uncomplicated opioid dependence (H)  (primary encounter diagnosis)  Plan: naloxone (NARCAN) nasal spray,     Subutex (GI intolerance and mouth sores from Suboxone tab)  -again reviewed differences of Suboxone and subutex and small to minimal absorbtion of Naloxone and the withdrawal and effectiveness with both product equal.  Patient still skeptical based on her reading.      Begin with Subutex  4 mg once withdrawal sx well established (>24 hr from last use) to avoid precipitated withdrawal.   Patient is aware of need for this.  May repeat dose in several hours if tolerated.   Then begin Suboxone  8mg daily for one day then resume 8mg bid.     Follow up one week        Opioid warning reviewed.  Risk of overdose following a period of abstinence due to decrease tolerance was discussed including risk of death.   Risk of overdose if using Suboxone with other substances particuarly benzodiazepines/alcohol was reviewed.       Counseled the patient on the importance of having a recovery program in addition to Medication assisted treatment.  Components include having some type of sober network, avoiding isolating, having willingness  to change, avoiding triggers and managing cravings.    Encouraged other services such as counseling, 12 step or other self-help organizations.        Strongly recommended abstain from alcohol, benzodiazepines, THC, opioids and other drugs of abuse.  Increased risk of relapse for opioids with use of  these substances discussed.  Increased risk of overdose/death with use of other substances particularly benzodiazepines/alcohol reviewed.      Naloxone offered.  Patient states she has               (F33.1) Moderate recurrent major depression (H)  (F41.9) Anxiety  Plan: strongly recommended follow up with PCP and ongoing mental health therapy.           (F17.200) Nicotine use disorder  Plan: Encouraged Abstinence.  Increase risk of relapse with other substances with return to nicotine use discussed.  Risk of Ecig/Vaping also reviewed.             ENCOUNTER FOR LONG TERM USE OF HIGH RISK MEDICATION   High Risk Drug Monitoring?  YES   Drug being monitored: Buprenorphine   Reason for drug: Opioid Use Disorder   What is being monitored?: Dosage, Cravings, Trigger, side effects, and continued abstinence.      Opioid warning reviewed.  Risk of overdose following a period of abstinence due to decrease tolerance was discussed including risk of death.   Risk of overdose if using Suboxone with other substances particuarly benzodiazepines/alcohol was reviewed.          Valeria Olson MD  [facility]  [phone]

## 2018-07-27 ENCOUNTER — OFFICE VISIT (OUTPATIENT)
Dept: ADDICTION MEDICINE | Facility: CLINIC | Age: 38
End: 2018-07-27
Payer: COMMERCIAL

## 2018-07-27 VITALS
WEIGHT: 115 LBS | SYSTOLIC BLOOD PRESSURE: 122 MMHG | BODY MASS INDEX: 23.63 KG/M2 | HEART RATE: 88 BPM | RESPIRATION RATE: 16 BRPM | OXYGEN SATURATION: 99 % | DIASTOLIC BLOOD PRESSURE: 74 MMHG

## 2018-07-27 DIAGNOSIS — F11.20 UNCOMPLICATED OPIOID DEPENDENCE (H): ICD-10-CM

## 2018-07-27 LAB
AMPHETAMINES UR QL: NOT DETECTED NG/ML
BARBITURATES UR QL SCN: NOT DETECTED NG/ML
BENZODIAZ UR QL SCN: ABNORMAL NG/ML
BUPRENORPHINE UR QL: ABNORMAL NG/ML
CANNABINOIDS UR QL: NOT DETECTED NG/ML
COCAINE UR QL SCN: ABNORMAL NG/ML
D-METHAMPHET UR QL: NOT DETECTED NG/ML
METHADONE UR QL SCN: NOT DETECTED NG/ML
OPIATES UR QL SCN: ABNORMAL NG/ML
OXYCODONE UR QL SCN: NOT DETECTED NG/ML
PCP UR QL SCN: NOT DETECTED NG/ML
PROPOXYPH UR QL: NOT DETECTED NG/ML
TRICYCLICS UR QL SCN: NOT DETECTED NG/ML

## 2018-07-27 PROCEDURE — 2894A VOIDCORRECT: CPT | Mod: 90 | Performed by: PEDIATRICS

## 2018-07-27 PROCEDURE — 99214 OFFICE O/P EST MOD 30 MIN: CPT | Performed by: PEDIATRICS

## 2018-07-27 PROCEDURE — 99000 SPECIMEN HANDLING OFFICE-LAB: CPT | Performed by: PEDIATRICS

## 2018-07-27 PROCEDURE — 80353 DRUG SCREENING COCAINE: CPT | Mod: 90 | Performed by: PEDIATRICS

## 2018-07-27 PROCEDURE — 80306 DRUG TEST PRSMV INSTRMNT: CPT | Performed by: PEDIATRICS

## 2018-07-27 PROCEDURE — 80346 BENZODIAZEPINES1-12: CPT | Mod: 90 | Performed by: PEDIATRICS

## 2018-07-27 RX ORDER — BUPRENORPHINE 8 MG/1
8 TABLET SUBLINGUAL 2 TIMES DAILY
Qty: 28 EACH | Refills: 0 | Status: SHIPPED | OUTPATIENT
Start: 2018-07-27 | End: 2018-10-01

## 2018-07-27 NOTE — MR AVS SNAPSHOT
After Visit Summary   7/27/2018    Alisia Lin    MRN: [id number]           Patient Information     Date Of Birth          1980        Visit Information        Provider Department      7/27/2018 2:00 PM Valeria Olson MD [facility]        Today's Diagnoses     Uncomplicated opioid dependence (H)           Follow-ups after your visit        Your next 10 appointments already scheduled     Jul 27, 2018  2:00 PM CDT   Return Visit with Valeria Olson MD   [facility] ([facility])    [address]  [address]  [facility] [address] [phone]            Aug 10, 2018 10:20 AM CDT   Return Visit with Valerai Olson MD   [facility] ([facility])    [address]  [address]  [facility] [address] [phone]              Who to contact     If you have questions or need follow up information about today's clinic visit or your schedule please contact [facility] directly at [phone].  Normal or non-critical lab and imaging results will be communicated to you by MyChart, letter or phone within 4 business days after the clinic has received the results. If you do not hear from us within 7 days, please contact the clinic through MyChart or phone. If you have a critical or abnormal lab result, we will notify you by phone as soon as possible.  Submit refill requests through [employer] or call your pharmacy and they will forward the refill request to us. Please allow 3 business days for your refill to be completed.          Additional Information About Your Visit        Care EveryWhere ID     This is your Care EveryWhere ID. This could be used by other organizations to access your [place] medical records  [id number]        Your Vitals Were     Pulse Respirations Last Period Pulse Oximetry BMI  (Body Mass Index)       88 16 07/11/2018 (Exact Date) 99% 23.63 kg/m2        Blood Pressure from Last 3 Encounters:   07/27/18 122/74   07/20/18 120/58   07/18/18 122/80    Weight from Last 3 Encounters:   07/27/18 115 lb (52.2 kg)   07/20/18 119 lb 8 oz (54.2 kg)   07/18/18 115 lb (52.2 kg)              We Performed the Following     Benzodiazepine Confirmatory Urine Qual     Cocaine qualitative confirm urine     Opiates quantitative urine     Urine Drugs of Abuse Screen Panel 13          Where to get your medicines      Some of these will need a paper prescription and others can be bought over the counter.  Ask your nurse if you have questions.     Bring a paper prescription for each of these medications     buprenorphine 8 MG Subl sublingual tablet          Primary Care Provider Office Phone # Fax #    Sepideh Rama Hines, APRN [facility] [phone]       [address]  [facility] [address]        Equal Access to Services     SAM CLARK : Hadii bakari ku hadasho Soomaali, waaxda luqadaha, qaybta kaalmada adeegyada, waxay kamilain cristobal gross . So [facility] [phone].    ATENCIÓN: Si habla español, tiene a randall disposición servicios gratuitos de asistencia lingüística. Llame al [phone].    We comply with applicable federal civil rights laws and [place] laws. We do not discriminate on the basis of race, color, national origin, age, disability, sex, sexual orientation, or gender identity.            Thank you!     Thank you for choosing [facility]  for your care. Our goal is always to provide you with excellent care. Hearing back from our patients is one way we can continue to improve our services. Please take a few minutes to complete the written survey that you may receive in the mail after your visit with us. Thank you!             Your Updated Medication List - Protect others around you: Learn how to safely use, store and throw away your medicines at  www.disposemymeds.org.          This list is accurate as of 7/27/18 10:53 AM.  Always use your most recent med list.                   Brand Name Dispense Instructions for use Diagnosis    ALPRAZolam 1 MG tablet    XANAX     Take 2 mg by mouth    Low TSH level       AMBIEN 10 MG tablet   Generic drug:  zolpidem     30 tablet    Take 1 tablet (10 mg) by mouth nightly as needed for sleep        amphetamine-dextroamphetamine 20 MG per tablet    ADDERALL     Take 20 mg by mouth    Low TSH level       azithromycin 250 MG tablet    ZITHROMAX    6 tablet    Two tablets first day, then one tablet daily for four days.    Acute bronchitis with symptoms > 10 days       buprenorphine 8 MG Subl sublingual tablet    SUBUTEX    28 each    Place 1 tablet (8 mg) under the tongue 2 times daily    Uncomplicated opioid dependence (H)       cetirizine 10 MG tablet    zyrTEC    30 tablet    TAKE 1 TABLET(10 MG) BY MOUTH EVERY EVENING    Seasonal allergic rhinitis, unspecified chronicity, unspecified trigger       CLONAZEPAM PO      Take by mouth 4 times daily as needed for anxiety        FLUoxetine 20 MG capsule    PROzac     Take 3 capsules (60 mg) by mouth daily    Moderate recurrent major depression (H), Anxiety       ibuprofen 600 MG tablet    ADVIL/MOTRIN    180 tablet    TAKE 1 TABLET BY MOUTH TWICE DAILY WITH MEALS    Chronic low back pain, unspecified back pain laterality, with sciatica presence unspecified, Chronic midline thoracic back pain       loperamide 2 MG tablet    IMODIUM A-D    60 tablet    Start with 2 tabs (4 mg), then take one tab (2 mg) after each diarrheal stool.  Do not use more than  8 tabs (16 mg) per day.    Diarrhea, unspecified type       MAPAP 500 MG tablet   Generic drug:  acetaminophen     100 tablet    TAKE 2 TABLETS(1000 MG) BY MOUTH TWICE DAILY AS NEEDED FOR MILD PAIN    Chronic midline low back pain with sciatica, sciatica laterality unspecified, Chronic right shoulder pain       Menthol (Topical  Analgesic) 4 % Gel     150 mL    Apply three times a day as needed to affected area    Bilateral low back pain with left-sided sciatica, Right shoulder pain       methocarbamol 750 MG tablet    ROBAXIN    180 tablet    TAKE 2 TABLETS(1500 MG) BY MOUTH THREE TIMES DAILY AS NEEDED FOR MUSCLE SPASMS    Chronic midline low back pain with sciatica, sciatica laterality unspecified, Chronic right shoulder pain       multivitamin, therapeutic with minerals Tabs tablet     100 tablet    Take 1 tablet by mouth daily    Other fatigue       naloxone nasal spray    NARCAN    2 each    Spray 1 spray (4 mg) into one nostril alternating nostrils as needed for opioid reversal    Uncomplicated opioid dependence (H)       pantoprazole 40 MG EC tablet    PROTONIX    90 tablet    Take 1 tablet (40 mg) by mouth daily    Gastroesophageal reflux disease without esophagitis       QUEtiapine 50 MG tablet    SEROquel     Take 50 mg by mouth    Low TSH level       VENTOLIN  (90 Base) MCG/ACT Inhaler   Generic drug:  albuterol     18 g    INHALE 2 PUFFS INTO THE LUNGS EVERY 4 HOURS AS NEEDED FOR SHORTNESS OF BREATH OR DIFFICULT BREATHING OR WHEEZING    Intermittent asthma, uncomplicated       VITAMIN B 12 PO      Patient reported taking two tabs daily- not sure of the dosage.    Low TSH level

## 2018-07-27 NOTE — PROGRESS NOTES
SUBJECTIVE:                                                    OPIOID USE DISORDER - BUPRENORPHINE FOLLOW UP:    Alisia Lin is a 37 year old female who presents to clinic today for follow up of  MAT for opioid use disorder.       Date of last visit:  7/20/2018      [place] [employer] Pharmacy Data Base Reviewed:    YES;     7/20/18 Suboxone 8mg film #14    Brief History:   Initial visit 9/17 hx of opioid use 10yr progressing to IV Heroin.  Several previous treatments.  Rx. Suboxone at initial visit. Ongoing relpase with heroin, cocaine, alcoholcontinued prescribed benzodiazepines.  Limited appointment attendance.            HPI:    7/27/2018  Subutex 8mg bid.    Is vaping 18mg /ml  6 puff /day but not smoking.    Has taken no Klonipin in last 3 days.  Denies other benzodiazepines.  utox positive for OPI/Cocaine.  Did use nyquil 5 days ago. Otherwise denies use.  No alcohol use.    Working as TMA passing medications.    No current meeting attendance or treatment.     Discussed positive UTOX and confirmation will be pending.  If positive will require further discussion.  Patient denies use except for Klonipin and nyquil (last used 5 days ago)           Status since last visit: Since last visit patient has been:stable    Intensity:     There has been: mild intermittent craving    Suboxone Dose: adequate    Progression of Symptoms/Precipitating Factors:     Cues to use and relapse triggers:Anxiety    Trigger have been:  moderate    Accompanying Signs & Symptoms:    Side Effects: none    Sobriety:     Status: unclear from utox but denies use.      Drug Screen: obtained    Alleviating factors/Other Therapies Tried:    Contact with sponsor has been: no sponsor    Family and support system has been: neutral    Patient has been going to recovery meetings: not at all    Recovery program has been : minimal    Patient has been participating in professional counseling /therapy: NO    Patient is following with psychiatry:  NO    Patient has established PCP: NO        Social History     Social History Narrative            Living with Father of youngest child  (3 girls 22, 18 and 10 )   Will be grandma around April 2018    No legal issues currently (possible identity theft).     Recently job loss ([employer])          Patient Active Problem List    Diagnosis Date Noted     Low TSH level 08/29/2017     Priority: Medium     Abnormal thyroid function test 08/08/2017     Priority: Medium     Narcotic dependence, in remission (H) 07/13/2017     Priority: Medium     Back pain 01/26/2016     Priority: Medium     Shoulder pain, right 01/26/2016     Priority: Medium     Intermittent asthma, uncomplicated 12/07/2015     Priority: Medium     Anxiety 05/21/2013     Priority: Medium     Benign neoplasm of colon 04/16/2013     Priority: Medium     Overview:   Found on colonoscopy 04/2013, repeat colonoscopy in 5 years, 04/2018.       Chronic low back pain 03/19/2012     Priority: Medium     On pain contract - [facility]       Degeneration of intervertebral disc 05/12/2010     Priority: Medium     Spondylosis 05/12/2010     Priority: Medium     Displacement of intervertebral disc 05/12/2010     Priority: Medium     ASCUS on Pap smear 03/03/2008     Priority: Medium     LEEP age 15 per notes in 2015  3/3/08: ASCUS, + HPV 53  4/17/08: [facility] - No visible lesions.   11/7/08: NIL pap  2/1/12: NIL pap  1/2013 Pap NIL, neg HPV. Plan cotest pap & HPV in 1 yr  3/2015: NIL pap, neg HPV. Plan cotest pap & HPV in 3 years.         Migraine with aura      Priority: Medium     Occas migraines, none recently  Problem list name updated by automated process. Provider to review       Tobacco use disorder      Priority: Medium     1 PPD- interested in quitting       Moderate recurrent major depression (H) 08/16/2005     Priority: Medium       Problem list and histories reviewed & adjusted, as indicated.  Additional history: as documented        Current Outpatient  Prescriptions on File Prior to Visit:  ALPRAZolam (XANAX) 1 MG tablet Take 2 mg by mouth   amphetamine-dextroamphetamine (ADDERALL) 20 MG per tablet Take 20 mg by mouth   azithromycin (ZITHROMAX) 250 MG tablet Two tablets first day, then one tablet daily for four days.   buprenorphine (SUBUTEX) 8 MG SUBL sublingual tablet Place 1 tablet (8 mg) under the tongue 2 times daily   cetirizine (ZYRTEC) 10 MG tablet TAKE 1 TABLET(10 MG) BY MOUTH EVERY EVENING   CLONAZEPAM PO Take by mouth 4 times daily as needed for anxiety   Cyanocobalamin (VITAMIN B 12 PO) Patient reported taking two tabs daily- not sure of the dosage.   FLUoxetine (PROZAC) 20 MG capsule Take 3 capsules (60 mg) by mouth daily   ibuprofen (ADVIL/MOTRIN) 600 MG tablet TAKE 1 TABLET BY MOUTH TWICE DAILY WITH MEALS   loperamide (IMODIUM A-D) 2 MG tablet Start with 2 tabs (4 mg), then take one tab (2 mg) after each diarrheal stool.  Do not use more than  8 tabs (16 mg) per day.   MAPAP 500 MG tablet TAKE 2 TABLETS(1000 MG) BY MOUTH TWICE DAILY AS NEEDED FOR MILD PAIN   Menthol, Topical Analgesic, 4 % GEL Apply three times a day as needed to affected area   methocarbamol (ROBAXIN) 750 MG tablet TAKE 2 TABLETS(1500 MG) BY MOUTH THREE TIMES DAILY AS NEEDED FOR MUSCLE SPASMS   multivitamin, therapeutic with minerals (MULTI-VITAMIN) TABS Take 1 tablet by mouth daily   naloxone (NARCAN) nasal spray Spray 1 spray (4 mg) into one nostril alternating nostrils as needed for opioid reversal   pantoprazole (PROTONIX) 40 MG enteric coated tablet Take 1 tablet (40 mg) by mouth daily   QUEtiapine (SEROQUEL) 50 MG tablet Take 50 mg by mouth   VENTOLIN  (90 BASE) MCG/ACT Inhaler INHALE 2 PUFFS INTO THE LUNGS EVERY 4 HOURS AS NEEDED FOR SHORTNESS OF BREATH OR DIFFICULT BREATHING OR WHEEZING   zolpidem (AMBIEN) 10 MG tablet Take 1 tablet (10 mg) by mouth nightly as needed for sleep     No current facility-administered medications on file prior to visit.        Allergies    Allergen Reactions     Compazine      Heart Problems/ Body went completley stiff for 8 hrs.     Nortriptyline      Skelaxin [Metaxalone]          REVIEW OF SYSTEMS:  General: No acute withdrawal symptoms.  No recent infections or fever  Rsp: No coughing, wheezing or shortness of breath  CV: No chest pains or palpitations  GI: No nausea, vomiting, abdominal pain, diarrhea, constipation  : No urinary frequency or dysuria,     Musculoskeletal: No significant muscle or joint pains, No edema  Neurologic: No numbness, tingling, weakness, problems with balance or coordination  Psychiatric: No acute concerns  Skin: No rashes    OBJECTIVE:    PHYSICAL EXAM:  [facility] 07/11/2018 (Exact Date)    GENERAL APPEARANCE:  alert, comfortable appearing  EYES:Eyes grossly normal to inspection  NEURO:  Gait normal.  No tremor. Coordination intact.   MENTAL STATUS EXAM:  Appearance/Behavior: No appearant distress  Speech: Normal and Pressured  Mood/Affect: agitation  Insight: Poor      Results for orders placed or performed in visit on 07/27/18   Urine Drugs of Abuse Screen Panel 13   Result Value Ref Range    Cannabinoids ([id number]-9-THC) Not Detected NDET^Not Detected ng/mL    Phencyclidine (Phencyclidine) Not Detected NDET^Not Detected ng/mL    Cocaine (Benzoylecgonine) Detected, Abnormal Result (A) NDET^Not Detected ng/mL    Methamphetamine (d-Methamphetamine) Not Detected NDET^Not Detected ng/mL    Opiates (Morphine) Detected, Abnormal Result (A) NDET^Not Detected ng/mL    Amphetamine (d-Amphetamine) Not Detected NDET^Not Detected ng/mL    Benzodiazepines (Nordiazepam) Detected, Abnormal Result (A) NDET^Not Detected ng/mL    Tricyclic Antidepressants (Desipramine) Not Detected NDET^Not Detected ng/mL    Methadone (Methadone) Not Detected NDET^Not Detected ng/mL    Barbiturates (Butalbital) Not Detected NDET^Not Detected ng/mL    Oxycodone (Oxycodone) Not Detected NDET^Not Detected ng/mL    Propoxyphene (Norpropoxyphene) Not  Detected NDET^Not Detected ng/mL    Buprenorphine (Buprenorphine) Detected, Abnormal Result (A) NDET^Not Detected ng/mL           ASSESSMENT/PLAN:         (F11.20) Uncomplicated opioid dependence (H)  (primary encounter diagnosis)  Plan: naloxone (NARCAN) nasal spray,      Subutex (GI intolerance and mouth sores from Suboxone tab)  -again reviewed differences of Suboxone and subutex and small to minimal absorbtion of Naloxone and the withdrawal and effectiveness with both product equal.  Patient still skeptical based on her reading.       Continue [id number] bid.      Follow up two week.    Discussed positive UTOX and confirmation will be pending.  If positive will require further discussion.  Patient denies use except for Klonipin and nyquil (last used 5 days ago)          Opioid warning reviewed.  Risk of overdose following a period of abstinence due to decrease tolerance was discussed including risk of death.   Risk of overdose if using Suboxone with other substances particuarly benzodiazepines/alcohol was reviewed.       Counseled the patient on the importance of having a recovery program in addition to Medication assisted treatment.  Components include having some type of sober network, avoiding isolating, having willingness  to change, avoiding triggers and managing cravings.    Encouraged other services such as counseling, 12 step or other self-help organizations.        Strongly recommended abstain from alcohol, benzodiazepines, THC, opioids and other drugs of abuse.  Increased risk of relapse for opioids with use of these substances discussed.  Increased risk of overdose/death with use of other substances particularly benzodiazepines/alcohol reviewed.      Naloxone offered.  Patient states she has                (F33.1) Moderate recurrent major depression (H)  (F41.9) Anxiety  Plan: strongly recommended follow up with PCP and ongoing mental health therapy.           (F17.200) Nicotine use disorder  Plan:  Encouraged Abstinence.  Increase risk of relapse with other substances with return to nicotine use discussed.  Risk of Ecig/Vaping also reviewed.          ENCOUNTER FOR LONG TERM USE OF HIGH RISK MEDICATION   High Risk Drug Monitoring?  YES   Drug being monitored: Buprenorphine   Reason for drug: Opioid Use Disorder   What is being monitored?: Dosage, Cravings, Trigger, side effects, and continued abstinence.      Opioid warning reviewed.  Risk of overdose following a period of abstinence due to decrease tolerance was discussed including risk of death.   Risk of overdose if using Suboxone with other substances particuarly benzodiazepines/alcohol was reviewed.          Valeria Olson MD  [facility]  [phone]

## 2018-08-04 LAB
6MAM SERPL-MCNC: 645 NG/ML
A-OH ALPRAZ UR QL CFM: NEGATIVE
ALPRAZ UR QL CFM: NEGATIVE
BZE UR QL: NORMAL NG/ML
COCAINE UR CFM-MCNC: NEGATIVE NG/ML
CODEINE UR CFM-MCNC: NEGATIVE NG/ML
LORAZEPAM UR QL CFM: NEGATIVE
MORPHINE UR CFM-MCNC: 331 NG/ML
NORDIAZEPAM UR QL CFM: NEGATIVE
OXAZEPAM UR QL CFM: NEGATIVE
TEMAZEPAM UR QL CFM: NEGATIVE

## 2018-08-06 DIAGNOSIS — G89.29 CHRONIC RIGHT SHOULDER PAIN: ICD-10-CM

## 2018-08-06 DIAGNOSIS — G89.29 CHRONIC MIDLINE LOW BACK PAIN WITH SCIATICA, SCIATICA LATERALITY UNSPECIFIED: ICD-10-CM

## 2018-08-06 DIAGNOSIS — M25.511 CHRONIC RIGHT SHOULDER PAIN: ICD-10-CM

## 2018-08-06 DIAGNOSIS — M54.40 CHRONIC MIDLINE LOW BACK PAIN WITH SCIATICA, SCIATICA LATERALITY UNSPECIFIED: ICD-10-CM

## 2018-08-06 NOTE — TELEPHONE ENCOUNTER
methocarbamol (ROBAXIN) 750 MG tablet      Last Written Prescription Date:  6/11/18  Last Fill Quantity: 180 TABLET,   # refills: 0  Last Office Visit: 7/18/18 WITH DEVIKA  Future Office visit:    Next 5 appointments (look out 90 days)     Aug 10, 2018 10:20 AM CDT   Return Visit with Valeria Olson MD   [facility] ([facility])    [address] [place] [address]  [address]  [facility] [address] [phone]                   Routing refill request to provider for review/approval because:  Drug not on the [facility], [facility] or [facility] refill protocol or controlled substance

## 2018-08-07 RX ORDER — METHOCARBAMOL 750 MG/1
TABLET, FILM COATED ORAL
Qty: 180 TABLET | Refills: 0 | Status: SHIPPED | OUTPATIENT
Start: 2018-08-07 | End: 2018-09-12

## 2018-08-16 DIAGNOSIS — M25.511 CHRONIC RIGHT SHOULDER PAIN: ICD-10-CM

## 2018-08-16 DIAGNOSIS — M54.40 CHRONIC MIDLINE LOW BACK PAIN WITH SCIATICA, SCIATICA LATERALITY UNSPECIFIED: ICD-10-CM

## 2018-08-16 DIAGNOSIS — G89.29 CHRONIC MIDLINE LOW BACK PAIN WITH SCIATICA, SCIATICA LATERALITY UNSPECIFIED: ICD-10-CM

## 2018-08-16 DIAGNOSIS — G89.29 CHRONIC RIGHT SHOULDER PAIN: ICD-10-CM

## 2018-08-17 NOTE — TELEPHONE ENCOUNTER
"Requested Prescriptions   Pending Prescriptions Disp Refills     ACETAMINOPHEN EXTRA STRENGTH 500 MG tablet [Pharmacy Med Name: ACETAMINOPHEN 500MG TABLETS]  Last Written Prescription Date:  7/14/2017  Last Fill Quantity: 100 tabs,  # refills: 5   Last office visit: 7/18/2018 with prescribing provider:  Donny   Future Office Visit:     100 tablet 0     Sig: TAKE 2 TABLETS(1000 MG) BY MOUTH TWICE DAILY AS NEEDED FOR MILD PAIN    Analgesics (Non-Narcotic Tylenol and ASA Only) Passed    8/16/2018  3:30 PM       Passed - Recent (12 mo) or future (30 days) visit within the authorizing provider's specialty    Patient had office visit in the last 12 months or has a visit in the next 30 days with authorizing provider or within the authorizing provider's specialty.  See \"Patient Info\" tab in inbasket, or \"Choose Columns\" in Meds & Orders section of the refill encounter.           Passed - Patient is 7 months old or older    If patient is a peds patient of the age 7 mos -12 years, ok to refill using weight-based dosing.     If >3g daily and/or sig is not \"prn\", check for liver enzymes. If normal in the last year, ok to refill.  If not, refer to the provider.            "

## 2018-08-22 RX ORDER — PSEUDOEPHED/ACETAMINOPH/DIPHEN 30MG-500MG
TABLET ORAL
Qty: 100 TABLET | Refills: 0 | Status: SHIPPED | OUTPATIENT
Start: 2018-08-22 | End: 2018-10-28

## 2018-08-22 NOTE — TELEPHONE ENCOUNTER
Prescription approved per [facility] Refill Protocol.  JEANA Rojas, BSN, RN

## 2018-09-05 ENCOUNTER — OFFICE VISIT (OUTPATIENT)
Dept: FAMILY MEDICINE | Facility: CLINIC | Age: 38
End: 2018-09-05
Payer: COMMERCIAL

## 2018-09-05 VITALS
SYSTOLIC BLOOD PRESSURE: 113 MMHG | DIASTOLIC BLOOD PRESSURE: 86 MMHG | BODY MASS INDEX: 23.01 KG/M2 | OXYGEN SATURATION: 96 % | WEIGHT: 112 LBS | TEMPERATURE: 98.8 F | RESPIRATION RATE: 12 BRPM | HEART RATE: 82 BPM

## 2018-09-05 DIAGNOSIS — F41.1 GAD (GENERALIZED ANXIETY DISORDER): ICD-10-CM

## 2018-09-05 DIAGNOSIS — M54.41 ACUTE LOW BACK PAIN WITH BILATERAL SCIATICA, UNSPECIFIED BACK PAIN LATERALITY: Primary | ICD-10-CM

## 2018-09-05 DIAGNOSIS — M54.42 ACUTE LOW BACK PAIN WITH BILATERAL SCIATICA, UNSPECIFIED BACK PAIN LATERALITY: Primary | ICD-10-CM

## 2018-09-05 PROCEDURE — 99214 OFFICE O/P EST MOD 30 MIN: CPT | Performed by: FAMILY MEDICINE

## 2018-09-05 RX ORDER — METHYLPREDNISOLONE 4 MG
TABLET, DOSE PACK ORAL
Qty: 21 TABLET | Refills: 0 | Status: SHIPPED | OUTPATIENT
Start: 2018-09-05 | End: 2019-04-23

## 2018-09-05 NOTE — LETTER
September 5, 2018      Alisia Lin  [address]  [facility] [address]        To Whom It May Concern:    Alisia Lakshmi Lin  was seen on 09/05/2018.  Please excuse her on September 5, 2018 and September 6 th, 2018 due to back pain.        Sincerely,        Deni Blue MD

## 2018-09-05 NOTE — MR AVS SNAPSHOT
After Visit Summary   9/5/2018    Alisia Lin    MRN: [id number]           Patient Information     Date Of Birth          1980        Visit Information        Provider Department      9/5/2018 9:20 AM Deni Bleu [facility]        Today's Diagnoses     Acute low back pain with bilateral sciatica, unspecified back pain laterality    -  1      Care Instructions    Back brace during the day, can take off at night   Continue robaxin three times daily as needed  Please start medrol dose pack  Continue physical therapy exercises  Follow up with specialist if your back pain is not improving           Follow-ups after your visit        Additional Services     ORTHO  REFERRAL       [facility] is referring you to the Orthopedic  Services at [facility].       The  Representative will assist you in the coordination of your Orthopedic and Musculoskeletal Care as prescribed by your physician.    The  Representative will call you within 1 business day to help schedule your appointment, or you may contact the  Representative at:    All areas ~ [phone]     Type of Referral : Spine: Cervical / Thoracic: Medical Spine Specialist        Timeframe requested: Routine    Coverage of these services is subject to the terms and limitations of your health insurance plan.  Please call member services at your health plan with any benefit or coverage questions.      If X-rays, CT or MRI's have been performed, please contact the facility where they were done to arrange for , prior to your scheduled appointment.  Please bring this referral request to your appointment and present it to your specialist.                  Who to contact     If you have questions or need follow up information about today's clinic visit or your schedule please contact [facility] directly at  [phone].  Normal or non-critical lab and imaging results will be communicated to you by MyChart, letter or phone within 4 business days after the clinic has received the results. If you do not hear from us within 7 days, please contact the clinic through MyChart or phone. If you have a critical or abnormal lab result, we will notify you by phone as soon as possible.  Submit refill requests through [employer] or call your pharmacy and they will forward the refill request to us. Please allow 3 business days for your refill to be completed.          Additional Information About Your Visit        Care EveryWhere ID     This is your Care EveryWhere ID. This could be used by other organizations to access your [place] medical records  [id number]        Your Vitals Were     Pulse Temperature Respirations Pulse Oximetry BMI (Body Mass Index)       82 98.8  F (37.1  C) (Oral) 12 96% 23.01 kg/m2        Blood Pressure from Last 3 Encounters:   09/05/18 113/86   07/27/18 122/74   07/20/18 120/58    Weight from Last 3 Encounters:   09/05/18 112 lb (50.8 kg)   07/27/18 115 lb (52.2 kg)   07/20/18 119 lb 8 oz (54.2 kg)              We Performed the Following     ORTHO  REFERRAL          Today's Medication Changes          These changes are accurate as of 9/5/18 10:02 AM.  If you have any questions, ask your nurse or doctor.               Start taking these medicines.        Dose/Directions    methylPREDNISolone 4 MG tablet   Commonly known as:  MEDROL DOSEPAK   Used for:  Acute low back pain with bilateral sciatica, unspecified back pain laterality   Started by:  Deni Blue MD        Follow package instructions   Quantity:  21 tablet   Refills:  0       order for DME   Used for:  Acute low back pain with bilateral sciatica, unspecified back pain laterality   Started by:  Deni Blue MD        Equipment being ordered: back brace   Quantity:  1 Device   Refills:  0            Where to get your  medicines      These medications were sent to [facility] [address] [address] [facility] [address]  [address] [place] [address], [facility] [address]    Hours:  24-hours Phone:  [phone]     methylPREDNISolone 4 MG tablet         Some of these will need a paper prescription and others can be bought over the counter.  Ask your nurse if you have questions.     Bring a paper prescription for each of these medications     order for DME                Primary Care Provider Office Phone # Fax #    Sepideh Rama Hines, APRN -[phone] [address]  [facility] [address]        Equal Access to Services     SAM CLARK : Batsheva bauero Sotash, waaxda luqadaha, qaybta kaalmada adeaubreeyada, shreyas gross . So [facility] [phone].    ATENCIÓN: Si habla español, tiene a randall disposición servicios gratuitos de asistencia lingüística. Llame al [phone].    We comply with applicable federal civil rights laws and [place] laws. We do not discriminate on the basis of race, color, national origin, age, disability, sex, sexual orientation, or gender identity.            Thank you!     Thank you for choosing [facility]  for your care. Our goal is always to provide you with excellent care. Hearing back from our patients is one way we can continue to improve our services. Please take a few minutes to complete the written survey that you may receive in the mail after your visit with us. Thank you!             Your Updated Medication List - Protect others around you: Learn how to safely use, store and throw away your medicines at www.disposemymeds.org.          This list is accurate as of 9/5/18 10:02 AM.  Always use your most recent med list.                   Brand Name Dispense Instructions for use Diagnosis    ACETAMINOPHEN EXTRA STRENGTH 500 MG tablet   Generic drug:  acetaminophen     100 tablet    TAKE 2  TABLETS(1000 MG) BY MOUTH TWICE DAILY AS NEEDED FOR MILD PAIN    Chronic midline low back pain with sciatica, sciatica laterality unspecified, Chronic right shoulder pain       ALPRAZolam 1 MG tablet    XANAX     Take 2 mg by mouth    Low TSH level       AMBIEN 10 MG tablet   Generic drug:  zolpidem     30 tablet    Take 1 tablet (10 mg) by mouth nightly as needed for sleep        amphetamine-dextroamphetamine 20 MG per tablet    ADDERALL     Take 20 mg by mouth    Low TSH level       azithromycin 250 MG tablet    ZITHROMAX    6 tablet    Two tablets first day, then one tablet daily for four days.    Acute bronchitis with symptoms > 10 days       buprenorphine 8 MG Subl sublingual tablet    SUBUTEX    28 each    Place 1 tablet (8 mg) under the tongue 2 times daily    Uncomplicated opioid dependence (H)       cetirizine 10 MG tablet    zyrTEC    30 tablet    TAKE 1 TABLET(10 MG) BY MOUTH EVERY EVENING    Seasonal allergic rhinitis, unspecified chronicity, unspecified trigger       CLONAZEPAM PO      Take by mouth 4 times daily as needed for anxiety        FLUoxetine 20 MG capsule    PROzac     Take 3 capsules (60 mg) by mouth daily    Moderate recurrent major depression (H), Anxiety       ibuprofen 600 MG tablet    ADVIL/MOTRIN    180 tablet    TAKE 1 TABLET BY MOUTH TWICE DAILY WITH MEALS    Chronic low back pain, unspecified back pain laterality, with sciatica presence unspecified, Chronic midline thoracic back pain       loperamide 2 MG tablet    IMODIUM A-D    60 tablet    Start with 2 tabs (4 mg), then take one tab (2 mg) after each diarrheal stool.  Do not use more than  8 tabs (16 mg) per day.    Diarrhea, unspecified type       Menthol (Topical Analgesic) 4 % Gel     150 mL    Apply three times a day as needed to affected area    Bilateral low back pain with left-sided sciatica, Right shoulder pain       methocarbamol 750 MG tablet    ROBAXIN    180 tablet    TAKE 2 TABLETS(1500 MG) BY MOUTH THREE TIMES DAILY  AS NEEDED FOR MUSCLE SPASMS    Chronic midline low back pain with sciatica, sciatica laterality unspecified, Chronic right shoulder pain       methylPREDNISolone 4 MG tablet    MEDROL DOSEPAK    21 tablet    Follow package instructions    Acute low back pain with bilateral sciatica, unspecified back pain laterality       multivitamin, therapeutic with minerals Tabs tablet     100 tablet    Take 1 tablet by mouth daily    Other fatigue       naloxone nasal spray    NARCAN    2 each    Spray 1 spray (4 mg) into one nostril alternating nostrils as needed for opioid reversal    Uncomplicated opioid dependence (H)       order for DME     1 Device    Equipment being ordered: back brace    Acute low back pain with bilateral sciatica, unspecified back pain laterality       pantoprazole 40 MG EC tablet    PROTONIX    90 tablet    Take 1 tablet (40 mg) by mouth daily    Gastroesophageal reflux disease without esophagitis       QUEtiapine 50 MG tablet    SEROquel     Take 50 mg by mouth    Low TSH level       VENTOLIN  (90 Base) MCG/ACT inhaler   Generic drug:  albuterol     18 g    INHALE 2 PUFFS INTO THE LUNGS EVERY 4 HOURS AS NEEDED FOR SHORTNESS OF BREATH OR DIFFICULT BREATHING OR WHEEZING    Intermittent asthma, uncomplicated       VITAMIN B 12 PO      Patient reported taking two tabs daily- not sure of the dosage.    Low TSH level

## 2018-09-05 NOTE — PATIENT INSTRUCTIONS
Back brace during the day, can take off at night   Continue robaxin three times daily as needed  Please start medrol dose pack  Continue physical therapy exercises  Follow up with specialist if your back pain is not improving

## 2018-09-12 DIAGNOSIS — G89.29 CHRONIC RIGHT SHOULDER PAIN: ICD-10-CM

## 2018-09-12 DIAGNOSIS — M54.40 CHRONIC MIDLINE LOW BACK PAIN WITH SCIATICA, SCIATICA LATERALITY UNSPECIFIED: ICD-10-CM

## 2018-09-12 DIAGNOSIS — M54.6 CHRONIC MIDLINE THORACIC BACK PAIN: ICD-10-CM

## 2018-09-12 DIAGNOSIS — M25.511 CHRONIC RIGHT SHOULDER PAIN: ICD-10-CM

## 2018-09-12 DIAGNOSIS — G89.29 CHRONIC LOW BACK PAIN, UNSPECIFIED BACK PAIN LATERALITY, WITH SCIATICA PRESENCE UNSPECIFIED: ICD-10-CM

## 2018-09-12 DIAGNOSIS — G89.29 CHRONIC MIDLINE LOW BACK PAIN WITH SCIATICA, SCIATICA LATERALITY UNSPECIFIED: ICD-10-CM

## 2018-09-12 DIAGNOSIS — G89.29 CHRONIC MIDLINE THORACIC BACK PAIN: ICD-10-CM

## 2018-09-12 DIAGNOSIS — M54.5 CHRONIC LOW BACK PAIN, UNSPECIFIED BACK PAIN LATERALITY, WITH SCIATICA PRESENCE UNSPECIFIED: ICD-10-CM

## 2018-09-13 NOTE — TELEPHONE ENCOUNTER
"Requested Prescriptions   Pending Prescriptions Disp Refills     ibuprofen (ADVIL/MOTRIN) 600 MG tablet [Pharmacy Med Name: IBUPROFEN 600MG TABLETS]  Last Written Prescription Date:  6/11/2018  Last Fill Quantity: 180 tablet,  # refills: 0   Last Office Visit: 9/5/2018   Future Office Visit:    Next 5 appointments (look out 90 days)     Oct 01, 2018  3:20 PM CDT   Return Visit with Valeria Olson MD   [facility] ([facility])    [address] [place] [address]  [address]  [facility] [address] [phone]                180 tablet 0     Sig: TAKE 1 TABLET BY MOUTH TWICE DAILY WITH MEALS    NSAID Medications Failed    9/12/2018  3:34 AM       Failed - Normal CBC on file in past 12 months    Recent Labs   Lab Test  07/14/17   1335   WBC  10.0   RBC  4.01   HGB  12.0   HCT  35.3   PLT  406     For [facility] ONLY: EIGL902 = WBC, FYTP284 = RBC         Passed - Blood pressure under 140/90 in past 12 months    BP Readings from Last 3 Encounters:   09/05/18 113/86   07/27/18 122/74   07/20/18 120/58          Passed - Normal ALT on file in past 12 months    Recent Labs   Lab Test 10/24/17   ALT  12          Passed - Normal AST on file in past 12 months    Recent Labs   Lab Test 10/24/17   AST  38          Passed - Recent (12 mo) or future (30 days) visit within the authorizing provider's specialty    Patient had office visit in the last 12 months or has a visit in the next 30 days with authorizing provider or within the authorizing provider's specialty.  See \"Patient Info\" tab in inbasket, or \"Choose Columns\" in Meds & Orders section of the refill encounter.           Passed - Patient is age 6-64 years       Passed - No active pregnancy on record       Passed - Normal serum creatinine on file in past 12 months    Recent Labs   Lab Test 10/24/17   CR  0.63          Passed - No positive pregnancy test in past 12 months          "

## 2018-09-13 NOTE — TELEPHONE ENCOUNTER
METHOCARBAMOL 750MG TABLETS        Last Written Prescription Date:  8/7/2018  Last Fill Quantity: 180 tablet,   # refills: 0  Last Office Visit: 9/5/2018  Future Office visit:    Next 5 appointments (look out 90 days)     Oct 01, 2018  3:20 PM CDT   Return Visit with Valeria Olson MD   [facility] ([facility])    [address]  [address]  [facility] [id number] [phone]                   Routing refill request to provider for review/approval because:  Drug not on the [facility], [facility] or [facility] refill protocol or controlled substance

## 2018-09-14 RX ORDER — METHOCARBAMOL 750 MG/1
TABLET, FILM COATED ORAL
Qty: 180 TABLET | Refills: 0 | Status: SHIPPED | OUTPATIENT
Start: 2018-09-14 | End: 2018-10-29

## 2018-09-17 RX ORDER — IBUPROFEN 600 MG/1
TABLET, FILM COATED ORAL
Qty: 180 TABLET | Refills: 0 | Status: SHIPPED | OUTPATIENT
Start: 2018-09-17 | End: 2019-01-02

## 2018-09-17 NOTE — TELEPHONE ENCOUNTER
Routing to provider for review. High dose ibuprofen -- labs out of date. Please review and sign/advise.    Thank you  BIENVENIDO FaustinN, RN  [facility]

## 2018-10-01 ENCOUNTER — OFFICE VISIT (OUTPATIENT)
Dept: ADDICTION MEDICINE | Facility: CLINIC | Age: 38
End: 2018-10-01
Payer: COMMERCIAL

## 2018-10-01 VITALS
BODY MASS INDEX: 22.8 KG/M2 | DIASTOLIC BLOOD PRESSURE: 62 MMHG | OXYGEN SATURATION: 98 % | HEART RATE: 95 BPM | SYSTOLIC BLOOD PRESSURE: 110 MMHG | TEMPERATURE: 98.7 F | WEIGHT: 111 LBS | RESPIRATION RATE: 18 BRPM

## 2018-10-01 DIAGNOSIS — F17.200 NICOTINE USE DISORDER: ICD-10-CM

## 2018-10-01 DIAGNOSIS — F11.20 UNCOMPLICATED OPIOID DEPENDENCE (H): ICD-10-CM

## 2018-10-01 DIAGNOSIS — Z79.899 HIGH RISK MEDICATION USE: ICD-10-CM

## 2018-10-01 DIAGNOSIS — F51.01 PRIMARY INSOMNIA: Primary | ICD-10-CM

## 2018-10-01 DIAGNOSIS — F41.9 ANXIETY: ICD-10-CM

## 2018-10-01 DIAGNOSIS — F90.2 ATTENTION DEFICIT HYPERACTIVITY DISORDER (ADHD), COMBINED TYPE: ICD-10-CM

## 2018-10-01 DIAGNOSIS — F33.1 MODERATE RECURRENT MAJOR DEPRESSION (H): ICD-10-CM

## 2018-10-01 LAB
AMPHETAMINES UR QL: NOT DETECTED NG/ML
BARBITURATES UR QL SCN: NOT DETECTED NG/ML
BENZODIAZ UR QL SCN: ABNORMAL NG/ML
BUPRENORPHINE UR QL: NOT DETECTED NG/ML
CANNABINOIDS UR QL: NOT DETECTED NG/ML
COCAINE UR QL SCN: ABNORMAL NG/ML
D-METHAMPHET UR QL: NOT DETECTED NG/ML
METHADONE UR QL SCN: NOT DETECTED NG/ML
OPIATES UR QL SCN: ABNORMAL NG/ML
OXYCODONE UR QL SCN: NOT DETECTED NG/ML
PCP UR QL SCN: NOT DETECTED NG/ML
PROPOXYPH UR QL: NOT DETECTED NG/ML
TRICYCLICS UR QL SCN: ABNORMAL NG/ML

## 2018-10-01 PROCEDURE — 80306 DRUG TEST PRSMV INSTRMNT: CPT | Performed by: PEDIATRICS

## 2018-10-01 PROCEDURE — 99214 OFFICE O/P EST MOD 30 MIN: CPT | Performed by: PEDIATRICS

## 2018-10-01 RX ORDER — BUPRENORPHINE 8 MG/1
8 TABLET SUBLINGUAL 3 TIMES DAILY
Qty: 90 EACH | Refills: 0 | Status: SHIPPED | OUTPATIENT
Start: 2018-10-01 | End: 2019-04-26

## 2018-10-01 RX ORDER — DIPHENHYDRAMINE HCL 25 MG
TABLET ORAL
Qty: 60 TABLET | Refills: 1 | Status: SHIPPED | OUTPATIENT
Start: 2018-10-01 | End: 2019-01-02

## 2018-10-01 NOTE — MR AVS SNAPSHOT
After Visit Summary   10/1/2018    Alisia Lin    MRN: [id number]           Patient Information     Date Of Birth          1980        Visit Information        Provider Department      10/1/2018 3:20 PM Valeria Olson MD [facility]        Today's Diagnoses     Primary insomnia    -  1    Uncomplicated opioid dependence (H)        Moderate recurrent major depression (H)        Anxiety        Attention deficit hyperactivity disorder (ADHD), combined type        Nicotine use disorder        High risk medication use           Follow-ups after your visit        Your next 10 appointments already scheduled     Oct 29, 2018  1:00 PM CDT   Return Visit with Valeria Olson MD   [facility]    [address]  [address]  [facility] [address] [phone]              Who to contact     If you have questions or need follow up information about today's clinic visit or your schedule please contact [facility] directly at [phone].  Normal or non-critical lab and imaging results will be communicated to you by MyChart, letter or phone within 4 business days after the clinic has received the results. If you do not hear from us within 7 days, please contact the clinic through MyChart or phone. If you have a critical or abnormal lab result, we will notify you by phone as soon as possible.  Submit refill requests through [employer] or call your pharmacy and they will forward the refill request to us. Please allow 3 business days for your refill to be completed.          Additional Information About Your Visit        Care EveryWhere ID     This is your Care EveryWhere ID. This could be used by other organizations to access your [place] medical records  [id number]        Your Vitals Were     Pulse Temperature Respirations Pulse Oximetry Breastfeeding? BMI (Body  Mass Index)    95 98.7  F (37.1  C) (Oral) 18 98% No 22.8 kg/m2       Blood Pressure from Last 3 Encounters:   10/01/18 110/62   09/05/18 113/86   07/27/18 122/74    Weight from Last 3 Encounters:   10/01/18 111 lb (50.3 kg)   09/05/18 112 lb (50.8 kg)   07/27/18 115 lb (52.2 kg)              We Performed the Following     Urine Drugs of Abuse Screen Panel 13          Today's Medication Changes          These changes are accurate as of 10/1/18  8:09 PM.  If you have any questions, ask your nurse or doctor.               Start taking these medicines.        Dose/Directions    diphenhydrAMINE 25 MG tablet   Commonly known as:  BENADRYL   Used for:  Uncomplicated opioid dependence (H)   Started by:  Valeria Olson MD        50mg q hs prn for sleep.   Quantity:  60 tablet   Refills:  1         These medicines have changed or have updated prescriptions.        Dose/Directions    buprenorphine 8 MG Subl sublingual tablet   Commonly known as:  SUBUTEX   This may have changed:  when to take this   Used for:  Uncomplicated opioid dependence (H)   Changed by:  Valeria Olson MD        Dose:  8 mg   Place 1 tablet (8 mg) under the tongue 3 times daily   Quantity:  90 each   Refills:  0            Where to get your medicines      These medications were sent to [facility] [place], [place] - [address]  [address] [facility] [address], [facility] [address]     Phone:  [phone]     diphenhydrAMINE 25 MG tablet         Some of these will need a paper prescription and others can be bought over the counter.  Ask your nurse if you have questions.     Bring a paper prescription for each of these medications     buprenorphine 8 MG Subl sublingual tablet                Primary Care Provider Office Phone # Fax #    SONJA Meek [phone] [address]  [facility] [address]        Equal Access to Services     SAM CLARK AH: paul Montez  celinatwila shreyas antonio. So [facility] [phone].    ATENCIÓN: Si kasia chauhan, tiene a randall disposición servicios gratuitos de asistencia lingüística. Norma al [phone].    We comply with applicable federal civil rights laws and [place] laws. We do not discriminate on the basis of race, color, national origin, age, disability, sex, sexual orientation, or gender identity.            Thank you!     Thank you for choosing [facility]  for your care. Our goal is always to provide you with excellent care. Hearing back from our patients is one way we can continue to improve our services. Please take a few minutes to complete the written survey that you may receive in the mail after your visit with us. Thank you!             Your Updated Medication List - Protect others around you: Learn how to safely use, store and throw away your medicines at www.disposemymeds.org.          This list is accurate as of 10/1/18  8:09 PM.  Always use your most recent med list.                   Brand Name Dispense Instructions for use Diagnosis    ACETAMINOPHEN EXTRA STRENGTH 500 MG tablet   Generic drug:  acetaminophen     100 tablet    TAKE 2 TABLETS(1000 MG) BY MOUTH TWICE DAILY AS NEEDED FOR MILD PAIN    Chronic midline low back pain with sciatica, sciatica laterality unspecified, Chronic right shoulder pain       ALPRAZolam 1 MG tablet    XANAX     Take 2 mg by mouth    Low TSH level       AMBIEN 10 MG tablet   Generic drug:  zolpidem     30 tablet    Take 1 tablet (10 mg) by mouth nightly as needed for sleep        amphetamine-dextroamphetamine 20 MG per tablet    ADDERALL     Take 20 mg by mouth    Low TSH level       buprenorphine 8 MG Subl sublingual tablet    SUBUTEX    90 each    Place 1 tablet (8 mg) under the tongue 3 times daily    Uncomplicated opioid dependence (H)       cetirizine 10 MG tablet    zyrTEC    30 tablet    TAKE 1 TABLET(10  MG) BY MOUTH EVERY EVENING    Seasonal allergic rhinitis, unspecified chronicity, unspecified trigger       CLONAZEPAM PO      Take by mouth 4 times daily as needed for anxiety        diphenhydrAMINE 25 MG tablet    BENADRYL    60 tablet    50mg q hs prn for sleep.    Uncomplicated opioid dependence (H)       FLUoxetine 20 MG capsule    PROzac     Take 3 capsules (60 mg) by mouth daily    Moderate recurrent major depression (H), Anxiety       ibuprofen 600 MG tablet    ADVIL/MOTRIN    180 tablet    TAKE 1 TABLET BY MOUTH TWICE DAILY WITH MEALS    Chronic low back pain, unspecified back pain laterality, with sciatica presence unspecified, Chronic midline thoracic back pain       loperamide 2 MG tablet    IMODIUM A-D    60 tablet    Start with 2 tabs (4 mg), then take one tab (2 mg) after each diarrheal stool.  Do not use more than  8 tabs (16 mg) per day.    Diarrhea, unspecified type       Menthol (Topical Analgesic) 4 % Gel     150 mL    Apply three times a day as needed to affected area    Bilateral low back pain with left-sided sciatica, Right shoulder pain       methocarbamol 750 MG tablet    ROBAXIN    180 tablet    TAKE 2 TABLETS(1500 MG) BY MOUTH THREE TIMES DAILY AS NEEDED FOR MUSCLE SPASMS    Chronic midline low back pain with sciatica, sciatica laterality unspecified, Chronic right shoulder pain       methylPREDNISolone 4 MG tablet    MEDROL DOSEPAK    21 tablet    Follow package instructions    Acute low back pain with bilateral sciatica, unspecified back pain laterality       multivitamin, therapeutic with minerals Tabs tablet     100 tablet    Take 1 tablet by mouth daily    Other fatigue       naloxone nasal spray    NARCAN    2 each    Spray 1 spray (4 mg) into one nostril alternating nostrils as needed for opioid reversal    Uncomplicated opioid dependence (H)       order for DME     1 Device    Equipment being ordered: back brace    Acute low back pain with bilateral sciatica, unspecified back pain  laterality       pantoprazole 40 MG EC tablet    PROTONIX    90 tablet    Take 1 tablet (40 mg) by mouth daily    Gastroesophageal reflux disease without esophagitis       QUEtiapine 50 MG tablet    SEROquel     Take 50 mg by mouth    Low TSH level       VENTOLIN  (90 Base) MCG/ACT inhaler   Generic drug:  albuterol     18 g    INHALE 2 PUFFS INTO THE LUNGS EVERY 4 HOURS AS NEEDED FOR SHORTNESS OF BREATH OR DIFFICULT BREATHING OR WHEEZING    Intermittent asthma, uncomplicated       VITAMIN B 12 PO      Patient reported taking two tabs daily- not sure of the dosage.    Low TSH level

## 2018-10-01 NOTE — PROGRESS NOTES
"  SUBJECTIVE:                                                    BUPRENORPHINE FOLLOW UP:    Alisia Lin is a 37 year old female who presents to clinic today for follow up of Buprenorphine.      Date of last visit:  8/10/2018  NS   7/27/18    [place] Board of Pharmacy Data Base Reviewed:    YES;     7/27/18 Suboxone 8mg tab #28    Brief History:   Initial visit 9/17 hx of opioid use 10yr progressing to IV Heroin.  Several previous treatments.  Rx. Suboxone at initial visit. Ongoing relpase with heroin, cocaine, alcoholcontinued prescribed benzodiazepines.  Limited appointment attendance      HPI:   Had been maintaining Suboxone until running out of medication due to missing appointment.  Denies use up until recent one day use by her report.      Did use \"line\" of Heroin on 10/18 but otherwise no use since.  Reason for use given was increase back pain.  Seen by PCP 9/5/18.    Medrol dose pack and Robaxin rx.  Was taking Suboxone 8mg bid (previous tid).  Denies cocaine use but says it \"may have been in Heroin\".  Ongoing postive screens discussed.    Currently taking Klonipin 1mg at hs (was qid) and Xanax is 1mg tid prn. (taking less)  Working as TMA passing medications.    No current meeting attendance or treatment.        Status since last visit: Since last visit patient has been:feels she is doing well but did have relapse.  Out of Suboxone     Intensity:     There has been: moderate craving with bid dosing.  Feels tid dosing (previous ) was much better.     Suboxone Dose: adequate    Progression of Symptoms/Precipitating Factors:     Cues to use and relapse triggers:Anxiety, Depression and Physical illness    Trigger have been:  moderate    Accompanying Signs & Symptoms:    Side Effects: none    Sobriety:     Status: patient has had opioid use and use of cocaine by utox and prescribed benzodiazepine.      Drug Screen: obtained    Alleviating factors/Other Therapies Tried:    Contact with sponsor has been: no " sponsor    Family and support system has been: neutral    Patient has been going to recovery meetings: not at all    Recovery program has been : minimal    Patient has been participating in professional counseling /therapy: NO    Patient is following with psychiatry: YES    Patient has established PCP: YES        Social History     Social History Narrative            Living with Father of youngest child  (3 girls 22, 18 and 10 )   Will be grandma around April 2018    No legal issues currently (possible identity theft).     Recently job loss ([employer])          Patient Active Problem List    Diagnosis Date Noted     Low TSH level 08/29/2017     Priority: Medium     Abnormal thyroid function test 08/08/2017     Priority: Medium     Narcotic dependence, in remission (H) 07/13/2017     Priority: Medium     Back pain 01/26/2016     Priority: Medium     Shoulder pain, right 01/26/2016     Priority: Medium     Intermittent asthma, uncomplicated 12/07/2015     Priority: Medium     Anxiety 05/21/2013     Priority: Medium     Benign neoplasm of colon 04/16/2013     Priority: Medium     Overview:   Found on colonoscopy 04/2013, repeat colonoscopy in 5 years, 04/2018.       Chronic low back pain 03/19/2012     Priority: Medium     On pain contract - [facility]       Degeneration of intervertebral disc 05/12/2010     Priority: Medium     Spondylosis 05/12/2010     Priority: Medium     Displacement of intervertebral disc 05/12/2010     Priority: Medium     ASCUS on Pap smear 03/03/2008     Priority: Medium     LEEP age 15 per notes in 2015  3/3/08: ASCUS, + HPV 53  4/17/08: Gypsum - No visible lesions.   11/7/08: NIL pap  2/1/12: NIL pap  1/2013 Pap NIL, neg HPV. Plan cotest pap & HPV in 1 yr  3/2015: NIL pap, neg HPV. Plan cotest pap & HPV in 3 years.         Migraine with aura      Priority: Medium     Occas migraines, none recently  Problem list name updated by automated process. Provider to review       Tobacco use  disorder      Priority: Medium     1 PPD- interested in quitting       Moderate recurrent major depression (H) 08/16/2005     Priority: Medium       Problem list and histories reviewed & adjusted, as indicated.  Additional history: as documented        Current Outpatient Prescriptions on File Prior to Visit:  ACETAMINOPHEN EXTRA STRENGTH 500 MG tablet TAKE 2 TABLETS(1000 MG) BY MOUTH TWICE DAILY AS NEEDED FOR MILD PAIN   ALPRAZolam (XANAX) 1 MG tablet Take 2 mg by mouth   amphetamine-dextroamphetamine (ADDERALL) 20 MG per tablet Take 20 mg by mouth   buprenorphine (SUBUTEX) 8 MG SUBL sublingual tablet Place 1 tablet (8 mg) under the tongue 2 times daily   cetirizine (ZYRTEC) 10 MG tablet TAKE 1 TABLET(10 MG) BY MOUTH EVERY EVENING   Cyanocobalamin (VITAMIN B 12 PO) Patient reported taking two tabs daily- not sure of the dosage.   FLUoxetine (PROZAC) 20 MG capsule Take 3 capsules (60 mg) by mouth daily   ibuprofen (ADVIL/MOTRIN) 600 MG tablet TAKE 1 TABLET BY MOUTH TWICE DAILY WITH MEALS   loperamide (IMODIUM A-D) 2 MG tablet Start with 2 tabs (4 mg), then take one tab (2 mg) after each diarrheal stool.  Do not use more than  8 tabs (16 mg) per day.   Menthol, Topical Analgesic, 4 % GEL Apply three times a day as needed to affected area   methocarbamol (ROBAXIN) 750 MG tablet TAKE 2 TABLETS(1500 MG) BY MOUTH THREE TIMES DAILY AS NEEDED FOR MUSCLE SPASMS   methylPREDNISolone (MEDROL DOSEPAK) 4 MG tablet Follow package instructions   multivitamin, therapeutic with minerals (MULTI-VITAMIN) TABS Take 1 tablet by mouth daily   naloxone (NARCAN) nasal spray Spray 1 spray (4 mg) into one nostril alternating nostrils as needed for opioid reversal   order for DME Equipment being ordered: back brace   pantoprazole (PROTONIX) 40 MG enteric coated tablet Take 1 tablet (40 mg) by mouth daily   QUEtiapine (SEROQUEL) 50 MG tablet Take 50 mg by mouth   VENTOLIN  (90 BASE) MCG/ACT Inhaler INHALE 2 PUFFS INTO THE LUNGS EVERY 4  HOURS AS NEEDED FOR SHORTNESS OF BREATH OR DIFFICULT BREATHING OR WHEEZING   zolpidem (AMBIEN) 10 MG tablet Take 1 tablet (10 mg) by mouth nightly as needed for sleep   CLONAZEPAM PO Take by mouth 4 times daily as needed for anxiety     No current facility-administered medications on file prior to visit.     Allergies   Allergen Reactions     Compazine      Heart Problems/ Body went completley stiff for 8 hrs.     Nortriptyline      Skelaxin [Metaxalone]          REVIEW OF SYSTEMS:  General: No acute withdrawal symptoms.  No recent infections or fever  Resp: No coughing, wheezing or shortness of breath  CV: No chest pains or palpitations  GI: No nausea, vomiting, abdominal pain, diarrhea, constipation  : No urinary frequency or dysuria,     Musculoskeletal: No significant muscle or joint pains, No edema  Neurologic: No numbness, tingling, weakness, problems with balance or coordination  Psychiatric: No acute concerns  Skin: No rashes    OBJECTIVE:    PHYSICAL EXAM:  /62  Pulse 95  Temp 98.7  F (37.1  C) (Oral)  Resp 18  Wt 111 lb (50.3 kg)  SpO2 98%  Breastfeeding? No  BMI 22.8 kg/m2    GENERAL APPEARANCE:  alert, comfortable appearing  EYES:Eyes grossly normal to inspection  NEURO:  Gait normal.  No tremor. Coordination intact.   MENTAL STATUS EXAM:  Appearance/Behavior: No appearant distress  Speech: Normal  Mood/Affect: anxiety  Insight: Fair      Results for orders placed or performed in visit on 10/01/18   Urine Drugs of Abuse Screen Panel 13   Result Value Ref Range    Cannabinoids (70-ccm-5-carboxy-9-THC) Not Detected NDET^Not Detected ng/mL    Phencyclidine (Phencyclidine) Not Detected NDET^Not Detected ng/mL    Cocaine (Benzoylecgonine) Detected, Abnormal Result (A) NDET^Not Detected ng/mL    Methamphetamine (d-Methamphetamine) Not Detected NDET^Not Detected ng/mL    Opiates (Morphine) Detected, Abnormal Result (A) NDET^Not Detected ng/mL    Amphetamine (d-Amphetamine) Not Detected NDET^Not  Detected ng/mL    Benzodiazepines (Nordiazepam) Detected, Abnormal Result (A) NDET^Not Detected ng/mL    Tricyclic Antidepressants (Desipramine) Detected, Abnormal Result (A) NDET^Not Detected ng/mL    Methadone (Methadone) Not Detected NDET^Not Detected ng/mL    Barbiturates (Butalbital) Not Detected NDET^Not Detected ng/mL    Oxycodone (Oxycodone) Not Detected NDET^Not Detected ng/mL    Propoxyphene (Norpropoxyphene) Not Detected NDET^Not Detected ng/mL    Buprenorphine (Buprenorphine) Not Detected NDET^Not Detected ng/mL           ASSESSMENT/PLAN:    (F11.20) Uncomplicated opioid dependence (H)  (primary encounter diagnosis)  Plan: naloxone (NARCAN) nasal spray,       Subutex (GI intolerance and mouth sores from Suboxone tab)  -again reviewed differences of Suboxone and subutex and small to minimal absorbtion of Naloxone and the withdrawal and effectiveness with both product equal.  Patient still skeptical based on her reading.       Continue [id number] bid.      Follow up one month with phone follow up in 1 wk.  Relapse prevention discussed.   Need for ongoing regular appointment follow up as a part of recovery discussed.      Opioid warning reviewed.  Risk of overdose following a period of abstinence due to decrease tolerance was discussed including risk of death.   Risk of overdose if using Suboxone with other substances particuarly benzodiazepines/alcohol was reviewed.       Counseled the patient on the importance of having a recovery program in addition to Medication assisted treatment.  Components include having some type of sober network, avoiding isolating, having willingness  to change, avoiding triggers and managing cravings. Encouraged other services such as counseling, 12 step or other self-help organizations.        Strongly recommended abstain from alcohol, benzodiazepines, THC, opioids and other drugs of abuse.  Increased risk of relapse for opioids with use of these substances discussed.   Increased risk of overdose/death with use of other substances particularly benzodiazepines/alcohol reviewed.          (F33.1) Moderate recurrent major depression (H)  (F41.9) Anxiety  Plan: strongly recommended follow up with PCP and ongoing mental health therapy.   Continue wean of benzodiazepine.       (F17.200) Nicotine use disorder  Plan: Encouraged Abstinence.  Increase risk of relapse with other substances with return to nicotine use discussed.  Risk of Ecig/Vaping also reviewed.            ENCOUNTER FOR LONG TERM USE OF HIGH RISK MEDICATION   High Risk Drug Monitoring?  YES   Drug being monitored: Buprenorphine   Reason for drug: Opioid Use Disorder   What is being monitored?: Dosage, Cravings, Trigger, side effects, and continued abstinence.      Opioid warning reviewed.  Risk of overdose following a period of abstinence due to decrease tolerance was discussed including risk of death.   Risk of overdose if using Suboxone with other substances particuarly benzodiazepines/alcohol was reviewed.          Valeria Olson MD  [facility]  [phone]

## 2018-10-28 DIAGNOSIS — G89.29 CHRONIC MIDLINE LOW BACK PAIN WITH SCIATICA, SCIATICA LATERALITY UNSPECIFIED: ICD-10-CM

## 2018-10-28 DIAGNOSIS — M54.40 CHRONIC MIDLINE LOW BACK PAIN WITH SCIATICA, SCIATICA LATERALITY UNSPECIFIED: ICD-10-CM

## 2018-10-28 DIAGNOSIS — M25.511 CHRONIC RIGHT SHOULDER PAIN: ICD-10-CM

## 2018-10-28 DIAGNOSIS — G89.29 CHRONIC RIGHT SHOULDER PAIN: ICD-10-CM

## 2018-10-29 DIAGNOSIS — N76.0 BACTERIAL VAGINOSIS: ICD-10-CM

## 2018-10-29 DIAGNOSIS — B96.89 BACTERIAL VAGINOSIS: ICD-10-CM

## 2018-10-29 DIAGNOSIS — N89.8 VAGINAL DISCHARGE: Primary | ICD-10-CM

## 2018-10-29 DIAGNOSIS — N89.8 VAGINAL IRRITATION: ICD-10-CM

## 2018-10-29 RX ORDER — PSEUDOEPHED/ACETAMINOPH/DIPHEN 30MG-500MG
TABLET ORAL
Qty: 100 TABLET | Refills: 3 | Status: SHIPPED | OUTPATIENT
Start: 2018-10-29 | End: 2019-10-09

## 2018-10-29 NOTE — TELEPHONE ENCOUNTER
"Requested Prescriptions   Pending Prescriptions Disp Refills     ACETAMINOPHEN EXTRA STRENGTH 500 MG tablet [Pharmacy Med Name: ACETAMINOPHEN 500MG TABLETS]  Last Written Prescription Date:  8/22/2018  Last Fill Quantity: 100,  # refills: 0   Last Office Visit: 9/5/2018   Future Office Visit:      100 tablet 0     Sig: TAKE 2 TABLETS(1000 MG) BY MOUTH TWICE DAILY AS NEEDED FOR MILD PAIN    Analgesics (Non-Narcotic Tylenol and ASA Only) Passed    10/28/2018  3:33 AM       Passed - Recent (12 mo) or future (30 days) visit within the authorizing provider's specialty    Patient had office visit in the last 12 months or has a visit in the next 30 days with authorizing provider or within the authorizing provider's specialty.  See \"Patient Info\" tab in inbasket, or \"Choose Columns\" in Meds & Orders section of the refill encounter.             Passed - Patient is 7 months old or older    If patient is a peds patient of the age 7 mos -12 years, ok to refill using weight-based dosing.     If >3g daily and/or sig is not \"prn\", check for liver enzymes. If normal in the last year, ok to refill.  If not, refer to the provider.            "

## 2018-10-30 RX ORDER — METRONIDAZOLE 7.5 MG/G
GEL VAGINAL
Qty: 70 G | Refills: 0 | OUTPATIENT
Start: 2018-10-30

## 2018-10-30 RX ORDER — CLOTRIMAZOLE 2 G/100G
CREAM VAGINAL
Qty: 21 G | Refills: 0 | OUTPATIENT
Start: 2018-10-30

## 2018-10-30 RX ORDER — PSEUDOEPHED/ACETAMINOPH/DIPHEN 30MG-500MG
TABLET ORAL
Qty: 100 TABLET | Refills: 0 | OUTPATIENT
Start: 2018-10-30

## 2018-10-30 NOTE — TELEPHONE ENCOUNTER
See other Refill encounter.  Patient told me she does already have the yeast medication just needs the medication for BV.  Katherine Jaimes RN

## 2018-10-30 NOTE — TELEPHONE ENCOUNTER
Routing refill request to provider for review/approval because:  Drug not on the [facility] refill protocol for BV - Metronidazole gel.

## 2018-10-30 NOTE — TELEPHONE ENCOUNTER
Writer called patient, informed her wet prep ordered and offered free/sliding fee clinic information if future office visits at [facility] is not a financial possibility.    Patient verbalized understanding and stated she should be able to come in for office visits in the future, but does live near a community clinic if she ever needs to transfer care.    Lab only appt scheduled on 10/31/18.  Appt date, time and location confirmed with patient.  BIENVENIDO RoyN, RN

## 2018-10-30 NOTE — TELEPHONE ENCOUNTER
Prescription approved per [facility] Refill Protocol.    Signed Prescriptions:                        Disp   Refills    ACETAMINOPHEN EXTRA STRENGTH 500 MG tablet 100 ta*3        Sig: TAKE 2 TABLETS(1000 MG) BY MOUTH TWICE DAILY AS           NEEDED FOR MILD PAIN  Authorizing Provider: MICKIE FORTUNE  Ordering User: ANDREW GREGORY      Closing encounter - no further actions needed at this time    Andrew Gregory RN

## 2018-10-30 NOTE — TELEPHONE ENCOUNTER
Patient is not able to afford an OV at this time and is not on My Chart as she has no internet.  States OV would cost her $300.   Could she do a lab only appointment to do a self wet prep?  Please advise.  Katherine Jaimes RN

## 2018-10-30 NOTE — TELEPHONE ENCOUNTER
Fine with me though I think were trying to cut down on the amount of free care we give.  I think we had agreed any requests for new meds needs a visit attached.  Can also refer to  for sliding fee clinic options.  Wet prep ordered.    Sepideh Hines, CNP

## 2018-11-05 ENCOUNTER — TRANSFERRED RECORDS (OUTPATIENT)
Dept: HEALTH INFORMATION MANAGEMENT | Facility: CLINIC | Age: 38
End: 2018-11-05

## 2018-11-05 LAB
CREAT SERPL-MCNC: 0.66 MG/DL (ref 0.57–1.11)
GFR SERPL CREATININE-BSD FRML MDRD: >60 ML/MIN/1.73M2
GLUCOSE SERPL-MCNC: 98 MG/DL (ref 65–100)
POTASSIUM SERPL-SCNC: 3.5 MMOL/L (ref 3.5–5)

## 2018-11-06 ENCOUNTER — TRANSFERRED RECORDS (OUTPATIENT)
Dept: HEALTH INFORMATION MANAGEMENT | Facility: CLINIC | Age: 38
End: 2018-11-06

## 2018-11-08 LAB
ALT SERPL-CCNC: 7 IU/L (ref 8–45)
AST SERPL-CCNC: 14 IU/L (ref 2–40)
CHOLEST SERPL-MCNC: 258 MG/DL (ref 100–199)
HDLC SERPL-MCNC: 32 MG/DL
LDLC SERPL CALC-MCNC: 200 MG/DL
NONHDLC SERPL-MCNC: 226 MG/DL
TRIGL SERPL-MCNC: 132 MG/DL
TSH SERPL-ACNC: 1.03 UIU/ML (ref 0.35–4.94)

## 2019-01-02 ENCOUNTER — TELEPHONE (OUTPATIENT)
Dept: FAMILY MEDICINE | Facility: CLINIC | Age: 39
End: 2019-01-02

## 2019-01-02 DIAGNOSIS — M25.511 CHRONIC RIGHT SHOULDER PAIN: ICD-10-CM

## 2019-01-02 DIAGNOSIS — M54.5 CHRONIC LOW BACK PAIN, UNSPECIFIED BACK PAIN LATERALITY, WITH SCIATICA PRESENCE UNSPECIFIED: ICD-10-CM

## 2019-01-02 DIAGNOSIS — G89.29 CHRONIC MIDLINE LOW BACK PAIN WITH SCIATICA, SCIATICA LATERALITY UNSPECIFIED: ICD-10-CM

## 2019-01-02 DIAGNOSIS — M54.6 CHRONIC MIDLINE THORACIC BACK PAIN: ICD-10-CM

## 2019-01-02 DIAGNOSIS — G89.29 CHRONIC MIDLINE THORACIC BACK PAIN: ICD-10-CM

## 2019-01-02 DIAGNOSIS — G89.29 CHRONIC LOW BACK PAIN, UNSPECIFIED BACK PAIN LATERALITY, WITH SCIATICA PRESENCE UNSPECIFIED: ICD-10-CM

## 2019-01-02 DIAGNOSIS — G89.29 CHRONIC RIGHT SHOULDER PAIN: ICD-10-CM

## 2019-01-02 DIAGNOSIS — R19.7 DIARRHEA, UNSPECIFIED TYPE: ICD-10-CM

## 2019-01-02 DIAGNOSIS — F11.20 UNCOMPLICATED OPIOID DEPENDENCE (H): ICD-10-CM

## 2019-01-02 DIAGNOSIS — M54.40 CHRONIC MIDLINE LOW BACK PAIN WITH SCIATICA, SCIATICA LATERALITY UNSPECIFIED: ICD-10-CM

## 2019-01-02 RX ORDER — LOPERAMIDE HYDROCHLORIDE 2 MG/1
TABLET ORAL
Qty: 60 TABLET | Refills: 0 | Status: CANCELLED | OUTPATIENT
Start: 2019-01-02

## 2019-01-02 RX ORDER — DIPHENHYDRAMINE HCL 25 MG
TABLET ORAL
Qty: 60 TABLET | Refills: 1 | Status: SHIPPED | OUTPATIENT
Start: 2019-01-02 | End: 2020-07-07

## 2019-01-02 RX ORDER — METHOCARBAMOL 750 MG/1
TABLET, FILM COATED ORAL
Qty: 180 TABLET | Refills: 0 | Status: SHIPPED | OUTPATIENT
Start: 2019-01-02 | End: 2019-01-25

## 2019-01-02 RX ORDER — IBUPROFEN 600 MG/1
TABLET, FILM COATED ORAL
Qty: 180 TABLET | Refills: 0 | Status: SHIPPED | OUTPATIENT
Start: 2019-01-02 | End: 2019-05-15

## 2019-01-02 NOTE — TELEPHONE ENCOUNTER
PT is wanting this refilled within one hour.  Pt is leaving for a treatment program today.     Pt wants clinic to call her when this is done. Call her at [phone]     I did tell pt that refills can take 3 business days.  TUSHAR Bo

## 2019-01-02 NOTE — TELEPHONE ENCOUNTER
PT is wanting this refilled within one hour.  Pt is leaving for a treatment program today.     Pt wants clinic to call her when this is done. Call her at [phone]    Sent in benadryl per prot.  Sending Ibuprofen to pcp.     I did tell pt that refills can take 3 business days.  TUSHAR Bo       7

## 2019-01-02 NOTE — TELEPHONE ENCOUNTER
"Requested Prescriptions   Pending Prescriptions Disp Refills     ibuprofen (ADVIL/MOTRIN) 600 MG tablet  Last Written Prescription Date:  2018  Last Fill Quantity: 180 tablet,  # refills: 0   Last Office Visit: 2018   Future Office Visit:      180 tablet 0    NSAID Medications Failed - 2019 10:00 AM       Failed - Normal ALT on file in past 12 months    Recent Labs   Lab Test 18   ALT 7*          Failed - Normal CBC on file in past 12 months    Recent Labs   Lab Test 17  1335   WBC 10.0   RBC 4.01   HGB 12.0   HCT 35.3             Passed - Blood pressure under 140/90 in past 12 months    BP Readings from Last 3 Encounters:   10/01/18 110/62   18 113/86   18 122/74          Passed - Normal AST on file in past 12 months    Recent Labs   Lab Test 18   AST 14          Passed - Recent (12 mo) or future (30 days) visit within the authorizing provider's specialty    Patient had office visit in the last 12 months or has a visit in the next 30 days with authorizing provider or within the authorizing provider's specialty.  See \"Patient Info\" tab in inbasket, or \"Choose Columns\" in Meds & Orders section of the refill encounter.           Passed - Patient is age 6-64 years       Passed - No active pregnancy on record       Passed - Normal serum creatinine on file in past 12 months    Recent Labs   Lab Test 18   CR 0.66          Passed - No positive pregnancy test in past 12 months     _________________________________________________________________________________________________________________________       diphenhydrAMINE (BENADRYL) 25 MG tablet  Last Written Prescription Date:  10/1/2018  Last Fill Quantity: 60 tablet,  # refills: 1   Last Office Visit: 2018   Future Office Visit:      60 tablet 1     Simg q hs prn for sleep.    Antihistamines Protocol Passed - 2019 10:00 AM       Passed - Recent (12 mo) or future (30 days) visit within the authorizing " "provider's specialty    Patient had office visit in the last 12 months or has a visit in the next 30 days with authorizing provider or within the authorizing provider's specialty.  See \"Patient Info\" tab in inbasket, or \"Choose Columns\" in Meds & Orders section of the refill encounter.           Passed - Patient is age 3 or older    Apply age and/or weight-based dosing for peds patients age 3 and older.    Forward request to provider for patients under the age of 3.            "

## 2019-01-02 NOTE — TELEPHONE ENCOUNTER
rx were sent in.  Pt should f/u with pcp after treatment.      I informed pt.  She says the treatment is 6-16 weeks. This is for trauma.  TUSHAR Bo

## 2019-01-02 NOTE — TELEPHONE ENCOUNTER
METHOCARBAMOL 750MG TABLETS      Last Written Prescription Date:  10/30/2018  Last Fill Quantity: 180 tablet,   # refills: 0  Last Office Visit: 9/5/2017  Future Office visit:       Routing refill request to provider for review/approval because:  Drug not on the [facility], [facility] or [facility] refill protocol or controlled substance

## 2019-01-03 ENCOUNTER — TRANSFERRED RECORDS (OUTPATIENT)
Dept: HEALTH INFORMATION MANAGEMENT | Facility: CLINIC | Age: 39
End: 2019-01-03

## 2019-01-25 DIAGNOSIS — M54.40 CHRONIC MIDLINE LOW BACK PAIN WITH SCIATICA, SCIATICA LATERALITY UNSPECIFIED: ICD-10-CM

## 2019-01-25 DIAGNOSIS — G89.29 CHRONIC MIDLINE LOW BACK PAIN WITH SCIATICA, SCIATICA LATERALITY UNSPECIFIED: ICD-10-CM

## 2019-01-25 DIAGNOSIS — G89.29 CHRONIC RIGHT SHOULDER PAIN: ICD-10-CM

## 2019-01-25 DIAGNOSIS — M25.511 CHRONIC RIGHT SHOULDER PAIN: ICD-10-CM

## 2019-01-25 NOTE — TELEPHONE ENCOUNTER
Medication Detail      Disp Refills Start End TJ   methocarbamol (ROBAXIN) 750 MG tablet 180 tablet 0 1/2/2019  No   Sig: TAKE 2 TABLETS(1500 MG) BY MOUTH THREE TIMES DAILY AS NEEDED FOR MUSCLE SPASMS   Sent to pharmacy as: methocarbamol (ROBAXIN) 750 MG tablet   Class: E-Prescribe   Order: [id number]   E-Prescribing Status: Receipt confirmed by pharmacy (1/2/2019 12:33 PM CST)       Last Office Visit: 9/5/2018  Future Office visit:       Routing refill request to provider for review/approval because:  Drug not on the [facility], [facility] or [facility] refill protocol or controlled substance

## 2019-01-28 RX ORDER — METHOCARBAMOL 750 MG/1
TABLET, FILM COATED ORAL
Qty: 180 TABLET | Refills: 0 | Status: SHIPPED | OUTPATIENT
Start: 2019-01-28 | End: 2019-04-17

## 2019-01-29 DIAGNOSIS — M54.6 CHRONIC MIDLINE THORACIC BACK PAIN: ICD-10-CM

## 2019-01-29 DIAGNOSIS — G89.29 CHRONIC LOW BACK PAIN, UNSPECIFIED BACK PAIN LATERALITY, WITH SCIATICA PRESENCE UNSPECIFIED: ICD-10-CM

## 2019-01-29 DIAGNOSIS — G89.29 CHRONIC MIDLINE THORACIC BACK PAIN: ICD-10-CM

## 2019-01-29 DIAGNOSIS — M54.5 CHRONIC LOW BACK PAIN, UNSPECIFIED BACK PAIN LATERALITY, WITH SCIATICA PRESENCE UNSPECIFIED: ICD-10-CM

## 2019-01-29 NOTE — TELEPHONE ENCOUNTER
"Requested Prescriptions   Pending Prescriptions Disp Refills     ibuprofen (ADVIL/MOTRIN) 600 MG tablet [Pharmacy Med Name: IBUPROFEN 600MG TABLETS]  Last Written Prescription Date:  1/2/2019  Last Fill Quantity: 180 tablet,  # refills: 0   Last Office Visit: 9/5/2018   Future Office Visit:      180 tablet 0     Sig: TAKE 1 TABLET BY MOUTH TWICE DAILY WITH MEALS    NSAID Medications Failed - 1/29/2019  7:07 AM       Failed - Normal ALT on file in past 12 months    Recent Labs   Lab Test 11/08/18   ALT 7*          Failed - Normal CBC on file in past 12 months    Recent Labs   Lab Test 07/14/17  1335   WBC 10.0   RBC 4.01   HGB 12.0   HCT 35.3             Passed - Blood pressure under 140/90 in past 12 months    BP Readings from Last 3 Encounters:   10/01/18 110/62   09/05/18 113/86   07/27/18 122/74          Passed - Normal AST on file in past 12 months    Recent Labs   Lab Test 11/08/18   AST 14          Passed - Recent (12 mo) or future (30 days) visit within the authorizing provider's specialty    Patient had office visit in the last 12 months or has a visit in the next 30 days with authorizing provider or within the authorizing provider's specialty.  See \"Patient Info\" tab in inbasket, or \"Choose Columns\" in Meds & Orders section of the refill encounter.           Passed - Patient is age 6-64 years       Passed - Medication is active on med list       Passed - No active pregnancy on record       Passed - Normal serum creatinine on file in past 12 months    Recent Labs   Lab Test 11/05/18   CR 0.66          Passed - No positive pregnancy test in past 12 months          "

## 2019-01-31 RX ORDER — IBUPROFEN 600 MG/1
TABLET, FILM COATED ORAL
Qty: 180 TABLET | Refills: 0 | OUTPATIENT
Start: 2019-01-31

## 2019-02-05 ENCOUNTER — TRANSFERRED RECORDS (OUTPATIENT)
Dept: HEALTH INFORMATION MANAGEMENT | Facility: CLINIC | Age: 39
End: 2019-02-05

## 2019-02-26 ENCOUNTER — TELEPHONE (OUTPATIENT)
Dept: ADDICTION MEDICINE | Facility: CLINIC | Age: 39
End: 2019-02-26

## 2019-02-26 DIAGNOSIS — F11.20 UNCOMPLICATED OPIOID DEPENDENCE (H): ICD-10-CM

## 2019-02-26 RX ORDER — BUPRENORPHINE 8 MG/1
8 TABLET SUBLINGUAL 3 TIMES DAILY
Qty: 90 EACH | Refills: 0 | Status: CANCELLED | OUTPATIENT
Start: 2019-02-26

## 2019-02-26 NOTE — TELEPHONE ENCOUNTER
Reason for Call:  Medication or medication refill:    Do you use a [facility]?  Name of the pharmacy and phone number for the current request:  [facility]     Name of the medication requested: Subutex bridge     Other request: pt no show her last appt on 2/6, and will run out of med tomorrow. Pt will need a bridge until her next appt 3/20.    Can we leave a detailed message on this number? YES    Phone number patient can be reached at: Home number on file [phone] (home)    Best Time: anytime     Call taken on 2/26/2019 at 10:59 AM by Abel Plunkett

## 2019-02-26 NOTE — TELEPHONE ENCOUNTER
Patient has not been seen since October 2018.  Last refill of Subutex was from a different provider 1/25/19 for a 7 day supply. Message from team states she will be out of Subutex tomorrow however.     RN attempted several calls to given telephone number. Number does not work.     Will route to provider for review.     Refill for: Subutex    Last Appointment: 10/01/2018    Next Appointment: 3/20/19    No Shows/Cancellations since last appointment:  No show 2/6, 10/31, and 10/29    Last Refill in Epic (date and amount/how many days):   buprenorphine (SUBUTEX) 8 MG SUBL sublingual tablet 90 each 0 10/1/2018  No   Sig - Route: Place 1 tablet (8 mg) under the tongue 3 times daily - Sublingual   Class: Local Print         Most Recent UDS results: 10/1/18 POS for cocaine, opiates, benzos, TCA's      reviewed and summarized below:   Fill Date ID Written                        Drug                         Qty Days Prescriber   01/29/2019 3 09/04/2018 Alprazolam 1 Mg Tablet 90 30 Ka Mark   01/29/2019 3 09/04/2018 Clonazepam 1 Mg Tablet 30 30 Ka Mark   01/25/2019 3 01/25/2019 Buprenorphine 8 Mg Tablet Sl 14 7 Al Sas

## 2019-03-02 ENCOUNTER — NURSE TRIAGE (OUTPATIENT)
Dept: NURSING | Facility: CLINIC | Age: 39
End: 2019-03-02

## 2019-03-02 DIAGNOSIS — G89.29 CHRONIC RIGHT SHOULDER PAIN: ICD-10-CM

## 2019-03-02 DIAGNOSIS — M25.511 CHRONIC RIGHT SHOULDER PAIN: ICD-10-CM

## 2019-03-02 DIAGNOSIS — G89.29 CHRONIC MIDLINE LOW BACK PAIN WITH SCIATICA, SCIATICA LATERALITY UNSPECIFIED: ICD-10-CM

## 2019-03-02 DIAGNOSIS — M54.40 CHRONIC MIDLINE LOW BACK PAIN WITH SCIATICA, SCIATICA LATERALITY UNSPECIFIED: ICD-10-CM

## 2019-03-02 NOTE — TELEPHONE ENCOUNTER
Patient asking for new prescription for Subutex.  She will have to go through her PCP when clinic opens and/or call back with symptoms.  Zita Green RN  [facility]      Reason for Disposition    Caller requesting a NON-URGENT new prescription or refill and triager unable to refill per unit policy    Protocols used: MEDICATION QUESTION CALL-ADULT-

## 2019-03-03 NOTE — TELEPHONE ENCOUNTER
Requested Prescriptions   Pending Prescriptions Disp Refills     methocarbamol (ROBAXIN) 750 MG tablet [Pharmacy Med Name:  Last Written Prescription Date:  1/28/19  Last Fill Quantity: 180,  # refills: 0   Last office visit: 9/5/2018 with prescribing provider:     Future Office Visit:   Next 5 appointments (look out 90 days)    Mar 20, 2019  2:00 PM CDT  Return Visit with Valeria Olson MD  [facility] ([facility]) [address]  [address]  [facility] [address]  [phone]          METHOCARBAMOL 750MG TABLETS] 180 tablet 0     Sig: TAKE 2 TABLETS(1500 MG) BY MOUTH THREE TIMES DAILY AS NEEDED FOR MUSCLE SPASMS    There is no refill protocol information for this order

## 2019-03-03 NOTE — TELEPHONE ENCOUNTER
"Routing refill request to provider for review/approval because:  --Plan in last office visit with Mirza - \"Follow up with specialist if your back pain is not improving \"  --No showed addiction med visit 2/6/19.          "

## 2019-03-04 RX ORDER — METHOCARBAMOL 750 MG/1
TABLET, FILM COATED ORAL
Qty: 90 TABLET | Refills: 0 | Status: SHIPPED | OUTPATIENT
Start: 2019-03-04 | End: 2019-04-09
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
## 2019-03-05 ENCOUNTER — OFFICE VISIT (OUTPATIENT)
Dept: FAMILY MEDICINE | Facility: CLINIC | Age: 39
End: 2019-03-05
Payer: MEDICAID

## 2019-03-05 VITALS
RESPIRATION RATE: 24 BRPM | HEART RATE: 141 BPM | BODY MASS INDEX: 26.3 KG/M2 | TEMPERATURE: 99.8 F | SYSTOLIC BLOOD PRESSURE: 108 MMHG | DIASTOLIC BLOOD PRESSURE: 72 MMHG | WEIGHT: 128 LBS | OXYGEN SATURATION: 97 %

## 2019-03-05 DIAGNOSIS — N89.8 VAGINAL ODOR: ICD-10-CM

## 2019-03-05 DIAGNOSIS — N76.0 BACTERIAL VAGINOSIS: ICD-10-CM

## 2019-03-05 DIAGNOSIS — B96.89 BACTERIAL VAGINOSIS: ICD-10-CM

## 2019-03-05 DIAGNOSIS — R30.0 DYSURIA: ICD-10-CM

## 2019-03-05 DIAGNOSIS — Z72.51 UNPROTECTED SEXUAL INTERCOURSE: ICD-10-CM

## 2019-03-05 DIAGNOSIS — T74.21XD SEXUAL ASSAULT OF ADULT, SUBSEQUENT ENCOUNTER: Primary | ICD-10-CM

## 2019-03-05 LAB
ALBUMIN UR-MCNC: 30 MG/DL
APPEARANCE UR: CLEAR
BACTERIA #/AREA URNS HPF: ABNORMAL /HPF
BILIRUB UR QL STRIP: ABNORMAL
CASTS #/AREA URNS LPF: ABNORMAL /LPF (ref 0–2)
COLOR UR AUTO: YELLOW
GLUCOSE UR STRIP-MCNC: NEGATIVE MG/DL
HCG UR QL: NEGATIVE
HGB UR QL STRIP: ABNORMAL
KETONES UR STRIP-MCNC: ABNORMAL MG/DL
LEUKOCYTE ESTERASE UR QL STRIP: NEGATIVE
Lab: ABNORMAL
MUCOUS THREADS #/AREA URNS LPF: PRESENT /LPF
NITRATE UR QL: NEGATIVE
NON-SQ EPI CELLS #/AREA URNS LPF: ABNORMAL /LPF
PH UR STRIP: 6 PH (ref 5–7)
RBC #/AREA URNS AUTO: ABNORMAL /HPF
RENAL EPI CELLS #/AREA URNS HPF: ABNORMAL /HPF
SOURCE: ABNORMAL
SP GR UR STRIP: >1.03 (ref 1–1.03)
SPECIMEN SOURCE: ABNORMAL
TRANS CELLS #/AREA URNS HPF: ABNORMAL /HPF
UROBILINOGEN UR STRIP-ACNC: 2 EU/DL (ref 0.2–1)
WBC #/AREA URNS AUTO: ABNORMAL /HPF
WET PREP SPEC: ABNORMAL

## 2019-03-05 PROCEDURE — 87210 SMEAR WET MOUNT SALINE/INK: CPT | Performed by: NURSE PRACTITIONER

## 2019-03-05 PROCEDURE — 0064U ANTB TP TOTAL&RPR IA QUAL: CPT | Performed by: NURSE PRACTITIONER

## 2019-03-05 PROCEDURE — 87389 HIV-1 AG W/HIV-1&-2 AB AG IA: CPT | Performed by: NURSE PRACTITIONER

## 2019-03-05 PROCEDURE — 87591 N.GONORRHOEAE DNA AMP PROB: CPT | Performed by: NURSE PRACTITIONER

## 2019-03-05 PROCEDURE — 36415 COLL VENOUS BLD VENIPUNCTURE: CPT | Performed by: NURSE PRACTITIONER

## 2019-03-05 PROCEDURE — 99214 OFFICE O/P EST MOD 30 MIN: CPT | Performed by: NURSE PRACTITIONER

## 2019-03-05 PROCEDURE — 81001 URINALYSIS AUTO W/SCOPE: CPT | Performed by: NURSE PRACTITIONER

## 2019-03-05 PROCEDURE — 87491 CHLMYD TRACH DNA AMP PROBE: CPT | Performed by: NURSE PRACTITIONER

## 2019-03-05 PROCEDURE — 81025 URINE PREGNANCY TEST: CPT | Performed by: NURSE PRACTITIONER

## 2019-03-05 PROCEDURE — 80048 BASIC METABOLIC PNL TOTAL CA: CPT | Performed by: NURSE PRACTITIONER

## 2019-03-05 RX ORDER — PRAZOSIN HYDROCHLORIDE 2 MG/1
2 CAPSULE ORAL AT BEDTIME
COMMUNITY
Start: 2019-03-05 | End: 2019-05-15

## 2019-03-05 RX ORDER — METRONIDAZOLE 500 MG/1
500 TABLET ORAL 2 TIMES DAILY
Qty: 14 TABLET | Refills: 0 | Status: SHIPPED | OUTPATIENT
Start: 2019-03-05 | End: 2019-04-17

## 2019-03-05 ASSESSMENT — PATIENT HEALTH QUESTIONNAIRE - PHQ9
5. POOR APPETITE OR OVEREATING: SEVERAL DAYS
SUM OF ALL RESPONSES TO PHQ QUESTIONS 1-9: 3

## 2019-03-05 ASSESSMENT — ANXIETY QUESTIONNAIRES
3. WORRYING TOO MUCH ABOUT DIFFERENT THINGS: SEVERAL DAYS
7. FEELING AFRAID AS IF SOMETHING AWFUL MIGHT HAPPEN: SEVERAL DAYS
6. BECOMING EASILY ANNOYED OR IRRITABLE: SEVERAL DAYS
1. FEELING NERVOUS, ANXIOUS, OR ON EDGE: NEARLY EVERY DAY
GAD7 TOTAL SCORE: 9
2. NOT BEING ABLE TO STOP OR CONTROL WORRYING: SEVERAL DAYS
5. BEING SO RESTLESS THAT IT IS HARD TO SIT STILL: SEVERAL DAYS

## 2019-03-05 NOTE — TELEPHONE ENCOUNTER
"Requested Prescriptions   Pending Prescriptions Disp Refills     prazosin (MINIPRESS) 1 MG capsule [Pharmacy Med Name: PRAZOSIN 1MG CAPSULES]  Last Written Prescription Date:    Last Fill Quantity: ,  # refills:    Last Office Visit: 9/5/2018   Future Office Visit:    Next 5 appointments (look out 90 days)    Mar 05, 2019  4:00 PM CST  SHORT with SONJA Meek CNP  [facility] ([facility]) [facility] [place] [id number] [phone]   Mar 20, 2019  2:00 PM CDT  Return Visit with Valeria Olson MD  [facility] ([facility]) [address] [place], [place] [address]   [address]  [facility] [address] [phone]        60 capsule 0     Sig: TAKE 2 CAPSULES BY MOUTH AT NIGHT AS NEEDED    Alpha Blockers Failed - 3/5/2019  1:14 PM       Failed - Medication is active on med list       Passed - Blood pressure under 140/90 in past 12 months    BP Readings from Last 3 Encounters:   10/01/18 110/62   09/05/18 113/86   07/27/18 122/74          Passed - Recent (12 mo) or future (30 days) visit within the authorizing provider's specialty    Patient had office visit in the last 12 months or has a visit in the next 30 days with authorizing provider or within the authorizing provider's specialty.  See \"Patient Info\" tab in inbasket, or \"Choose Columns\" in Meds & Orders section of the refill encounter.           Passed - Patient does not have Tadalafil, Vardenafil, or Sildenafil on their medication list       Passed - Patient is 18 years of age or older       Passed - No active pregnancy on record       Passed - No positive pregnancy test in past 12 months          "

## 2019-03-05 NOTE — LETTER
March 7, 2019      Alisia Martins Prebi  [address]  [place] [address]          Alisia,    STD screening was negative for chlamydia, gonorrhea, HIV, and syphilis.  Condoms are the best way to prevent transmission of STDs.    Your blood sugar was normal indicating you do not have diabetes.    Wet prep shows bacterial vaginosis, I sent the flagyl medication into your pharmacy.  I tried to call you but the number was not working.    Normal kidney function.     Results for orders placed or performed in visit on 03/05/19   HCG Qual, Urine ([id number])   Result Value Ref Range    HCG Qual Urine Negative NEG^Negative   UA reflex to Microscopic and Culture   Result Value Ref Range    Color Urine Yellow     Appearance Urine Clear     Glucose Urine Negative NEG^Negative mg/dL    Bilirubin Urine Small (A) NEG^Negative    Ketones Urine Trace (A) NEG^Negative mg/dL    Specific Gravity Urine >1.030 1.003 - 1.035    Blood Urine Small (A) NEG^Negative    pH Urine 6.0 5.0 - 7.0 pH    Protein Albumin Urine 30 (A) NEG^Negative mg/dL    Urobilinogen Urine 2.0 (H) 0.2 - 1.0 EU/dL    Nitrite Urine Negative NEG^Negative    Leukocyte Esterase Urine Negative NEG^Negative    Source Midstream Urine    HIV Antigen Antibody Combo   Result Value Ref Range    HIV Antigen Antibody Combo Nonreactive NR^Nonreactive       Treponema Abs w Reflex to RPR and Titer   Result Value Ref Range    Treponema Antibodies Nonreactive NR^Nonreactive   Urine Microscopic   Result Value Ref Range    WBC Urine 0 - 5 OTO5^0 - 5 /HPF    RBC Urine O - 2 OTO2^O - 2 /HPF    Cast Urine 2-5 0 - 2 /LPF    Squamous Epithelial /LPF Urine Moderate (A) FEW^Few /LPF    Transitional Epi Few FEW^Few /HPF    Renal Tub Epi Few (A) NEG^Negative /HPF    Bacteria Urine Few (A) NEG^Negative /HPF    Mucous Urine Present (A) NEG^Negative /LPF   Basic metabolic panel   Result Value Ref Range    Sodium 139 133 - 144 mmol/L    Potassium 3.5 3.4 - 5.3 mmol/L    Chloride 105 94 - 109 mmol/L     Carbon Dioxide 24 20 - 32 mmol/L    Anion Gap 10 3 - 14 mmol/L    Glucose 89 70 - 99 mg/dL    Urea Nitrogen 9 7 - 30 mg/dL    Creatinine 0.86 0.52 - 1.04 mg/dL    GFR Estimate 84 >60 mL/min/[1.73_m2]    GFR Estimate If Black >90 >60 mL/min/[1.73_m2]    Calcium 9.0 8.5 - 10.1 mg/dL   Wet prep   Result Value Ref Range    Specimen Description Vagina     Special Requests Vagina     Wet Prep Clue cells seen (A)     Wet Prep No Trichomonas seen     Wet Prep No yeast seen    NEISSERIA GONORRHOEA PCR   Result Value Ref Range    Specimen Descrip Vagina     N Gonorrhea PCR Negative NEG^Negative   CHLAMYDIA TRACHOMATIS PCR   Result Value Ref Range    Specimen Description Vagina     Chlamydia Trachomatis PCR Negative NEG^Negative        Sincerely,      Sepideh Hines, APRN CNP/jln

## 2019-03-06 LAB
ANION GAP SERPL CALCULATED.3IONS-SCNC: 10 MMOL/L (ref 3–14)
BUN SERPL-MCNC: 9 MG/DL (ref 7–30)
CALCIUM SERPL-MCNC: 9 MG/DL (ref 8.5–10.1)
CHLORIDE SERPL-SCNC: 105 MMOL/L (ref 94–109)
CO2 SERPL-SCNC: 24 MMOL/L (ref 20–32)
CREAT SERPL-MCNC: 0.86 MG/DL (ref 0.52–1.04)
GFR SERPL CREATININE-BSD FRML MDRD: 84 ML/MIN/{1.73_M2}
GLUCOSE SERPL-MCNC: 89 MG/DL (ref 70–99)
HIV 1+2 AB+HIV1 P24 AG SERPL QL IA: NONREACTIVE
POTASSIUM SERPL-SCNC: 3.5 MMOL/L (ref 3.4–5.3)
SODIUM SERPL-SCNC: 139 MMOL/L (ref 133–144)

## 2019-03-06 RX ORDER — PRAZOSIN HYDROCHLORIDE 1 MG/1
CAPSULE ORAL
Qty: 60 CAPSULE | Refills: 3 | OUTPATIENT
Start: 2019-03-06

## 2019-03-06 ASSESSMENT — ASTHMA QUESTIONNAIRES: ACT_TOTALSCORE: 25

## 2019-03-06 ASSESSMENT — ANXIETY QUESTIONNAIRES: GAD7 TOTAL SCORE: 9

## 2019-03-06 NOTE — TELEPHONE ENCOUNTER
"  --Last OV : 3/5/19 Donny - \"......Has addressed with her psychiatrist and was started on prazosin 2mg daily\"  -- I called pharmacy and asked if psych has been ordering and pharmacy says yes and will route to originating provider.          "

## 2019-03-07 LAB
C TRACH DNA SPEC QL NAA+PROBE: NEGATIVE
N GONORRHOEA DNA SPEC QL NAA+PROBE: NEGATIVE
SPECIMEN SOURCE: NORMAL
SPECIMEN SOURCE: NORMAL
T PALLIDUM AB SER QL: NONREACTIVE

## 2019-04-05 ENCOUNTER — TELEPHONE (OUTPATIENT)
Dept: ADDICTION MEDICINE | Facility: CLINIC | Age: 39
End: 2019-04-05

## 2019-04-05 DIAGNOSIS — F51.4 NIGHT TERROR: Primary | ICD-10-CM

## 2019-04-05 DIAGNOSIS — M54.40 CHRONIC MIDLINE LOW BACK PAIN WITH SCIATICA, SCIATICA LATERALITY UNSPECIFIED: ICD-10-CM

## 2019-04-05 DIAGNOSIS — M25.511 CHRONIC RIGHT SHOULDER PAIN: ICD-10-CM

## 2019-04-05 DIAGNOSIS — G89.29 CHRONIC RIGHT SHOULDER PAIN: ICD-10-CM

## 2019-04-05 DIAGNOSIS — F11.20 UNCOMPLICATED OPIOID DEPENDENCE (H): ICD-10-CM

## 2019-04-05 DIAGNOSIS — G89.29 CHRONIC MIDLINE LOW BACK PAIN WITH SCIATICA, SCIATICA LATERALITY UNSPECIFIED: ICD-10-CM

## 2019-04-05 RX ORDER — BUPRENORPHINE 8 MG/1
8 TABLET SUBLINGUAL 3 TIMES DAILY
Qty: 90 EACH | Refills: 0 | Status: CANCELLED | OUTPATIENT
Start: 2019-04-05

## 2019-04-05 NOTE — TELEPHONE ENCOUNTER
Requested Prescriptions   Pending Prescriptions Disp Refills     methocarbamol (ROBAXIN) 750 MG tablet [Pharmacy Med Name: METHOCARBAMOL 750MG TABLETS]  Last Written Prescription Date:  3/4/2019  Last Fill Quantity: 09tablet,  # refills: 0   Last Office Visit: 3/5/2019 Donny  Future Office Visit:    Next 5 appointments (look out 90 days)    Apr 17, 2019  2:20 PM CDT  Office Visit with SONJA Meek CNP  [facility] ([facility]) [address] [place] [id number]  [phone]   Apr 26, 2019  1:20 PM CDT  Return Visit with Valeria Olson MD  [facility] ([facility]) [address]  [address] [place] [id number] [phone]          90 tablet 0     Sig: TAKE 2 TABLETS BY MOUTH THREE TIMES DAILY AS NEEDED FOR MUSCLE SPASMS    There is no refill protocol information for this order        prazosin (MINIPRESS) 1 MG capsule [Pharmacy Med Name: PRAZOSIN 1MG CAPSULES]  Last Written Prescription Date:  3/5/2019  Last Fill Quantity: historical,  # refills: 0   Last Office Visit: 3/5/2019 Donny  Future Office Visit:    Next 5 appointments (look out 90 days)    Apr 17, 2019  2:20 PM CDT  Office Visit with SONJA Meek CNP  [facility] ([facility]) [address] [place] [id number] [phone]   Apr 26, 2019  1:20 PM CDT  Return Visit with Valeria Olson MD  [facility] ([facility]) [address]  [address] [place] [id number] [phone]          60 capsule 0     Sig: TAKE 2 CAPSULES BY MOUTH AT NIGHT AS NEEDED    Alpha Blockers Passed - 4/5/2019 10:25 AM       Passed - Blood pressure under 140/90 in past 12 months    BP Readings from Last 3 Encounters:   03/05/19 108/72   10/01/18 110/62   09/05/18 113/86                Passed - Recent (12 mo) or future (30  "days) visit within the authorizing provider's specialty    Patient had office visit in the last 12 months or has a visit in the next 30 days with authorizing provider or within the authorizing provider's specialty.  See \"Patient Info\" tab in inbasket, or \"Choose Columns\" in Meds & Orders section of the refill encounter.             Passed - Patient does not have Tadalafil, Vardenafil, or Sildenafil on their medication list       Passed - Medication is active on med list       Passed - Patient is 18 years of age or older       Passed - No active pregnancy on record       Passed - No positive pregnancy test in past 12 months          "

## 2019-04-05 NOTE — TELEPHONE ENCOUNTER
Reason for Call: Subutex Bridge    Do you use a [facility]?  Name of the pharmacy and phone number for the current request:  [facility] - [phone]    Name of the medication requested: buprenorphine (SUBUTEX) 8 MG SUBL sublingual tablet    Other request: Pt missed her March appt due to her insurance situation. Pt is now on new insurance and is scheduled to see Dr. Olson at her soonest available appt in [place], 4/26/19 @ 1:20. Pt is requesting a bridge to get her to that appt as she has been out for a while now.    Can we leave a detailed message on this number? YES    Phone number patient can be reached at: Home number on file [phone] (home)    Best Time: anytime    Call taken on 4/5/2019 at 10:18 AM by Hyun Cuevas

## 2019-04-05 NOTE — TELEPHONE ENCOUNTER
Refill for: subutex    Last Appointment: 10/1/18    Next Appointment: 4/26/19    No Shows/Cancellations since last appointment:  No Show 10/29, 10/31, 2/6, 3/20    Last Refill in Epic (date and amount/how many days):    Disp Refills Start End TJ   buprenorphine (SUBUTEX) 8 MG SUBL sublingual tablet 90 each 0 10/1/2018  No   Sig - Route: Place 1 tablet (8 mg) under the tongue 3 times daily - Sublingual     Most Recent UDS results: 10/1/18 POS for cocaine, opiates, benzodiazepines, and tricyclic antidepressants     reviewed and summarized below:   Fill Date Written    Drug   Qty Days Prescriber  03/21/2019 03/04/2019 Alprazolam 0.5 Mg Tablet  10 3 Ka Mark   03/21/2019 03/04/2019 Clonazepam 1 Mg Tablet  24 24 Ka Mark   03/21/2019 02/12/2019 Dextroamp-Amphet Er 30 Mg Cap 30 30 Ka Mark   03/08/2019 03/04/2019 Alprazolam 0.5 Mg Tablet  10 3 Ka Mark   03/08/2019 03/04/2019 Clonazepam 1 Mg Tablet  3 3 Ka Mark   03/05/2019 03/04/2019 Alprazolam 0.5 Mg Tablet  10 3 Ka Mark   03/05/2019 03/04/2019 Clonazepam 1 Mg Tablet  3 3 Ka Mark   01/29/2019 09/04/2018 Alprazolam 1 Mg Tablet  90 30 Ka Mark   01/29/2019 09/04/2018 Clonazepam 1 Mg Tablet  30 30 Ka Mark   01/25/2019 01/25/2019 Diazepam 5 Mg Tablet  32 7 Al Sas   01/25/2019 01/25/2019 Buprenorphine 8 Mg Tablet Sl  14 7 Al Sas     Writer called patient and explained that because patient hasn't been seen in so long, Dr. Olson will not be able to provide any prescriptions until she re-establishes care (on 4/26) (per note from encounter on 2/26/19 at 4:07 pm). Patient stated understanding and reconfirmed she will be at the appointment on 4/26.

## 2019-04-08 RX ORDER — PRAZOSIN HYDROCHLORIDE 2 MG/1
2 CAPSULE ORAL AT BEDTIME
Qty: 90 CAPSULE | Refills: 3 | OUTPATIENT
Start: 2019-04-08

## 2019-04-08 NOTE — TELEPHONE ENCOUNTER
This Rx comes from her psychiatrist, needs to contact their office for refills.    Sepideh Hines, CNP

## 2019-04-08 NOTE — TELEPHONE ENCOUNTER
Routing refill request to provider for review/approval because:  Medication is reported/historical      Sepideh-Please sign if agree, or advise if you recommend patient's mental health provider refill.    Thank you!  BIENVENIDO RoyN, RN

## 2019-04-08 NOTE — TELEPHONE ENCOUNTER
LM on unidentified VM. Very generic message.  Please get this med refill from your specialist.  Please call clinic for more specifics.  TUSHAR Bo

## 2019-04-08 NOTE — TELEPHONE ENCOUNTER
Routing refill request to provider for review/approval because:  --Prazosin - only entry for this medication is historical and I am not finding diagnosis to associate.  --Methocarbamol not on RN protocol.

## 2019-04-09 RX ORDER — METHOCARBAMOL 750 MG/1
TABLET, FILM COATED ORAL
Qty: 90 TABLET | Refills: 3 | Status: SHIPPED | OUTPATIENT
Start: 2019-04-09 | End: 2019-07-17

## 2019-04-09 RX ORDER — PRAZOSIN HYDROCHLORIDE 1 MG/1
CAPSULE ORAL
Qty: 180 CAPSULE | Refills: 3 | OUTPATIENT
Start: 2019-04-09

## 2019-04-09 RX ORDER — METHOCARBAMOL 750 MG/1
TABLET, FILM COATED ORAL
Qty: 90 TABLET | Refills: 0 | OUTPATIENT
Start: 2019-04-09

## 2019-04-09 NOTE — TELEPHONE ENCOUNTER
Update pt on the med refills.    Pt takes the prazosin for night terrors. PT has PTSD. She was rx it in a residential treatment program. She will check to see if her current psychiatrist would rx.   It has stopped her night terrors.    PT left a residential treatment program in Jan 2019 after 8-9 days. Not good experience.    PT has f/u appt with PCP on 4/17/19. Her insurance had lapsed, so that was delay in clinic f/u.  PT is following up for h/a and memory loss.    FYI- to pcp.  TUSHAR Bo

## 2019-04-09 NOTE — TELEPHONE ENCOUNTER
There was another request for prazosin, she needs to get this from her psychiatrist who is prescribing it.    Refilled methocarbamol.    Sepideh Hines, CNP
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
## 2019-04-23 ENCOUNTER — OFFICE VISIT (OUTPATIENT)
Dept: FAMILY MEDICINE | Facility: CLINIC | Age: 39
End: 2019-04-23
Payer: COMMERCIAL

## 2019-04-23 VITALS
TEMPERATURE: 99.9 F | OXYGEN SATURATION: 99 % | RESPIRATION RATE: 16 BRPM | DIASTOLIC BLOOD PRESSURE: 82 MMHG | BODY MASS INDEX: 25.89 KG/M2 | WEIGHT: 126 LBS | SYSTOLIC BLOOD PRESSURE: 124 MMHG | HEART RATE: 154 BPM

## 2019-04-23 DIAGNOSIS — F41.1 GAD (GENERALIZED ANXIETY DISORDER): ICD-10-CM

## 2019-04-23 DIAGNOSIS — N89.8 VAGINAL DISCHARGE: ICD-10-CM

## 2019-04-23 DIAGNOSIS — Z30.09 BIRTH CONTROL COUNSELING: ICD-10-CM

## 2019-04-23 DIAGNOSIS — F33.1 MODERATE RECURRENT MAJOR DEPRESSION (H): ICD-10-CM

## 2019-04-23 DIAGNOSIS — Z72.51 UNPROTECTED SEXUAL INTERCOURSE: ICD-10-CM
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
## 2019-04-23 DIAGNOSIS — R00.0 TACHYCARDIA: ICD-10-CM

## 2019-04-23 DIAGNOSIS — J30.1 SEASONAL ALLERGIC RHINITIS DUE TO POLLEN: ICD-10-CM

## 2019-04-23 LAB
Lab: NORMAL
SPECIMEN SOURCE: NORMAL
WET PREP SPEC: NORMAL

## 2019-04-23 PROCEDURE — 87210 SMEAR WET MOUNT SALINE/INK: CPT | Performed by: NURSE PRACTITIONER

## 2019-04-23 PROCEDURE — 99214 OFFICE O/P EST MOD 30 MIN: CPT | Performed by: NURSE PRACTITIONER

## 2019-04-23 RX ORDER — CETIRIZINE HYDROCHLORIDE 10 MG/1
10 TABLET ORAL DAILY
Qty: 30 TABLET | Refills: 3 | Status: SHIPPED | OUTPATIENT
Start: 2019-04-23 | End: 2020-03-11

## 2019-04-23 NOTE — PATIENT INSTRUCTIONS
1.  I sent in emergency contraception, take it as soon as possible with the zofran.  Nausea and abd cramping are possible side effects    2.  I will send in metrogel if wet prep is positive    3.  Sent in allergy medication     4.  See OBGYN about implant for contraception

## 2019-04-23 NOTE — RESULT ENCOUNTER NOTE
Please send out result letter with the following note, thanks.    Wet prep was normal.    -Sepideh Hines, CNP

## 2019-04-23 NOTE — LETTER
April 25, 2019      Alisia Martins Prebish  [address]  [place] [address]        Dear Alisia,    The results of your recent lab (wet prep)  was normal.  Results for orders placed or performed in visit on 04/23/19   Wet prep   Result Value Ref Range    Specimen Description Vagina     Special Requests Vagina     Wet Prep No Trichomonas seen     Wet Prep No clue cells seen     Wet Prep No yeast seen     Wet Prep No WBC's seen              Sincerely,        Sepideh Hines, APRN CNP/jln

## 2019-04-26 ENCOUNTER — OFFICE VISIT (OUTPATIENT)
Dept: ADDICTION MEDICINE | Facility: CLINIC | Age: 39
End: 2019-04-26
Payer: COMMERCIAL

## 2019-04-26 VITALS
RESPIRATION RATE: 16 BRPM | BODY MASS INDEX: 26.09 KG/M2 | HEART RATE: 116 BPM | DIASTOLIC BLOOD PRESSURE: 72 MMHG | OXYGEN SATURATION: 99 % | WEIGHT: 127 LBS | SYSTOLIC BLOOD PRESSURE: 128 MMHG

## 2019-04-26 DIAGNOSIS — F90.2 ATTENTION DEFICIT HYPERACTIVITY DISORDER (ADHD), COMBINED TYPE: ICD-10-CM

## 2019-04-26 DIAGNOSIS — F51.01 PRIMARY INSOMNIA: ICD-10-CM

## 2019-04-26 DIAGNOSIS — F41.9 ANXIETY: ICD-10-CM

## 2019-04-26 DIAGNOSIS — F33.1 MODERATE RECURRENT MAJOR DEPRESSION (H): ICD-10-CM

## 2019-04-26 DIAGNOSIS — Z79.899 HIGH RISK MEDICATION USE: ICD-10-CM

## 2019-04-26 DIAGNOSIS — F11.20 UNCOMPLICATED OPIOID DEPENDENCE (H): Primary | ICD-10-CM

## 2019-04-26 DIAGNOSIS — F17.200 NICOTINE USE DISORDER: ICD-10-CM

## 2019-04-26 LAB
AMPHETAMINES UR QL: NOT DETECTED NG/ML
BARBITURATES UR QL SCN: NOT DETECTED NG/ML
BENZODIAZ UR QL SCN: ABNORMAL NG/ML
BUPRENORPHINE UR QL: NOT DETECTED NG/ML
CANNABINOIDS UR QL: NOT DETECTED NG/ML
COCAINE UR QL SCN: ABNORMAL NG/ML
D-METHAMPHET UR QL: NOT DETECTED NG/ML
METHADONE UR QL SCN: NOT DETECTED NG/ML
OPIATES UR QL SCN: NOT DETECTED NG/ML
OXYCODONE UR QL SCN: NOT DETECTED NG/ML
PCP UR QL SCN: NOT DETECTED NG/ML
PROPOXYPH UR QL: NOT DETECTED NG/ML
TRICYCLICS UR QL SCN: ABNORMAL NG/ML

## 2019-04-26 PROCEDURE — 99215 OFFICE O/P EST HI 40 MIN: CPT | Performed by: PEDIATRICS

## 2019-04-26 PROCEDURE — 80306 DRUG TEST PRSMV INSTRMNT: CPT | Performed by: PEDIATRICS

## 2019-04-26 RX ORDER — BUPRENORPHINE 8 MG/1
8 TABLET SUBLINGUAL 3 TIMES DAILY
Qty: 45 EACH | Refills: 0 | Status: SHIPPED | OUTPATIENT
Start: 2019-04-26 | End: 2019-05-15
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
## 2019-05-03 ENCOUNTER — TELEPHONE (OUTPATIENT)
Dept: ADDICTION MEDICINE | Facility: CLINIC | Age: 39
End: 2019-05-03

## 2019-05-03 NOTE — TELEPHONE ENCOUNTER
Writer called pharmacy as our system shows the 4/26 subutex prescription was received by pharmacy on 4/26/19 at 2:02 pm.    Pharmacy stated patient picked up the prescription yesterday.    Writer called patient to discuss. Patient did not answer. Voicemail left for her to return our call.

## 2019-05-03 NOTE — TELEPHONE ENCOUNTER
Pt called about her Subutex Rx Manny didn't receive the Rx dated for 4/26. Please re-send Rx to pharmacy.    Manny in [place] tel: [phone]    Pt tel: [phone]      Abel Plunkett

## 2019-05-06 NOTE — TELEPHONE ENCOUNTER
Attempt #2 to reach patient. Voicemail left asking her to return our call.     :  Fill Date Written    Drug   Qty Days Prescriber   04/29/2019 04/26/2019 Dextroamp-Amphetamine 5 Mg Tab 60 30 Ka Mark   04/29/2019 04/26/2019 Buprenorphine 8 Mg Tablet Sl  45 15 Ei Bur   04/26/2019 04/26/2019 Alprazolam 1 Mg Tablet  90 30 Ka Mark     Writer checked , it confirms that patient should have enough suboxone to last through the end of the day on 5/13/19 so she will have none to take starting 5/14/19. This means patient may need a one day bridge. Will wait for patient to contact us for the bridge if she needs it. Closing this encounter.

## 2019-05-15 ENCOUNTER — OFFICE VISIT (OUTPATIENT)
Dept: ADDICTION MEDICINE | Facility: CLINIC | Age: 39
End: 2019-05-15
Payer: COMMERCIAL

## 2019-05-15 VITALS
RESPIRATION RATE: 16 BRPM | HEART RATE: 115 BPM | SYSTOLIC BLOOD PRESSURE: 104 MMHG | OXYGEN SATURATION: 98 % | BODY MASS INDEX: 26.71 KG/M2 | DIASTOLIC BLOOD PRESSURE: 68 MMHG | WEIGHT: 130 LBS

## 2019-05-15 DIAGNOSIS — G89.29 CHRONIC LOW BACK PAIN, UNSPECIFIED BACK PAIN LATERALITY, WITH SCIATICA PRESENCE UNSPECIFIED: ICD-10-CM

## 2019-05-15 DIAGNOSIS — M54.5 CHRONIC LOW BACK PAIN, UNSPECIFIED BACK PAIN LATERALITY, WITH SCIATICA PRESENCE UNSPECIFIED: ICD-10-CM

## 2019-05-15 DIAGNOSIS — F90.2 ATTENTION DEFICIT HYPERACTIVITY DISORDER (ADHD), COMBINED TYPE: ICD-10-CM

## 2019-05-15 DIAGNOSIS — F33.1 MODERATE RECURRENT MAJOR DEPRESSION (H): ICD-10-CM

## 2019-05-15 DIAGNOSIS — F17.200 NICOTINE USE DISORDER: ICD-10-CM

## 2019-05-15 DIAGNOSIS — F11.20 UNCOMPLICATED OPIOID DEPENDENCE (H): Primary | ICD-10-CM

## 2019-05-15 DIAGNOSIS — L30.9 ECZEMA, UNSPECIFIED TYPE: ICD-10-CM

## 2019-05-15 DIAGNOSIS — F41.9 ANXIETY: ICD-10-CM

## 2019-05-15 DIAGNOSIS — J30.1 SEASONAL ALLERGIC RHINITIS DUE TO POLLEN: ICD-10-CM

## 2019-05-15 DIAGNOSIS — M54.6 CHRONIC MIDLINE THORACIC BACK PAIN: ICD-10-CM

## 2019-05-15 DIAGNOSIS — G89.29 CHRONIC MIDLINE THORACIC BACK PAIN: ICD-10-CM

## 2019-05-15 LAB
AMPHETAMINES UR QL: ABNORMAL NG/ML
BARBITURATES UR QL SCN: NOT DETECTED NG/ML
BENZODIAZ UR QL SCN: ABNORMAL NG/ML
BUPRENORPHINE UR QL: ABNORMAL NG/ML
CANNABINOIDS UR QL: NOT DETECTED NG/ML
COCAINE UR QL SCN: ABNORMAL NG/ML
D-METHAMPHET UR QL: NOT DETECTED NG/ML
METHADONE UR QL SCN: NOT DETECTED NG/ML
OPIATES UR QL SCN: ABNORMAL NG/ML
OXYCODONE UR QL SCN: NOT DETECTED NG/ML
PCP UR QL SCN: NOT DETECTED NG/ML
PROPOXYPH UR QL: NOT DETECTED NG/ML
TRICYCLICS UR QL SCN: ABNORMAL NG/ML

## 2019-05-15 PROCEDURE — 80361 OPIATES 1 OR MORE: CPT | Mod: 90 | Performed by: PEDIATRICS

## 2019-05-15 PROCEDURE — 80306 DRUG TEST PRSMV INSTRMNT: CPT | Performed by: PEDIATRICS

## 2019-05-15 PROCEDURE — 99215 OFFICE O/P EST HI 40 MIN: CPT | Performed by: PEDIATRICS

## 2019-05-15 PROCEDURE — 99000 SPECIMEN HANDLING OFFICE-LAB: CPT | Performed by: PEDIATRICS

## 2019-05-15 RX ORDER — PRAZOSIN HYDROCHLORIDE 2 MG/1
2 CAPSULE ORAL 2 TIMES DAILY
COMMUNITY
Start: 2019-05-15 | End: 2020-03-11

## 2019-05-15 RX ORDER — FLUTICASONE PROPIONATE 50 MCG
2 SPRAY, SUSPENSION (ML) NASAL DAILY
Qty: 18.2 ML | Refills: 1 | Status: SHIPPED | OUTPATIENT
Start: 2019-05-15 | End: 2019-06-27

## 2019-05-15 RX ORDER — BUPRENORPHINE 8 MG/1
8 TABLET SUBLINGUAL 3 TIMES DAILY
Qty: 45 EACH | Refills: 0 | Status: SHIPPED | OUTPATIENT
Start: 2019-05-15 | End: 2019-05-15

## 2019-05-15 RX ORDER — ALPRAZOLAM 0.25 MG
0.5 TABLET ORAL 3 TIMES DAILY PRN
Refills: 0 | COMMUNITY
Start: 2019-05-15 | End: 2020-03-11

## 2019-05-15 RX ORDER — DIAPER,BRIEF,INFANT-TODD,DISP
EACH MISCELLANEOUS 2 TIMES DAILY
Qty: 60 G | Refills: 1 | Status: SHIPPED | OUTPATIENT
Start: 2019-05-15 | End: 2020-03-11

## 2019-05-15 RX ORDER — PAROXETINE 40 MG/1
20 TABLET, FILM COATED ORAL EVERY MORNING
COMMUNITY
Start: 2019-05-15 | End: 2020-03-11

## 2019-05-15 RX ORDER — BUPRENORPHINE 8 MG/1
8 TABLET SUBLINGUAL 3 TIMES DAILY
Qty: 45 EACH | Refills: 0 | Status: SHIPPED | OUTPATIENT
Start: 2019-05-15 | End: 2019-05-29
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
## 2019-05-15 NOTE — TELEPHONE ENCOUNTER
"Requested Prescriptions   Pending Prescriptions Disp Refills     ibuprofen (ADVIL/MOTRIN) 600 MG tablet [Pharmacy Med Name: IBUPROFEN 600MG TABLETS] 180 tablet 0     Sig: TAKE 1 TABLET BY MOUTH TWICE DAILY WITH MEALS  Last Written Prescription Date:  1/2/2019  Last Fill Quantity: 180 tablet,  # refills: 0   Last Office Visit: 4/23/2019   Future Office Visit:    Next 5 appointments (look out 90 days)    May 29, 2019  8:00 AM CDT  Return Visit with Valeria Olson MD  [facility] [address]  [address]  [facility] [address]  [phone]              NSAID Medications Failed - 5/15/2019  3:24 PM        Failed - Normal ALT on file in past 12 months     Recent Labs   Lab Test 11/08/18   ALT 7*           Failed - Normal CBC on file in past 12 months     Recent Labs   Lab Test 07/14/17  1335   WBC 10.0   RBC 4.01   HGB 12.0   HCT 35.3              Passed - Blood pressure under 140/90 in past 12 months     BP Readings from Last 3 Encounters:   05/15/19 104/68   04/26/19 128/72   04/23/19 124/82           Passed - Normal AST on file in past 12 months     Recent Labs   Lab Test 11/08/18   AST 14           Passed - Recent (12 mo) or future (30 days) visit within the authorizing provider's specialty     Patient had office visit in the last 12 months or has a visit in the next 30 days with authorizing provider or within the authorizing provider's specialty.  See \"Patient Info\" tab in inbasket, or \"Choose Columns\" in Meds & Orders section of the refill encounter.            Passed - Patient is age 6-64 years        Passed - Medication is active on med list        Passed - No active pregnancy on record        Passed - Normal serum creatinine on file in past 12 months     Recent Labs   Lab Test 03/05/19  1644   CR 0.86           Passed - No positive pregnancy test in past 12 months          "

## 2019-05-21 RX ORDER — IBUPROFEN 600 MG/1
TABLET, FILM COATED ORAL
Qty: 180 TABLET | Refills: 0 | Status: SHIPPED | OUTPATIENT
Start: 2019-05-21 | End: 2019-08-22

## 2019-05-23 LAB
6MAM SERPL-MCNC: NEGATIVE NG/ML
CODEINE UR CFM-MCNC: NEGATIVE NG/ML
MORPHINE UR CFM-MCNC: 687 NG/ML

## 2019-05-24 ENCOUNTER — OFFICE VISIT (OUTPATIENT)
Dept: FAMILY MEDICINE | Facility: CLINIC | Age: 39
End: 2019-05-24
Payer: COMMERCIAL

## 2019-05-24 VITALS
OXYGEN SATURATION: 96 % | WEIGHT: 131.5 LBS | DIASTOLIC BLOOD PRESSURE: 86 MMHG | RESPIRATION RATE: 24 BRPM | HEART RATE: 146 BPM | BODY MASS INDEX: 27.02 KG/M2 | SYSTOLIC BLOOD PRESSURE: 134 MMHG | TEMPERATURE: 99 F

## 2019-05-24 DIAGNOSIS — F41.1 GAD (GENERALIZED ANXIETY DISORDER): Primary | ICD-10-CM

## 2019-05-24 DIAGNOSIS — G47.00 INSOMNIA, UNSPECIFIED TYPE: ICD-10-CM

## 2019-05-24 DIAGNOSIS — F13.930 BENZODIAZEPINE WITHDRAWAL WITHOUT COMPLICATION (H): ICD-10-CM

## 2019-05-24 DIAGNOSIS — R00.0 TACHYCARDIA: ICD-10-CM

## 2019-05-24 DIAGNOSIS — F07.81 POST CONCUSSION SYNDROME: ICD-10-CM

## 2019-05-24 PROCEDURE — 80307 DRUG TEST PRSMV CHEM ANLYZR: CPT | Mod: 90 | Performed by: NURSE PRACTITIONER

## 2019-05-24 PROCEDURE — 99000 SPECIMEN HANDLING OFFICE-LAB: CPT | Performed by: NURSE PRACTITIONER

## 2019-05-24 PROCEDURE — 99214 OFFICE O/P EST MOD 30 MIN: CPT | Performed by: NURSE PRACTITIONER

## 2019-05-24 RX ORDER — CLONAZEPAM 0.5 MG/1
0.5 TABLET ORAL DAILY
Qty: 4 TABLET | Refills: 0 | Status: SHIPPED | OUTPATIENT
Start: 2019-05-24 | End: 2020-03-11
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
## 2019-05-24 NOTE — PATIENT INSTRUCTIONS
1.  I do think you might be withdrawing from the clonopin.  I will give you #4 tabs of 0.5mg dose (half dose), you need to follow up with psychiatry on Tuesday for a better plan to wean the benzos.    2.  Ask your psychiatrist about ambien instead of clonazepam.    3.  See concussion therapy to work on headache, vision changes, and dizziness    4.  Follow up with me in 2 weeks

## 2019-05-29 ENCOUNTER — OFFICE VISIT (OUTPATIENT)
Dept: ADDICTION MEDICINE | Facility: CLINIC | Age: 39
End: 2019-05-29
Payer: COMMERCIAL

## 2019-05-29 VITALS
SYSTOLIC BLOOD PRESSURE: 102 MMHG | OXYGEN SATURATION: 96 % | BODY MASS INDEX: 27.53 KG/M2 | TEMPERATURE: 97 F | RESPIRATION RATE: 16 BRPM | DIASTOLIC BLOOD PRESSURE: 72 MMHG | WEIGHT: 134 LBS | HEART RATE: 93 BPM

## 2019-05-29 DIAGNOSIS — F11.20 UNCOMPLICATED OPIOID DEPENDENCE (H): ICD-10-CM

## 2019-05-29 DIAGNOSIS — F33.1 MODERATE RECURRENT MAJOR DEPRESSION (H): Primary | ICD-10-CM

## 2019-05-29 DIAGNOSIS — F90.2 ATTENTION DEFICIT HYPERACTIVITY DISORDER (ADHD), COMBINED TYPE: ICD-10-CM

## 2019-05-29 DIAGNOSIS — F41.9 ANXIETY: ICD-10-CM

## 2019-05-29 PROCEDURE — 80361 OPIATES 1 OR MORE: CPT | Mod: 90 | Performed by: PEDIATRICS

## 2019-05-29 PROCEDURE — 99000 SPECIMEN HANDLING OFFICE-LAB: CPT | Performed by: PEDIATRICS

## 2019-05-29 PROCEDURE — 99214 OFFICE O/P EST MOD 30 MIN: CPT | Performed by: PEDIATRICS

## 2019-05-29 PROCEDURE — 80306 DRUG TEST PRSMV INSTRMNT: CPT | Performed by: PEDIATRICS

## 2019-05-29 RX ORDER — BUPRENORPHINE 8 MG/1
8 TABLET SUBLINGUAL 3 TIMES DAILY
Qty: 90 EACH | Refills: 0 | Status: SHIPPED | OUTPATIENT
Start: 2019-05-29 | End: 2019-05-29

## 2019-05-29 RX ORDER — BUPRENORPHINE 8 MG/1
8 TABLET SUBLINGUAL 3 TIMES DAILY
Qty: 90 EACH | Refills: 0 | Status: SHIPPED | OUTPATIENT
Start: 2019-05-29 | End: 2020-03-11

## 2019-05-29 NOTE — PROGRESS NOTES
SUBJECTIVE:                                                    BUPRENORPHINE FOLLOW UP:    Alisia Lin is a 38 year old female who presents to clinic today for follow up of Buprenorphine.      Date of last visit:  5/15/2019    [place] Board of Pharmacy Data Base Reviewed:    Yes ;     5/15/19  Suboxone 8mg tab #45  4/22/19 Klonopin 1mg #4  4/29/19 Adderall 5mg #60  4/6/19 Alprazolam 1mg #90      Brief History:   Initial visit 9/17 hx of opioid use 10yr progressing to IV Heroin.  Several previous treatments.  Rx. Suboxone at initial visit. Ongoing relpase with heroin, cocaine, alcohol continued prescribed benzodiazepines.  Limited appointment attendance.  No follow up from Oct 2018 until May 2019             HPI:    5/29/2019    Did take nyquil sleep aid but didn't like.  Did take Roxana seltzer cold one night.    Benadryl taking 50mg and on occasion 75 mg to sleep.   Going to meetings about every other day.  Starting therapy with planning later this week.   Currently taking Alprazolam 1.0 mg tid and Klonipin at 1mg a hs.  Klonopin  had been removed but sleep and withdrawal sx occurred and having many muscled jerks (however  does not reflect this).  Psychiatry (ANITRA Stephenson) working on taper of benzodiazepine.  Will be going to [place] to see family.  Grandmother goes to meetings there.   Leaving in about 2 wk for about 2 wk.    Taking suboxone 8mg tid.  No other opioid by her report yet Utox pos today for Opi and Cocaine and benzodiazepine (expected)    Has plans to start counseling.   Is reportedly attending some  meetings.   Psychiatry (ANITRA Stephenson) working on taper of benzodiazepine.  Took Klonopin away and      Living with daughter's father but will be moving to cousin's in [facility].   Going to NA and planning to start EMDR and then DBT.       Prefers subutex rx due to mouth itching and swelling.       reviewed positive confirmation from last visit for OPI.  Morphine noted.  Discussed possible  substances that cause.  Patient continues to deny use then and now.  Reviewed rapid vs confirmation testing. Was also positive for cocaine at that visit.            Social History     Social History Narrative            Living with Father of youngest child  (3 girls 22, 18 and 10 )   Will be grandma around April 2018    No legal issues currently (possible identity theft).     Recently job loss ([employer])          Patient Active Problem List    Diagnosis Date Noted     NATHANAEL (generalized anxiety disorder) 04/23/2019     Priority: Medium     Low TSH level 08/29/2017     Priority: Medium     Abnormal thyroid function test 08/08/2017     Priority: Medium     Narcotic dependence, in remission (H) 07/13/2017     Priority: Medium     Back pain 01/26/2016     Priority: Medium     Shoulder pain, right 01/26/2016     Priority: Medium     Intermittent asthma, uncomplicated 12/07/2015     Priority: Medium     Anxiety 05/21/2013     Priority: Medium     Benign neoplasm of colon 04/16/2013     Priority: Medium     Overview:   Found on colonoscopy 04/2013, repeat colonoscopy in 5 years, 04/2018.       Chronic low back pain 03/19/2012     Priority: Medium     On pain contract - [facility]       Degeneration of intervertebral disc 05/12/2010     Priority: Medium     Spondylosis 05/12/2010     Priority: Medium     Displacement of intervertebral disc 05/12/2010     Priority: Medium     ASCUS on Pap smear 03/03/2008     Priority: Medium     LEEP age 15 per notes in 2015  3/3/08: ASCUS, + HPV 53  4/17/08: [facility] - No visible lesions.   11/7/08: NIL pap  2/1/12: NIL pap  1/2013 Pap NIL, neg HPV. Plan cotest pap & HPV in 1 yr  3/2015: NIL pap, neg HPV. Plan cotest pap & HPV in 3 years.         Migraine with aura      Priority: Medium     Occas migraines, none recently  Problem list name updated by automated process. Provider to review       Tobacco use disorder      Priority: Medium     1 PPD- interested in quitting       Moderate  recurrent major depression (H) 08/16/2005     Priority: Medium       Problem list and histories reviewed & adjusted, as indicated.  Additional history: as documented        Current Outpatient Medications on File Prior to Visit:  ACETAMINOPHEN EXTRA STRENGTH 500 MG tablet TAKE 2 TABLETS(1000 MG) BY MOUTH TWICE DAILY AS NEEDED FOR MILD PAIN   ALPRAZolam (XANAX) 0.25 MG tablet Take 2 tablets (0.5 mg) by mouth 3 times daily as needed for anxiety   buprenorphine (SUBUTEX) 8 MG SUBL sublingual tablet Place 1 tablet (8 mg) under the tongue 3 times daily Mouth itching/swelling with suboxone [id number]   cetirizine (ZYRTEC) 10 MG tablet Take 1 tablet (10 mg) by mouth daily   clonazePAM (KLONOPIN) 0.5 MG tablet Take 1 tablet (0.5 mg) by mouth daily   diphenhydrAMINE (BENADRYL) 25 MG tablet 50mg q hs prn for sleep.   fluticasone (FLONASE) 50 MCG/ACT nasal spray Spray 2 sprays into both nostrils daily   hydrocortisone (CORTAID) 1 % external ointment Apply topically 2 times daily   ibuprofen (ADVIL/MOTRIN) 600 MG tablet TAKE 1 TABLET BY MOUTH TWICE DAILY WITH MEALS   Menthol, Topical Analgesic, 4 % GEL Apply three times a day as needed to affected area   methocarbamol (ROBAXIN) 750 MG tablet TAKE 2 TABLETS(1500 MG) BY MOUTH THREE TIMES DAILY AS NEEDED FOR MUSCLE SPASMS   multivitamin, therapeutic with minerals (MULTI-VITAMIN) TABS Take 1 tablet by mouth daily   naloxone (NARCAN) 4 MG/0.1ML nasal spray Spray 1 spray (4 mg) into one nostril alternating nostrils once as needed for opioid reversal every 2-3 minutes until assistance arrives   naloxone (NARCAN) nasal spray Spray 1 spray (4 mg) into one nostril alternating nostrils as needed for opioid reversal   pantoprazole (PROTONIX) 40 MG enteric coated tablet Take 1 tablet (40 mg) by mouth daily   PARoxetine (PAXIL) 40 MG tablet Take 1 tablet (40 mg) by mouth every morning   prazosin (MINIPRESS) 2 MG capsule Take 1 capsule (2 mg) by mouth 2 times daily   QUEtiapine (SEROQUEL) 50 MG  tablet Take 50 mg by mouth   VENTOLIN  (90 BASE) MCG/ACT Inhaler INHALE 2 PUFFS INTO THE LUNGS EVERY 4 HOURS AS NEEDED FOR SHORTNESS OF BREATH OR DIFFICULT BREATHING OR WHEEZING     No current facility-administered medications on file prior to visit.     Allergies   Allergen Reactions     Compazine      Heart Problems/ Body went completley stiff for 8 hrs.     Nortriptyline      Skelaxin [Metaxalone]            REVIEW OF SYSTEMS:  General:  No acute withdrawal symptoms.  No recent infections or fever  Eyes:  No vision concerns.  No double vision.    Resp: No coughing, wheezing or shortness of breath  CV: No chest pains or palpitations  GI: No nausea, vomiting, abdominal pain, diarrhea.  No constipation  : No urinary frequency or dysuria    Musculoskeletal: No significant muscle or joint pains other than as above.  No edema  Neurologic: No numbness, tingling, weakness, problems with balance or coordination  Psychiatric: No acute concerns other than as above.   Skin: No rashes or areas of acute infection    OBJECTIVE:    PHYSICAL EXAM:  /72   Pulse 93   Temp 97  F (36.1  C)   Resp 16   Wt 60.8 kg (134 lb)   SpO2 96%   BMI 27.53 kg/m      GENERAL APPEARANCE:  alert, comfortable appearing  EYES:Eyes grossly normal to inspection  NEURO:  Gait normal.  No tremor. Coordination intact.   MENTAL STATUS EXAM:  Appearance/Behavior: No appearant distress  Speech: Normal  Mood/Affect: normal affect  Insight: Adequate and Poor      Results for orders placed or performed in visit on 05/29/19   Urine Drugs of Abuse Screen Panel 13   Result Value Ref Range    Cannabinoids (69-fmw-5-carboxy-9-THC) Not Detected NDET^Not Detected ng/mL    Phencyclidine (Phencyclidine) Not Detected NDET^Not Detected ng/mL    Cocaine (Benzoylecgonine) Detected, Abnormal Result (A) NDET^Not Detected ng/mL    Methamphetamine (d-Methamphetamine) Not Detected NDET^Not Detected ng/mL    Opiates (Morphine) Detected, Abnormal Result (A)  NDET^Not Detected ng/mL    Amphetamine (d-Amphetamine) Not Detected NDET^Not Detected ng/mL    Benzodiazepines (Nordiazepam) Detected, Abnormal Result (A) NDET^Not Detected ng/mL    Tricyclic Antidepressants (Desipramine) Not Detected NDET^Not Detected ng/mL    Methadone (Methadone) Not Detected NDET^Not Detected ng/mL    Barbiturates (Butalbital) Not Detected NDET^Not Detected ng/mL    Oxycodone (Oxycodone) Not Detected NDET^Not Detected ng/mL    Propoxyphene (Norpropoxyphene) Not Detected NDET^Not Detected ng/mL    Buprenorphine (Buprenorphine) Detected, Abnormal Result (A) NDET^Not Detected ng/mL     Utox positive after visit for OPI,,coccaine.    Conformation testing Positive for cocaine and morphine.  Have left several messaged to have patient call back.  Will need to be addressed at next visit.       ASSESSMENT/PLAN:         (F11.20) Uncomplicated opioid dependence (H)  (primary encounter diagnosis)  Plan:      Subutex (GI intolerance and mouth sores from Suboxone tab)  -Continue Subutex 8mg bid.      Follow up 3-4 weeks due to upcoming travel. Relapse prevention discussed.  High risk of overdose with mixing substances, benzodiazepine use, reviewed.     Need for ongoing regular appointment follow up as a part of recovery discussed.  Need for honesty regarding use reviewed. Confirmation for OPI today pending as well as pos cocaine screen.  If positive will need further discussion and reviewed with patient that she will likely be asked to provide utox prior to scheduled follow up.       Opioid warning reviewed.  Risk of overdose following a period of abstinence due to decrease tolerance was discussed including risk of death.  Risk of overdose if using Suboxone with other substances particuarly benzodiazepines/alcohol was reviewed.       Counseled the patient on the importance of having a recovery program in addition to Medication assisted treatment.  Components include having some type of sober network,  avoiding isolating, having willingness  to change, avoiding triggers and managing cravings. Encouraged other services such as counseling, 12 step or other self-help organizations.        Strongly recommended abstain from alcohol, benzodiazepines, THC, opioids and other drugs of abuse.  Increased risk of relapse for opioids with use of these substances discussed.  Increased risk of overdose/death with use of other substances particularly benzodiazepines/alcohol reviewed.          (F33.1) Moderate recurrent major depression (H)  (F41.9) Anxiety  Plan: strongly recommended follow up with PCP and ongoing mental health therapy.   Continue wean of benzodiazepine.    Release of information for outside provider in place-need to coordinate care once confirmation testing completed.            (F17.200) Nicotine use disorder  Plan: Encouraged Abstinence.  Increase risk of relapse with other substances with return to nicotine use discussed.  Risk of Ecig/Vaping also reviewed.       (Z79.339) High risk medication use        ENCOUNTER FOR LONG TERM USE OF HIGH RISK MEDICATION   High Risk Drug Monitoring?  YES   Drug being monitored: Buprenorphine   Reason for drug: Opioid use disorder   What is being monitored?: Dosage, Cravings, Trigger, side effects, and continued abstinence.      Opioid warning reviewed.  Risk of overdose following a period of abstinence due to decrease tolerance was discussed including risk of death.   Risk of overdose if using Suboxone with other substances particuarly benzodiazepines/alcohol was reviewed.        Valeria Olson MD  [facility]  [phone]

## 2019-06-03 LAB — PAIN DRUG SCR UR W RPTD MEDS: NORMAL

## 2019-06-04 LAB
6MAM SERPL-MCNC: NEGATIVE NG/ML
BZE UR QL: POSITIVE NG/ML
COCAINE UR CFM-MCNC: NEGATIVE NG/ML
CODEINE UR CFM-MCNC: NEGATIVE NG/ML
MORPHINE UR CFM-MCNC: 830 NG/ML

## 2019-06-04 NOTE — RESULT ENCOUNTER NOTE
Please send out result letter with the following note, thanks.    Alisia,    Your urine drug test was positive for cocaine, an illegal substance of abuse.  Please let me know if you need help with getting resources for chemical dependency treatment.  We have a behavioral health specialist at the clinic, Ton Kat, whom you could scheudle an appointment with.  He would be able to discuss treatment options with you.  Cocaine use cold explain some of the symptoms we discussed at your last visit and is probably worsening your anxiety.    -Sepideh Hines, CNP

## 2019-06-27 DIAGNOSIS — J30.1 SEASONAL ALLERGIC RHINITIS DUE TO POLLEN: ICD-10-CM

## 2019-06-27 RX ORDER — FLUTICASONE PROPIONATE 50 MCG
2 SPRAY, SUSPENSION (ML) NASAL DAILY
Qty: 18.2 ML | Refills: 1 | Status: SHIPPED | OUTPATIENT
Start: 2019-06-27 | End: 2019-11-15

## 2019-06-27 NOTE — TELEPHONE ENCOUNTER
"Requested Prescriptions   Pending Prescriptions Disp Refills     fluticasone (FLONASE) 50 MCG/ACT nasal spray  Last Written Prescription Date:  5/15/19  Last Fill Quantity: 18.2L,  # refills: 1   Last office visit: 5/29/2019 with prescribing provider:     Future Office Visit:     18.2 mL 1     Sig: Spray 2 sprays into both nostrils daily       Inhaled Steroids Protocol Passed - 6/27/2019  9:13 AM        Passed - Patient is age 12 or older        Passed - Asthma control assessment score within normal limits in last 6 months     Please review ACT score.           Passed - Medication is active on med list        Passed - Recent (6 mo) or future (30 days) visit within the authorizing provider's specialty     Patient had office visit in the last 6 months or has a visit in the next 30 days with authorizing provider or within the authorizing provider's specialty.  See \"Patient Info\" tab in inbasket, or \"Choose Columns\" in Meds & Orders section of the refill encounter.              "

## 2019-06-27 NOTE — TELEPHONE ENCOUNTER
Refill for: fluticasone (FLONASE) 50 MCG/ACT nasal spray    Last Appointment: 5/29/19    Next Appointment: none    No Shows/Cancellations since last appointment: no show on 6/26    Last Refill in Epic (date and amount/how many days):    Disp Refills Start End TJ   fluticasone (FLONASE) 50 MCG/ACT nasal spray 18.2 mL 1 5/15/2019  --   Sig - Route: Spray 2 sprays into both nostrils daily - Both Nostrils     : It appears patient does have PCP, who she sees regularly. Do you want this medication to transfer to PCP management?         Ashwini Moore RN  06/27/19  11:43 AM

## 2019-07-17 DIAGNOSIS — G89.29 CHRONIC RIGHT SHOULDER PAIN: ICD-10-CM

## 2019-07-17 DIAGNOSIS — M54.40 CHRONIC MIDLINE LOW BACK PAIN WITH SCIATICA, SCIATICA LATERALITY UNSPECIFIED: ICD-10-CM

## 2019-07-17 DIAGNOSIS — G89.29 CHRONIC MIDLINE LOW BACK PAIN WITH SCIATICA, SCIATICA LATERALITY UNSPECIFIED: ICD-10-CM

## 2019-07-17 DIAGNOSIS — M25.511 CHRONIC RIGHT SHOULDER PAIN: ICD-10-CM

## 2019-07-17 NOTE — TELEPHONE ENCOUNTER
Requested Prescriptions   Pending Prescriptions Disp Refills     methocarbamol (ROBAXIN) 750 MG tablet [Pharmacy Med Name: METHOCARBAMOL 750MG TABLETS] 90 tablet 0     Sig: TAKE 2 TABLETS(1500 MG) BY MOUTH THREE TIMES DAILY AS NEEDED FOR MUSCLE SPASMS       There is no refill protocol information for this order              Last Written Prescription Date:  4/9/19  Last Fill Quantity: 90,   # refills: 3  Last Office Visit: 5/24/19  Future Office visit:       Routing refill request to provider for review/approval because:  Drug not on the G, P or [facility] refill protocol or controlled substance

## 2019-07-18 RX ORDER — METHOCARBAMOL 750 MG/1
TABLET, FILM COATED ORAL
Qty: 90 TABLET | Refills: 5 | Status: SHIPPED | OUTPATIENT
Start: 2019-07-18 | End: 2020-04-29

## 2019-08-19 ENCOUNTER — TELEPHONE (OUTPATIENT)
Dept: FAMILY MEDICINE | Facility: CLINIC | Age: 39
End: 2019-08-19

## 2019-08-19 NOTE — TELEPHONE ENCOUNTER
Reason for call:  Patient reporting a symptom    Symptom or request: BREAST LUMP, PAIN, SWELLING, REDNESS, FLU LIKE SYMPTOMS    Duration (how long have symptoms been present): OVER A MONTH AND A HALF    Have you been treated for this before? No    Additional comments: Pt having red flag symptoms, flu like symptoms, and L breast pain    Phone Number patient can be reached at:  Cell number on file:    Telephone Information:   Mobile [phone]       Best Time:  ANYTIME    Can we leave a detailed message on this number:  YES    Call taken on 8/19/2019 at 9:44 AM by Anna Marie Berman

## 2019-08-19 NOTE — TELEPHONE ENCOUNTER
Offered appt today.she did not have transportation.   Plan : Appt tomorrow with Dr. Wegener.    Family hx of breast cancer.   Marry King RN

## 2019-08-21 ENCOUNTER — TRANSFERRED RECORDS (OUTPATIENT)
Dept: HEALTH INFORMATION MANAGEMENT | Facility: CLINIC | Age: 39
End: 2019-08-21

## 2019-08-21 LAB
CREAT SERPL-MCNC: 0.84 MG/DL (ref 0.57–1.11)
GFR SERPL CREATININE-BSD FRML MDRD: >60 ML/MIN/1.73M2
GLUCOSE SERPL-MCNC: 95 MG/DL (ref 65–100)
POTASSIUM SERPL-SCNC: 4.1 MMOL/L (ref 3.5–5)

## 2019-08-22 DIAGNOSIS — G89.29 CHRONIC MIDLINE THORACIC BACK PAIN: ICD-10-CM

## 2019-08-22 DIAGNOSIS — G89.29 CHRONIC LOW BACK PAIN, UNSPECIFIED BACK PAIN LATERALITY, WITH SCIATICA PRESENCE UNSPECIFIED: ICD-10-CM

## 2019-08-22 DIAGNOSIS — M54.5 CHRONIC LOW BACK PAIN, UNSPECIFIED BACK PAIN LATERALITY, WITH SCIATICA PRESENCE UNSPECIFIED: ICD-10-CM

## 2019-08-22 DIAGNOSIS — M54.6 CHRONIC MIDLINE THORACIC BACK PAIN: ICD-10-CM

## 2019-08-22 NOTE — TELEPHONE ENCOUNTER
"Requested Prescriptions   Pending Prescriptions Disp Refills     ibuprofen (ADVIL/MOTRIN) 600 MG tablet [Pharmacy Med Name: IBUPROFEN 600MG TABLETS] 180 tablet 0     Sig: TAKE 1 TABLET BY MOUTH TWICE DAILY WITH MEALS  Last Written Prescription Date:  5/21/2019  Last Fill Quantity: 180 tablet,  # refills: 0   Last Office Visit: 5/24/2019   Future Office Visit:            NSAID Medications Failed - 8/22/2019  3:34 AM        Failed - Normal ALT on file in past 12 months     Recent Labs   Lab Test 11/08/18   ALT 7*           Failed - Normal CBC on file in past 12 months     Recent Labs   Lab Test 07/14/17  1335   WBC 10.0   RBC 4.01   HGB 12.0   HCT 35.3              Passed - Blood pressure under 140/90 in past 12 months     BP Readings from Last 3 Encounters:   05/29/19 102/72   05/24/19 134/86   05/15/19 104/68           Passed - Normal AST on file in past 12 months     Recent Labs   Lab Test 11/08/18   AST 14           Passed - Recent (12 mo) or future (30 days) visit within the authorizing provider's specialty     Patient had office visit in the last 12 months or has a visit in the next 30 days with authorizing provider or within the authorizing provider's specialty.  See \"Patient Info\" tab in inbasket, or \"Choose Columns\" in Meds & Orders section of the refill encounter.            Passed - Patient is age 6-64 years        Passed - Medication is active on med list        Passed - No active pregnancy on record        Passed - Normal serum creatinine on file in past 12 months     Recent Labs   Lab Test 03/05/19  1644   CR 0.86           Passed - No positive pregnancy test in past 12 months          "

## 2019-08-23 RX ORDER — IBUPROFEN 600 MG/1
TABLET, FILM COATED ORAL
Qty: 60 TABLET | Refills: 0 | Status: SHIPPED | OUTPATIENT
Start: 2019-08-23 | End: 2020-08-21

## 2019-08-23 NOTE — TELEPHONE ENCOUNTER
[facility] ER recently rx ibuprofen for breast pain.  5/24/19 was last OV.  ALT and CBC failed the refill prot.  ALT was low on 11/8/18.  Ordered the CBC for future lab.      Routing to DOD.  Reception- please have pt make lab only appt for nonfasting CBC.    Thanks!  TUSHAR Bo

## 2019-09-19 ENCOUNTER — TELEPHONE (OUTPATIENT)
Dept: FAMILY MEDICINE | Facility: CLINIC | Age: 39
End: 2019-09-19

## 2019-09-19 DIAGNOSIS — N63.0 LUMP OR MASS IN BREAST: Primary | ICD-10-CM

## 2019-09-19 DIAGNOSIS — N64.4 BREAST TENDERNESS IN FEMALE: ICD-10-CM

## 2019-09-19 NOTE — TELEPHONE ENCOUNTER
Reason for call:  Patient reporting a symptom    Symptom or request: L breast burning pain, swollen lymph nodes, clogged duct oozing green pus like drainage.   Duration (how long have symptoms been present): 2 month    Have you been treated for this before? Yes    Additional comments: Strong family hx of br ca.      Phone Number patient can be reached at:  Cell number on file:    Telephone Information:   Mobile [phone]       Best Time:  Patient can be reached on her mobile phone until 5.  If after 5 please call .    Can we leave a detailed message on this number:  NO    Call taken on 9/19/2019 at 2:28 PM by Shea Pantjoa

## 2019-09-19 NOTE — TELEPHONE ENCOUNTER
Patient is calling to see about getting a referral to the breast center  States both her grandmothers had breast cancer and her mother and 5 aunts had breast cancer.    Was seen in ER at [facility] 8/21/19 and did not follow-up per their plan.  Wants a brast Ultrasound and additional testing.  Had drainage from her left breast, seemed like a cyst that popped.  Drained about 8 weeks ago and has since refilled  Has breast tenderness and cyst.    Patient is also complaining of uterine issues and her aunt had problems with this.    If not able to reach at cell,  Leave message at [phone] - leave message for patient to call back.  REFERRAL PENDED. I also pended a diagnostic mammogram  Katherine Jaimes RN

## 2019-09-20 NOTE — TELEPHONE ENCOUNTER
Left messages on patient cell phone and the [phone] numbers for patient to call the clinic.  Ask to speak to an RN, let them know it's a return call.    Leave a number and time that you can be reached.   Katherine Jaimes, RN

## 2019-09-20 NOTE — TELEPHONE ENCOUNTER
"Gave patient the number for scheduling diagnostic mammography.  Patient states she just wants an US done - would be too painful for a mammogram.  I did read her the order:   Do you want to order follow up Imaging as recommended by Radiologist? Yes:Diagnostic Breast Imaging Assessment, as deemed appropriate, by the supervising radiologist, may include one or more: Diagnostic Mammogram, Ultrasound, Cyst Aspiration, FNA, Biopsy, Abscess Drainage, Ductogram, or Contrast Enhanced Mammography     Patient will schedule diagnostic mammography and then will schedule f/u with Sepideh to discuss, for further referral and to find out about if OBGYN referral is also needed \"for the uterus stuff.\"  Katherine Jaimes RN      "

## 2019-09-25 ENCOUNTER — ANCILLARY PROCEDURE (OUTPATIENT)
Dept: MAMMOGRAPHY | Facility: CLINIC | Age: 39
End: 2019-09-25
Attending: NURSE PRACTITIONER
Payer: COMMERCIAL

## 2019-09-25 DIAGNOSIS — N64.4 BREAST TENDERNESS IN FEMALE: ICD-10-CM

## 2019-09-25 DIAGNOSIS — N63.0 LUMP OR MASS IN BREAST: ICD-10-CM

## 2019-09-25 NOTE — LETTER
September 26, 2019      Alisia Lin  [address]  [place] [address]        Dear Ms.Riley,    We are writing to inform you of your test results.    Results within acceptable limits.     Resulted Orders   US Breast Left    Narrative    Examinations: MA DIAGNOSTIC WITH IMPLANTS BILATERAL W/ JOSE, US BREAST  LEFT LIMITED 1-3 QUADRANTS, 9/25/2019 3:23 PM and CAD    Comparisons: 9/8/2017, 8/8/2008    History/Family History: Left breast lump with shooting pain radiating  to the left axilla. The patient describes family history for breast  cancer in multiple relatives.    Breast Density: Scattered fibroglandular densities  Technique: Standard mammographic views were performed with  tomosynthesis and 2D reconstruction.    Findings:     Mammogram:  Imaging of the left breast was limited from patient motion due to  reported pain. No significant interval change in either breast. There  are bilateral retropectoral silicone implants.    Ultrasound:  Targeted, real-time ultrasound evaluation was performed by the  technologist and radiologist.  The patient indicates her lump to be located in the left breast at  12:00, 1 cm from the nipple. There is a probable Ordonez gland,  smaller than before in the skin. No suspicious sonographic findings.  In addition evaluation of the left breast between 1-2 o'clock toward  the axilla also revealed no suspicious sonographic findings.  Normal-appearing lymph nodes are visualized.      Impression    IMPRESSION: BI-RADS CATEGORY: 2 - Benign.    RECOMMENDED FOLLOW-UP: Annual Mammography.    The finding and recommendation were discussed with the patient at the  time of evaluation.    Based on the patient history questionnaire completed prior to the  mammogram, the NCI risk calculator and the NCCN indications for  genetic screening, the patient may be at an increased risk for breast  cancer and/or have an indication for genetic screening.  A letter has  been sent to inform the  patient of this and a phone number to schedule  an appointment with a Breast  if the patient so  desires.    Code: GC    The patient was given the results of the examination.    AN MD JENN       If you have any questions or concerns, please call the clinic at the number listed above.       Sincerely,    Reina Boles MD/nr

## 2019-09-25 NOTE — LETTER
Alisia Al  [address]  [facility] [address]      September 25, 2019  Date of Exam:       Dear Alisia Lin:    Thank you for your recent visit.    Mammogram Result: Normal. We are pleased to inform you that the interpretation of your recent mammography did not find cancer.    Breast Cancer Risk Assessment: Based on the answers you gave on your history questionnaire that you filled out prior to your mammogram, you may be at increased risk for breast cancer.  If you would like to have this further evaluated, please call our Breast Center Nurse at [phone].  If you have already been evaluated, please disregard this notice.    Breast Problems: If you are experiencing any breast problems such as a lump or localized pain we request that you discuss this with your health care provider if you haven t already done so, as additional testing may be necessary.    Your Next Mammogram: The American College of Radiology and the Society of Breast Imaging recommend a yearly screening mammogram beginning at the age of 40 as the most effective way of saving lives from breast cancer. Please be aware that other screening recommendations do exist.    Mammogram Records: A report of your mammogram results was sent to the health care provider you indicated. Your mammogram and report will become part of your medical file here at [facility] for at least 10 years. You are responsible for informing any new health care provider or mammography facility of the date and location of this examination.     We appreciate the opportunity to participate in your health care.    Sincerely,    Yohana Hernandez MD  Interpreting Radiologist

## 2019-09-25 NOTE — LETTER
[facility]  [address]  [facility] [address]        September 25, 2019  Date of Exam:       Dear Alisia Lin:    Thank you for your recent visit.  Mammogram Result: Normal. We are pleased to inform you that the interpretation of your recent mammography did not find cancer.    Breast Problems: If you are experiencing any breast problems such as a lump or localized pain we request that you discuss this with your health care provider if you haven t already done so, as additional testing may be necessary.    Your Next Mammogram: The American College of Radiology and the Society of Breast Imaging recommend a yearly screening mammogram beginning at the age of 40 as the most effective way of saving lives from breast cancer. Please be aware that other screening recommendations do exist.    Mammogram Records: A report of your mammogram results was sent to the health care provider you indicated. Your mammogram and report will become part of your medical file here at [facility] for at least 10 years. You are responsible for informing any new health care provider or mammography facility of the date and location of this examination.     We appreciate the opportunity to participate in your health care.    Sincerely,    Yohana Hernandez MD  Interpreting Radiologist

## 2019-10-09 DIAGNOSIS — M25.511 CHRONIC RIGHT SHOULDER PAIN: ICD-10-CM

## 2019-10-09 DIAGNOSIS — G89.29 CHRONIC RIGHT SHOULDER PAIN: ICD-10-CM

## 2019-10-09 DIAGNOSIS — M54.40 CHRONIC MIDLINE LOW BACK PAIN WITH SCIATICA, SCIATICA LATERALITY UNSPECIFIED: ICD-10-CM

## 2019-10-09 DIAGNOSIS — G89.29 CHRONIC MIDLINE LOW BACK PAIN WITH SCIATICA, SCIATICA LATERALITY UNSPECIFIED: ICD-10-CM

## 2019-10-09 NOTE — TELEPHONE ENCOUNTER
"Requested Prescriptions   Pending Prescriptions Disp Refills     ACETAMINOPHEN EXTRA STRENGTH 500 MG tablet [Pharmacy Med Name: ACETAMINOPHEN 500MG E/S CAPLETS] 100 tablet 0     Sig: TAKE 2 TABLETS(1000 MG) BY MOUTH TWICE DAILY AS NEEDED FOR MILD PAIN  Last Written Prescription Date:  10/29/2018  Last Fill Quantity: 100 tablet,  # refills: 3   Last Office Visit: 5/24/2019   Future Office Visit:            Analgesics (Non-Narcotic Tylenol and ASA Only) Passed - 10/9/2019 10:48 AM        Passed - Recent (12 mo) or future (30 days) visit within the authorizing provider's specialty     Patient has had an office visit with the authorizing provider or a provider within the authorizing providers department within the previous 12 mos or has a future within next 30 days. See \"Patient Info\" tab in inbasket, or \"Choose Columns\" in Meds & Orders section of the refill encounter.            Passed - Patient is 7 months old or older     If patient is a peds patient of the age 7 mos -12 years, ok to refill using weight-based dosing.     If >3g daily and/or sig is not \"prn\", check for liver enzymes. If normal in the last year, ok to refill.  If not, refer to the provider.          Passed - Medication is active on med list          "

## 2019-10-13 RX ORDER — PSEUDOEPHED/ACETAMINOPH/DIPHEN 30MG-500MG
TABLET ORAL
Qty: 100 TABLET | Refills: 2 | Status: SHIPPED | OUTPATIENT
Start: 2019-10-13 | End: 2020-03-11

## 2019-10-13 NOTE — TELEPHONE ENCOUNTER
Signed Prescriptions:                        Disp   Refills    ACETAMINOPHEN EXTRA STRENGTH 500 MG tablet 100 ta*2        Sig: TAKE 2 TABLETS(1000 MG) BY MOUTH TWICE DAILY AS           NEEDED FOR MILD PAIN  Authorizing Provider: MICKIE FORTUNE  Ordering User: ANDREW GREGORY

## 2019-10-22 ENCOUNTER — OFFICE VISIT (OUTPATIENT)
Dept: URGENT CARE | Facility: URGENT CARE | Age: 39
End: 2019-10-22
Payer: COMMERCIAL

## 2019-10-22 VITALS
BODY MASS INDEX: 25.68 KG/M2 | OXYGEN SATURATION: 97 % | TEMPERATURE: 98.8 F | DIASTOLIC BLOOD PRESSURE: 84 MMHG | HEART RATE: 83 BPM | SYSTOLIC BLOOD PRESSURE: 123 MMHG | WEIGHT: 125 LBS

## 2019-10-22 DIAGNOSIS — Z11.3 SCREEN FOR STD (SEXUALLY TRANSMITTED DISEASE): Primary | ICD-10-CM

## 2019-10-22 DIAGNOSIS — N76.0 BV (BACTERIAL VAGINOSIS): ICD-10-CM

## 2019-10-22 DIAGNOSIS — B96.89 BV (BACTERIAL VAGINOSIS): ICD-10-CM

## 2019-10-22 LAB
ALBUMIN UR-MCNC: NEGATIVE MG/DL
APPEARANCE UR: CLEAR
BILIRUB UR QL STRIP: NEGATIVE
COLOR UR AUTO: YELLOW
GLUCOSE UR STRIP-MCNC: NEGATIVE MG/DL
HGB UR QL STRIP: NEGATIVE
KETONES UR STRIP-MCNC: NEGATIVE MG/DL
LEUKOCYTE ESTERASE UR QL STRIP: NEGATIVE
NITRATE UR QL: NEGATIVE
PH UR STRIP: 6.5 PH (ref 5–7)
SOURCE: NORMAL
SP GR UR STRIP: 1.02 (ref 1–1.03)
SPECIMEN SOURCE: ABNORMAL
UROBILINOGEN UR STRIP-ACNC: 0.2 EU/DL (ref 0.2–1)
WET PREP SPEC: ABNORMAL

## 2019-10-22 PROCEDURE — 87591 N.GONORRHOEAE DNA AMP PROB: CPT | Performed by: PREVENTIVE MEDICINE

## 2019-10-22 PROCEDURE — 87210 SMEAR WET MOUNT SALINE/INK: CPT | Performed by: PREVENTIVE MEDICINE

## 2019-10-22 PROCEDURE — 99213 OFFICE O/P EST LOW 20 MIN: CPT | Performed by: PREVENTIVE MEDICINE

## 2019-10-22 PROCEDURE — 81003 URINALYSIS AUTO W/O SCOPE: CPT | Performed by: PREVENTIVE MEDICINE

## 2019-10-22 PROCEDURE — 87491 CHLMYD TRACH DNA AMP PROBE: CPT | Performed by: PREVENTIVE MEDICINE

## 2019-10-22 RX ORDER — METRONIDAZOLE 7.5 MG/G
10 GEL VAGINAL DAILY
Qty: 70 G | Refills: 0 | Status: SHIPPED | OUTPATIENT
Start: 2019-10-22 | End: 2019-10-22

## 2019-10-22 RX ORDER — METRONIDAZOLE 500 MG/1
500 TABLET ORAL 2 TIMES DAILY
Qty: 14 TABLET | Refills: 0 | Status: SHIPPED | OUTPATIENT
Start: 2019-10-22 | End: 2020-03-11

## 2019-10-22 RX ORDER — METRONIDAZOLE 7.5 MG/G
5 GEL VAGINAL DAILY
Qty: 70 G | Refills: 0 | Status: SHIPPED | OUTPATIENT
Start: 2019-10-22 | End: 2020-03-11

## 2019-10-22 NOTE — PROGRESS NOTES
SUBJECTIVE:   Alisia Lin is a 39 year old female who  presents today for a possible UTI. Symptoms of frequency have been going on for 2day(s).  Hematuria no.  gradual onsetand mild.  There is no history of fever, chills, nausea or vomiting.  No history of vaginal or penile discharge. This patient does have a history of urinary tract infections. Patient denies None, long duration, rigors, flank pain, temperature > 101 degrees F., Vomiting, significant nausea or diarrhea, taking Coumadin and GFR less than 30 within the last year.  Does have vaginal discharge     Past Medical History:   Diagnosis Date     Acute stress reaction 5/31/2014     Anxiety      Arthritis      Asthma     Flovent BID     Back pain 1/26/2016     Chemical dependency (H) 06/05/2014    heroin, Norco     Chronic low back pain 3/19/2012     Influenza A 10/17/2016    with influenza pneumonia.  Hospitalized [facility]     Migraine with aura, without mention of intractable migraine without mention of status migrainosus     occas, Last one 11/2013. 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     Other, mixed, or unspecified nondependent drug abuse, in remission 1/1/07    Treatment for narcotic use      Papanicolaou smear of cervix with atypical squamous cells of undetermined significance (ASC-US) 3/2008    colp - WNL, HPV 53     Pyelonephritis 12/23/2015     Tobacco use disorder     1-1.5 ppd, zyban unsuccessful     Unspecified contraceptive management     ortho tricyclen     Current Outpatient Medications   Medication Sig Dispense Refill     ALPRAZolam (XANAX) 0.25 MG tablet Take 2 tablets (0.5 mg) by mouth 3 times daily as needed for anxiety  0     buprenorphine (SUBUTEX) 8 MG SUBL sublingual tablet Place 1 tablet (8 mg) under  the tongue 3 times daily Mouth itching/swelling with suboxone [id number] each 0     clonazePAM (KLONOPIN) 0.5 MG tablet Take 1 tablet (0.5 mg) by mouth daily 4 tablet 0     diphenhydrAMINE (BENADRYL) 25 MG tablet 50mg q hs prn for sleep. 60 tablet 1     fluticasone (FLONASE) 50 MCG/ACT nasal spray Spray 2 sprays into both nostrils daily 18.2 mL 1     ibuprofen (ADVIL/MOTRIN) 600 MG tablet TAKE 1 TABLET BY MOUTH TWICE DAILY WITH MEALS 60 tablet 0     PARoxetine (PAXIL) 40 MG tablet Take 1 tablet (40 mg) by mouth every morning       prazosin (MINIPRESS) 2 MG capsule Take 1 capsule (2 mg) by mouth 2 times daily       ACETAMINOPHEN EXTRA STRENGTH 500 MG tablet TAKE 2 TABLETS(1000 MG) BY MOUTH TWICE DAILY AS NEEDED FOR MILD PAIN (Patient not taking: Reported on 10/22/2019) 100 tablet 2     cetirizine (ZYRTEC) 10 MG tablet Take 1 tablet (10 mg) by mouth daily (Patient not taking: Reported on 10/22/2019) 30 tablet 3     hydrocortisone (CORTAID) 1 % external ointment Apply topically 2 times daily (Patient not taking: Reported on 10/22/2019) 60 g 1     Menthol, Topical Analgesic, 4 % GEL Apply three times a day as needed to affected area (Patient not taking: Reported on 10/22/2019) 150 mL 4     methocarbamol (ROBAXIN) 750 MG tablet TAKE 2 TABLETS(1500 MG) BY MOUTH THREE TIMES DAILY AS NEEDED FOR MUSCLE SPASMS (Patient not taking: Reported on 10/22/2019) 90 tablet 5     multivitamin, therapeutic with minerals (MULTI-VITAMIN) TABS Take 1 tablet by mouth daily (Patient not taking: Reported on 10/22/2019) 100 tablet 3     naloxone (NARCAN) 4 MG/0.1ML nasal spray Spray 1 spray (4 mg) into one nostril alternating nostrils once as needed for opioid reversal every 2-3 minutes until assistance arrives (Patient not taking: Reported on 10/22/2019) 0.2 mL 0     naloxone (NARCAN) nasal spray Spray 1 spray (4 mg) into one nostril alternating nostrils as needed for opioid reversal (Patient not taking: Reported on 10/22/2019) 2 each 1      pantoprazole (PROTONIX) 40 MG enteric coated tablet Take 1 tablet (40 mg) by mouth daily (Patient not taking: Reported on 10/22/2019) 90 tablet 3     QUEtiapine (SEROQUEL) 50 MG tablet Take 50 mg by mouth       VENTOLIN  (90 BASE) MCG/ACT Inhaler INHALE 2 PUFFS INTO THE LUNGS EVERY 4 HOURS AS NEEDED FOR SHORTNESS OF BREATH OR DIFFICULT BREATHING OR WHEEZING (Patient not taking: Reported on 10/22/2019) 18 g 0     Social History     Tobacco Use     Smoking status: Former Smoker     Packs/day: 1.50     Years: 10.00     Pack years: 15.00     Types: Cigarettes, Other     Last attempt to quit: 10/1/2016     Years since quitting: 3.0     Smokeless tobacco: Current User     Tobacco comment: less than 1/2 ppd   Substance Use Topics     Alcohol use: No     Comment: JAYA 18 days ago       ROS:   CONSTITUTIONAL:NEGATIVE for fever, chills, change in weight  INTEGUMENTARY/SKIN: NEGATIVE for worrisome rashes, moles or lesions  EYES: NEGATIVE for vision changes or irritation  ENT/MOUTH: NEGATIVE for ear, mouth and throat problems  RESP:NEGATIVE for significant cough or SOB  CV: NEGATIVE for chest pain, palpitations or peripheral edema  MUSCULOSKELETAL: NEGATIVE for significant arthralgias or myalgia  NEURO: NEGATIVE for weakness, dizziness or paresthesias  ENDOCRINE: NEGATIVE for temperature intolerance, skin/hair changes    OBJECTIVE:  /84   Pulse 83   Temp 98.8  F (37.1  C) (Oral)   Wt 56.7 kg (125 lb)   SpO2 97%   BMI 25.68 kg/m    GENERAL APPEARANCE: healthy, alert and no distress  RESP: lungs clear to auscultation - no rales, rhonchi or wheezes  CV: regular rates and rhythm, normal S1 S2, no murmur noted  ABDOMEN:  soft, nontender, no HSM or masses and bowel sounds normal  BACK: No CVA tenderness  SKIN: no suspicious lesions or rashes    Wet prep - +clue cells    ASSESSMENT:   Bacterial Vaginosis    PLAN:  Flagyl 500 mg two times per day for one week  As per ordered above  Drink plenty of fluids.  Prevention  and treatment of UTI's discussed.Signs and symptoms of pyelonephritis mentioned.  Follow up with primary care physician if not improving

## 2019-10-22 NOTE — PATIENT INSTRUCTIONS
Take flagyl as prescribed    Patient Education     Bacterial Vaginosis    You have a vaginal infection called bacterial vaginosis (BV). Both good and bad bacteria are present in a healthy vagina. BV occurs when these bacteria get out of balance. The number of bad bacteria increase. And the number of good bacteria decrease. Although BV is associated with sexual activity, it is not a sexually transmitted disease.  BV may or may not cause symptoms. If symptoms do occur, they can include:    Thin, gray, milky-white, or sometimes green discharge    Unpleasant odor or  fishy  smell    Itching, burning, or pain in or around the vagina  It is not known what causes BV, but certain factors can make the problem more likely. This can include:    Douching    Having sex with a new partner    Having sex with more than one partner  BV will sometimes go away on its own. But treatment is usually recommended. This is because untreated BV can increase the risk of more serious health problems such as:    Pelvic inflammatory disease (PID)     delivery (giving birth to a baby early if you re pregnant)    HIV and certain other sexually transmitted diseases (STDs)    Infection after surgery on the reproductive organs  Home care  General care    BV is most often treated with medicines called antibiotics. These may be given as pills or as a vaginal cream. If antibiotics are prescribed, be sure to use them exactly as directed. Also, be sure to complete all of the medicine, even if your symptoms go away.    Don't douche or having sex during treatment.    If you have sex with a female partner, ask your healthcare provider if she should also be treated.  Prevention    Don't douche.    Don't have sex. If you do have sex, then take steps to lower your risk:  ? Use condoms when having sex.  ? Limit the number of sexual partners you have.  Follow-up care  Follow up with your healthcare provider, or as advised.  When to seek medical  advice  Call your healthcare provider right away if:    You have a fever of 100.4 F (38 C) or higher, or as directed by your provider.    Your symptoms worsen, or they don t go away within a few days of starting treatment.    You have new pain in the lower belly or pelvic region.    You have side effects that bother you or a reaction to the pills or cream you re prescribed.    You or any partners you have sex with have new symptoms, such as a rash, joint pain, or sores.  Date Last Reviewed: 10/1/2017    7962-3956 [employer]. [address] [place] [address]. All rights reserved. This information is not intended as a substitute for professional medical care. Always follow your healthcare professional's instructions.         Take flagyl as prescribed

## 2019-10-23 ENCOUNTER — TELEPHONE (OUTPATIENT)
Dept: URGENT CARE | Facility: URGENT CARE | Age: 39
End: 2019-10-23

## 2019-10-23 LAB
C TRACH DNA SPEC QL NAA+PROBE: NEGATIVE
N GONORRHOEA DNA SPEC QL NAA+PROBE: NEGATIVE
SPECIMEN SOURCE: NORMAL
SPECIMEN SOURCE: NORMAL

## 2019-10-23 NOTE — TELEPHONE ENCOUNTER
Clinic Action Needed:Yes, please return call    Reason for Call: Alisia is calling to check on her lab results from yesterday.  Please call her to discuss when they've bee reviewed and released by provider.   She stressed that she does not want anything mailed to her and she would like a DETAILED message left on her voice mail.  If she needs to go on any abx, she stressed she can't take PCN, but she can take liquid amoxicillin (she said the kids pink liquid kind).  This caller has a history of head injury and she repeated herself quite often in the call.  Please return call to discuss further.  Thank you.     Routed to: [facility] Nurse Pool    Luz Artis, RN  [facility]

## 2019-10-25 NOTE — TELEPHONE ENCOUNTER
Left message for pt to call back.  Please inform std screen for GC/Chlam negative.   wesley grider

## 2019-11-14 DIAGNOSIS — J30.1 SEASONAL ALLERGIC RHINITIS DUE TO POLLEN: ICD-10-CM

## 2019-11-14 NOTE — TELEPHONE ENCOUNTER
"Requested Prescriptions   Pending Prescriptions Disp Refills     fluticasone (FLONASE) 50 MCG/ACT nasal spray    Last Written Prescription Date:  6/27/19  Last Fill Quantity: 18.2 mL,  # refills: 1   Last office visit: 5/29/2019 with prescribing provider:     Future Office Visit:     18.2 mL 1     Sig: Spray 2 sprays into both nostrils daily       Inhaled Steroids Protocol Failed - 11/14/2019 10:38 AM        Failed - Asthma control assessment score within normal limits in last 6 months     Please review ACT score.           Passed - Patient is age 12 or older        Passed - Medication is active on med list        Passed - Recent (6 mo) or future (30 days) visit within the authorizing provider's specialty     Patient had office visit in the last 6 months or has a visit in the next 30 days with authorizing provider or within the authorizing provider's specialty.  See \"Patient Info\" tab in inbasket, or \"Choose Columns\" in Meds & Orders section of the refill encounter.              "

## 2019-11-14 NOTE — TELEPHONE ENCOUNTER
Patient was to follow up in 3-4 weeks from last visit on 5/29.   Medication requested should be managed by PCP if she sees fit to continue medication.     Routing to PCP.    Ashwini Moore RN  11/14/19  2:25 PM

## 2019-11-15 RX ORDER — FLUTICASONE PROPIONATE 50 MCG
2 SPRAY, SUSPENSION (ML) NASAL DAILY
Qty: 18.2 ML | Refills: 1 | Status: SHIPPED | OUTPATIENT
Start: 2019-11-15 | End: 2020-07-07

## 2019-12-11 ENCOUNTER — TELEPHONE (OUTPATIENT)
Dept: FAMILY MEDICINE | Facility: CLINIC | Age: 39
End: 2019-12-11

## 2019-12-11 DIAGNOSIS — F31.9 BIPOLAR DISORDER (H): Primary | ICD-10-CM

## 2019-12-11 RX ORDER — OLANZAPINE 10 MG/1
TABLET ORAL
Qty: 45 TABLET | Refills: 0 | Status: CANCELLED | OUTPATIENT
Start: 2019-12-11

## 2019-12-11 NOTE — TELEPHONE ENCOUNTER
"Sepideh/Covering providers-Please review request.  Writer planned on informing patient she will need to follow up with [facility] to address this refill request.    1. Per chart review, patient hospitalized most recently in November 2019 at [facility] psychiatry unit  2. Discharged with Zyprexa 5 mg daily and 15 mg HS  3. Patient was no show for 11/27/19 appt with FLOR Hines CNP.  Last office visit was 5/24/19.  4. Discharge instructions:  \"REGARDING PSYCHIATRIC FOLLOW UP/MEDICATION MANAGEMENT:   It is very important that you receive follow up care following this hospitalization. If you have been prescribed medications during this visit, you will need a follow up appointment in a psychiatry clinic within 14 days to obtain refills of your medications. You must be seen in clinic in order to get medication refills.  The [facility] psychiatry clinic is available to see you following your hospitalization. They offer a medication compliance clinic every Wednesday and Thursday afternoon from 1:00pm to 3:00pm. If you wish to receive ongoing psychiatric care at this facility, please attend the [facility]iat[facility] medication compliance clinic for your initial visit following discharge from the hospital. This is a first come, first served clinic so you can attend during that two hour time frame to be seen. It is important that you attend one of those clinic dates before 12- so that you can get refills of your medication. Once you have been seen one time in the Psychiatry medication compliance clinic, the staff will help you get set up for routine follow up visits.  If you have questions, you can contact the clinic at [phone].  The clinic is located at [address] [place] [address], [address], [place] [address].  Free parking is available in the [employer] Parking lot at the east end of the building.  The clinic has your medical information available but we ask that you also bring along your medication " "bottles to your visit and any discharge information you receive.\"      Thank you!  BIENVENIDO RoyN, RN    "

## 2019-12-11 NOTE — TELEPHONE ENCOUNTER
Reason for Call:  Medication or medication refill:    Do you use a [facility]?  Name of the pharmacy and phone number for the current request:  [id number] [address] AT [address]  [phone]    Name of the medication requested: Olanzapine 20 MG    Other request: Patient states she is out of this medication (was prescribed in the hospital) and is hoping for a refill ASAP. Please assist. States she is starting to have side affects. Thanks!    Can we leave a detailed message on this number? YES    Phone number patient can be reached at: Cell number on file:    Telephone Information:   Mobile [phone]     Best Time: Any    Call taken on 12/11/2019 at 4:12 PM by Stacie Terrazas

## 2019-12-12 NOTE — TELEPHONE ENCOUNTER
Writer called patient and informed her psychiatry provider will need to address this refill.    Patient verbalized understanding and in agreement with plan.    Patient asked for writer to call back and leave voicemail with [facility] phone number.  Writer did as patient requested.    BIENVENIDO RoyN, RN

## 2020-03-11 ENCOUNTER — OFFICE VISIT (OUTPATIENT)
Dept: FAMILY MEDICINE | Facility: CLINIC | Age: 40
End: 2020-03-11

## 2020-03-11 VITALS
RESPIRATION RATE: 16 BRPM | HEIGHT: 59 IN | BODY MASS INDEX: 32.36 KG/M2 | HEART RATE: 94 BPM | TEMPERATURE: 98.2 F | SYSTOLIC BLOOD PRESSURE: 108 MMHG | OXYGEN SATURATION: 99 % | DIASTOLIC BLOOD PRESSURE: 64 MMHG | WEIGHT: 160.5 LBS

## 2020-03-11 DIAGNOSIS — F41.1 GAD (GENERALIZED ANXIETY DISORDER): ICD-10-CM

## 2020-03-11 DIAGNOSIS — G47.00 INSOMNIA, UNSPECIFIED TYPE: ICD-10-CM

## 2020-03-11 DIAGNOSIS — G89.29 CHRONIC RIGHT SHOULDER PAIN: ICD-10-CM

## 2020-03-11 DIAGNOSIS — F31.9 BIPOLAR 1 DISORDER (H): ICD-10-CM

## 2020-03-11 DIAGNOSIS — M54.40 CHRONIC MIDLINE LOW BACK PAIN WITH SCIATICA, SCIATICA LATERALITY UNSPECIFIED: ICD-10-CM

## 2020-03-11 DIAGNOSIS — F19.20 CHEMICAL DEPENDENCY (H): Primary | ICD-10-CM

## 2020-03-11 DIAGNOSIS — F33.1 MODERATE RECURRENT MAJOR DEPRESSION (H): ICD-10-CM

## 2020-03-11 DIAGNOSIS — F43.10 PTSD (POST-TRAUMATIC STRESS DISORDER): ICD-10-CM

## 2020-03-11 DIAGNOSIS — N89.8 VAGINAL DISCHARGE: ICD-10-CM

## 2020-03-11 DIAGNOSIS — M25.511 CHRONIC RIGHT SHOULDER PAIN: ICD-10-CM

## 2020-03-11 DIAGNOSIS — G89.29 CHRONIC MIDLINE LOW BACK PAIN WITH SCIATICA, SCIATICA LATERALITY UNSPECIFIED: ICD-10-CM

## 2020-03-11 LAB
SPECIMEN SOURCE: NORMAL
WET PREP SPEC: NORMAL

## 2020-03-11 PROCEDURE — 87210 SMEAR WET MOUNT SALINE/INK: CPT | Performed by: NURSE PRACTITIONER

## 2020-03-11 PROCEDURE — 99214 OFFICE O/P EST MOD 30 MIN: CPT | Performed by: NURSE PRACTITIONER

## 2020-03-11 RX ORDER — PAROXETINE 20 MG/1
20 TABLET, FILM COATED ORAL EVERY MORNING
Qty: 30 TABLET | Refills: 0 | Status: SHIPPED | OUTPATIENT
Start: 2020-03-11 | End: 2020-06-16

## 2020-03-11 RX ORDER — METHOCARBAMOL 750 MG/1
TABLET, FILM COATED ORAL
Qty: 90 TABLET | Refills: 5 | Status: CANCELLED | OUTPATIENT
Start: 2020-03-11

## 2020-03-11 RX ORDER — OLANZAPINE 5 MG/1
5 TABLET ORAL AT BEDTIME
Qty: 30 TABLET | Refills: 0 | Status: SHIPPED | OUTPATIENT
Start: 2020-03-11 | End: 2020-09-05

## 2020-03-11 RX ORDER — ALBUTEROL SULFATE 90 UG/1
AEROSOL, METERED RESPIRATORY (INHALATION)
Qty: 18 G | Refills: 0 | Status: CANCELLED | OUTPATIENT
Start: 2020-03-11

## 2020-03-11 RX ORDER — HYDROXYZINE HYDROCHLORIDE 50 MG/1
50 TABLET, FILM COATED ORAL
COMMUNITY
Start: 2020-01-02 | End: 2020-07-07

## 2020-03-11 RX ORDER — PAROXETINE 40 MG/1
20 TABLET, FILM COATED ORAL EVERY MORNING
Qty: 90 TABLET | Refills: 3 | Status: CANCELLED | OUTPATIENT
Start: 2020-03-11

## 2020-03-11 RX ORDER — OLANZAPINE 20 MG/1
20 TABLET ORAL
COMMUNITY
Start: 2020-01-02 | End: 2020-03-11

## 2020-03-11 RX ORDER — PRAZOSIN HYDROCHLORIDE 2 MG/1
2 CAPSULE ORAL AT BEDTIME
Qty: 30 CAPSULE | Refills: 0 | Status: SHIPPED | OUTPATIENT
Start: 2020-03-11 | End: 2020-07-07

## 2020-03-11 RX ORDER — PROPRANOLOL HYDROCHLORIDE 10 MG/1
10 TABLET ORAL 3 TIMES DAILY PRN
Qty: 90 TABLET | Refills: 0 | Status: SHIPPED | OUTPATIENT
Start: 2020-03-11 | End: 2020-07-07

## 2020-03-11 RX ORDER — DOXEPIN HYDROCHLORIDE 10 MG/1
10 CAPSULE ORAL AT BEDTIME
Qty: 30 CAPSULE | Refills: 0 | Status: SHIPPED | OUTPATIENT
Start: 2020-03-11 | End: 2020-07-07

## 2020-03-11 RX ORDER — OLANZAPINE 20 MG/1
20 TABLET ORAL
Qty: 90 TABLET | Refills: 3 | Status: CANCELLED | OUTPATIENT
Start: 2020-03-11

## 2020-03-11 RX ORDER — DOXEPIN HYDROCHLORIDE 10 MG/1
10 CAPSULE ORAL
COMMUNITY
Start: 2020-01-02 | End: 2020-03-11

## 2020-03-11 RX ORDER — METHOCARBAMOL 750 MG/1
750 TABLET, FILM COATED ORAL 3 TIMES DAILY PRN
Qty: 90 TABLET | Refills: 0 | Status: SHIPPED | OUTPATIENT
Start: 2020-03-11 | End: 2020-08-21

## 2020-03-11 RX ORDER — HYDROXYZINE PAMOATE 50 MG/1
50 CAPSULE ORAL 4 TIMES DAILY PRN
Qty: 120 CAPSULE | Refills: 0 | Status: SHIPPED | OUTPATIENT
Start: 2020-03-11 | End: 2020-07-07

## 2020-03-11 ASSESSMENT — ANXIETY QUESTIONNAIRES
GAD7 TOTAL SCORE: 12
2. NOT BEING ABLE TO STOP OR CONTROL WORRYING: MORE THAN HALF THE DAYS
5. BEING SO RESTLESS THAT IT IS HARD TO SIT STILL: MORE THAN HALF THE DAYS
1. FEELING NERVOUS, ANXIOUS, OR ON EDGE: MORE THAN HALF THE DAYS
3. WORRYING TOO MUCH ABOUT DIFFERENT THINGS: SEVERAL DAYS
7. FEELING AFRAID AS IF SOMETHING AWFUL MIGHT HAPPEN: MORE THAN HALF THE DAYS
6. BECOMING EASILY ANNOYED OR IRRITABLE: SEVERAL DAYS

## 2020-03-11 ASSESSMENT — PATIENT HEALTH QUESTIONNAIRE - PHQ9
5. POOR APPETITE OR OVEREATING: MORE THAN HALF THE DAYS
SUM OF ALL RESPONSES TO PHQ QUESTIONS 1-9: 8

## 2020-03-11 ASSESSMENT — MIFFLIN-ST. JEOR: SCORE: 1290.71

## 2020-03-11 NOTE — PROGRESS NOTES
Subjective     Alisia Lin is a 39 year old female who presents to clinic today for the following health issues:    HPI   Medication Followup of methocarbamol (stronger dose), changing to Latuda    Taking Medication as prescribed: yes    Side Effects:  Tardive dyskinesia    Medication Helping Symptoms:  yes     Interval history:  Several psychiatric hospitalizations nov and dec following suicide attempt via overdose.  UDS positive for benzos, cocaine, and fentanyl.  She had manic symptoms, was stared on Zyprexa and paxil during November hospitalization.  Hospitalized in December after taking 100 tabs diphenhydramine with 4 shots of whiskey.  She requested to go to treatment.  Felt prior medication regimen was effective.      Update today:  In CD tx 1/2/20 through 2 weeks ago [facility] chemical dependency treatment in [facility].  Needs refills of all meds today.    Living with her cousin, this is safe, he is support person.  She can stay there as long as needed.      Weaning off methadone (to get off benzos).  shes at methadone clinic in [place].  She is on 65mg daily.      Anxiety is horrible.  She is on hydroxyzine 4 times daily, propranolol three times a day (denies lightheadedness).  Not seeing psychiatrist or therapist right now.  Doxepin helps with sleep      shes having tardive dyskinesia on zyprexa, wants to change to latuda.  Has been on this before, thinks it worked well.  She gets a muscle jerk, happens every time she goes to sleep.  Last 10 days lowered dose to 5mg with improvement in jerk.  Also notes she has gotten TD with seroquel (300mg)    Prazosin 2mg before bed very helpful with nightmares.      Methocarbamol for back three times a day for lower back pain.  Thinks methadone taper is contributing to back pain.      Patient Active Problem List   Diagnosis     Moderate recurrent major depression (H)     Migraine with aura     Tobacco use disorder     ASCUS on Pap smear     Chronic low back pain      Anxiety     Benign neoplasm of colon     Degeneration of intervertebral disc     Spondylosis     Displacement of intervertebral disc     Intermittent asthma, uncomplicated     Back pain     Shoulder pain, right     Narcotic dependence, in remission (H)     Abnormal thyroid function test     Low TSH level     NATHANAEL (generalized anxiety disorder)     Past Surgical History:   Procedure Laterality Date     HC ENLARGE BREAST WITH IMPLANT  2002    BILATERAL     HC REMOVE TONSILS/ADENOIDS,10+ Y/O  2006     HC TOOTH EXTRACTION W/FORCEP  14 yo    wisdom teeth     IUD PARAGARD  3/3/08    Removed with mirena placed. Mirena has now been removed as well.      LEEP TX, CERVICAL  age 15?     ORTHOPEDIC SURGERY      Lumbar fusion 2009       Social History     Tobacco Use     Smoking status: Former Smoker     Packs/day: 1.50     Years: 10.00     Pack years: 15.00     Types: Cigarettes, Other     Last attempt to quit: 10/1/2016     Years since quitting: 3.4     Smokeless tobacco: Current User     Tobacco comment: less than 1/2 ppd   Substance Use Topics     Alcohol use: No     Comment: JAYA 18 days ago     Family History   Problem Relation Age of Onset     Hypertension Mother      Alcohol/Drug Mother         alcohol, sober for 19 years     Depression Mother         on medication     Lipids Mother         on medication     Alcohol/Drug Father         alcohol, still actively drinking     Lipids Father         on medication     C.A.D. Paternal Grandmother      Diabetes Paternal Grandmother      Breast Cancer Paternal Grandmother      Arthritis Paternal Grandmother      Cancer Paternal Grandmother         breast cancer     Cancer Paternal Grandfather         colon cancer     Asthma Daughter         x2     Thyroid Disease No family hx of          Current Outpatient Medications   Medication Sig Dispense Refill     diphenhydrAMINE (BENADRYL) 25 MG tablet 50mg q hs prn for sleep. 60 tablet 1     doxepin (SINEQUAN) 10 MG capsule Take 1  "capsule (10 mg) by mouth At Bedtime 30 capsule 0     fluticasone (FLONASE) 50 MCG/ACT nasal spray Spray 2 sprays into both nostrils daily 18.2 mL 1     hydrOXYzine (ATARAX) 50 MG tablet Take 50 mg by mouth       hydrOXYzine (VISTARIL) 50 MG capsule Take 1 capsule (50 mg) by mouth 4 times daily as needed for itching 120 capsule 0     ibuprofen (ADVIL/MOTRIN) 600 MG tablet TAKE 1 TABLET BY MOUTH TWICE DAILY WITH MEALS 60 tablet 0     methocarbamol (ROBAXIN) 750 MG tablet Take 1 tablet (750 mg) by mouth 3 times daily as needed for muscle spasms 90 tablet 0     methocarbamol (ROBAXIN) 750 MG tablet TAKE 2 TABLETS(1500 MG) BY MOUTH THREE TIMES DAILY AS NEEDED FOR MUSCLE SPASMS 90 tablet 5     multivitamin, therapeutic with minerals (MULTI-VITAMIN) TABS Take 1 tablet by mouth daily 100 tablet 3     naloxone (NARCAN) 4 MG/0.1ML nasal spray Spray 1 spray (4 mg) into one nostril alternating nostrils once as needed for opioid reversal every 2-3 minutes until assistance arrives 0.2 mL 0     OLANZapine (ZYPREXA) 5 MG tablet Take 1 tablet (5 mg) by mouth At Bedtime 30 tablet 0     PARoxetine (PAXIL) 20 MG tablet Take 1 tablet (20 mg) by mouth every morning 30 tablet 0     prazosin (MINIPRESS) 2 MG capsule Take 1 capsule (2 mg) by mouth At Bedtime 30 capsule 0     propranolol (INDERAL) 10 MG tablet Take 1 tablet (10 mg) by mouth 3 times daily as needed (anxiety) 90 tablet 0     VENTOLIN  (90 BASE) MCG/ACT Inhaler INHALE 2 PUFFS INTO THE LUNGS EVERY 4 HOURS AS NEEDED FOR SHORTNESS OF BREATH OR DIFFICULT BREATHING OR WHEEZING 18 g 0         Reviewed and updated as needed this visit by Provider         Review of Systems   ROS COMP: Constitutional, HEENT, cardiovascular, pulmonary, gi and gu systems are negative, except as otherwise noted.      Objective    /64 (BP Location: Left arm, Patient Position: Chair, Cuff Size: Adult Regular)   Pulse 94   Temp 98.2  F (36.8  C) (Oral)   Resp 16   Ht 1.486 m (4' 10.5\")   Wt " 72.8 kg (160 lb 8 oz)   SpO2 99%   BMI 32.97 kg/m    Body mass index is 32.97 kg/m .  Physical Exam   GENERAL APPEARANCE: healthy, alert and no distress  EYES: Eyes grossly normal to inspection and conjunctivae and sclerae normal  RESP: respirations nonlabored  PSYCH: mentation appears normal and affect normal/bright       Diagnostic Test Results:  Results for orders placed or performed in visit on 03/11/20   Wet prep     Status: None    Specimen: Vagina   Result Value Ref Range    Specimen Description Vagina     Wet Prep No Trichomonas seen     Wet Prep No clue cells seen     Wet Prep No yeast seen     Wet Prep WBC'S seen  Rare                Assessment & Plan     (F19.20) Chemical dependency (H)  (primary encounter diagnosis)  Comment: s/p residential treatment program   Plan: needs to est with support sytem including therapy, scheduled next fri for ton alarcon here    (F31.9) Bipolar 1 disorder (H)  (F41.1) NATHANAEL (generalized anxiety disorder)  (F43.10) PTSD (post-traumatic stress disorder)  (F33.1) Moderate recurrent major depression (H)  Comment: feels she is having tardive dyskinesia from zyprexa, wanting to change to latuda  Plan: OLANZapine (ZYPREXA) 5 MG tablet, PARoxetine         (PAXIL) 20 MG tablet, hydrOXYzine (VISTARIL) 50        MG capsule, propranolol (INDERAL) 10 MG tablet,        MENTAL HEALTH REFERRAL  - Adult; Psychiatry;         Psychiatry; Other: [facility]         [phone]; We will contact you to schedule        the appointment or please call with any         questions        Discussed I am not comfortable transitioning antipsychotics, recommend [facility] psychiatry, she agrees, referral made.  Follow up with Ton Alarcon as above.  I have refilled all psych meds x 1 mo.  Follow up in 1mo to check in.      (G47.00) Insomnia, unspecified type  Comment:   Plan: doxepin (SINEQUAN) 10 MG capsule        refilled    (M54.40,  G89.29) Chronic midline low back pain with sciatica, sciatica  laterality unspecified  Comment: she believes methadone taper may be contributing  Plan: methocarbamol (ROBAXIN) 750 MG tablet        Refilled, may benefit though physical therapy down the road      (N89.8) Vaginal discharge  Comment: symptoms now resolved  Plan: Wet prep        Reassured wet prep normal         See Patient Instructions    No follow-ups on file.    SONJA Meek [facility]

## 2020-03-11 NOTE — PATIENT INSTRUCTIONS
1.  refilled all meds x 1mo.  Follow up with psychiatry to discuss olanzapine adjustment.  2.  See stevie next fri 3/20 at 2, will call to confirm  3.  See me in 1 month

## 2020-03-12 ASSESSMENT — ANXIETY QUESTIONNAIRES: GAD7 TOTAL SCORE: 12

## 2020-03-20 ENCOUNTER — VIRTUAL VISIT (OUTPATIENT)
Dept: BEHAVIORAL HEALTH | Facility: CLINIC | Age: 40
End: 2020-03-20

## 2020-03-20 DIAGNOSIS — F41.1 GAD (GENERALIZED ANXIETY DISORDER): Primary | ICD-10-CM

## 2020-03-20 NOTE — PROGRESS NOTES
Patient is new patient.  Attempted to contact by phone for scheduled 2:00 appointment.  Left voicemail that had reviewed her chart and recent appointment with her PCP.  Encouraged patient to give the clinic a call to reschedule a telephone visit with the [facility].

## 2020-04-29 ENCOUNTER — VIRTUAL VISIT (OUTPATIENT)
Dept: FAMILY MEDICINE | Facility: CLINIC | Age: 40
End: 2020-04-29

## 2020-04-29 VITALS — BODY MASS INDEX: 31.49 KG/M2 | HEIGHT: 58 IN | WEIGHT: 150 LBS

## 2020-04-29 DIAGNOSIS — M54.40 CHRONIC MIDLINE LOW BACK PAIN WITH SCIATICA, SCIATICA LATERALITY UNSPECIFIED: ICD-10-CM

## 2020-04-29 DIAGNOSIS — M25.511 CHRONIC RIGHT SHOULDER PAIN: ICD-10-CM

## 2020-04-29 DIAGNOSIS — G89.29 CHRONIC MIDLINE LOW BACK PAIN WITH SCIATICA, SCIATICA LATERALITY UNSPECIFIED: ICD-10-CM

## 2020-04-29 DIAGNOSIS — K04.7 ABSCESSED TOOTH: Primary | ICD-10-CM

## 2020-04-29 DIAGNOSIS — T36.95XA ANTIBIOTIC-INDUCED YEAST INFECTION: ICD-10-CM

## 2020-04-29 DIAGNOSIS — G89.29 CHRONIC RIGHT SHOULDER PAIN: ICD-10-CM

## 2020-04-29 DIAGNOSIS — B37.9 ANTIBIOTIC-INDUCED YEAST INFECTION: ICD-10-CM

## 2020-04-29 PROCEDURE — 99214 OFFICE O/P EST MOD 30 MIN: CPT | Mod: 95 | Performed by: NURSE PRACTITIONER

## 2020-04-29 RX ORDER — METHOCARBAMOL 750 MG/1
750 TABLET, FILM COATED ORAL 3 TIMES DAILY PRN
Qty: 90 TABLET | Refills: 1 | Status: SHIPPED | OUTPATIENT
Start: 2020-04-29 | End: 2020-09-05

## 2020-04-29 RX ORDER — AMOXICILLIN 875 MG
875 TABLET ORAL 2 TIMES DAILY
Qty: 20 TABLET | Refills: 0 | Status: SHIPPED | OUTPATIENT
Start: 2020-04-29 | End: 2020-07-07

## 2020-04-29 RX ORDER — FLUCONAZOLE 150 MG/1
150 TABLET ORAL ONCE
Qty: 1 TABLET | Refills: 1 | Status: SHIPPED | OUTPATIENT
Start: 2020-04-29 | End: 2020-07-07

## 2020-04-29 ASSESSMENT — MIFFLIN-ST. JEOR: SCORE: 1235.15

## 2020-04-29 NOTE — PROGRESS NOTES
"Alisia Lin is a 39 year old female who is being evaluated via a billable video visit.      The patient has been notified of following:     \"This video visit will be conducted via a call between you and your physician/provider. We have found that certain health care needs can be provided without the need for an in-person physical exam.  This service lets us provide the care you need with a video conversation.  If a prescription is necessary we can send it directly to your pharmacy.  If lab work is needed we can place an order for that and you can then stop by our lab to have the test done at a later time.    Video visits are billed at different rates depending on your insurance coverage.  Please reach out to your insurance provider with any questions.    If during the course of the call the physician/provider feels a video visit is not appropriate, you will not be charged for this service.\"    Patient has given verbal consent for Video visit? Yes    How would you like to obtain your AVS? Emiliano    Patient would like the video invitation sent by: Text to cell phone: [phone]    Will anyone else be joining your video visit? No    Subjective     Alisia Lin is a 39 year old female who is doing a video visit today for the following health issues:    HPI  Mouth problem      Duration: 2 days     Description (location/character/radiation): upper right side top of mouth    Intensity:  severe    Accompanying signs and symptoms: right side facial swelling    History (similar episodes/previous evaluation): None    Precipitating or alleviating factors: None    Therapies tried and outcome: ibuprofen      Video Start Time: 2:04 PM    Right upper jaw tooth pain:  Started 2 days ago.  She also has some swelling on her cheek because of the tooth problem.  She reports she has a abcessed tooth on her right upper jaw, \"the tooth next to the eye tooth.\"  She has not yet reached out to a dentist.  She denies fever or " "chills.  Ibuprofen is helpful for the pain, but as soon as the ibuprofen wears off she has pain again.   No other symptoms.  Today, she is requesting antibiotics for the abscessed tooth as well as Diflucan because she gets vaginal infections from the antibiotics.    Muscle relaxant/robaxin:  She takes 1-2 per day for muscle spasms.  This works great.  She is currently staying with her mom in the [facility] and she is unable to get to her \"usual\" pharmacy for her refill.  She is requesting a new rx be sent to a pharmacy close to her now.  She plans to follow up with her PCP for additional refills.       Patient Active Problem List   Diagnosis     Moderate recurrent major depression (H)     Migraine with aura     Tobacco use disorder     ASCUS on Pap smear     Chronic low back pain     Anxiety     Benign neoplasm of colon     Degeneration of intervertebral disc     Spondylosis     Displacement of intervertebral disc     Intermittent asthma, uncomplicated     Back pain     Shoulder pain, right     Narcotic dependence, in remission (H)     Abnormal thyroid function test     Low TSH level     NATHANAEL (generalized anxiety disorder)     Past Surgical History:   Procedure Laterality Date     HC ENLARGE BREAST WITH IMPLANT  2002    BILATERAL     HC REMOVE TONSILS/ADENOIDS,12+ Y/O  2006     HC TOOTH EXTRACTION W/FORCEP  16 yo    wisdom teeth     IUD PARAGARD  3/3/08    Removed with mirena placed. Mirena has now been removed as well.      ASHLY TX, CERVICAL  age 15?     ORTHOPEDIC SURGERY      Lumbar fusion 2009       Social History     Tobacco Use     Smoking status: Former Smoker     Packs/day: 1.50     Years: 10.00     Pack years: 15.00     Types: Cigarettes, Other     Last attempt to quit: 10/1/2016     Years since quitting: 3.5     Smokeless tobacco: Current User     Tobacco comment: less than 1/2 ppd   Substance Use Topics     Alcohol use: No     Comment: JAYA 18 days ago     Family History   Problem Relation Age of Onset "     Hypertension Mother      Alcohol/Drug Mother         alcohol, sober for 19 years     Depression Mother         on medication     Lipids Mother         on medication     Alcohol/Drug Father         alcohol, still actively drinking     Lipids Father         on medication     C.A.D. Paternal Grandmother      Diabetes Paternal Grandmother      Breast Cancer Paternal Grandmother      Arthritis Paternal Grandmother      Cancer Paternal Grandmother         breast cancer     Cancer Paternal Grandfather         colon cancer     Asthma Daughter         x2     Thyroid Disease No family hx of          Current Outpatient Medications   Medication Sig Dispense Refill     amoxicillin (AMOXIL) 875 MG tablet Take 1 tablet (875 mg) by mouth 2 times daily 20 tablet 0     fluconazole (DIFLUCAN) 150 MG tablet Take 1 tablet (150 mg) by mouth once for 1 dose And may repeat in one week if needed. 1 tablet 1     ibuprofen (ADVIL/MOTRIN) 600 MG tablet TAKE 1 TABLET BY MOUTH TWICE DAILY WITH MEALS 60 tablet 0     methocarbamol (ROBAXIN) 750 MG tablet Take 1 tablet (750 mg) by mouth 3 times daily as needed for muscle spasms 90 tablet 1     OLANZapine (ZYPREXA) 5 MG tablet Take 1 tablet (5 mg) by mouth At Bedtime 30 tablet 0     PARoxetine (PAXIL) 20 MG tablet Take 1 tablet (20 mg) by mouth every morning 30 tablet 0     prazosin (MINIPRESS) 2 MG capsule Take 1 capsule (2 mg) by mouth At Bedtime (Patient taking differently: Take 3 mg by mouth At Bedtime ) 30 capsule 0     diphenhydrAMINE (BENADRYL) 25 MG tablet 50mg q hs prn for sleep. (Patient not taking: Reported on 4/29/2020) 60 tablet 1     doxepin (SINEQUAN) 10 MG capsule Take 1 capsule (10 mg) by mouth At Bedtime (Patient not taking: Reported on 4/29/2020) 30 capsule 0     fluticasone (FLONASE) 50 MCG/ACT nasal spray Spray 2 sprays into both nostrils daily (Patient not taking: Reported on 4/29/2020) 18.2 mL 1     hydrOXYzine (ATARAX) 50 MG tablet Take 50 mg by mouth       hydrOXYzine  (VISTARIL) 50 MG capsule Take 1 capsule (50 mg) by mouth 4 times daily as needed for itching (Patient not taking: Reported on 4/29/2020) 120 capsule 0     methocarbamol (ROBAXIN) 750 MG tablet Take 1 tablet (750 mg) by mouth 3 times daily as needed for muscle spasms (Patient not taking: Reported on 4/29/2020) 90 tablet 0     multivitamin, therapeutic with minerals (MULTI-VITAMIN) TABS Take 1 tablet by mouth daily (Patient not taking: Reported on 4/29/2020) 100 tablet 3     naloxone (NARCAN) 4 MG/0.1ML nasal spray Spray 1 spray (4 mg) into one nostril alternating nostrils once as needed for opioid reversal every 2-3 minutes until assistance arrives (Patient not taking: Reported on 4/29/2020) 0.2 mL 0     propranolol (INDERAL) 10 MG tablet Take 1 tablet (10 mg) by mouth 3 times daily as needed (anxiety) (Patient not taking: Reported on 4/29/2020) 90 tablet 0     VENTOLIN  (90 BASE) MCG/ACT Inhaler INHALE 2 PUFFS INTO THE LUNGS EVERY 4 HOURS AS NEEDED FOR SHORTNESS OF BREATH OR DIFFICULT BREATHING OR WHEEZING (Patient not taking: Reported on 4/29/2020) 18 g 0     Allergies   Allergen Reactions     Compazine      Heart Problems/ Body went completley stiff for 8 hrs.     Nortriptyline      Skelaxin [Metaxalone]      Recent Labs   Lab Test 08/21/19 03/05/19  1644 11/08/18  10/24/17 08/29/17  1534 07/14/17  1335  03/13/15  0840   LDL  --   --  200*  --   --   --  156*  --  114   HDL  --   --  32*  --   --   --  41*  --  56   TRIG  --   --  132  --   --   --  105  --  72   ALT  --   --  7*  --  12  --  18   < > 16   CR 0.84 0.86  --    < > 0.63  --  0.56   < > 0.56   GFRESTIMATED >60 84  --    < > >60  --  >90  Non  GFR Calc     < > >90  Non  GFR Calc     GFRESTBLACK >60 >90  --    < > >60  --  >90  African American GFR Calc     < > >90   GFR Calc     POTASSIUM 4.1 3.5  --    < > 3.0*  --  3.4   < > 2.8*   TSH  --   --  1.03  --   --  2.84 0.26*  --  1.21    < > =  "values in this interval not displayed.      BP Readings from Last 3 Encounters:   03/11/20 108/64   10/22/19 123/84   05/29/19 102/72    Wt Readings from Last 3 Encounters:   04/29/20 68 kg (150 lb)   03/11/20 72.8 kg (160 lb 8 oz)   10/22/19 56.7 kg (125 lb)              Reviewed and updated as needed this visit by Provider  Tobacco  Allergies  Meds  Problems  Med Hx  Surg Hx  Fam Hx         Review of Systems   ROS COMP: Constitutional, HEENT, cardiovascular, pulmonary, GI, , musculoskeletal, neuro, skin, endocrine and psych systems are negative, except as otherwise noted.      Objective    Ht 1.473 m (4' 10\")   Wt 68 kg (150 lb)   LMP 04/20/2020   BMI 31.35 kg/m    Estimated body mass index is 31.35 kg/m  as calculated from the following:    Height as of this encounter: 1.473 m (4' 10\").    Weight as of this encounter: 68 kg (150 lb).  Physical Exam     GENERAL: healthy, alert and no distress  EYES: Eyes grossly normal to inspection, conjunctivae and sclerae normal  Face: her right upper jaw region is slightly puffy without redness.   RESP: no audible wheeze, cough, or visible cyanosis.  No visible retractions or increased work of breathing.  Able to speak fully in complete sentences.  NEURO: Cranial nerves grossly intact, mentation intact and speech normal  PSYCH: mentation appears normal, affect normal/bright, judgement and insight intact, normal speech and appearance well-groomed    Diagnostic Test Results:  Labs reviewed in Epic        Assessment & Plan     (K04.7) Abscessed tooth  (primary encounter diagnosis)  Comment: right upper jaw  Plan: amoxicillin (AMOXIL) 875 MG tablet         I did go ahead and approve amoxicillin for 10 days for her abscessed tooth.  I talked with her at length about the necessity to follow-up with the dentist as soon as possible.  I did tell her that even though there is the coronavirus pandemic and precautions in the community, the dental teams are supposed to get " "patients in for tooth pain.  I encouraged her to take ibuprofen as needed for pain as well and follow-up with the dentist as soon as possible.  She is appreciative.    (B37.9,  T36.95XA) Antibiotic-induced yeast infection  Comment: History of  Plan: fluconazole (DIFLUCAN) 150 MG tablet        I did go ahead and approve Diflucan for her in the event that she develops vaginal yeast infection related to her antibiotics.  She is appreciative.    (M54.40,  G89.29) Chronic midline low back pain with sciatica, sciatica laterality unspecified  Comment: Chronic  Plan: methocarbamol (ROBAXIN) 750 MG tablet        I did go ahead and refill the Robaxin for her and told her to follow-up with her PCP for additional refills.  She agrees and understands.    (M25.511,  G89.29) Chronic right shoulder pain  Comment: Chronic  Plan: methocarbamol (ROBAXIN) 750 MG tablet        As above       BMI:   Estimated body mass index is 31.35 kg/m  as calculated from the following:    Height as of this encounter: 1.473 m (4' 10\").    Weight as of this encounter: 68 kg (150 lb).           See Patient Instructions    Return in about 5 days (around 5/4/2020), or with a dentist for the abscessed tooth.    SONJA Carrington CNP  [facility]      Video-Visit Details    Type of service:  Video Visit    Video End Time:2:16 PM    Originating Location (pt. Location): Home    Distant Location (provider location):  [facility]     Mode of Communication:  Video Conference via [employer]    Return in about 5 days (around 5/4/2020), or with a dentist for the abscessed tooth.       SONJA Carrington CNP        "

## 2020-04-30 ASSESSMENT — ASTHMA QUESTIONNAIRES: ACT_TOTALSCORE: 25

## 2020-06-13 DIAGNOSIS — F31.9 BIPOLAR 1 DISORDER (H): ICD-10-CM

## 2020-06-13 DIAGNOSIS — F41.1 GAD (GENERALIZED ANXIETY DISORDER): ICD-10-CM

## 2020-06-13 DIAGNOSIS — F33.1 MODERATE RECURRENT MAJOR DEPRESSION (H): ICD-10-CM

## 2020-06-16 RX ORDER — PAROXETINE 20 MG/1
TABLET, FILM COATED ORAL
Qty: 30 TABLET | Refills: 0 | Status: ON HOLD | OUTPATIENT
Start: 2020-06-16 | End: 2021-07-18

## 2020-06-16 NOTE — TELEPHONE ENCOUNTER
Routing refill request to provider for review/approval because:  PHQ-9 score:    PHQ 3/11/2020   PHQ-9 Total Score 8   Q9: Thoughts of better off dead/self-harm past 2 weeks Several days

## 2020-06-16 NOTE — TELEPHONE ENCOUNTER
Refilled x 1, pt needs virtual visit for additional refills.  She usually follows with psychiatry, if she is seeing them they should be able to fill this med for her.    Sepideh Hines, CNP

## 2020-07-05 ENCOUNTER — TRANSFERRED RECORDS (OUTPATIENT)
Dept: HEALTH INFORMATION MANAGEMENT | Facility: CLINIC | Age: 40
End: 2020-07-05

## 2020-07-05 LAB
ALT SERPL-CCNC: 16 IU/L (ref 8–45)
AST SERPL-CCNC: 20 IU/L (ref 2–40)
CREAT SERPL-MCNC: 0.75 MG/DL (ref 0.57–1.11)
GFR SERPL CREATININE-BSD FRML MDRD: >60 ML/MIN/1.73M2
GLUCOSE SERPL-MCNC: 97 MG/DL (ref 65–100)
POTASSIUM SERPL-SCNC: 3.1 MMOL/L (ref 3.5–5)

## 2020-07-07 ENCOUNTER — VIRTUAL VISIT (OUTPATIENT)
Dept: FAMILY MEDICINE | Facility: CLINIC | Age: 40
End: 2020-07-07
Payer: COMMERCIAL

## 2020-07-07 DIAGNOSIS — F43.10 PTSD (POST-TRAUMATIC STRESS DISORDER): ICD-10-CM

## 2020-07-07 DIAGNOSIS — F33.1 MODERATE RECURRENT MAJOR DEPRESSION (H): ICD-10-CM

## 2020-07-07 DIAGNOSIS — F41.1 GAD (GENERALIZED ANXIETY DISORDER): Primary | ICD-10-CM

## 2020-07-07 DIAGNOSIS — R19.7 DIARRHEA, UNSPECIFIED TYPE: ICD-10-CM

## 2020-07-07 PROCEDURE — 99214 OFFICE O/P EST MOD 30 MIN: CPT | Mod: 95 | Performed by: NURSE PRACTITIONER

## 2020-07-07 PROCEDURE — 96127 BRIEF EMOTIONAL/BEHAV ASSMT: CPT | Mod: 59 | Performed by: NURSE PRACTITIONER

## 2020-07-07 RX ORDER — PAROXETINE 40 MG/1
40 TABLET, FILM COATED ORAL EVERY MORNING
Qty: 30 TABLET | Refills: 1 | Status: SHIPPED | OUTPATIENT
Start: 2020-07-07 | End: 2020-09-17

## 2020-07-07 RX ORDER — HYDROXYZINE HYDROCHLORIDE 50 MG/1
50 TABLET, FILM COATED ORAL EVERY 6 HOURS PRN
Qty: 120 TABLET | Refills: 0 | Status: SHIPPED | OUTPATIENT
Start: 2020-07-07 | End: 2020-12-10

## 2020-07-07 RX ORDER — PRAZOSIN HYDROCHLORIDE 2 MG/1
6 CAPSULE ORAL AT BEDTIME
Qty: 90 CAPSULE | Refills: 0 | Status: SHIPPED | OUTPATIENT
Start: 2020-07-07 | End: 2020-08-05

## 2020-07-07 RX ORDER — PROPRANOLOL HYDROCHLORIDE 10 MG/1
10 TABLET ORAL 3 TIMES DAILY PRN
Qty: 90 TABLET | Refills: 0 | Status: SHIPPED | OUTPATIENT
Start: 2020-07-07 | End: 2020-08-05

## 2020-07-07 ASSESSMENT — ANXIETY QUESTIONNAIRES
6. BECOMING EASILY ANNOYED OR IRRITABLE: NEARLY EVERY DAY
5. BEING SO RESTLESS THAT IT IS HARD TO SIT STILL: NEARLY EVERY DAY
3. WORRYING TOO MUCH ABOUT DIFFERENT THINGS: NEARLY EVERY DAY
7. FEELING AFRAID AS IF SOMETHING AWFUL MIGHT HAPPEN: NEARLY EVERY DAY
GAD7 TOTAL SCORE: 21
IF YOU CHECKED OFF ANY PROBLEMS ON THIS QUESTIONNAIRE, HOW DIFFICULT HAVE THESE PROBLEMS MADE IT FOR YOU TO DO YOUR WORK, TAKE CARE OF THINGS AT HOME, OR GET ALONG WITH OTHER PEOPLE: EXTREMELY DIFFICULT
2. NOT BEING ABLE TO STOP OR CONTROL WORRYING: NEARLY EVERY DAY
1. FEELING NERVOUS, ANXIOUS, OR ON EDGE: NEARLY EVERY DAY

## 2020-07-07 ASSESSMENT — PATIENT HEALTH QUESTIONNAIRE - PHQ9
SUM OF ALL RESPONSES TO PHQ QUESTIONS 1-9: 19
5. POOR APPETITE OR OVEREATING: NEARLY EVERY DAY

## 2020-07-07 NOTE — PROGRESS NOTES
"Alisia Lin is a 39 year old female who is being evaluated via a billable telephone visit.      The patient has been notified of following:     \"This telephone visit will be conducted via a call between you and your physician/provider. We have found that certain health care needs can be provided without the need for a physical exam.  This service lets us provide the care you need with a short phone conversation.  If a prescription is necessary we can send it directly to your pharmacy.  If lab work is needed we can place an order for that and you can then stop by our lab to have the test done at a later time.    Telephone visits are billed at different rates depending on your insurance coverage. During this emergency period, for some insurers they may be billed the same as an in-person visit.  Please reach out to your insurance provider with any questions.    If during the course of the call the physician/provider feels a telephone visit is not appropriate, you will not be charged for this service.\"    Patient has given verbal consent for Telephone visit?  Yes    What phone number would you like to be contacted at? [phone]    How would you like to obtain your AVS? Mail a copy    Subjective     Alisia Lin is a 39 year old female who presents via phone visit today for the following health issues:    HPI    ER Follow-up Visit:         ER visit 7/5 for diarrhea, nausea, and dizziness x 1d.  Normal EKG and CKR.  CMP and CBC normal except wbc 12 and K 3.1.  Treated with fluids, zofran, and 40mEq K.  Appeared anxious and given 0.5mg IV ativan with improvement.  [place] to be viral, discharged home with supportive cares.    MMD/NATHANAEL- currently on paroxetine and zyprexa    Update today:  Feels like shit, the anxiety has been worse in the last few weeks.  No specific trigger.  Had been doing well, talking to all kids and was working.    Stools are watery, no appetite.  Was weighing 160, down to 150.  No abd pain, " she is passing gas.  4 stools since this morning.  No blood in stools, no fever.  Feels it is related to anxiety.  shes drained and tired.    Had grandson the evening of the 4th, since then having nightmares.  She is taking prazosin.  Living with cousin, he is working nights (this is not new), when he is gone she has nightmares.  Thinks trauma is coming back.  shes crying, anxiety has just been horrible.  Working as , [employer] worked since 7/4.  No drinking or street drug use.    Not seeing therapist, no longer seeing anyone and needs to get reestablished.  Was in CD tx jan-feb.    Previously followed with Sakshi Neely, was on chronic benzos.  Hesitant to return to this with CD hx.  Psychiatrist is no longer in practice.    She is taking paxil and prazosin.  No longer on zyprexa, hydroxyzine, propranolol.      Patient Active Problem List   Diagnosis     Moderate recurrent major depression (H)     Migraine with aura     Tobacco use disorder     ASCUS on Pap smear     Chronic low back pain     Anxiety     Benign neoplasm of colon     Degeneration of intervertebral disc     Spondylosis     Displacement of intervertebral disc     Intermittent asthma, uncomplicated     Back pain     Shoulder pain, right     Narcotic dependence, in remission (H)     Abnormal thyroid function test     Low TSH level     NATHANAEL (generalized anxiety disorder)     Past Surgical History:   Procedure Laterality Date     HC ENLARGE BREAST WITH IMPLANT  2002    BILATERAL     HC REMOVE TONSILS/ADENOIDS,10+ Y/O  2006     HC TOOTH EXTRACTION W/FORCEP  14 yo    wisdom teeth     IUD PARAGARD  3/3/08    Removed with mirena placed. Mirena has now been removed as well.      LEEP TX, CERVICAL  age 15?     ORTHOPEDIC SURGERY      Lumbar fusion 2009       Social History     Tobacco Use     Smoking status: Former Smoker     Packs/day: 1.50     Years: 10.00     Pack years: 15.00     Types: Cigarettes, Other     Last attempt to quit: 10/1/2016     Years  since quitting: 3.7     Smokeless tobacco: Current User     Tobacco comment: less than 1/2 ppd   Substance Use Topics     Alcohol use: No     Comment: JAYA 18 days ago     Family History   Problem Relation Age of Onset     Hypertension Mother      Alcohol/Drug Mother         alcohol, sober for 19 years     Depression Mother         on medication     Lipids Mother         on medication     Alcohol/Drug Father         alcohol, still actively drinking     Lipids Father         on medication     C.A.D. Paternal Grandmother      Diabetes Paternal Grandmother      Breast Cancer Paternal Grandmother      Arthritis Paternal Grandmother      Cancer Paternal Grandmother         breast cancer     Cancer Paternal Grandfather         colon cancer     Asthma Daughter         x2     Thyroid Disease No family hx of          Current Outpatient Medications   Medication Sig Dispense Refill     hydrOXYzine (ATARAX) 50 MG tablet Take 1 tablet (50 mg) by mouth every 6 hours as needed for itching or anxiety 120 tablet 0     ibuprofen (ADVIL/MOTRIN) 600 MG tablet TAKE 1 TABLET BY MOUTH TWICE DAILY WITH MEALS 60 tablet 0     methocarbamol (ROBAXIN) 750 MG tablet Take 1 tablet (750 mg) by mouth 3 times daily as needed for muscle spasms 90 tablet 1     methocarbamol (ROBAXIN) 750 MG tablet Take 1 tablet (750 mg) by mouth 3 times daily as needed for muscle spasms 90 tablet 0     OLANZapine (ZYPREXA) 5 MG tablet Take 1 tablet (5 mg) by mouth At Bedtime 30 tablet 0     PARoxetine (PAXIL) 20 MG tablet TAKE 1 TABLET(20 MG) BY MOUTH EVERY MORNING 30 tablet 0     PARoxetine (PAXIL) 40 MG tablet Take 1 tablet (40 mg) by mouth every morning 30 tablet 1     prazosin (MINIPRESS) 2 MG capsule Take 3 capsules (6 mg) by mouth At Bedtime 90 capsule 0     propranolol (INDERAL) 10 MG tablet Take 1 tablet (10 mg) by mouth 3 times daily as needed (anxiety) 90 tablet 0     VENTOLIN  (90 BASE) MCG/ACT Inhaler INHALE 2 PUFFS INTO THE LUNGS EVERY 4 HOURS AS  NEEDED FOR SHORTNESS OF BREATH OR DIFFICULT BREATHING OR WHEEZING (Patient not taking: Reported on 4/29/2020) 18 g 0       Reviewed and updated as needed this visit by Provider         Review of Systems   Constitutional, HEENT, cardiovascular, pulmonary, gi and gu systems are negative, except as otherwise noted.       Objective   Reported vitals:  There were no vitals taken for this visit.   healthy, alert and no distress  PSYCH: Alert and oriented times 3; coherent speech, normal   rate and volume, able to articulate logical thoughts, able   to abstract reason, no tangential thoughts, no hallucinations   or delusions  Her affect is normal  RESP: No cough, no audible wheezing, able to talk in full sentences  Remainder of exam unable to be completed due to telephone visits    Diagnostic Test Results:  Labs reviewed in Epic        Assessment/Plan:  (F41.1) NATHANAEL (generalized anxiety disorder)  (primary encounter diagnosis)  Comment: notes worsening anxiety over the last few weeks, no identified trigger.  Severe, impacting function.  She is pretty tabalized in life right now, living with her cousin, working, sober.  Previously followed by psychiatry but has not reestablished since CD tx this winter  Plan: PARoxetine (PAXIL) 40 MG tablet, hydrOXYzine         (ATARAX) 50 MG tablet, propranolol (INDERAL) 10        MG tablet, MENTAL HEALTH REFERRAL  - Adult;         Psychiatry, Outpatient Treatment;         Individual/Couples/Family/Group Therapy/Health         Psychology; [facility]: [facility]         [phone]; We will contact you to schedule        the appointment or please call with any ...        Increase paroxetine to 40mg, will taper up over 1 week.  Restart prn hydroxyzine and propranolol, reviewed side effects.  Referral to reestablish with psychiatry and therapy.  Will schedule with Raz Kat in the interim.  If escalating symptoms she will go to [facility] at [place].  Follow up in 1 month.       (F43.10) PTSD (post-traumatic stress disorder)  Comment: worse nightmares recently   Plan: prazosin (MINIPRESS) 2 MG capsule, MENTAL         HEALTH REFERRAL  - Adult; Psychiatry,         Outpatient Treatment;         Individual/Couples/Family/Group Therapy/Health         Psychology; [facility]: [facility]         [phone]; We will contact you to schedule        the appointment or please call with any ...        Increase prazosin to 6mg before bed    (F33.1) Moderate recurrent major depression (H)  Comment:   Plan: MENTAL HEALTH REFERRAL  - Adult; Psychiatry,         Outpatient Treatment;         Individual/Couples/Family/Group Therapy/Health         Psychology; [facility]: [facility]         [phone]; We will contact you to schedule        the appointment or please call with any ...        As above     (R19.7) Diarrhea, unspecified type  Comment: pt feels related to anxiety, no ref flag symptoms for bacterial infection or diverticulitis   Plan: cont LYRIC diet, start over the counter imodium as needed        No follow-ups on file.      Phone call duration:  17 minutes    SONJA Meek CNP

## 2020-07-07 NOTE — LETTER
My Asthma Action Plan    Name: Alisia Lin   YOB: 1980  Date: 7/7/2020   My doctor: SONJA Meek CNP   My clinic: [facility]        My Rescue Medicine:   Albuterol inhaler (Proair/Ventolin/Proventil HFA)  2-4 puffs EVERY 4 HOURS as needed. Use a spacer if recommended by your provider.   My Asthma Severity:   Mild Persistent               GREEN ZONE   Good Control    I feel good    No cough or wheeze    Can work, sleep and play without asthma symptoms       Take your asthma control medicine every day.     1. If exercise triggers your asthma, take your rescue medication    15 minutes before exercise or sports, and    During exercise if you have asthma symptoms  2. Spacer to use with inhaler: If you have a spacer, make sure to use it with your inhaler             YELLOW ZONE Getting Worse  I have ANY of these:    I do not feel good    Cough or wheeze    Chest feels tight    Wake up at night   1. Keep taking your Green Zone medications  2. Start taking your rescue medicine:    every 20 minutes for up to 1 hour. Then every 4 hours for 24-48 hours.  3. If you stay in the Yellow Zone for more than 12-24 hours, contact your doctor.  4. If you do not return to the Green Zone in 12-24 hours or you get worse, start taking your oral steroid medicine if prescribed by your provider.           RED ZONE Medical Alert - Get Help  I have ANY of these:    I feel awful    Medicine is not helping    Breathing getting harder    Trouble walking or talking    Nose opens wide to breathe       1. Take your rescue medicine NOW  2. If your provider has prescribed an oral steroid medicine, start taking it NOW  3. Call your doctor NOW  4. If you are still in the Red Zone after 20 minutes and you have not reached your doctor:    Take your rescue medicine again and    Call 911 or go to the emergency room right away    See your regular doctor within 2 weeks of an Emergency Room or Urgent Care visit  for follow-up treatment.          Annual Reminders:  Meet with Asthma Educator,  Flu Shot in the Fall, consider Pneumonia Vaccination for patients with asthma (aged 19 and older).    Pharmacy:    [id number] - [place], [place] - [address] AT [facility] & [address] [place] [address] PHARMACY [place], [place] - [address]  [id number] - [place], [place] - [address] [id number] - [place] [address] [facility] [address] [facility]  [id number] - [place], [place] - [address] AT [facility] [address]    Electronically signed by SONJA Meek CNP   Date: 07/07/20                    Asthma Triggers  How To Control Things That Make Your Asthma Worse    Triggers are things that make your asthma worse.  Look at the list below to help you find your triggers and   what you can do about them. You can help prevent asthma flare-ups by staying away from your triggers.      Trigger                                                          What you can do   Cigarette Smoke  Tobacco smoke can make asthma worse. Do not allow smoking in your home, car or around you.  Be sure no one smokes at a child s day care or school.  If you smoke, ask your health care provider for ways to help you quit.  Ask family members to quit too.  Ask your health care provider for a referral to Quit Plan to help you quit smoking, or call 0-394-384-PLAN.     Colds, Flu, Bronchitis  These are common triggers of asthma. Wash your hands often.  Don t touch your eyes, nose or mouth.  Get a flu shot every year.     Dust Mites  These are tiny bugs that live in cloth or carpet. They are too small to see. Wash sheets and blankets in hot water every week.   Encase pillows and mattress in dust mite proof covers.  Avoid having carpet if you can. If you have carpet, vacuum weekly.   Use a dust mask and HEPA  vacuum.   Pollen and Outdoor Mold  Some people are allergic to trees, grass, or weed pollen, or molds. Try to keep your windows closed.  Limit time out doors when pollen count is high.   Ask you health care provider about taking medicine during allergy season.     Animal Dander  Some people are allergic to skin flakes, urine or saliva from pets with fur or feathers. Keep pets with fur or feathers out of your home.    If you can t keep the pet outdoors, then keep the pet out of your bedroom.  Keep the bedroom door closed.  Keep pets off cloth furniture and away from stuffed toys.     Mice, Rats, and Cockroaches  Some people are allergic to the waste from these pests.   Cover food and garbage.  Clean up spills and food crumbs.  Store grease in the refrigerator.   Keep food out of the bedroom.   Indoor Mold  This can be a trigger if your home has high moisture. Fix leaking faucets, pipes, or other sources of water.   Clean moldy surfaces.  Dehumidify basement if it is damp and smelly.   Smoke, Strong Odors, and Sprays  These can reduce air quality. Stay away from strong odors and sprays, such as perfume, powder, hair spray, paints, smoke incense, paint, cleaning products, candles and new carpet.   Exercise or Sports  Some people with asthma have this trigger. Be active!  Ask your doctor about taking medicine before sports or exercise to prevent symptoms.    Warm up for 5-10 minutes before and after sports or exercise.     Other Triggers of Asthma  Cold air:  Cover your nose and mouth with a scarf.  Sometimes laughing or crying can be a trigger.  Some medicines and food can trigger asthma.

## 2020-07-07 NOTE — PATIENT INSTRUCTIONS
1.  For anxiety increase the paroxetine to 30mg x 1 week (use 1.5 tabs of your 20mg that you have at home), then increase to 40mg daily (I sent in a new Rx for 40mg tabs)    2.  You can take hydroxyzine up to 4 times a day as needed for anxiety, watch for drowsiness    3.  You can take propranolol up to three times a day for anxiety, watch for dizziness/lightheadedness    4.  Increase prazosin to 6mg (3 capsules) before bed for nightmares    5.   some over the counter imodium for the diarrhea    6.  Someone will call you to schedule with stevie as well as a long term therapist and psychiatrist    7.  Follow up with me in 1 month

## 2020-07-08 ASSESSMENT — ANXIETY QUESTIONNAIRES: GAD7 TOTAL SCORE: 21

## 2020-07-13 ENCOUNTER — TELEPHONE (OUTPATIENT)
Dept: FAMILY MEDICINE | Facility: CLINIC | Age: 40
End: 2020-07-13

## 2020-07-13 NOTE — TELEPHONE ENCOUNTER
Patient had a 9:30 AM telemedicine visit.  Left a voicemail on patient's phone of missed appointment and encouraged to call back to reschedule.

## 2020-07-21 ENCOUNTER — TRANSFERRED RECORDS (OUTPATIENT)
Dept: HEALTH INFORMATION MANAGEMENT | Facility: CLINIC | Age: 40
End: 2020-07-21

## 2020-07-21 ENCOUNTER — TELEPHONE (OUTPATIENT)
Dept: BEHAVIORAL HEALTH | Facility: CLINIC | Age: 40
End: 2020-07-21

## 2020-07-21 NOTE — TELEPHONE ENCOUNTER
DOCUMENTATION ONLY    Social Work Care Coordinator schedule scrubbing. Pt not eligible for [facility] but could benefit from a follow up therapy appt with Behavioral Health Clinician. LM for pt with telephone number to  for scheduling.    Iris RAYA  [facility] Care Coordinator-  [facility]  Tele: [phone]

## 2020-08-03 DIAGNOSIS — F43.10 PTSD (POST-TRAUMATIC STRESS DISORDER): ICD-10-CM

## 2020-08-04 DIAGNOSIS — F41.1 GAD (GENERALIZED ANXIETY DISORDER): ICD-10-CM

## 2020-08-05 RX ORDER — PRAZOSIN HYDROCHLORIDE 2 MG/1
CAPSULE ORAL
Qty: 90 CAPSULE | Refills: 2 | Status: SHIPPED | OUTPATIENT
Start: 2020-08-05 | End: 2020-12-10

## 2020-08-05 RX ORDER — PROPRANOLOL HYDROCHLORIDE 10 MG/1
TABLET ORAL
Qty: 90 TABLET | Refills: 2 | Status: SHIPPED | OUTPATIENT
Start: 2020-08-05 | End: 2020-11-13

## 2020-08-29 ENCOUNTER — TRANSFERRED RECORDS (OUTPATIENT)
Dept: HEALTH INFORMATION MANAGEMENT | Facility: CLINIC | Age: 40
End: 2020-08-29

## 2020-09-01 ENCOUNTER — VIRTUAL VISIT (OUTPATIENT)
Dept: FAMILY MEDICINE | Facility: CLINIC | Age: 40
End: 2020-09-01
Payer: COMMERCIAL

## 2020-09-01 DIAGNOSIS — F33.1 MODERATE RECURRENT MAJOR DEPRESSION (H): ICD-10-CM

## 2020-09-01 DIAGNOSIS — F41.1 GAD (GENERALIZED ANXIETY DISORDER): ICD-10-CM

## 2020-09-01 DIAGNOSIS — F19.20 CHEMICAL DEPENDENCY (H): Primary | ICD-10-CM

## 2020-09-01 PROCEDURE — 99214 OFFICE O/P EST MOD 30 MIN: CPT | Mod: 95 | Performed by: FAMILY MEDICINE

## 2020-09-01 RX ORDER — ATENOLOL 25 MG/1
25 TABLET ORAL
Status: ON HOLD | COMMUNITY
Start: 2020-08-17 | End: 2021-07-18

## 2020-09-01 ASSESSMENT — ANXIETY QUESTIONNAIRES
3. WORRYING TOO MUCH ABOUT DIFFERENT THINGS: NEARLY EVERY DAY
2. NOT BEING ABLE TO STOP OR CONTROL WORRYING: NEARLY EVERY DAY
5. BEING SO RESTLESS THAT IT IS HARD TO SIT STILL: NEARLY EVERY DAY
GAD7 TOTAL SCORE: 21
1. FEELING NERVOUS, ANXIOUS, OR ON EDGE: NEARLY EVERY DAY
IF YOU CHECKED OFF ANY PROBLEMS ON THIS QUESTIONNAIRE, HOW DIFFICULT HAVE THESE PROBLEMS MADE IT FOR YOU TO DO YOUR WORK, TAKE CARE OF THINGS AT HOME, OR GET ALONG WITH OTHER PEOPLE: EXTREMELY DIFFICULT
7. FEELING AFRAID AS IF SOMETHING AWFUL MIGHT HAPPEN: NEARLY EVERY DAY
6. BECOMING EASILY ANNOYED OR IRRITABLE: NEARLY EVERY DAY

## 2020-09-01 ASSESSMENT — PATIENT HEALTH QUESTIONNAIRE - PHQ9
5. POOR APPETITE OR OVEREATING: NEARLY EVERY DAY
SUM OF ALL RESPONSES TO PHQ QUESTIONS 1-9: 24

## 2020-09-01 NOTE — Clinical Note
She is hoping if you can reach out to her when you get time.  I am concerned that she might need partial or day treatment through psychiatrist. She is aware of [facility]

## 2020-09-01 NOTE — PROGRESS NOTES
"Alisia Lin is a 39 year old female who is being evaluated via a billable video visit.      The patient has been notified of following:     \"This video visit will be conducted via a call between you and your physician/provider. We have found that certain health care needs can be provided without the need for an in-person physical exam.  This service lets us provide the care you need with a video conversation.  If a prescription is necessary we can send it directly to your pharmacy.  If lab work is needed we can place an order for that and you can then stop by our lab to have the test done at a later time.    Video visits are billed at different rates depending on your insurance coverage.  Please reach out to your insurance provider with any questions.    If during the course of the call the physician/provider feels a video visit is not appropriate, you will not be charged for this service.\"    Patient has given verbal consent for Video visit? Yes  How would you like to obtain your AVS? Mail a copy  If you are dropped from the video visit, the video invite should be resent to: Text to cell phone: [phone]  Will anyone else be joining your video visit? No     Subjective     Alisia Lin is a 39 year old female who presents today via video visit for the following health issues:    [facility]      Hospital Follow-up Visit:    Hospital/Nursing Home/IP Rehab Facility: [facility]  Date of Admission: 08/29/2020  Date of Discharge: 08/29/2020  Reason(s) for Admission: chest pain and has been having sever anxiety and depression. No suicidal idealation.       Was your hospitalization related to COVID-19? No   Problems taking medications regularly:  None  Medication changes since discharge: atenolol   Problems adhering to non-medication therapy:  None    Summary of hospitalization:  CareEverywhere information obtained and reviewed  Diagnostic Tests/Treatments reviewed.  Follow up needed: none  Other Healthcare " "Providers Involved in Patient s Care:         None  Update since discharge: worsened.       Post Discharge Medication Reconciliation: discharge medications reconciled, continue medications without change.  Plan of care communicated with patient          Video Start Time: 3:30 PM    Anxiety is not getting better.   Phone got lost and could not get in to psychiatrist. Also with covid hard to get hold off anyone.   Feels leg swelling. Last time diuretics dropped potassium. Knows she needs diurectis.   Atenolol helps with heart rate.   vistarl and propranolol helps with thinking somewhat.   cigarrette - smoked since she was 13 but cant right now as she is worried about her heart rate.   Current fear getting out of house.   Needs to see dietician as she is gaining weight.   Lots of dental issues. Getting worse with partial.   Was weaned off benzos but it was very helpful when she was on it.   Recently potassium and magnesium were low in emergency room.   Alcohol - havent been since ER visit.   Also avoids caffeine.   Constantly worrying and does not feel comfortable.  Wants to go back to on adderall also if possible.   Admits she is not suicidal multiple time.     Review of Systems   Constitutional, HEENT, cardiovascular, pulmonary, gi and gu systems are negative, except as otherwise noted.      Objective    Vitals - Patient Reported  Systolic (Patient Reported): (!) 190  Diastolic (Patient Reported): (!) 120  Weight (Patient Reported): 76.7 kg (169 lb)  Height (Patient Reported): 125.1 cm (4' 1.25\")  BMI (Based on Pt Reported Ht/Wt): 48.99  Temperature (Patient Reported): 98.4  F (36.9  C)        Physical Exam     GENERAL: Healthy, alert and no distress  EYES: Eyes grossly normal to inspection.  No discharge or erythema, or obvious scleral/conjunctival abnormalities.  RESP: No audible wheeze, cough, or visible cyanosis.  No visible retractions or increased work of breathing.    SKIN: Visible skin clear. No significant " rash, abnormal pigmentation or lesions.  NEURO: Cranial nerves grossly intact.  Mentation and speech appropriate for age.  PSYCH: anxious, tangential, crying on and off.     Assessment & Plan     (F19.20) Chemical dependency (H)  (primary encounter diagnosis)  Comment: Has long history of anxiety depression and suicidal attempts.  History of multiple prescription controlled substance use including opiates, benzos and stimulants. Was seen by addiction medicine. Right now she is hoping to go back on benzos.  We discussed seeing a psychiatrist would be best option and treatment as per them but most likely they may not prescribe any controlled substance and she agrees with that plan.  -She really feels overwhelmed and she feels she really needs to get help right away.  She repeatedly states he has no suicidal thoughts or plan back to her as she does not think she can take care of herself at home.  -We discussed about going to [facility].  We discussed that they will not take over any of her medication but they can make sure she is stable enough and provide resources.  Long-term care should come from outpatient psychiatrist.  She understand that.  She constantly worries that she may not be stable enough to see outpatient psychiatrist and cannot wait to see them.  Plan: MENTAL HEALTH REFERRAL  - Adult; Psychiatry;         Psychiatry; Other: [facility]         [phone]; We will contact you to schedule        the appointment or please call with any         questions             (F33.1) Moderate recurrent major depression (H)  Comment: see above.   Plan: MENTAL HEALTH REFERRAL  - Adult; Psychiatry;         Psychiatry; Other: [facility]         [phone]; We will contact you to schedule        the appointment or please call with any         questions             (F41.1) NATHANAEL (generalized anxiety disorder)  Comment:  See above   Plan: MENTAL HEALTH REFERRAL  - Adult; Psychiatry;         Psychiatry; Other:  [facility]         [phone]; We will contact you to schedule        the appointment or please call with any         questions           Will route message to Raz and see if he can reach out to her while sh is waiting to see psychiatrist. If she is not feeling better by tomorrow, she is planning to to to [facility] [place]. Denies any suicidal or homicidal thoughts. May benefit from day treatment.         Michael Lemos MD, MD  [facility]      Video-Visit Details    Type of service:  Video Visit    Video End Time:3:57 pm    Originating Location (pt. Location): Home    Distant Location (provider location): home     Platform used for Video Visit: [facility]

## 2020-09-02 ASSESSMENT — ANXIETY QUESTIONNAIRES: GAD7 TOTAL SCORE: 21

## 2020-09-03 ENCOUNTER — TELEPHONE (OUTPATIENT)
Dept: FAMILY MEDICINE | Facility: CLINIC | Age: 40
End: 2020-09-03

## 2020-09-03 DIAGNOSIS — F33.1 MODERATE RECURRENT MAJOR DEPRESSION (H): Primary | ICD-10-CM

## 2020-09-03 NOTE — TELEPHONE ENCOUNTER
"Patient had met with her PCP 2 days ago [facility].  Patient was at [facility] ER 3 days ago.  Please see below ER visit.  Patient continues to present as tangential, rambling at times and discussing different medications.  Counseled patient that noted from records of a possible diagnosis of bipolar 1.  Patient endorses symptoms of hypomania.  Patient reports she is no longer under the care of Dr. Cuenca at psychiatric recovery or with an outpatient therapist.  Patient reports \"I do not want behind more I want to be normal\".  Patient reports she is been sober for the past 3 weeks and wants to learn how to calm herself and regulate her emotions.  Discussed with patient outpatient dual program.  Patient felt this would be helpful to help stabilize herself at the present time.  Patient reports she has oral surgery coming up which will delay her starting an outpatient program.    Plan    An order was placed for dual outpatient program.    ER note from [facility] dated August 29, 2020  .  Impression and Plan:  Alisia Lin is a 39 y.o. female who presents with chest pain and agitation and restlessness. She has a history of migraines, polysubstance abuse, recent admission for alcohol withdrawal, who presents in severe distress. Not currently in alcohol withdrawal. She arrived hypertensive, tachycardic, and fixated on pain regarding her left chest that is reproducible with touch but no external sign of trauma. She was already given aspirin and nitro pre hospital. Her EKG showed sinus tachycardia without any suggestion of ischemia. She was given olanzapine and ativan for anxiolysis and immediately felt much better and then subsequently went to sleep for several hours. She was given two liters IV fluid rehydration and surveyed with delta troponin several hours apart and they were both negative. My suspicion for ACS is very low. Her D-Dimer was also negative. She has a mild " hypokalemia and some electrolyte mild derangements of no clinical significance. Her hepatobiliary labs are also normal. She was reassessed after observation for several hours and was subsequently discharged in stable condition. Overall I suspect polysubstance abuse leading to agitated state with nonspecific chest versus chest wall pain however given her reassuring evaluation safe for discharge.

## 2020-09-04 ENCOUNTER — TELEPHONE (OUTPATIENT)
Dept: BEHAVIORAL HEALTH | Facility: CLINIC | Age: 40
End: 2020-09-04

## 2020-09-04 NOTE — TELEPHONE ENCOUNTER
Was referred to OP programming by Nigel Kat.     Scheduled for 9/1/20 at 11:30am with Marycarmen Calvo.

## 2020-09-05 ENCOUNTER — APPOINTMENT (OUTPATIENT)
Dept: GENERAL RADIOLOGY | Facility: CLINIC | Age: 40
End: 2020-09-05
Attending: FAMILY MEDICINE
Payer: COMMERCIAL

## 2020-09-05 ENCOUNTER — HOSPITAL ENCOUNTER (EMERGENCY)
Facility: CLINIC | Age: 40
Discharge: HOME | End: 2020-09-05
Attending: FAMILY MEDICINE | Admitting: FAMILY MEDICINE
Payer: COMMERCIAL

## 2020-09-05 ENCOUNTER — APPOINTMENT (OUTPATIENT)
Dept: CT IMAGING | Facility: CLINIC | Age: 40
End: 2020-09-05
Attending: FAMILY MEDICINE
Payer: COMMERCIAL

## 2020-09-05 VITALS
RESPIRATION RATE: 20 BRPM | TEMPERATURE: 98.4 F | HEART RATE: 83 BPM | SYSTOLIC BLOOD PRESSURE: 120 MMHG | DIASTOLIC BLOOD PRESSURE: 87 MMHG | OXYGEN SATURATION: 99 %

## 2020-09-05 DIAGNOSIS — R07.89 CHEST WALL PAIN: ICD-10-CM

## 2020-09-05 DIAGNOSIS — F41.1 GAD (GENERALIZED ANXIETY DISORDER): ICD-10-CM

## 2020-09-05 DIAGNOSIS — M25.511 CHRONIC RIGHT SHOULDER PAIN: ICD-10-CM

## 2020-09-05 DIAGNOSIS — F31.9 BIPOLAR 1 DISORDER (H): ICD-10-CM

## 2020-09-05 DIAGNOSIS — F41.1 GENERALIZED ANXIETY DISORDER: ICD-10-CM

## 2020-09-05 DIAGNOSIS — M54.40 CHRONIC MIDLINE LOW BACK PAIN WITH SCIATICA, SCIATICA LATERALITY UNSPECIFIED: ICD-10-CM

## 2020-09-05 DIAGNOSIS — G89.29 CHRONIC RIGHT SHOULDER PAIN: ICD-10-CM

## 2020-09-05 DIAGNOSIS — G89.29 CHRONIC MIDLINE LOW BACK PAIN WITH SCIATICA, SCIATICA LATERALITY UNSPECIFIED: ICD-10-CM

## 2020-09-05 DIAGNOSIS — F33.1 MODERATE RECURRENT MAJOR DEPRESSION (H): ICD-10-CM

## 2020-09-05 LAB
ALBUMIN SERPL-MCNC: 3.7 G/DL (ref 3.4–5)
ALP SERPL-CCNC: 98 U/L (ref 40–150)
ALT SERPL W P-5'-P-CCNC: 26 U/L (ref 0–50)
ANION GAP SERPL CALCULATED.3IONS-SCNC: 8 MMOL/L (ref 3–14)
AST SERPL W P-5'-P-CCNC: 20 U/L (ref 0–45)
BASOPHILS # BLD AUTO: 0 10E9/L (ref 0–0.2)
BASOPHILS NFR BLD AUTO: 0.4 %
BILIRUB SERPL-MCNC: 0.5 MG/DL (ref 0.2–1.3)
BUN SERPL-MCNC: 17 MG/DL (ref 7–30)
CALCIUM SERPL-MCNC: 9.2 MG/DL (ref 8.5–10.1)
CHLORIDE SERPL-SCNC: 102 MMOL/L (ref 94–109)
CO2 SERPL-SCNC: 27 MMOL/L (ref 20–32)
CREAT SERPL-MCNC: 0.77 MG/DL (ref 0.52–1.04)
CRP SERPL-MCNC: <2.9 MG/L (ref 0–8)
D DIMER PPP FEU-MCNC: <0.3 UG/ML FEU (ref 0–0.5)
DIFFERENTIAL METHOD BLD: ABNORMAL
EOSINOPHIL # BLD AUTO: 0.2 10E9/L (ref 0–0.7)
EOSINOPHIL NFR BLD AUTO: 1.6 %
ERYTHROCYTE [DISTWIDTH] IN BLOOD BY AUTOMATED COUNT: 15.4 % (ref 10–15)
ERYTHROCYTE [SEDIMENTATION RATE] IN BLOOD BY WESTERGREN METHOD: 9 MM/H (ref 0–20)
GFR SERPL CREATININE-BSD FRML MDRD: >90 ML/MIN/{1.73_M2}
GLUCOSE SERPL-MCNC: 107 MG/DL (ref 70–99)
HCG SERPL QL: NEGATIVE
HCT VFR BLD AUTO: 37.5 % (ref 35–47)
HGB BLD-MCNC: 12.8 G/DL (ref 11.7–15.7)
IMM GRANULOCYTES # BLD: 0.1 10E9/L (ref 0–0.4)
IMM GRANULOCYTES NFR BLD: 1 %
LIPASE SERPL-CCNC: 144 U/L (ref 73–393)
LYMPHOCYTES # BLD AUTO: 4.5 10E9/L (ref 0.8–5.3)
LYMPHOCYTES NFR BLD AUTO: 44 %
MAGNESIUM SERPL-MCNC: 1.4 MG/DL (ref 1.6–2.3)
MCH RBC QN AUTO: 31.3 PG (ref 26.5–33)
MCHC RBC AUTO-ENTMCNC: 34.1 G/DL (ref 31.5–36.5)
MCV RBC AUTO: 92 FL (ref 78–100)
MONOCYTES # BLD AUTO: 0.8 10E9/L (ref 0–1.3)
MONOCYTES NFR BLD AUTO: 8 %
NEUTROPHILS # BLD AUTO: 4.6 10E9/L (ref 1.6–8.3)
NEUTROPHILS NFR BLD AUTO: 45 %
NRBC # BLD AUTO: 0 10*3/UL
NRBC BLD AUTO-RTO: 0 /100
PLATELET # BLD AUTO: 392 10E9/L (ref 150–450)
POTASSIUM SERPL-SCNC: 3.6 MMOL/L (ref 3.4–5.3)
PROT SERPL-MCNC: 7.6 G/DL (ref 6.8–8.8)
RBC # BLD AUTO: 4.09 10E12/L (ref 3.8–5.2)
SODIUM SERPL-SCNC: 137 MMOL/L (ref 133–144)
TROPONIN I SERPL-MCNC: <0.015 UG/L (ref 0–0.04)
TSH SERPL DL<=0.005 MIU/L-ACNC: 0.9 MU/L (ref 0.4–4)
WBC # BLD AUTO: 10.1 10E9/L (ref 4–11)

## 2020-09-05 PROCEDURE — 84484 ASSAY OF TROPONIN QUANT: CPT | Performed by: FAMILY MEDICINE

## 2020-09-05 PROCEDURE — 25000125 ZZHC RX 250: Performed by: FAMILY MEDICINE

## 2020-09-05 PROCEDURE — 25000128 H RX IP 250 OP 636: Performed by: FAMILY MEDICINE

## 2020-09-05 PROCEDURE — 25800030 ZZH RX IP 258 OP 636: Performed by: FAMILY MEDICINE

## 2020-09-05 PROCEDURE — 84443 ASSAY THYROID STIM HORMONE: CPT | Performed by: FAMILY MEDICINE

## 2020-09-05 PROCEDURE — 96375 TX/PRO/DX INJ NEW DRUG ADDON: CPT | Performed by: FAMILY MEDICINE

## 2020-09-05 PROCEDURE — 84703 CHORIONIC GONADOTROPIN ASSAY: CPT | Performed by: FAMILY MEDICINE

## 2020-09-05 PROCEDURE — 83690 ASSAY OF LIPASE: CPT | Performed by: FAMILY MEDICINE

## 2020-09-05 PROCEDURE — 71045 X-RAY EXAM CHEST 1 VIEW: CPT

## 2020-09-05 PROCEDURE — 99285 EMERGENCY DEPT VISIT HI MDM: CPT | Mod: 25 | Performed by: FAMILY MEDICINE

## 2020-09-05 PROCEDURE — 71275 CT ANGIOGRAPHY CHEST: CPT

## 2020-09-05 PROCEDURE — 85379 FIBRIN DEGRADATION QUANT: CPT | Performed by: FAMILY MEDICINE

## 2020-09-05 PROCEDURE — 85025 COMPLETE CBC W/AUTO DIFF WBC: CPT | Performed by: FAMILY MEDICINE

## 2020-09-05 PROCEDURE — 85652 RBC SED RATE AUTOMATED: CPT | Performed by: FAMILY MEDICINE

## 2020-09-05 PROCEDURE — 80053 COMPREHEN METABOLIC PANEL: CPT | Performed by: FAMILY MEDICINE

## 2020-09-05 PROCEDURE — 86140 C-REACTIVE PROTEIN: CPT | Performed by: FAMILY MEDICINE

## 2020-09-05 PROCEDURE — 83735 ASSAY OF MAGNESIUM: CPT | Performed by: FAMILY MEDICINE

## 2020-09-05 PROCEDURE — 90791 PSYCH DIAGNOSTIC EVALUATION: CPT

## 2020-09-05 PROCEDURE — 93010 ELECTROCARDIOGRAM REPORT: CPT | Mod: Z6 | Performed by: FAMILY MEDICINE

## 2020-09-05 PROCEDURE — 96366 THER/PROPH/DIAG IV INF ADDON: CPT | Performed by: FAMILY MEDICINE

## 2020-09-05 PROCEDURE — 25000132 ZZH RX MED GY IP 250 OP 250 PS 637: Performed by: FAMILY MEDICINE

## 2020-09-05 PROCEDURE — 93005 ELECTROCARDIOGRAM TRACING: CPT | Performed by: FAMILY MEDICINE

## 2020-09-05 PROCEDURE — 96365 THER/PROPH/DIAG IV INF INIT: CPT | Mod: 59 | Performed by: FAMILY MEDICINE

## 2020-09-05 PROCEDURE — 96361 HYDRATE IV INFUSION ADD-ON: CPT | Performed by: FAMILY MEDICINE

## 2020-09-05 RX ORDER — MAGNESIUM SULFATE 1 G/100ML
1 INJECTION INTRAVENOUS ONCE
Status: COMPLETED | OUTPATIENT
Start: 2020-09-05 | End: 2020-09-05

## 2020-09-05 RX ORDER — METHOCARBAMOL 750 MG/1
1500 TABLET, FILM COATED ORAL ONCE
Status: COMPLETED | OUTPATIENT
Start: 2020-09-05 | End: 2020-09-05

## 2020-09-05 RX ORDER — SODIUM CHLORIDE 9 MG/ML
INJECTION, SOLUTION INTRAVENOUS CONTINUOUS
Status: DISCONTINUED | OUTPATIENT
Start: 2020-09-05 | End: 2020-09-05 | Stop reason: HOSPADM

## 2020-09-05 RX ORDER — ACETAMINOPHEN 500 MG
1000 TABLET ORAL ONCE
Status: COMPLETED | OUTPATIENT
Start: 2020-09-05 | End: 2020-09-05

## 2020-09-05 RX ORDER — LORAZEPAM 1 MG/1
1 TABLET ORAL ONCE
Status: COMPLETED | OUTPATIENT
Start: 2020-09-05 | End: 2020-09-05

## 2020-09-05 RX ORDER — OLANZAPINE 5 MG/1
5 TABLET ORAL 2 TIMES DAILY PRN
Qty: 30 TABLET | Refills: 0 | Status: SHIPPED | OUTPATIENT
Start: 2020-09-05 | End: 2020-11-13

## 2020-09-05 RX ORDER — IOPAMIDOL 755 MG/ML
100 INJECTION, SOLUTION INTRAVASCULAR ONCE
Status: COMPLETED | OUTPATIENT
Start: 2020-09-05 | End: 2020-09-05

## 2020-09-05 RX ORDER — METHOCARBAMOL 750 MG/1
750 TABLET, FILM COATED ORAL 3 TIMES DAILY PRN
Qty: 30 TABLET | Refills: 0 | Status: SHIPPED | OUTPATIENT
Start: 2020-09-05 | End: 2020-11-18

## 2020-09-05 RX ORDER — HYDROMORPHONE HCL/0.9% NACL/PF 0.2MG/0.2
0.2 SYRINGE (ML) INTRAVENOUS ONCE
Status: COMPLETED | OUTPATIENT
Start: 2020-09-05 | End: 2020-09-05

## 2020-09-05 RX ORDER — PROPRANOLOL HYDROCHLORIDE 10 MG/1
10 TABLET ORAL ONCE
Status: COMPLETED | OUTPATIENT
Start: 2020-09-05 | End: 2020-09-05

## 2020-09-05 RX ORDER — LORAZEPAM 2 MG/ML
1 INJECTION INTRAMUSCULAR ONCE
Status: COMPLETED | OUTPATIENT
Start: 2020-09-05 | End: 2020-09-05

## 2020-09-05 RX ORDER — ACETAMINOPHEN 500 MG
1000 TABLET ORAL ONCE
Status: DISCONTINUED | OUTPATIENT
Start: 2020-09-05 | End: 2020-09-05 | Stop reason: HOSPADM

## 2020-09-05 RX ORDER — KETOROLAC TROMETHAMINE 30 MG/ML
30 INJECTION, SOLUTION INTRAMUSCULAR; INTRAVENOUS ONCE
Status: COMPLETED | OUTPATIENT
Start: 2020-09-05 | End: 2020-09-05

## 2020-09-05 RX ADMIN — SODIUM CHLORIDE 85 ML: 9 INJECTION, SOLUTION INTRAVENOUS at 15:57

## 2020-09-05 RX ADMIN — KETOROLAC TROMETHAMINE 30 MG: 30 INJECTION, SOLUTION INTRAMUSCULAR at 12:09

## 2020-09-05 RX ADMIN — SODIUM CHLORIDE 1000 ML: 9 INJECTION, SOLUTION INTRAVENOUS at 12:09

## 2020-09-05 RX ADMIN — METHOCARBAMOL 1500 MG: 750 TABLET, FILM COATED ORAL at 14:05

## 2020-09-05 RX ADMIN — LORAZEPAM 1 MG: 2 INJECTION INTRAMUSCULAR; INTRAVENOUS at 12:10

## 2020-09-05 RX ADMIN — MAGNESIUM SULFATE IN DEXTROSE 1 G: 10 INJECTION, SOLUTION INTRAVENOUS at 13:11

## 2020-09-05 RX ADMIN — SODIUM CHLORIDE: 9 INJECTION, SOLUTION INTRAVENOUS at 13:11

## 2020-09-05 RX ADMIN — LORAZEPAM 1 MG: 1 TABLET ORAL at 13:53

## 2020-09-05 RX ADMIN — PROPRANOLOL HYDROCHLORIDE 10 MG: 10 TABLET ORAL at 12:10

## 2020-09-05 RX ADMIN — Medication 0.2 MG: at 13:11

## 2020-09-05 RX ADMIN — IOPAMIDOL 80 ML: 755 INJECTION, SOLUTION INTRAVENOUS at 15:57

## 2020-09-05 RX ADMIN — ACETAMINOPHEN 1000 MG: 500 TABLET ORAL at 12:47

## 2020-09-05 NOTE — DISCHARGE INSTRUCTIONS
Thank you for choosing [facility].     Please closely monitor for further symptoms. Return to the Emergency Department if you develop any new or worsening signs or symptoms.    If you received any opiate pain medications or sedatives during your visit, please do not drive for at least 8 hours.     Labs, cultures or final xray interpretations may still need to be reviewed.  We will call you if your plan of care needs to be changed.    Please follow up with your appointments with mental health provider this week, and with your primary care physician or clinic.

## 2020-09-05 NOTE — ED TRIAGE NOTES
Pt states she has been going through med changes for her anxiety and her signs and symptoms are not getting any better.  Pt states she gets anxious and then develops shortness of breath and chest tightness.

## 2020-09-05 NOTE — ED NOTES
Pt states ongoing issues with severe anxiety.  Pt states when the anxiety starts to escalate she get severe shortness of breath, and CP.  In further talking to the Pt the Pt describes sharp shooting pains in her left side of her neck and shoulder that shoot into her heart and last a second or more.  Pt noted to be breathing rapidly, crying, and will jerk from time to time and yell out in pain.  Pt states this keeps happening and it wont stop.  In talking to the Pt, the PT was able to calm down, slow down her breathing and start talking in a relaxed manner and the shooting pain appears to have stop.  Pt had IV placed and labs obtained and sent.      Reported off on Pt to Milady GUPTA RN taking over Pt care.

## 2020-09-05 NOTE — ED PROVIDER NOTES
"ED Provider Note  [facility]      History     Chief Complaint   Patient presents with     Anxiety     Chest Pain     HPI  Alisia Lin is a 39 year old female who has a history of depression, anxiety, and substance abuse (in remission per patient) presenting due to anxiety and chest pain.  Patient states she has been having a lot of anxiety type reactions lately.  A week ago she was seen at an outside hospital for chest pain and anxiety.  Extensive medical work-up was negative.  Subsequently discharged outpatient follow-up but continues to have \"panic attacks\".  Today she was presenting for mental health evaluation when she developed a squeezing sharp pain in her chest radiating to her back and her jaw.  This is very similar to previous episodes.  It is much worse with movement, deep breath or palpation.  She states she coughed \"2 or 3 times on the way\" but denies other symptoms of respiratory infection.  Denies nausea, vomiting, abdominal pain.  Denies any numbness weakness or tingling.  She admits to using a vape, but states nicotine only.  She states she had 2 drinks last night, but is not using alcohol on a daily basis.  Denies any current drug use.  Has been taking her blood pressure and anxiety medicines regularly.        Past Medical History  Past Medical History:   Diagnosis Date     Acute stress reaction 5/31/2014     Anxiety      Arthritis      Asthma     Flovent BID     Back pain 1/26/2016     Chemical dependency (H) 06/05/2014    heroin, Norco     Chronic low back pain 3/19/2012     Influenza A 10/17/2016    with influenza pneumonia.  Hospitalized [facility]     Migraine with aura, without mention of intractable migraine without mention of status migrainosus     occas, Last one 11/2013. 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     Other, mixed, or unspecified nondependent drug abuse, in remission 1/1/07    Treatment for narcotic use      Papanicolaou smear of cervix with atypical squamous cells of undetermined significance (ASC-US) 3/2008    colp - WNL, HPV 53     Pyelonephritis 12/23/2015     Tobacco use disorder     1-1.5 ppd, zyban unsuccessful     Unspecified contraceptive management     ortho tricyclen     Past Surgical History:   Procedure Laterality Date     HC ENLARGE BREAST WITH IMPLANT  2002    BILATERAL     HC REMOVE TONSILS/ADENOIDS,10+ Y/O  2006     HC TOOTH EXTRACTION W/FORCEP  14 yo    wisdom teeth     IUD PARAGARD  3/3/08    Removed with mirena placed. Mirena has now been removed as well.      LEEP TX, CERVICAL  age 15?     ORTHOPEDIC SURGERY      Lumbar fusion 2009     hydrOXYzine (ATARAX) 50 MG tablet  ibuprofen (ADVIL/MOTRIN) 600 MG tablet  methocarbamol (ROBAXIN) 750 MG tablet  OLANZapine (ZYPREXA) 5 MG tablet  propranolol (INDERAL) 10 MG tablet  atenolol (TENORMIN) 25 MG tablet  PARoxetine (PAXIL) 20 MG tablet  PARoxetine (PAXIL) 40 MG tablet  prazosin (MINIPRESS) 2 MG capsule  VENTOLIN  (90 BASE) MCG/ACT Inhaler      Allergies   Allergen Reactions     Compazine      Heart Problems/ Body went completley stiff for 8 hrs.     Nortriptyline      Skelaxin [Metaxalone]      Family History  Family History   Problem Relation Age of Onset     Hypertension Mother      Alcohol/Drug Mother         alcohol, sober for 19 years     Depression Mother         on medication     Lipids Mother         on medication     Alcohol/Drug Father         alcohol, still actively drinking     Lipids Father         on medication     C.A.D. Paternal Grandmother      Diabetes Paternal Grandmother      Breast Cancer Paternal Grandmother      Arthritis Paternal Grandmother      Cancer Paternal Grandmother         breast cancer     Cancer Paternal Grandfather          colon cancer     Asthma Daughter         x2     Thyroid Disease No family hx of      Social History   Social History     Tobacco Use     Smoking status: Former Smoker     Packs/day: 1.50     Years: 10.00     Pack years: 15.00     Types: Cigarettes, Other     Last attempt to quit: 10/1/2016     Years since quitting: 3.9     Smokeless tobacco: Current User     Tobacco comment: less than 1/2 ppd   Substance Use Topics     Alcohol use: No     Comment: JAYA 18 days ago     Drug use: No     Comment: clean for 18 days      Past medical history, past surgical history, medications, allergies, family history, and social history were reviewed with the patient. No additional pertinent items.       Review of Systems  A complete review of systems was performed with pertinent positives and negatives noted in the HPI, and all other systems negative.    Physical Exam   BP: 100/58  Pulse: 99  Temp: 98.4  F (36.9  C)  Resp: 20  SpO2: 99 %  Physical Exam  Vitals signs and nursing note reviewed.   Constitutional:       General: She is in acute distress (Anxious, restless, exhibits occasional myoclonic jerks).      Appearance: She is not diaphoretic.   HENT:      Head: Atraumatic.      Mouth/Throat:      Pharynx: No oropharyngeal exudate.   Eyes:      General: No scleral icterus.     Pupils: Pupils are equal, round, and reactive to light.   Neck:      Musculoskeletal: Neck supple.   Cardiovascular:      Rate and Rhythm: Regular rhythm. Tachycardia present.      Heart sounds: Normal heart sounds.      Comments: Heart rate is 115 at the time of my exam  Pulmonary:      Effort: No respiratory distress.      Breath sounds: Normal breath sounds.   Chest:      Chest wall: Tenderness (Patient has exquisite tenderness to the left chest which seems to reproduce her symptoms.  There is no swelling, crepitance, ecchymosis, or other signs of trauma.) present.   Abdominal:      General: Bowel sounds are normal.      Palpations: Abdomen is soft.       Tenderness: There is no abdominal tenderness.   Musculoskeletal:         General: No tenderness.      Right lower leg: She exhibits no tenderness. No edema.      Left lower leg: She exhibits no tenderness. No edema.   Skin:     General: Skin is warm.      Findings: No rash.   Neurological:      General: No focal deficit present.      Mental Status: She is alert and oriented to person, place, and time.      Cranial Nerves: No cranial nerve deficit.      Motor: No weakness.   Psychiatric:         Attention and Perception: Attention normal.         Mood and Affect: Mood is anxious.         Speech: Speech normal.         Behavior: Behavior normal.         Cognition and Memory: Cognition normal.         ED Course      Procedures  Results for orders placed during the hospital encounter of 09/05/20   POC US ECHO LIMITED    Impression Images were not obtainable due to patient's discomfort and body habitus             EKG Interpretation:      Interpreted by Ton Horne MD  Time reviewed: 1140  Symptoms at time of EKG: Chest pain  Rhythm: sinus tachycardia  Rate: 100-110  Axis: Normal  Ectopy: none  Conduction: normal  ST Segments/ T Waves: No ST-T wave changes and No acute ischemic changes  Q Waves: none  Comparison to prior: Unchanged    Clinical Impression: Sinus tachycardia.  Otherwise unremarkable EKG with no significant change from previous                            Results for orders placed or performed during the hospital encounter of 09/05/20   XR Chest Port 1 View     Status: None    Narrative    CHEST PORTABLE ONE VIEW September 5, 2020 12:25 PM     HISTORY: Chest pain.    COMPARISON: August 7, 2017.      Impression    IMPRESSION: Heart size is normal. No pleural effusion, pneumothorax,  or abnormal area of consolidation. Bilateral breast implants  incidentally noted.    RENUKA SWANSON MD   POC US ECHO LIMITED     Status: None    Impression    Images were not obtainable due to patient's discomfort and body  habitus   CTA Chest with Contrast     Status: None    Narrative    CTA CHEST WITH CONTRAST 9/5/2020 4:00 PM    CLINICAL HISTORY: Severe chest pain radiating to back, history of  hypertension and substance abuse, evaluate for dissection or other  etiology.    TECHNIQUE: CT chest with IV contrast. Multiplanar reformats were  obtained. Dose reduction techniques were used.    CONTRAST: 80 mL Isovue 370    COMPARISON: None.    FINDINGS:   VASCULAR: No evidence for thoracic aortic dissection or periaortic  hematoma. No evidence for aneurysm. Ascending thoracic aorta is 2.3  cm. Aortic arch is 2 cm. Descending thoracic aorta is 1.8 cm. No  evidence for pulmonary embolism.    LUNGS AND PLEURA: No effusions or acute airspace disease. 0.6 cm  nodule anterior left lower lobe series 6 image 62.    MEDIASTINUM/AXILLAE: No acute abnormality. No adenopathy.    UPPER ABDOMEN: No significant finding.    MUSCULOSKELETAL: Unremarkable.      Impression    IMPRESSION:   1.  No acute abnormality. No evidence for thoracic aortic dissection  or hematoma.  2.  Incidental left-sided pulmonary nodule. See below for follow up.    Recommendations for an incidental lung nodule = or > 6mm to 8mm:    Low risk patients: Initial follow-up CT at 6-12 months, then  consider CT at 18-24 months if no change.    High risk patients: Initial follow-up CT at 6-12 months, then CT at  18-24 months if no change.    *Low Risk: Minimal or absent history of smoking or other known risk  factors.  *Nonsolid (ground-glass) or partly solid nodules may require longer  follow-up to exclude indolent adenocarcinoma.  *Recommendations based on Guidelines for the Management of Incidental  Pulmonary Nodules Detected at CT: From the Fleischner Society 2017,  Radiology 2017.    MURRAY ULRICH MD   CBC with platelets differential     Status: Abnormal   Result Value Ref Range    WBC 10.1 4.0 - 11.0 10e9/L    RBC Count 4.09 3.8 - 5.2 10e12/L    Hemoglobin 12.8 11.7 - 15.7 g/dL     Hematocrit 37.5 35.0 - 47.0 %    MCV 92 78 - 100 fl    MCH 31.3 26.5 - 33.0 pg    MCHC 34.1 31.5 - 36.5 g/dL    RDW 15.4 (H) 10.0 - 15.0 %    Platelet Count 392 150 - 450 10e9/L    Diff Method Automated Method     % Neutrophils 45.0 %    % Lymphocytes 44.0 %    % Monocytes 8.0 %    % Eosinophils 1.6 %    % Basophils 0.4 %    % Immature Granulocytes 1.0 %    Nucleated RBCs 0 0 /100    Absolute Neutrophil 4.6 1.6 - 8.3 10e9/L    Absolute Lymphocytes 4.5 0.8 - 5.3 10e9/L    Absolute Monocytes 0.8 0.0 - 1.3 10e9/L    Absolute Eosinophils 0.2 0.0 - 0.7 10e9/L    Absolute Basophils 0.0 0.0 - 0.2 10e9/L    Abs Immature Granulocytes 0.1 0 - 0.4 10e9/L    Absolute Nucleated RBC 0.0    Troponin I     Status: None   Result Value Ref Range    Troponin I ES <0.015 0.000 - 0.045 ug/L   Comprehensive metabolic panel     Status: Abnormal   Result Value Ref Range    Sodium 137 133 - 144 mmol/L    Potassium 3.6 3.4 - 5.3 mmol/L    Chloride 102 94 - 109 mmol/L    Carbon Dioxide 27 20 - 32 mmol/L    Anion Gap 8 3 - 14 mmol/L    Glucose 107 (H) 70 - 99 mg/dL    Urea Nitrogen 17 7 - 30 mg/dL    Creatinine 0.77 0.52 - 1.04 mg/dL    GFR Estimate >90 >60 mL/min/[1.73_m2]    GFR Estimate If Black >90 >60 mL/min/[1.73_m2]    Calcium 9.2 8.5 - 10.1 mg/dL    Bilirubin Total 0.5 0.2 - 1.3 mg/dL    Albumin 3.7 3.4 - 5.0 g/dL    Protein Total 7.6 6.8 - 8.8 g/dL    Alkaline Phosphatase 98 40 - 150 U/L    ALT 26 0 - 50 U/L    AST 20 0 - 45 U/L   Magnesium     Status: Abnormal   Result Value Ref Range    Magnesium 1.4 (L) 1.6 - 2.3 mg/dL   Lipase     Status: None   Result Value Ref Range    Lipase 144 73 - 393 U/L   D dimer quantitative     Status: None   Result Value Ref Range    D Dimer <0.3 0.0 - 0.50 ug/ml FEU   CRP inflammation     Status: None   Result Value Ref Range    CRP Inflammation <2.9 0.0 - 8.0 mg/L   Erythrocyte sedimentation rate auto     Status: None   Result Value Ref Range    Sed Rate 9 0 - 20 mm/h   HCG qualitative     Status:  None   Result Value Ref Range    HCG Qualitative Serum Negative NEG^Negative   EKG 12 lead     Status: None (Preliminary result)   Result Value Ref Range    Interpretation ECG Click View Image link to view waveform and result      Medications   0.9% sodium chloride BOLUS (0 mLs Intravenous Stopped 9/5/20 1348)     Followed by   sodium chloride 0.9% infusion ( Intravenous Stopped 9/5/20 1658)   acetaminophen (TYLENOL) tablet 1,000 mg ( Oral Canceled Entry 9/5/20 1513)   LORazepam (ATIVAN) injection 1 mg (1 mg Intravenous Given 9/5/20 1210)   ketorolac (TORADOL) injection 30 mg (30 mg Intravenous Given 9/5/20 1209)   propranolol (INDERAL) tablet 10 mg (10 mg Oral Given 9/5/20 1210)   acetaminophen (TYLENOL) tablet 1,000 mg (1,000 mg Oral Given 9/5/20 1247)   magnesium sulfate 1 gm in 100mL D5W intermittent infusion (0 g Intravenous Stopped 9/5/20 1616)   HYDROmorphone (DILAUDID) injection 0.2 mg (0.2 mg Intravenous Given 9/5/20 1311)   methocarbamol (ROBAXIN) tablet 1,500 mg (1,500 mg Oral Given 9/5/20 1405)   LORazepam (ATIVAN) tablet 1 mg (1 mg Oral Given 9/5/20 1353)   iopamidol (ISOVUE-370) solution 100 mL (80 mLs Intravenous Given 9/5/20 1557)   sodium chloride 0.9 % bag 500mL for CT scan flush use (85 mLs Intravenous Given 9/5/20 1557)        Assessments & Plan (with Medical Decision Making)   Patient with a history of depression, anxiety, and substance abuse presenting due to anxiety and chest pain.  With respect to chest pain, initial differential diagnosis included a life-threatening cause of chest pain such as a ACS, PE, dissection, pneumonia, pneumothorax, pericarditis ,Boerhaave tear, and other life-threatening as well as nonlife threatening causes of acute chest pain.  Also in the differential is non-life-threatening causes such as costochondritis, intercostal muscle spasm, esophagitis, anxiety.  On exam her vital signs are unremarkable.  She was anxious, hyperventilating, and has clear tenderness to  palpation of the left chest which is also exacerbated by rotation of the torso and positioning.  The remainder of her physical exam including cardiovascular exam and pulmonary exam is largely unremarkable.  She exhibits evidence of severe anxiety but has normal insight, judgment, no signs of psychosis, and no suicidal or homicidal thoughts.  Her EKG is unremarkable, troponin initially normal.  Chest x-ray d-dimer negative.  Patient is not pregnant, her other labs are also remarkable only for low magnesium.  Attempted to perform a bedside echo but due to the patient's level of discomfort with touching the probe to her chest, as well as her body habitus, I was not able to obtain any meaningful images.  CT chest is also normal.  She continued to complain mostly of reproducible musculoskeletal pain and anxiety.  Was treated with multiple doses of analgesics, muscle relaxers, and anxiolytics with substantial improvement.  I find no evidence to support a life-threatening etiology of her pain.  She also had an extensive similar work-up including serial troponin less than a week ago which was also negative.  Primary problem seems to be, (and also identified as the main problem by the patient) anxiety.  The patient was also seen by the [facility] , please refer to their extensive note/evaluation which was reviewed with me and is documented in [facility] on 9/5/2020 for further details.  Patient does not endorse any thoughts of harm to self or others nor any symptoms of psychosis.  Arrangements are made for outpatient psychiatric follow-up both with therapy and a medication prescriber.  She is requesting Zyprexa to be used for sleep and anxiety as this is been helpful in the past and was given a short-term prescription.  I also prescribed Robaxin and Tylenol to be used for her musculoskeletal chest pain.  Based on the clinical findings and the entire clinical scenario, the patient appears stable at this time to be treated  symptomatically, and to follow-up as an outpatient for any further evaluation and treatment.  Discussed expected course, need for follow up, and indications for return with the patient.  See discharge instructions.    I have reviewed the nursing notes. I have reviewed the findings, diagnosis, plan and need for follow up with the patient.    Discharge Medication List as of 9/5/2020  4:59 PM          Final diagnoses:   Chest wall pain   Generalized anxiety disorder       --  Ton Moore  [facility], [place], EMERGENCY DEPARTMENT  9/5/2020     Ton Horne MD  09/05/20 [id number]

## 2020-09-06 LAB — INTERPRETATION ECG - MUSE: NORMAL

## 2020-09-11 ENCOUNTER — BEH TREATMENT PLAN (OUTPATIENT)
Dept: BEHAVIORAL HEALTH | Facility: CLINIC | Age: 40
End: 2020-09-11
Attending: PSYCHIATRY & NEUROLOGY

## 2020-09-11 ENCOUNTER — HOSPITAL ENCOUNTER (OUTPATIENT)
Dept: BEHAVIORAL HEALTH | Facility: CLINIC | Age: 40
Discharge: HOME | End: 2020-09-11
Attending: SOCIAL WORKER | Admitting: SOCIAL WORKER
Payer: COMMERCIAL

## 2020-09-11 ENCOUNTER — TELEPHONE (OUTPATIENT)
Dept: FAMILY MEDICINE | Facility: CLINIC | Age: 40
End: 2020-09-11

## 2020-09-11 DIAGNOSIS — F19.20 CHEMICAL DEPENDENCY (H): ICD-10-CM

## 2020-09-11 DIAGNOSIS — F33.1 MODERATE RECURRENT MAJOR DEPRESSION (H): Primary | ICD-10-CM

## 2020-09-11 DIAGNOSIS — F33.2 MDD (MAJOR DEPRESSIVE DISORDER), RECURRENT SEVERE, WITHOUT PSYCHOSIS (H): ICD-10-CM

## 2020-09-11 DIAGNOSIS — F41.1 GAD (GENERALIZED ANXIETY DISORDER): ICD-10-CM

## 2020-09-11 PROCEDURE — 90791 PSYCH DIAGNOSTIC EVALUATION: CPT | Mod: GT | Performed by: SOCIAL WORKER

## 2020-09-11 ASSESSMENT — ANXIETY QUESTIONNAIRES
GAD7 TOTAL SCORE: 18
2. NOT BEING ABLE TO STOP OR CONTROL WORRYING: NEARLY EVERY DAY
6. BECOMING EASILY ANNOYED OR IRRITABLE: MORE THAN HALF THE DAYS
3. WORRYING TOO MUCH ABOUT DIFFERENT THINGS: NEARLY EVERY DAY
5. BEING SO RESTLESS THAT IT IS HARD TO SIT STILL: MORE THAN HALF THE DAYS
1. FEELING NERVOUS, ANXIOUS, OR ON EDGE: NEARLY EVERY DAY
7. FEELING AFRAID AS IF SOMETHING AWFUL MIGHT HAPPEN: MORE THAN HALF THE DAYS
IF YOU CHECKED OFF ANY PROBLEMS ON THIS QUESTIONNAIRE, HOW DIFFICULT HAVE THESE PROBLEMS MADE IT FOR YOU TO DO YOUR WORK, TAKE CARE OF THINGS AT HOME, OR GET ALONG WITH OTHER PEOPLE: VERY DIFFICULT

## 2020-09-11 ASSESSMENT — PATIENT HEALTH QUESTIONNAIRE - PHQ9
5. POOR APPETITE OR OVEREATING: NEARLY EVERY DAY
SUM OF ALL RESPONSES TO PHQ QUESTIONS 1-9: 16

## 2020-09-11 NOTE — TELEPHONE ENCOUNTER
----- Message from CLAYTON Mohan sent at 9/11/2020  1:16 PM CDT -----  Regarding: Referral to Integrated Primary Care Clinic  Janice Lemos:  My name is Irina Calvo, I am an LICSW located at [facility] in the [facility]. I interviewed Alisia Lin today for the dual disorder day treatment program. During the course of the interview she identified several co-morbidities including a lengthy trauma history, substance abuse and possible diagnosis of bipolar disorder ( according to her), as well as PTSD and depression.     We have a Integrated [facility] Clinic located in the professional building on the 6th floor that provides not only primary care but psychiatry and therapy and case management. I feel she would be a good fit for this wrap around clinic in addition to attending the DDP. She minimizes her substance abuse but records indicate she was recently at [facility] in alcohol withdrawal for 3 days. She is willing to do the DDP outpatient program but I also feel the Integrated Care clinic would be a better fit for her complex needs.     They are accepting new patients but they need a referral/order from you to begin care. The phone number is [phone]. I told Alisia I would be sending you a message requesting the order be sent to this clinic as she is very willing to move there to get all her needs  met.    Sincerely,  CLAYTON Riley  [facility] [address]  [place] [id number]

## 2020-09-11 NOTE — PATIENT INSTRUCTIONS
"[facility] - [facility]    MY COPING PLAN FOR SAFETY    PATIENT'S NAME: Alisia Lin  MRN:   [id number]    SAFETY PLAN:    Step 1: Warning signs / cues (Thoughts, images, mood, situation, behavior) that a crisis may be developing:      Thoughts: \"I don't matter\", \"I can't do this anymore\", \"I just want this to end\" and \"Nothing makes it better\"    Images: denied    Thinking Processes: ruminations (can't stop thinking about my problems): about all her losses, racing thoughts, intrusive thoughts (bothersome, unwanted thoughts that come out of nowhere): racing every day and disorganized thinking: cannot keep thoughts straight.    Mood: worsening depression, hopelessness, helplessness, intense anger, intense worry, agitation and mood swings    Behaviors: isolating/withdrawing , using alcohol, impulsive, reckless behaviors (acting without thinking): not to point of doing anything harmful, not taking care of myself, not taking care of my responsibilities and not sleeping enough    Situations: relationship problems and trauma        Step 2: Coping strategies - Things I can do to take my mind off of my problems without contacting another person (relaxation technique, physical activity):      Distress Tolerance Strategies:  when kids are around stops the thoughts or play a kids movie    Physical Activities: go for a walk    Focus on helpful thoughts:  \"I will get through this\"    Step 3: People and social settings that provide distraction:     Name: mother, Binu and youngest daughters- phone numbers has by memory      see kids     Step 4: Remind myself of people and things that are important to me and worth living for:  \" my self, my children and mother and father and family\".    Step 5: When I am in crisis, I can ask these people to help me use my safety plan:     Name: Stefania Topete- [phone]   Phone:       Step 6: Making the environment safe:       remove alcohol, remove drugs and be around " others    Step 7: Professionals or agencies I can contact during a crisis:      Suicide Prevention Lifeline: 5-629-207-TALK (2428)    Crisis Text Line Service (available 24 hours a day, 7 days a week): Text [place] to 792436    Call  **[facility]IS[facility] ([phone]) from a cell phone to talk to a team of professionals who can help you.    Crisis Services By [facility]: Phone Number:   [place]     [phone]   [place]    [phone]   [place]    [phone]   [place]    [phone]   [place]    [phone]   [place] [phone]   [place]     [phone]       Call 911 or go to my nearest emergency department.     I helped develop this safety plan and agree to use it when needed.  I have been given a copy of this plan.

## 2020-09-11 NOTE — PROGRESS NOTES
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 opioids, There is persistent desire or unsuccessful efforts to cut down or control use of the substance.  Met for:  Alcohol,opioids  A great deal of time is spent in activities necessary to obtain the substance, use the substance, or recover from its effects.  Met for:  Alcohol opioids, Craving, or a strong desire or urge to use the substance.  Met for:  Alcohol,opioids Recurrent use of the substance resulting in a failure to fulfill major role obligations at work, school, or home.  Met for:  Alcohol,opioids Continued use of the substance despite having persistent or recurrent social or interpersonal problems caused or exacerbated by the effects of its use.  Met for:  Alcohol opioids, Important social, occupational, or recreational activities are given up or reduced because of the substance.  Met for:  Alcohol opioids, Recurrent use of the substance in which it is physically hazardous.  Met for:  Alcohol,opioids Use of the substance is continued despite knowledge of having a persistent or recurrent physical or psychological problem that is likely to have been cause or exacerbated by the substance.  Met for:  Alcohol,opioids Tolerance:  either a need for markedly  increased amounts of the substance to achieve the desired effect or a markedly diminished effect with continued use of the dame amount of the substance.  Met for:  Alcohol opioids, Withdrawal:  either patient endorses characteristic withdrawal syndrome for the substance or the substance (or closely related substance) is taken to relieve or avoid withdrawal symptoms.  Met for:  Alcohol  A. The person has been exposed to a traumatic event in which both of the following were present:     (1) the person experienced, witnessed, or was confronted with an event or events that involved actual or threatened death or serious injury, or a threat to the physical integrity of self or others     (2) the person's response involved intense fear, helplessness, or horror. Note: In children, this may be expressed instead by disorganized or agitated behavior  B. The traumatic event is persistently reexperienced in one (or more) of the following ways:     - Recurrent and intrusive distressing recollections of the event, including images, thoughts, or perceptions. Note: In young children, repetitive play may occur in which themes or aspects of the trauma are expressed.      - Recurrent distressing dreams of the event. Note: In children, there may be frightening dreams without recognizable content.      - Acting or feeling as if the traumatic event were recurring (includes a sense of reliving the experience, illusions, hallucinations, and dissociative flashback episodes, including those that occur on awakening or when intoxicated). Note: In young children, trauma-specific reenactment may occur.      - Intense psychological distress at exposure to internal or external cues that symbolize or resemble an aspect of the traumatic event.      - Physiological reactivity on exposure to internal or external cues that symbolize or resemble an aspect of the traumatic event.   C. Persistent avoidance of stimuli associated with the trauma and numbing  of general responsiveness (not present before the trauma), as indicated by three (or more) of the following:     - Efforts to avoid thoughts, feelings, or conversations associated with the trauma.      - Efforts to avoid activities, places, or people that arouse recollections of the trauma.      - Inability to recall an important aspect of the trauma.      - Markedly diminished interest or participation in significant activities.   D. Persistent symptoms of increased arousal (not present before the trauma), as indicated by two (or more) of the following:  E. Duration of the disturbance is more than 1 month.  F. The disturbance causes clinically significant distress or impairment in social, occupational, or other important areas of functioning.    Functional Status:  Patient reports the following functional impairments: academic performance, health maintenance, management of the household and or completion of tasks, relationship(s), self-care, social interactions and work / vocational responsibilities.     WHODAS:   WHODAS 2.0 Total Score 9/11/2020   Total Score 38       Clinical Summary:  1. Reason for assessment: referred per her request by PCP for worsening depression with mood swings, severe panic and per record review VIOLET  .  2. Psychosocial, Cultural and Contextual Factors: hx of trauma, unable to work, lack of social support, VIOLET.  3. Principal DSM5 Diagnoses  (Sustained by DSM5 Criteria Listed Above):   296.33 (F33.2) Major Depressive Disorder, Recurrent Episode, Severe _ and With anxious distress.  4. 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  5. Provisional Diagnosis:  296.53 Bipolar I Disorder Current or Most  Recent Episode Depressed, Severe as evidenced by patient report of having this diagnosis in the past and endorsing mood swings, impulsivity, rather pressured speech, anxiety. .  6. Prognosis: Expect Improvement.  7. Likely consequences of symptoms if not treated: Without treatment patient more than likely will experience a continuation of symptoms with decreased daily functioning, requiring an increased level of care.  .  8. Client strengths include:  has a previous history of therapy, motivated, support of family, friends and providers, wants to learn, willing to ask questions and willing to relate to others .     Recommendations:     1. Plan for Safety and Risk Management:   A safety and risk management plan has been developed including: Patient consented to co-developed safety plan.  Safety and risk management plan was completed.  Patient agreed to use safety plan should any safety concerns arise.  A copy was given to the patient..          Report to child / adult protection services was NA.     2. Patient's identified cultural concerns will be addressed by reported grew up poor and was often picked on and bullied..     3. Initial Treatment will focus on:    Depressed Mood - provide support ongoing assessment and structure to increase functioning.  Anxiety - provide skills and support to decrease panic and anxiety and practice new skills.  Alcohol / Substance Use - maintain abstinence from all substances..     4. Resources/Service Plan:    services are not indicated.   Modifications to assist communication are not indicated.   Additional disability accommodations are not indicated.      5. Collaboration:   Collaboration / coordination of treatment will be initiated with the following  support professionals: sent inbasket to PCP asking refer to integrated care clinic..      6.  Referrals:   The following referral(s) will be initiated: Intensive Outpatient Chemical Health Treatment . Next Scheduled  Appointment: 9/15/20.     A Release of Information has been obtained for the following: primary care physician and outpatient therapist.    7. VIOLET:    VIOLET:  Discussed the general effects of drugs and alcohol on health and well-being. Provider gave patient printed information about the effects of chemical use on their health and well being. Recommendations:  DDP .     8. Records:    were reviewed at time of assessment.   Information in this assessment was obtained from the medical record and  provided by patient who is a limited historian.    Patient will have open access to their mental health medical record.      Eval type:  Mental Health    Provider Name/ Credentials:  CLAYTON Gillette    September 11, 2020

## 2020-09-12 ASSESSMENT — ANXIETY QUESTIONNAIRES: GAD7 TOTAL SCORE: 18

## 2020-09-14 ENCOUNTER — TELEPHONE (OUTPATIENT)
Dept: BEHAVIORAL HEALTH | Facility: CLINIC | Age: 40
End: 2020-09-14

## 2020-09-14 NOTE — TELEPHONE ENCOUNTER
This author contacted the patient in order to complete a program admit for the Dual Diagnosis Program. The patient stated they were currently unavailable to complete the program admit because they were at the dentist. They requested this author or another party contact them after 12pm.

## 2020-09-14 NOTE — PROGRESS NOTES
"Outpatient Mental Health Services - Adult     MY COPING PLAN FOR SAFETY     PATIENT'S NAME:    Alisia Lin  MRN:                           [id number]     SAFETY PLAN:     Step 1: Warning signs / cues (Thoughts, images, mood, situation, behavior) that a crisis may be developing:     ? Thoughts: \"I don't matter\", \"I can't do this anymore\", \"I just want this to end\" and \"Nothing makes it better\"  ? Images: denied  ? Thinking Processes: ruminations (can't stop thinking about my problems): about all her losses, racing thoughts, intrusive thoughts (bothersome, unwanted thoughts that come out of nowhere): racing every day and disorganized thinking: cannot keep thoughts straight.  ? Mood: worsening depression, hopelessness, helplessness, intense anger, intense worry, agitation and mood swings  ? Behaviors: isolating/withdrawing , using alcohol, impulsive, reckless behaviors (acting without thinking): not to point of doing anything harmful, not taking care of myself, not taking care of my responsibilities and not sleeping enough  ? Situations: relationship problems and trauma    ?    Step 2: Coping strategies - Things I can do to take my mind off of my problems without contacting another person (relaxation technique, physical activity):     ? Distress Tolerance Strategies:  when kids are around stops the thoughts or play a kids movie  ? Physical Activities: go for a walk  ? Focus on helpful thoughts:  \"I will get through this\"     Step 3: People and social settings that provide distraction:                 Name: mother, Binu and youngest daughters- phone numbers has by memory                            see kids                Step 4: Remind myself of people and things that are important to me and worth living for:  \" my self, my children and mother and father and family\".     Step 5: When I am in crisis, I can ask these people to help me use my safety plan:                 Name: Stefania Topete- [phone]             "  Phone:         Step 6: Making the environment safe:      ? remove alcohol, remove drugs and be around others     Step 7: Professionals or agencies I can contact during a crisis:     ? Suicide Prevention Lifeline: 8-762-456-NDUT (7202)  ? Crisis Text Line Service (available 24 hours a day, 7 days a week): Text [place] to 510814  ? Call  **CRISIS (522697) from a cell phone to talk to a team of professionals who can help you.          Crisis Services [facility]: Phone Number:   [place]     [phone]   [place]    [phone]   [place]    [phone]   [place]    [phone]   [place]    [phone]   [place] [phone]   [place]     [phone]      ? Call 591 or go to my nearest emergency department.             I helped develop this safety plan and agree to use it when needed.  I have been given a copy of this plan.

## 2020-09-14 NOTE — TELEPHONE ENCOUNTER
This author left the patient a voicemail stating she was calling to complete a program admit. The patient was unavailable.

## 2020-09-14 NOTE — TELEPHONE ENCOUNTER
This author contacted the patient in order to complete a program admit for the dual diagnosis program. The patient did not answer her phone so this author left a voicemail with the reason for her call.

## 2020-09-15 ENCOUNTER — HOSPITAL ENCOUNTER (OUTPATIENT)
Dept: BEHAVIORAL HEALTH | Facility: CLINIC | Age: 40
End: 2020-09-15
Attending: PSYCHIATRY & NEUROLOGY
Payer: COMMERCIAL

## 2020-09-15 ENCOUNTER — TELEPHONE (OUTPATIENT)
Dept: BEHAVIORAL HEALTH | Facility: CLINIC | Age: 40
End: 2020-09-15

## 2020-09-15 PROBLEM — F33.2 MDD (MAJOR DEPRESSIVE DISORDER), RECURRENT SEVERE, WITHOUT PSYCHOSIS (H): Status: ACTIVE | Noted: 2020-09-15

## 2020-09-15 PROCEDURE — 90853 GROUP PSYCHOTHERAPY: CPT | Mod: GT

## 2020-09-15 PROCEDURE — G0177 OPPS/PHP; TRAIN & EDUC SERV: HCPCS | Mod: GT | Performed by: OCCUPATIONAL THERAPIST

## 2020-09-15 PROCEDURE — 90853 GROUP PSYCHOTHERAPY: CPT | Mod: GT | Performed by: COUNSELOR

## 2020-09-15 ASSESSMENT — ANXIETY QUESTIONNAIRES
2. NOT BEING ABLE TO STOP OR CONTROL WORRYING: NEARLY EVERY DAY
6. BECOMING EASILY ANNOYED OR IRRITABLE: NEARLY EVERY DAY
GAD7 TOTAL SCORE: 21
7. FEELING AFRAID AS IF SOMETHING AWFUL MIGHT HAPPEN: NEARLY EVERY DAY
1. FEELING NERVOUS, ANXIOUS, OR ON EDGE: NEARLY EVERY DAY
3. WORRYING TOO MUCH ABOUT DIFFERENT THINGS: NEARLY EVERY DAY
4. TROUBLE RELAXING: NEARLY EVERY DAY
5. BEING SO RESTLESS THAT IT IS HARD TO SIT STILL: NEARLY EVERY DAY
IF YOU CHECKED OFF ANY PROBLEMS ON THIS QUESTIONNAIRE, HOW DIFFICULT HAVE THESE PROBLEMS MADE IT FOR YOU TO DO YOUR WORK, TAKE CARE OF THINGS AT HOME, OR GET ALONG WITH OTHER PEOPLE: EXTREMELY DIFFICULT

## 2020-09-15 ASSESSMENT — PATIENT HEALTH QUESTIONNAIRE - PHQ9: SUM OF ALL RESPONSES TO PHQ QUESTIONS 1-9: 23

## 2020-09-15 NOTE — GROUP NOTE
Process Group Note    PATIENT'S NAME: Alisia Lin  MRN:   [id number]  :   1980  ACCT. NUMBER: [id number]  DATE OF SERVICE: 9/15/20  START TIME: 12:00 PM  END TIME: 12:50 PM  FACILITATOR: Cinda Crystal  TOPIC:  Process Group    Diagnoses:  296.33 (F33.2) Major Depressive Disorder, Recurrent Episode, Severe _ and With anxious distress.  4. 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      Adult Dual Diagnosis Day Treatment  TRACK: 3    Telemedicine Visit: The patient's condition can be safely assessed and treated via synchronous audio and visual telemedicine encounter.      Reason for Telemedicine Visit:  covid 19    Originating Site (Patient Location): Patient's home    Distant Site (Provider Location): Provider Remote Setting    Consent:  The patient/guardian has verbally consented to: the potential risks and benefits of telemedicine (video visit) versus in person care; bill my insurance or make self-payment for services provided; and responsibility for payment of non-covered services.     Mode of Communication:  Video Conference via [employer]    As the provider I attest to compliance with applicable laws and regulations related to telemedicine.      NUMBER OF PARTICIPANTS: 5          Data:    Session content: At the start of this group, patients were invited to check in by identifying themselves, describing their current emotional status, and identifying issues to address in this group.   Area(s) of treatment focus addressed in this session included Symptom Management, Personal Safety, Community Resources/Discharge Planning, Develop / Improve Independent Living Skills and Develop Socialization /  Interpersonal Relationship Skills.  Alisia reported feeling happy and excited to be in group.  She reported her goal is to see her kids and grandson and to be in the moment with them.  She reported trying to work on eating better food.  She reported no safety concerns or chemical health issues.  She reported taking medications as prescribed.  She reported feeling proud of herself for going on a walk this morning despite low motivation.  She declined processing time in favor of listening to peers on her first day.    Therapeutic Interventions/Treatment Strategies:  Psychotherapist offered support, feedback and validation and reinforced use of skills. Treatment modalities used include Motivational Interviewing, Cognitive Behavioral Therapy and Dialectical Behavioral Therapy. Validated and normalized.  Highlighted and reinforced skills used; connected to positive outcomes.  Provided additional skill suggestions.  Aided in creating a plan to help work towards goals.        Assessment:    Patient response:   Patient responded to session by accepting feedback, giving feedback, listening, focusing on goals, being attentive and accepting support    Possible barriers to participation / learning include: and no barriers identified    Health Issues:   None reported       Substance Use Review:   Substance Use: No active concerns identified.    Mental Status/Behavioral Observations  Appearance:   Appropriate   Eye Contact:   Good   Psychomotor Behavior: Normal   Attitude:   Cooperative   Orientation:   All  Speech   Rate / Production: Normal    Volume:  Normal   Mood:    Normal  Affect:    Bright   Thought Content:   Clear  Thought Form:  Coherent  Logical     Insight:    Good     Plan:     Safety Plan: No current safety concerns identified.  Recommended that patient call 911 or go to the local ED should there be a change in any of these risk factors.     Barriers to treatment: None identified    Patient Contracts (see media tab):   None    Substance Use: Not addressed in session     Continue or Discharge: Patient will continue in Adult Dual Disorder Program (DDP) as planned. Patient is likely to benefit from learning and using skills as they work toward the goals identified in their treatment plan.      Cinda Crystal  September 15, 2020

## 2020-09-15 NOTE — GROUP NOTE
Psychoeducation Group Note    PATIENT'S NAME: Alisia Lin  MRN:   [id number]  :   1980  ACCT. NUMBER: [id number]  DATE OF SERVICE: 9/15/20  START TIME: 11:00 AM  END TIME: 11:50 AM  FACILITATOR: Matilde Ramirez OTR/L  TOPIC: MH Life Skills Group: Sensory Approaches in Mental Health  Adult Dual Diagnosis Day Treatment  TRACK: 3    NUMBER OF PARTICIPANTS: 5  Telemedicine Visit: The patient's condition can be safely assessed and treated via synchronous audio and visual telemedicine encounter.      Reason for Telemedicine Visit: Services only offered telehealth    Originating Site (Patient Location): Patient's home    Distant Site (Provider Location): [facility]: [facility]    Consent:  The patient/guardian has verbally consented to: the potential risks and benefits of telemedicine (video visit) versus in person care; bill my insurance or make self-payment for services provided; and responsibility for payment of non-covered services.     Mode of Communication:  Video Conference via [employer]    As the provider I attest to compliance with applicable laws and regulations related to telemedicine.      Summary of Group / Topics Discussed:  Sensory Approaches in Mental Health:  Sensory Enhanced Mindfulness: Patients were taught and provided with an opportunity to explore and practice how using sensory enhanced mindfulness practices can help them stay grounded in the present moment as a way to manage mental health symptoms and stressors.         Patient Session Goals / Objectives:    Identified how using sensory enhanced mindfulness practices can be used for grounding, stress management, and self regulation      Improved awareness of different types of sensory enhanced mindfulness activities that assist with healthy coping of stress and symptoms      Established a plan for practice of these skills in their own environments    Practiced and reflected on how to generalize taught skills to  their everyday life        Patient Participation / Response:  Fully participated with the group by sharing personal reflections / insights and openly received / provided feedback with other participants.    Patient presentation: easily engaged and noted benefits for herself, Verbalized understanding of content and Patient would benefit from additional opportunities to practice the content to be able to generalize it to their everyday life with increased intentionality, consistency, and efficacy in support of their psychiatric recovery    Treatment Plan:  Patient has an initial individualized treatment plan that was created as part of their diagnostic assessment / admission process.  A master individualized treatment plan is in the process of being developed with the patient and multi-disciplinary care team.    Matilde Ramirez OTR/L

## 2020-09-15 NOTE — GROUP NOTE
Psychotherapy Group Note    PATIENT'S NAME: Alisia Lin  MRN:   [id number]  :   1980  ACCT. NUMBER: [id number]  DATE OF SERVICE: 9/15/20  START TIME:  1:00 PM  END TIME:  1:50 PM  FACILITATOR: Chanel Singh LPCC  TOPIC:  EBP Group: DDP Relapse Prevention  Adult Dual Diagnosis Day Treatment  TRACK: 3    NUMBER OF PARTICIPANTS: 5    Telemedicine Visit: The patient's condition can be safely assessed and treated via synchronous audio and visual telemedicine encounter.      Reason for Telemedicine Visit: Services only offered telehealth    Originating Site (Patient Location): Patient's home    Distant Site (Provider Location): Provider Remote Setting    Consent:  The patient/guardian has verbally consented to: the potential risks and benefits of telemedicine (video visit) versus in person care; bill my insurance or make self-payment for services provided; and responsibility for payment of non-covered services.     Mode of Communication:  Video Conference via [employer]    As the provider I attest to compliance with applicable laws and regulations related to telemedicine.      Summary of Group / Topics Discussed:  DDP Relapse Prevention: Relationship Mapping: Patients identified different types of relationships they have in their life and examined if there is conflict or closeness, the degree of conflict or closeness, and the reason for conflict. The goal of this topic is to help patients gain awareness of the relationships they have with others, identify types of conflict in patients  lives and how they impact symptoms/functioning, and identify how they can improve relationships with relationship and interpersonal skills they have learned.     Patient Session Goals / Objectives:    Familiarized patients with awareness to the different types of relationships they may have with different people and substances in their lives    Discussed and practiced strategies to promote healthier understanding of  interpersonal relationships with a focus on awareness of conflict, the causes of conflict, and the ways to transform conflict    Discussed the use of substances and its impact on their relationships      Patient Participation / Response:  Fully participated with the group by sharing personal reflections / insights and openly received / provided feedback with other participants.    Demonstrated understanding of topics discussed through group discussion and participation, Demonstrated understanding of utilizing relapse prevention skills to manage urges and maintain sobriety, Identified / Expressed personal readiness to utilize relapse prevention skills and Verbalized understanding of how relapse prevention skills can assist in maintaining sobriety    Treatment Plan:  Patient has a current master individualized treatment plan.  See Epic treatment plan for more information.    Chanel Singh, [facility]

## 2020-09-16 ENCOUNTER — HOSPITAL ENCOUNTER (OUTPATIENT)
Dept: BEHAVIORAL HEALTH | Facility: CLINIC | Age: 40
End: 2020-09-16
Attending: PSYCHIATRY & NEUROLOGY
Payer: COMMERCIAL

## 2020-09-16 PROCEDURE — G0177 OPPS/PHP; TRAIN & EDUC SERV: HCPCS | Mod: GT

## 2020-09-16 PROCEDURE — 90853 GROUP PSYCHOTHERAPY: CPT | Mod: 95 | Performed by: MARRIAGE & FAMILY THERAPIST

## 2020-09-16 ASSESSMENT — ANXIETY QUESTIONNAIRES: GAD7 TOTAL SCORE: 21

## 2020-09-16 NOTE — PROGRESS NOTES
"RN Review of Medical History / Physical Health Screen  Outpatient Mental Health Programs - Adult    Adult Dual Diagnosis Day Treatment    PATIENT'S NAME: Alisia Lin  MRN:   [id number]  :   1980  ACCT. NUMBER: [id number]  CURRENT AGE:  39 year old    DATE OF DIAGNOSTIC ASSESSMENT: 20  DATE OF ADMISSION: 9/15/20     Please see Diagnostic Assessment for additional Medical History.     General Health:   Have you had any exposure to any communicable disease in the past 2-3 weeks? no     Are you aware of safe sex practices? yes       Nutrition:    Are you on a special diet? If yes, please explain:  no   Do you have any concerns regarding your nutritional status? If yes, please explain:  yes   Have you had any appetite changes in the last 3 months?  Yes, \"too good\"     Have you had any weight loss or weight gain in the last 3 months?  Yes, how much? 10-20 lbs over several months     Do you have a history of an eating disorder? no   Do you have a history of being in an eating disorder program? no     Patient height and weight recorded by RN in epic flowsheet: no    No; Unable to measure  Because of temporary in-person programmatic suspension due to COVID-19 pandemic, all pt weights and heights will be collected through patient self-report an recorded in physical health screening progress note upon admission to the program.                            Height/Weight Review:  Patient reported height:     4'10\"   Patient reports weight:  Date last checked:  168lb   Any referrals/needs identified?     pt anticipates thyroid testing with upcoiming appt to assess weight gain     BMI Review:  Was the patient informed of BMI? no      Findings  No Intervention         Fall Risk:   Have you had any falls in the past 3 months? yes tripped and bruised face. No other injury     Do you currently use any assistive devices for mobility?   no      Additional Comments/Assessment: pt is concerned about weight gain.  She has " recently started Zyprexa (post weight gain) and has been taking Paxil many years, with recent increase.  She will discuss with her PCP    Per completion of the Medical History / Physical Health Screen, is there a recommendation to see / follow up with a primary care physician/clinic or dentist?    No.      Regina Joseph RN  9/16/2020

## 2020-09-16 NOTE — GROUP NOTE
Process Group Note    PATIENT'S NAME: Alisia Lin  MRN:   [id number]  :   1980  ACCT. NUMBER: [id number]  DATE OF SERVICE: 20  START TIME: 11:00 AM  END TIME: 11:50 AM  FACILITATOR: Jeanette Obregon LMFT  TOPIC:  Process Group    Diagnoses:  296.33 (F33.2) Major Depressive Disorder, Recurrent Episode, Severe _ and With anxious distress.  4. 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    Adult Dual Diagnosis Day Treatment  TRACK: 3    NUMBER OF PARTICIPANTS: 6    Telemedicine Visit: The patient's condition can be safely assessed and treated via synchronous audio and visual telemedicine encounter.      Reason for Telemedicine Visit: Services only offered telehealth    Originating Site (Patient Location): Patient's home    Distant Site (Provider Location): Provider Remote Setting    Consent:  The patient/guardian has verbally consented to: the potential risks and benefits of telemedicine (video visit) versus in person care; bill my insurance or make self-payment for services provided; and responsibility for payment of non-covered services.     Mode of Communication:  Video Conference via [employer]    As the provider I attest to compliance with applicable laws and regulations related to telemedicine.           Data:    Session content: At the start of this group, patients were invited to check in by identifying themselves, describing their current emotional status, and identifying issues to address in this group.   Area(s) of treatment focus addressed in this session included Symptom Management, Personal Safety and Abstinence/Relapse Prevention.    Alisia reports feeling distracted today, she is working on  a goal of getting her rx taken and set up, using mindfulness skills, is struggling with cravings today, she identifies that bordem is a trigger, denies safety concerns, denies chemical use, is taking rx, is grateful for the group and having a group today, she processed about having cravings    Therapeutic Interventions/Treatment Strategies:  Psychotherapist offered support, feedback and validation and reinforced use of skills. Treatment modalities used include Motivational Interviewing. Interventions include Relapse Prevention: Facilitated understanding the importance of awareness of factors that contribute to relapse  and Facilitated understanding of effective coping skills in response to triggers for substance use.    Assessment:    Patient response:   Patient responded to session by accepting feedback, listening, being attentive, accepting support and appearing alert    Possible barriers to participation / learning include: and no barriers identified    Health Issues:   None reported       Substance Use Review:   Substance Use: alcohol .     Mental Status/Behavioral Observations  Appearance:   Appropriate   Eye Contact:   Good   Psychomotor Behavior: Normal   Attitude:   Cooperative   Orientation:   All  Speech   Rate / Production: Normal    Volume:  Normal   Mood:    Anxious   Affect:    Blunted   Thought Content:   Clear and Safety denies any current safety concerns including suicidal ideation, self-harm, and homicidal ideation  Thought Form:  Coherent  Logical     Insight:    Good     Plan:     Safety Plan: No current safety concerns identified.  Recommended that patient call 911 or go to the local ED should there be a change in any of these risk factors.     Barriers to treatment: None identified    Patient Contracts (see media tab):  None    Substance Use: Provided encouragement towards sobriety    Provided support and affirmation for steps taken towards sobriety      Continue or Discharge: Patient will  continue in Adult Dual Disorder Program (DDP) as planned. Patient is likely to benefit from learning and using skills as they work toward the goals identified in their treatment plan.      Fabian Obregon, LMFT  September 16, 2020

## 2020-09-16 NOTE — GROUP NOTE
Psychoeducation Group Note    PATIENT'S NAME: Alisia Lin  MRN:   [id number]  :   1980  ACCT. NUMBER: [id number]  DATE OF SERVICE: 20  START TIME: 12:00 PM  END TIME: 12:50 PM  FACILITATOR: Regina Joseph RN  TOPIC: KAIT RN Group: [facility] [address]  Adult Dual Diagnosis Day Treatment  TRACK: 3    NUMBER OF PARTICIPANTS: 7    Summary of Group / Topics Discussed:  [employer]: Exercise: Why exercise: Patients evaluated and discussed their current exercise behaviors/physical activity routine and identified barriers/obstacles to meeting daily physical activity recommendations. Patients were educated on the four types of exercise: endurance, strength, balance, and flexibility. Patients were educated on the benefits of getting daily physical activity, safety tips for beginning an exercise program, and fitness goal setting strategies.     Patient Session Goals / Objectives:  ? Explained the emotional and physical benefits of exercise  ? Identified how exercise and activity affects bodily function  ? Listed ways to incorporate exercise into daily routine  Telemedicine Visit: The patient's condition can be safely assessed and treated via synchronous audio and visual telemedicine encounter.      Reason for Telemedicine Visit: Services only offered telehealth    Originating Site (Patient Location): Patient's home    Distant Site (Provider Location): Provider Remote Setting    Consent:  The patient/guardian has verbally consented to: the potential risks and benefits of telemedicine (video visit) versus in person care; bill my insurance or make self-payment for services provided; and responsibility for payment of non-covered services.     Mode of Communication:  Video Conference via [employer]    As the provider I attest to compliance with applicable laws and regulations related to telemedicine.       Patient Participation / Response:  Moderately participated, sharing some personal reflections /  insights and adequately adequately received / provided feedback with other participants.    Demonstrated understanding of topics discussed through group discussion and participation    Treatment Plan:  Patient has a current master individualized treatment plan.  See Epic treatment plan for more information.    Regina Joseph RN

## 2020-09-17 ENCOUNTER — HOSPITAL ENCOUNTER (OUTPATIENT)
Dept: BEHAVIORAL HEALTH | Facility: CLINIC | Age: 40
End: 2020-09-17
Attending: PSYCHIATRY & NEUROLOGY
Payer: COMMERCIAL

## 2020-09-17 PROCEDURE — G0177 OPPS/PHP; TRAIN & EDUC SERV: HCPCS | Mod: 95 | Performed by: OCCUPATIONAL THERAPIST

## 2020-09-17 PROCEDURE — 90853 GROUP PSYCHOTHERAPY: CPT | Mod: GT | Performed by: PSYCHOLOGIST

## 2020-09-17 NOTE — GROUP NOTE
Psychoeducation Group Note    PATIENT'S NAME: Alisia Lin  MRN:   [id number]  :   1980  ACCT. NUMBER: [id number]  DATE OF SERVICE: 20  START TIME: 11:00 AM  END TIME: 11:50 AM  FACILITATOR: Matilde Ramirez OTR/L  TOPIC: KAIT RN Group: Health Maintenance  Adult Dual Diagnosis Day Treatment  TRACK: 3    NUMBER OF PARTICIPANTS: 7  Telemedicine Visit: The patient's condition can be safely assessed and treated via synchronous audio and visual telemedicine encounter.      Reason for Telemedicine Visit: Services only offered telehealth    Originating Site (Patient Location): Patient's home    Distant Site (Provider Location): [facility]: [facility]    Consent:  The patient/guardian has verbally consented to: the potential risks and benefits of telemedicine (video visit) versus in person care; bill my insurance or make self-payment for services provided; and responsibility for payment of non-covered services.     Mode of Communication:  Video Conference via [employer]    As the provider I attest to compliance with applicable laws and regulations related to telemedicine.      Summary of Group / Topics Discussed:  Health Maintenance: Weekend planning: Patients were given time to complete a weekend plan of what they will do to promote wellness and sobriety over the weekend when they do not have the structure of group. Patients were encouraged to review progress on their treatment goals and were challenged to identify ways to work toward meeting them. Patients identified and discussed foreseeable barriers to success over the weekend and then developed a plan to overcome them. Patients reviewed their distress coping skills and social support network and discussed this with the group.       Patient Session Goals / Objectives:    ?    Identified activities to engage in that promote balance in wellness  ?    Distinguished possible barriers to success over the weekend and created a plan to  overcome them  ?    Listed distress coping skills and identified social support network to utilize if in crisis during the weekend          Patient Participation / Response:  Fully participated with the group by sharing personal reflections / insights and openly received / provided feedback with other participants.    Demonstrated understanding of topics discussed through group discussion and participation, Identified / Expressed personal readiness to practice skills and Verbalized understanding of health maintenance topic    Treatment Plan:  Patient has an initial individualized treatment plan that was created as part of their diagnostic assessment / admission process.  A master individualized treatment plan is in the process of being developed with the patient and multi-disciplinary care team.    Matilde Ramirez, OTR/L

## 2020-09-17 NOTE — GROUP NOTE
Process Group Note    PATIENT'S NAME: Alisia Lin  MRN:   [id number]  :   1980  ACCT. NUMBER: [id number]  DATE OF SERVICE: 20  START TIME:  1:00 PM  END TIME:  1:50 PM  FACILITATOR: Siddharth Oreilly LP  TOPIC:  Process Group    Diagnoses:  296.33 (F33.2) Major Depressive Disorder, Recurrent Episode, Severe _ and With anxious distress.  4. 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    Adult Dual Diagnosis Day Treatment  TRACK: 3    NUMBER OF PARTICIPANTS: 7    Telemedicine Visit: The patient's condition can be safely assessed and treated via synchronous audio and visual telemedicine encounter.      Reason for Telemedicine Visit: Services only offered telehealth    Originating Site (Patient Location): Patient's home    Distant Site (Provider Location): [facility]    Consent:  The patient/guardian has verbally consented to: the potential risks and benefits of telemedicine (video visit) versus in person care; bill my insurance or make self-payment for services provided; and responsibility for payment of non-covered services.     Mode of Communication:  Video Conference via [employer]    As the provider I attest to compliance with applicable laws and regulations related to telemedicine.            Data:    Session content: At the start of this group, patients were invited to check in by identifying themselves, describing their current emotional status, and identifying issues to address in this group.   Area(s) of treatment focus addressed in this session included Symptom Management, Personal Safety, Community Resources/Discharge Planning, Abstinence/Relapse  "Prevention, Develop / Improve Independent Living Skills and Develop Socialization / Interpersonal Relationship Skills.    Pt reports she is \"OK.\" She reports she is tired of over talking. She reports she is trying to halt herself from talking too much.     Pt reports she has a lot of negative thoughts from her cousins passing.     Therapeutic Interventions/Treatment Strategies:  Psychotherapist offered support, feedback and validation.    Assessment:    Patient response:   Patient responded to session by accepting feedback, giving feedback, listening, focusing on goals, being attentive, accepting support and appearing alert    Possible barriers to participation / learning include: and no barriers identified    Health Issues:   None reported       Substance Use Review:   Substance Use: alcohol .     Mental Status/Behavioral Observations  Appearance:   Appropriate   Eye Contact:   Good   Psychomotor Behavior: Normal   Attitude:   Cooperative   Orientation:   All  Speech   Rate / Production: Normal    Volume:  Normal   Mood:    Depressed  Sad   Affect:    Constricted   Thought Content:   Clear and Safety denies any current safety concerns including suicidal ideation, self-harm, and homicidal ideation  Thought Form:  Coherent  Logical     Insight:    Good     Plan:     Safety Plan: Recommended that patient call 911 or go to the local ED should there be a change in any of these risk factors.     Barriers to treatment: None identified    Patient Contracts (see media tab):  None    Substance Use: Stage of Change: Action     Continue or Discharge: Patient will continue in Adult Dual Disorder Program (DDP) as planned. Patient is likely to benefit from learning and using skills as they work toward the goals identified in their treatment plan.      Siddharth Oreilly, MAXI  September 17, 2020  "

## 2020-09-17 NOTE — GROUP NOTE
Psychoeducation Group Note    PATIENT'S NAME: Alisia Lin  MRN:   [id number]  :   1980  ACCT. NUMBER: [id number]  DATE OF SERVICE: 20  START TIME: 12:00 PM  END TIME: 12:50 PM  FACILITATOR: Matilde Ramirez OTR/L  TOPIC: MH Life Skills Group: Resiliency Development  Adult Dual Diagnosis Day Treatment  TRACK: 3    NUMBER OF PARTICIPANTS: 7  Telemedicine Visit: The patient's condition can be safely assessed and treated via synchronous audio and visual telemedicine encounter.      Reason for Telemedicine Visit: Services only offered telehealth    Originating Site (Patient Location): Patient's home    Distant Site (Provider Location): [facility]: [facility]    Consent:  The patient/guardian has verbally consented to: the potential risks and benefits of telemedicine (video visit) versus in person care; bill my insurance or make self-payment for services provided; and responsibility for payment of non-covered services.     Mode of Communication:  Video Conference via [employer]    As the provider I attest to compliance with applicable laws and regulations related to telemedicine.      Summary of Group / Topics Discussed:  Resiliency Development:  Coping Skills: Patients were taught how to identify stressors, signs of stress, coping skills, and prevention strategies for overall stress management.  Patients were given the opportunity to identify both ongoing and acute mental health symptoms and how to effectively manage these symptoms by developing an effective aftercare plan.  Patients increased awareness of community based resources.    Patient Session Goals / Objectives:    Identified how using coping skills can be used for symptom and stress management       Improved awareness of individualed symptoms and stressors and how to effectively cope     Established a relapse prevention plan to practice these skills in their own environments    Practiced and reflected on how to generalize  taught skills to their everyday life          Patient Participation / Response:  Fully participated with the group by sharing personal reflections / insights and openly received / provided feedback with other participants.    Patient presentation: able to identify specific negative and positive coping skills and open to learn new ones, Verbalized understanding of content and Patient would benefit from additional opportunities to practice the content to be able to generalize it to their everyday life with increased intentionality, consistency, and efficacy in support of their psychiatric recovery    Treatment Plan:  Patient has an initial individualized treatment plan that was created as part of their diagnostic assessment / admission process.  A master individualized treatment plan is in the process of being developed with the patient and multi-disciplinary care team.    Matilde Ramirez OTR/L

## 2020-09-18 ENCOUNTER — HOSPITAL ENCOUNTER (OUTPATIENT)
Dept: BEHAVIORAL HEALTH | Facility: CLINIC | Age: 40
End: 2020-09-18
Attending: PSYCHIATRY & NEUROLOGY
Payer: COMMERCIAL

## 2020-09-18 PROCEDURE — G0177 OPPS/PHP; TRAIN & EDUC SERV: HCPCS | Mod: 95 | Performed by: OCCUPATIONAL THERAPIST

## 2020-09-18 PROCEDURE — 90853 GROUP PSYCHOTHERAPY: CPT | Mod: GT | Performed by: COUNSELOR

## 2020-09-18 PROCEDURE — 90853 GROUP PSYCHOTHERAPY: CPT | Mod: 95 | Performed by: PSYCHOLOGIST

## 2020-09-18 NOTE — GROUP NOTE
Process Group Note    PATIENT'S NAME: Alisia Lin  MRN:   [id number]  :   1980  ACCT. NUMBER: [id number]  DATE OF SERVICE: 20  START TIME: 11:00 AM  END TIME: 11:50 AM  FACILITATOR: Siddharth Oreilly LP  TOPIC:  Process Group    Diagnoses:  296.33 (F33.2) Major Depressive Disorder, Recurrent Episode, Severe _ and With anxious distress.  4. 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    Adult Dual Diagnosis Day Treatment  TRACK: 3    NUMBER OF PARTICIPANTS: 6    Telemedicine Visit: The patient's condition can be safely assessed and treated via synchronous audio and visual telemedicine encounter.      Reason for Telemedicine Visit: Services only offered telehealth    Originating Site (Patient Location): Patient's home    Distant Site (Provider Location): [facility]    Consent:  The patient/guardian has verbally consented to: the potential risks and benefits of telemedicine (video visit) versus in person care; bill my insurance or make self-payment for services provided; and responsibility for payment of non-covered services.     Mode of Communication:  Video Conference via [employer]    As the provider I attest to compliance with applicable laws and regulations related to telemedicine.        Data:    Session content: At the start of this group, patients were invited to check in by identifying themselves, describing their current emotional status, and identifying issues to address in this group.   Area(s) of treatment focus addressed in this session included Symptom Management, Personal Safety, Community Resources/Discharge Planning, Abstinence/Relapse Prevention,  "Develop / Improve Independent Living Skills and Develop Socialization / Interpersonal Relationship Skills.    Pt reports she feels \"scatterbrained.\" She reports feeling overwhelmed with paperwork she needs to complete before Monday. She reports she is using skills of breathing, relaxing, walking to help.     Pt asked the group for advice on how to stabilize her mood since she feels like her mood fluctuates a lot.     Therapeutic Interventions/Treatment Strategies:  Psychotherapist offered support, feedback and validation. Treatment modalities used include Cognitive Behavioral Therapy. Interventions include Mindfulness: Encouraged a plan to use mindfulness skills in daily life and Emotions Management:  Discussed barriers to emotional regulation and Increased awareness of daily mood patterns/changes.    Assessment:    Patient response:   Patient responded to session by accepting feedback, giving feedback, listening, focusing on goals, being attentive, accepting support and appearing alert    Possible barriers to participation / learning include: and no barriers identified    Health Issues:   Yes: Asthma, Associated Psychological Distress       Substance Use Review:   Substance Use: alcohol .     Mental Status/Behavioral Observations  Appearance:   Appropriate   Eye Contact:   Good   Psychomotor Behavior: Normal   Attitude:   Cooperative   Orientation:   All  Speech   Rate / Production: Normal    Volume:  Normal   Mood:    Anxious  Depressed   Affect:    Constricted   Thought Content:   Clear and Safety denies any current safety concerns including suicidal ideation, self-harm, and homicidal ideation  Thought Form:  Coherent  Obsessive     Insight:    Good     Plan:     Safety Plan: Recommended that patient call 911 or go to the local ED should there be a change in any of these risk factors.     Barriers to treatment: None identified    Patient Contracts (see media tab):  None    Substance Use: Stage of Change: Action "     Continue or Discharge: Patient will continue in Adult Dual Disorder Program (DDP) as planned. Patient is likely to benefit from learning and using skills as they work toward the goals identified in their treatment plan.      Siddharth Oreilly, MAXI  September 18, 2020

## 2020-09-18 NOTE — GROUP NOTE
Psychoeducation Group Note    PATIENT'S NAME: Alisia Lin  MRN:   [id number]  :   1980  ACCT. NUMBER: [id number]  DATE OF SERVICE: 20  START TIME:  1:00 PM  END TIME:  1:50 PM  FACILITATOR: Matilde Ramirez OTR/L  TOPIC: MH Life Skills Group: Resiliency Development  Adult Dual Diagnosis Day Treatment  TRACK: 3    NUMBER OF PARTICIPANTS: 5  Telemedicine Visit: The patient's condition can be safely assessed and treated via synchronous audio and visual telemedicine encounter.      Reason for Telemedicine Visit: Services only offered telehealth    Originating Site (Patient Location): Patient's home    Distant Site (Provider Location): [facility]: [facility]    Consent:  The patient/guardian has verbally consented to: the potential risks and benefits of telemedicine (video visit) versus in person care; bill my insurance or make self-payment for services provided; and responsibility for payment of non-covered services.     Mode of Communication:  Video Conference via [employer]    As the provider I attest to compliance with applicable laws and regulations related to telemedicine.      Summary of Group / Topics Discussed:  Resiliency Development:  Coping Skills: Patients were taught how to identify stressors, signs of stress, coping skills, and prevention strategies for overall stress management.  Patients were given the opportunity to identify both ongoing and acute mental health symptoms and how to effectively manage these symptoms by developing an effective aftercare plan.  Patients increased awareness of community based resources.    Patient Session Goals / Objectives:    Identified how using coping skills can be used for symptom and stress management       Improved awareness of individualed symptoms and stressors and how to effectively cope     Established a relapse prevention plan to practice these skills in their own environments    Practiced and reflected on how to generalize  taught skills to their everyday life          Patient Participation / Response:  Fully participated with the group by sharing personal reflections / insights and openly received / provided feedback with other participants.    Patient presentation: able to note specific signs of stess and a couple of relaxation techniques she practices, open to practice a new one, Verbalized understanding of content and Patient would benefit from additional opportunities to practice the content to be able to generalize it to their everyday life with increased intentionality, consistency, and efficacy in support of their psychiatric recovery    Treatment Plan:  Patient has an initial individualized treatment plan that was created as part of their diagnostic assessment / admission process.  A master individualized treatment plan is in the process of being developed with the patient and multi-disciplinary care team.    Matilde Ramirez OTR/L

## 2020-09-22 ENCOUNTER — TELEPHONE (OUTPATIENT)
Dept: BEHAVIORAL HEALTH | Facility: CLINIC | Age: 40
End: 2020-09-22

## 2020-09-23 ENCOUNTER — TELEPHONE (OUTPATIENT)
Dept: BEHAVIORAL HEALTH | Facility: CLINIC | Age: 40
End: 2020-09-23

## 2020-09-23 NOTE — TELEPHONE ENCOUNTER
Pt phoned, said she was having trouble connecting and asked for a return call.     Phoned pt and no answer, it went to voice mail, left message giving the IT dept number and offering to send a voice only invitation per pt request.

## 2020-09-24 ENCOUNTER — TELEPHONE (OUTPATIENT)
Dept: BEHAVIORAL HEALTH | Facility: CLINIC | Age: 40
End: 2020-09-24

## 2020-09-24 NOTE — TELEPHONE ENCOUNTER
Pt no show for group, phoned and spoke with pt. Pt reports her cell phone does not work. We will try to use an audio connection tomorrow.

## 2020-09-25 ENCOUNTER — HOSPITAL ENCOUNTER (OUTPATIENT)
Dept: BEHAVIORAL HEALTH | Facility: CLINIC | Age: 40
End: 2020-09-25
Attending: PSYCHIATRY & NEUROLOGY
Payer: COMMERCIAL

## 2020-09-25 PROCEDURE — 90853 GROUP PSYCHOTHERAPY: CPT | Mod: GT | Performed by: PSYCHOLOGIST

## 2020-09-25 NOTE — GROUP NOTE
Process Group Note    PATIENT'S NAME: Alisia Lin  MRN:   [id number]  :   1980  ACCT. NUMBER: [id number]  DATE OF SERVICE: 20  START TIME: 11:00 AM  END TIME: 11:50 AM  FACILITATOR: Siddharth Oreilly LP  TOPIC:  Process Group    Diagnoses:296.33 (F33.2) Major Depressive Disorder, Recurrent Episode, Severe _ and With anxious distress.  4. 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      Adult Dual Diagnosis Day Treatment  TRACK: 3    NUMBER OF PARTICIPANTS: 7    Telemedicine Visit: The patient's condition can be safely assessed and treated via synchronous audio and visual telemedicine encounter.      Reason for Telemedicine Visit: Services only offered telehealth    Originating Site (Patient Location): Patient's home    Distant Site (Provider Location): [facility]    Consent:  The patient/guardian has verbally consented to: the potential risks and benefits of telemedicine (video visit) versus in person care; bill my insurance or make self-payment for services provided; and responsibility for payment of non-covered services.     Mode of Communication:  Video Conference via [place] Self Representation Document Preparation    As the provider I attest to compliance with applicable laws and regulations related to telemedicine.            Data:    Session content: At the start of this group, patients were invited to check in by identifying themselves, describing their current emotional status, and identifying issues to address in this group.   Area(s) of treatment focus addressed in this session included Symptom Management, Personal Safety, Community Resources/Discharge Planning, Abstinence/Relapse  "Prevention, Develop / Improve Independent Living Skills and Develop Socialization / Interpersonal Relationship Skills.    Pt reports she is \"pretty good.\" Pt reports she is working on trying to relax. She reports she has her grandson which she likes, but he is \"cutting teeth\" and somewhat crabby.     Therapeutic Interventions/Treatment Strategies:  Psychotherapist offered support, feedback and validation.    Assessment:    Patient response:   Patient responded to session by accepting feedback, giving feedback, listening, focusing on goals, being attentive, accepting support and appearing alert    Possible barriers to participation / learning include: and no barriers identified    Health Issues:   None reported       Substance Use Review:   Substance Use: alcohol .     Mental Status/Behavioral Observations  Appearance:   Pt attended via phone only   Eye Contact:   NA   Psychomotor Behavior: Unknown   Attitude:   Cooperative   Orientation:   All  Speech   Rate / Production: Normal    Volume:  Normal   Mood:    Normal  Affect:    Appropriate   Thought Content:   Clear  Thought Form:  Coherent  Logical     Insight:    Good     Plan:     Safety Plan: Recommended that patient call 911 or go to the local ED should there be a change in any of these risk factors.     Barriers to treatment: None identified    Patient Contracts (see media tab):  None    Substance Use: Stage of Change: Action     Continue or Discharge: Patient will continue in Adult Dual Disorder Program (DDP) as planned. Patient is likely to benefit from learning and using skills as they work toward the goals identified in their treatment plan.      Siddharth Oreilly LP  September 25, 2020  "

## 2020-09-25 NOTE — PROGRESS NOTES
Adult Dual Disorder Program:   Acknowledgement of Current Treatment Plan       I have reviewed my treatment plan with my therapist on 9/25/2020  .   I agree with the plan as it is written in the electronic health record.    Name:      Signature:  Alisia iLn     Unable to sign due to COVID         Garrett Oreilly MA, LP  Psychotherapist    Nahomi Guerrero MD  Psychiatrist/Medical Director Nahomi Guerrero MD on 9-30-20

## 2020-09-25 NOTE — PROGRESS NOTES
Adult Dual Disorder Program:  Individualized Treatment Plan     Date of Plan: 2020      Name: Alisia Lin MRN: [id number]    : 1980    Programs:  Adult Dual Disorder Program (DDP)    Clinical Track (if applicable):  3    DSM5 Diagnosis  309.81 (F43.10) Posttraumatic Stress Disorder (includes Posttraumatic Stress Disorder for Children 6 Years and Younger)  Without dissociative symptoms  Substance-Related & Addictive Disorders Alcohol Use Disorder   303.90 (F10.20) Moderate In early remission        Adult Dual Disorder Program Multidisciplinary Team Members: Nahomi Guerrero MD, Chanel Singh, [facility], [facility]; Garrett Oreilly MA, , [facility]; Fabian Obregon, LMFT, [facility]; Marina Ramirez, OTR/L; Georgie Acosta RN, PHN    Alisia Lin will participate in the Adult Dual Disorder Program  5 days per week, 3 hours per day. Anticipated duration/discharge: 6-8 weeks     Due to COVID-19, services will be delivered via telemedicine until further notice.        Program Start Date: 9/15/20 Anticipated Discharge Date: 20 (pending authorization/clinical changes)    NOTE: Complete CGI     Review Date: Does Alisia Lin continue to meet criteria to participate in the Adult Dual Disorder Program, 5 days per week; 3 hours per day?   *** {YES NO change discharge info above:073634}   *** {YES NO change discharge info above:129414}   *** {YES NO change discharge info above:613435}   *** {YES NO change discharge info above:378015}   *** {YES NO change discharge info above:931994}       Client Strengths:  {Client Strengths:6882757}    Client Participation in Plan:  Contributed to goals and plan   Agrees with plan   Received copy of treatment plan     Areas of Vulnerability:  Suicidal Ideation   Anxiety  Depressive symptoms   Trauma/Abuse/Neglect    Long-Term Goals:  Knowledge about illness and management of symptoms   Maintenance of personal safety   Maintenance of sobriety   Effective management of impulsivity     Abuse  Prevention Plan:  Safe, therapeutic environment   Safety coping plan as needed   Education regarding illness and skill development   Coordination with care providers   Impluse control education and intervention   Medication adjustment/management (MI/CD)   Monitor for use of substances    Discharge Criteria:  Satisfactory progress toward treatment goals   Improvement re: identified problems and symptoms   Ability to continue recovery at next level of service   Has a discharge plan in place   Has safety/coping plan in place   Ability to maintain sobriety  Complete goodbye letter assignment   Complete coping cards   Regular attendance as scheduled     Areas of Treatment Focus        Area of Treatment Focus:   Personal Safety  Start Date:    9/25/20    Dimension:   III. Emotional / Behavioral Condition    Description:    ***    Goal:  Target Date: 10/23/20 Status: Active  Client will use coping plan for safety, as needed.      Progress:       Treatment Strategies:   Facilitate increased self awareness  Teach adaptive coping skills and communication skills      Area of Treatment Focus:   Symptom Stabilization and Management  Start Date:    9/25/20    Dimension:   III. Emotional / Behavioral Condition    Description:    ***    Goal:  Target Date: 10/23/20 Status: Active  {Symptom Stabilization Goals:907740}      Progress:       Treatment Strategies:   {Treatment Strategies:328384}        Area of Treatment Focus:    Abstinence / Relapse Prevention  Start Date:    9/25/20    Dimension:   V. Relapse    Description:    ***    Goal:  Target Date: 10/23/20 Status: Active  {Relapse Prevention Goals:292309}      Progress:       Treatment Strategies:   {Treatment Strategies:627117}      Area of Treatment Focus:   Community Resources / Support and Discharge Planning  Start Date:    9/25/20    Dimension:   VI. Recovery Environment    Description:    ***    Goal:  Target Date: 10/23/20 Status: Active  {Areas of Focus with  "Goals:333157}      Progress:       Treatment Strategies:   {Treatment Strategies:675874}       Siddharth Oreilly LP      NOTE: Required signatures are completed manually and scanned into the electronic medical record. See \"Media\" tab in epic.    The Individualized Treatment Plan Signature Page has been routed to the provider for co-sign.       "

## 2020-09-25 NOTE — GROUP NOTE
Psychotherapy Group Note    PATIENT'S NAME: Alisia Lin  MRN:   [id number]  :   1980  ACCT. NUMBER: [id number]  DATE OF SERVICE: 20  START TIME: 12:00 PM  END TIME: 12:50 PM  FACILITATOR: Siddharth Oreilly LP  TOPIC:  EBP Group: Emotions Management  Adult Dual Diagnosis Day Treatment  TRACK: 3    NUMBER OF PARTICIPANTS: 7    Telemedicine Visit: The patient's condition can be safely assessed and treated via synchronous audio and visual telemedicine encounter.      Reason for Telemedicine Visit: Services only offered telehealth    Originating Site (Patient Location): Patient's home    Distant Site (Provider Location): [facility]    Consent:  The patient/guardian has verbally consented to: the potential risks and benefits of telemedicine (video visit) versus in person care; bill my insurance or make self-payment for services provided; and responsibility for payment of non-covered services.     Mode of Communication:  Video Conference via [place]    As the provider I attest to compliance with applicable laws and regulations related to telemedicine.      Summary of Group / Topics Discussed:  Emotions Management: Understanding Emotions: Patients discussed the purpose of emotions and function they serve in our lives.  Reviewed core emotions, why they happen (triggers), and how they occur. The group assisted one anothers' understanding that: emotional experiences are important; difficult emotions have a place in our lives; and the differences between various emotions.    Patient Session Goals / Objectives:    Demonstrate understanding of types various emotions    Identify and discuss specific emotions and when they occur; understand triggers    Discuss barriers to emotional regulation      Patient Participation / Response:  Fully participated with the group by sharing personal reflections / insights and openly received / provided feedback with other  participants.    Demonstrated understanding of topics discussed through group discussion and participation    Treatment Plan:  Patient has a current master individualized treatment plan.  See Epic treatment plan for more information.    Siddharth Oreilly, LP

## 2020-09-28 ENCOUNTER — TELEPHONE (OUTPATIENT)
Dept: BEHAVIORAL HEALTH | Facility: CLINIC | Age: 40
End: 2020-09-28

## 2020-09-29 ENCOUNTER — TELEPHONE (OUTPATIENT)
Dept: BEHAVIORAL HEALTH | Facility: CLINIC | Age: 40
End: 2020-09-29

## 2020-09-29 NOTE — TELEPHONE ENCOUNTER
Phoned pt due to poor attendance.     Pt reports she has oral surgery this week. She would like to restart Monday.     I explained she could and she will be on an attendance contract starting that date.

## 2020-10-06 ENCOUNTER — TELEPHONE (OUTPATIENT)
Dept: BEHAVIORAL HEALTH | Facility: CLINIC | Age: 40
End: 2020-10-06

## 2020-10-06 NOTE — TELEPHONE ENCOUNTER
Attempted to call patient again at this time to check in. No answer and left message. This staff was facilitator of group and noted that she had first logged on to the group for a brief time period and then left prior to group starting and did not return. Group was then cancelled due to low numbers. Requested she call main program number to let staff know what has been her challenge or reasons for missing groups this week as the plan was for her to return to group yesterday. Will coordinate with team and await call back from pt.

## 2020-10-06 NOTE — TELEPHONE ENCOUNTER
Writer contacted patient and left message requesting return phone call to discuss absences from group and participation in program.     Yael Antunez, [facility], [facility]  10/6/2020

## 2020-10-07 ENCOUNTER — TRANSFERRED RECORDS (OUTPATIENT)
Dept: HEALTH INFORMATION MANAGEMENT | Facility: CLINIC | Age: 40
End: 2020-10-07

## 2020-10-07 LAB
ALT SERPL-CCNC: 19 IU/L (ref 8–45)
AST SERPL-CCNC: 20 IU/L (ref 2–40)
CREAT SERPL-MCNC: 0.72 MG/DL (ref 0.57–1.11)
GFR SERPL CREATININE-BSD FRML MDRD: >60 ML/MIN/1.73M2
GLUCOSE SERPL-MCNC: 92 MG/DL (ref 65–100)
POTASSIUM SERPL-SCNC: 3.6 MMOL/L (ref 3.5–5)

## 2020-10-07 NOTE — DISCHARGE SUMMARY
Adult Dual Disorder Program   Discharge Summary/Instructions     Patient: Alisia Lin MRN: [id number]  : 1980 Age:  39 year old Sex:  female    Admission Date: 9/15/20  Discharge Date: 10/6/20  Diagnosis:    296.33 (F33.2) Major Depressive Disorder, Recurrent Episode, Severe _ and With anxious distress.  300.01 (F41.0) Panic Disorder  309.81 (F43.10) Posttraumatic Stress Disorder (includes Posttraumatic Stress Disorder for Children 6 Years and Younger)  Without dissociative symptoms   Alcohol Use Disorder   303.90 (F10.20) Moderate In early remission, .    Focus of Treatment / Discharge Recommendations: IOP MICD [place]    Personal Safety/ Management of Symptoms:    * Follow your safety plan.  Report increased symptoms to your care team                    and/or use the crisis resources listed below.    Crisis Resources:    Suicide Prevention Lifeline: 2-956-891-TALK (1212)    Crisis Text Line Service (available 24 hours a day, 7 days a week): Text [place] to 045834    Call  **CRISIS (948570) from a cell phone to talk to a team of professionals who can help you.  Crisis Services By [facility]: Phone Number:   [place]     [phone]   [place]    [phone]   [place]    [phone]   [place]    [phone]   [place]    [phone]   [place] [phone]   [place]     [phone]       Call 911 or go to my nearest emergency department.     Managing Symptoms / Abstinence / Preventing Relapse:    Take all medicines as directed.  Carry a current list of medicines with you.    Use coping skills: mindfulness, breathing    Do not use illicit (street) drugs, controlled substances (narcotics) or alcohol.    Go to all appointments.    Report symptoms to your care team including: thoughts of suicide, loss of sleep, increased confusion, mood getting worse..    Develop/Improve Independent Living/Socialization Skills: planning, organizing     Community Resources/Supports and Discharge Planning:    Follow up with  psychiatrist / main caregiver: Not given    Next visit:     Follow up with your therapist: ALBER   Next visit:     Go to group therapy and / or support groups at: [facility]    See your medical doctor about:  ALBER    Other:  ALBER    Copy of summary sent to: ALBER      Client Signature:_________________________________   Date / Time:___________    Staff Signature:__________________________________   Date / Time:___________

## 2020-10-07 NOTE — DISCHARGE SUMMARY
Adult Dual Disorder Program Discharge Summary / Instructions     [id number]  : 1980 Age:  39 year old Sex:  female    Admission Date: 20  Discharge Date: 10/6/20    Reason for Discharge: Poor Attendance            Prognosis: Guarded      Client Progress Toward AchievingTreatment Plan Goals / Dimensions Risk Scale       Alisia attended 13 of 48 scheduled MICD groups. Absences were due to illness, appointments, unable to connect.      Diagnosis:   296.33 (F33.2) Major Depressive Disorder, Recurrent Episode, Severe _ and With anxious distress.  300.01 (F41.0) Panic Disorder  309.81 (F43.10) Posttraumatic Stress Disorder (includes Posttraumatic Stress Disorder for Children 6 Years and Younger)  Without dissociative symptoms   Alcohol Use Disorder   303.90 (F10.20) Moderate In early remission, .      Individualized Treatment Plan Goals/Progress:   No treatment plan completed.     Admit Discharge   PHQ-9 23 -   NATHANAEL-7 21 -   CGI 5 4         Additional Comments: NA    Completed By: Garrett Oreilly MA [facility]

## 2020-10-21 ENCOUNTER — OFFICE VISIT (OUTPATIENT)
Dept: FAMILY MEDICINE | Facility: CLINIC | Age: 40
End: 2020-10-21
Payer: COMMERCIAL

## 2020-10-21 ENCOUNTER — COMMUNICATION - HEALTHEAST (OUTPATIENT)
Dept: BEHAVIORAL HEALTH | Facility: CLINIC | Age: 40
End: 2020-10-21

## 2020-10-21 VITALS
SYSTOLIC BLOOD PRESSURE: 122 MMHG | HEIGHT: 59 IN | BODY MASS INDEX: 37.5 KG/M2 | WEIGHT: 186 LBS | OXYGEN SATURATION: 98 % | RESPIRATION RATE: 16 BRPM | DIASTOLIC BLOOD PRESSURE: 86 MMHG | TEMPERATURE: 99 F | HEART RATE: 86 BPM

## 2020-10-21 DIAGNOSIS — Z23 NEED FOR PROPHYLACTIC VACCINATION AND INOCULATION AGAINST INFLUENZA: ICD-10-CM

## 2020-10-21 DIAGNOSIS — M79.89 LEG SWELLING: ICD-10-CM

## 2020-10-21 DIAGNOSIS — E66.01 MORBID OBESITY (H): Primary | ICD-10-CM

## 2020-10-21 PROBLEM — R79.89 LOW TSH LEVEL: Status: RESOLVED | Noted: 2017-08-29 | Resolved: 2020-10-21

## 2020-10-21 PROCEDURE — 90686 IIV4 VACC NO PRSV 0.5 ML IM: CPT | Performed by: FAMILY MEDICINE

## 2020-10-21 PROCEDURE — 90471 IMMUNIZATION ADMIN: CPT | Performed by: FAMILY MEDICINE

## 2020-10-21 PROCEDURE — 99214 OFFICE O/P EST MOD 30 MIN: CPT | Mod: 25 | Performed by: FAMILY MEDICINE

## 2020-10-21 RX ORDER — FUROSEMIDE 20 MG
20 TABLET ORAL DAILY
Qty: 14 TABLET | Refills: 0 | Status: SHIPPED | OUTPATIENT
Start: 2020-10-21 | End: 2021-01-06

## 2020-10-21 ASSESSMENT — ANXIETY QUESTIONNAIRES
3. WORRYING TOO MUCH ABOUT DIFFERENT THINGS: NEARLY EVERY DAY
1. FEELING NERVOUS, ANXIOUS, OR ON EDGE: NEARLY EVERY DAY
GAD7 TOTAL SCORE: 20
2. NOT BEING ABLE TO STOP OR CONTROL WORRYING: NEARLY EVERY DAY
7. FEELING AFRAID AS IF SOMETHING AWFUL MIGHT HAPPEN: NEARLY EVERY DAY
6. BECOMING EASILY ANNOYED OR IRRITABLE: MORE THAN HALF THE DAYS
5. BEING SO RESTLESS THAT IT IS HARD TO SIT STILL: NEARLY EVERY DAY
4. TROUBLE RELAXING: NEARLY EVERY DAY
GAD7 TOTAL SCORE: 20
GAD7 TOTAL SCORE: 20
7. FEELING AFRAID AS IF SOMETHING AWFUL MIGHT HAPPEN: NEARLY EVERY DAY

## 2020-10-21 ASSESSMENT — PATIENT HEALTH QUESTIONNAIRE - PHQ9
10. IF YOU CHECKED OFF ANY PROBLEMS, HOW DIFFICULT HAVE THESE PROBLEMS MADE IT FOR YOU TO DO YOUR WORK, TAKE CARE OF THINGS AT HOME, OR GET ALONG WITH OTHER PEOPLE: EXTREMELY DIFFICULT
SUM OF ALL RESPONSES TO PHQ QUESTIONS 1-9: 22
SUM OF ALL RESPONSES TO PHQ QUESTIONS 1-9: 22

## 2020-10-21 ASSESSMENT — MIFFLIN-ST. JEOR: SCORE: 1413.28

## 2020-10-21 NOTE — PROGRESS NOTES
Subjective     Alisia Lin is a 40 year old female who presents to clinic today for the following health issues:    HPI      Hypothyroidism Follow-up      Since last visit, patient describes the following symptoms:  no hair loss, no skin changes, no constipation, no loose stools, weight gain of 25 lbs, loose stools, anxiety, depression, fatigue and hair loss      How many servings of fruits and vegetables do you eat daily?  0-1    On average, how many sweetened beverages do you drink each day (Examples: soda, juice, sweet tea, etc.  Do NOT count diet or artificially sweetened beverages)?   0    How many days per week do you exercise enough to make your heart beat faster? 7    How many minutes a day do you exercise enough to make your heart beat faster? 10 - 19    How many days per week do you miss taking your medication? 0    Depression and Anxiety Follow-Up    How are you doing with your depression since your last visit? Worsened     How are you doing with your anxiety since your last visit?  Worsened     Are you having other symptoms that might be associated with depression or anxiety? Yes:  fatigue, weight gain     Have you had a significant life event? No     Do you have any concerns with your use of alcohol or other drugs? No    Social History     Tobacco Use     Smoking status: Current Every Day Smoker     Packs/day: 1.50     Years: 10.00     Pack years: 15.00     Types: Other     Last attempt to quit: 10/1/2016     Years since quittin.0     Smokeless tobacco: Current User     Tobacco comment: less than 1/2 ppd   Substance Use Topics     Alcohol use: No     Comment: JAYA 18 days ago     Drug use: No     Comment: clean for 18 days     PHQ 2020 2020 10/21/2020   PHQ-9 Total Score 19 24 22   Q9: Thoughts of better off dead/self-harm past 2 weeks Not at all Not at all Not at all   Some encounter information is confidential and restricted. Go to Review Flowsheets activity to see all data.     NATHANAEL-7  "SCORE 7/7/2020 9/1/2020 10/21/2020   Total Score - - -   Total Score - - 20 (severe anxiety)   Total Score 21 21 20   Some encounter information is confidential and restricted. Go to Review Flowsheets activity to see all data.     Last PHQ-9 10/21/2020   1.  Little interest or pleasure in doing things 3   2.  Feeling down, depressed, or hopeless 3   3.  Trouble falling or staying asleep, or sleeping too much 3   4.  Feeling tired or having little energy 3   5.  Poor appetite or overeating 3   6.  Feeling bad about yourself 2   7.  Trouble concentrating 3   8.  Moving slowly or restless 2   Q9: Thoughts of better off dead/self-harm past 2 weeks 0   PHQ-9 Total Score 22   Difficulty at work, home, or with people -   Some encounter information is confidential and restricted. Go to Review Flowsheets activity to see all data.     NATHANAEL-7  10/21/2020   1. Feeling nervous, anxious, or on edge 3   2. Not being able to stop or control worrying 3   3. Worrying too much about different things 3   4. Trouble relaxing 3   5. Being so restless that it is hard to sit still 3   6. Becoming easily annoyed or irritable 2   7. Feeling afraid, as if something awful might happen 3   NATHANAEL-7 Total Score 20   If you checked any problems, how difficult have they made it for you to do your work, take care of things at home, or get along with other people? -   Some encounter information is confidential and restricted. Go to Review FlowsEmbark activity to see all data.      was seen in the ER with leg swelling. Feels swelling is not improving. Feels \"puffed up\" and wondering about water pill.    Also wondering if she should take thyroid medication as her thyroid was abnormal in the past.      Answers for HPI/ROS submitted by the patient on 10/21/2020   If you checked off any problems, how difficult have these problems made it for you to do your work, take care of things at home, or get along with other people?: Extremely difficult  PHQ9 TOTAL " "SCORE: 22  NATHANAEL 7 TOTAL SCORE: 20      Social History     Occupational History     Occupation: self employed     Comment:      Employer: [employer]   Tobacco Use     Smoking status: Current Every Day Smoker     Packs/day: 1.50     Years: 10.00     Pack years: 15.00     Types: Other     Last attempt to quit: 10/1/2016     Years since quittin.0     Smokeless tobacco: Current User     Tobacco comment: less than 1/2 ppd   Substance and Sexual Activity     Alcohol use: No     Comment: JAYA 18 days ago     Drug use: No     Comment: clean for 18 days     Sexual activity: Yes     Partners: Male     Allergies   Allergen Reactions     Compazine      Heart Problems/ Body went completley stiff for 8 hrs.     Nortriptyline      Skelaxin [Metaxalone]      Patient Active Problem List   Diagnosis     Moderate recurrent major depression (H)     Migraine with aura     Tobacco use disorder     ASCUS on Pap smear     Chronic low back pain     Anxiety     Benign neoplasm of colon     Degeneration of intervertebral disc     Spondylosis     Displacement of intervertebral disc     Intermittent asthma, uncomplicated     Back pain     Shoulder pain, right     Narcotic dependence, in remission (H)     Abnormal thyroid function test     Low TSH level     NATHANAEL (generalized anxiety disorder)     MDD (major depressive disorder), recurrent severe, without psychosis (H)     Morbid obesity (H)     Reviewed medications, social history and  past medical and surgical history.    Review of system: for general, respiratory, CVS, GI and psychiatry negative except for noted above.     EXAM:  /86 (BP Location: Left arm, Patient Position: Sitting, Cuff Size: Adult Large)   Pulse 86   Temp 99  F (37.2  C) (Oral)   Resp 16   Ht 1.505 m (4' 11.25\")   Wt 84.4 kg (186 lb)   LMP 10/18/2020   SpO2 98%   BMI 37.25 kg/m    Constitutional: healthy, alert and no distress   Psychiatric: anxious but pleasant.  Cardiovascular: RRR. No " murmurs,  Leg swelling present bilaterally.     ASSESSMENT / PLAN:  (E66.01) Morbid obesity (H)  (primary encounter diagnosis)  Comment: Body mass index is 37.25 kg/m . with co morbidities.  Interested in having intervention regarding her weight.  Reasonable to see a medical weight loss specialist.  Plan: COMPREHENSIVE WEIGHT MANAGEMENT             (M79.89) Leg swelling  Comment: We discussed Lasix or water pills and not most ideal and not helpful long-term.  She should use compression socks.  Okay to consider Lasix for 2 weeks.  She recently had hypomagnesemia and we may need to monitor her electrolytes if she is requiring to use Lasix for more than 2 weeks.  She understood.  Hold off on prescription prophylactic electrolytes but encouraged her to continue with dietary changes.  Plan: furosemide (LASIX) 20 MG tablet             (Z23) Need for prophylactic vaccination and inoculation against influenza  Comment:    Plan: INFLUENZA VACCINE IM > 6 MONTHS VALENT IIV4         [33784], Vaccine Administration, Initial         [11698]           Overdue for her Pap.  She is going to wait until her PCP is back in the clinic.    The above note was dictated using voice recognition. Although reviewed after completion, some word and grammatical error may remain .

## 2020-10-22 ASSESSMENT — ASTHMA QUESTIONNAIRES: ACT_TOTALSCORE: 24

## 2020-10-22 ASSESSMENT — ANXIETY QUESTIONNAIRES: GAD7 TOTAL SCORE: 20

## 2020-10-22 ASSESSMENT — PATIENT HEALTH QUESTIONNAIRE - PHQ9: SUM OF ALL RESPONSES TO PHQ QUESTIONS 1-9: 22

## 2020-11-02 ENCOUNTER — VIRTUAL VISIT (OUTPATIENT)
Dept: FAMILY MEDICINE | Facility: CLINIC | Age: 40
End: 2020-11-02
Payer: COMMERCIAL

## 2020-11-02 DIAGNOSIS — Z72.51 UNPROTECTED SEXUAL INTERCOURSE: ICD-10-CM

## 2020-11-02 DIAGNOSIS — J02.9 SORE THROAT: Primary | ICD-10-CM

## 2020-11-02 DIAGNOSIS — R50.9 FEVER, UNSPECIFIED FEVER CAUSE: ICD-10-CM

## 2020-11-02 DIAGNOSIS — M79.89 LEG SWELLING: ICD-10-CM

## 2020-11-02 LAB
DEPRECATED S PYO AG THROAT QL EIA: ABNORMAL
SPECIMEN SOURCE: ABNORMAL

## 2020-11-02 PROCEDURE — 99213 OFFICE O/P EST LOW 20 MIN: CPT | Mod: 95 | Performed by: FAMILY MEDICINE

## 2020-11-02 RX ORDER — LEVONORGESTREL 1.5 MG/1
1.5 TABLET ORAL ONCE
Qty: 1 TABLET | Refills: 0 | Status: SHIPPED | OUTPATIENT
Start: 2020-11-02 | End: 2021-01-06

## 2020-11-02 NOTE — PROGRESS NOTES
"Alisia Lin is a 40 year old female who is being evaluated via a billable telephone visit.      The patient has been notified of following:     \"This telephone visit will be conducted via a call between you and your physician/provider. We have found that certain health care needs can be provided without the need for a physical exam.  This service lets us provide the care you need with a short phone conversation.  If a prescription is necessary we can send it directly to your pharmacy.  If lab work is needed we can place an order for that and you can then stop by our lab to have the test done at a later time.    Telephone visits are billed at different rates depending on your insurance coverage. During this emergency period, for some insurers they may be billed the same as an in-person visit.  Please reach out to your insurance provider with any questions.    If during the course of the call the physician/provider feels a telephone visit is not appropriate, you will not be charged for this service.\"    Patient has given verbal consent for Telephone visit?  Yes    What phone number would you like to be contacted at? [phone] (B)     How would you like to obtain your AVS? Emiliano    Subjective     Alisia Lin is a 40 year old female who presents via phone visit today for the following health issues:    HPI     Acute Illness  Acute illness concerns: possible strep   Onset/Duration: 3 days ago   Symptoms:  Fever: YES-100.8 10 min ago   Chills/Sweats: YES  Headache (location?): YES last 25 min   Sinus Pressure: no  Conjunctivitis:  YES- watery eyes   Ear Pain: YES: left \"feels like there is hair in her ear\"  Rhinorrhea: no  Congestion: no  Sore Throat: YES  Cough: no  Wheeze: no  Decreased Appetite: YES has not ate in 3 days   Nausea: YES  Vomiting: no  Diarrhea: no  Dysuria/Freq.: no  Dysuria or Hematuria: no  Fatigue/Achiness: YES  Sick/Strep Exposure: no  Therapies tried and outcome: ibuprofen, tylenol, " with minor relief. alkaseltzer cold helped yesterday. Gargling with salt water helps.   Hurts to swallow solids. Able to swallow liquids.     Other concerns: Condom broke last night and she would like morning after pill. She says her partner has recently been tested for STI and she is not concerned about STI. Has used morning after pill before.        Was your last period abnormal?:    No  Last recent unprotected sex >72 hours:   No  Any incidents of unprotected sex since last menstrual period:    No    HX:  Stroke:    No  Breast Cancer:   No  Unexplained Vaginal Bleeding:   No  Blood clot:   No       Review of Systems          Objective          Vitals:  No vitals were obtained today due to virtual visit.    alert and no acute distress  PSYCH: Alert and oriented times 3; coherent speech, normal   rate and volume   or delusions  Her affect is normal  RESP: No cough, no audible wheezing, able to talk in full sentences  Remainder of exam unable to be completed due to telephone visits          Assessment/Plan:    Assessment & Plan     Sore throat   continue ibuprofen, tylenol prn pain and fever. If unable to swallow should be seen right away in UC or ER.   - Streptococcus A Rapid Scr w Reflx to PCR  - Symptomatic COVID-19 Virus (Coronavirus) by PCR    Fever, unspecified fever cause     - Streptococcus A Rapid Scr w Reflx to PCR  - Symptomatic COVID-19 Virus (Coronavirus) by PCR    Unprotected sexual intercourse   see below.   - levonorgestrel (PLAN B) 1.5 MG tablet  Dispense: 1 tablet; Refill: 0          Patient Instructions   1. Emergency contraception (EC) attempts to prevent a pregnancy after unprotected sex or birth control failure (missed birth control pills, broken condom, etc.).  It is safe and effective, especially if taken soon.  2. EC can lower the chance of getting pregnant from 60-95 percent, depending on how quickly it is used.  3. Emergency contraception DOES NOT end a pregnancy and WILL NOT work if a  woman is already pregnant.   4. EC contains only progestin, a hormone found in all birth control pills.  Side effects are minimal but can include mild nausea, headache, dizziness or breast tenderness. Sometimes the next period will come a few days early or late.  5. Plan B One-step is available without a prescription.  6. Insurance may or may not pay for EC.        Patient Education     Self-Care for Sore Throats     Sore throats happen for many reasons, such as colds, allergies, cigarette smoke, air pollution, and infections caused by viruses or bacteria. In any case, your throat becomes red and sore. Your goal for self-care is to reduce your discomfort while giving your throat a chance to heal.  Moisten and soothe your throat  Tips include the following:    Try a sip of water first thing after waking up.    Keep your throat moist by drinking 6 or more glasses of clear liquids every day.    Run a cool-air humidifier in your room overnight.    Stay away from cigarette smoke.     Check the air quality index,if air pollution gives you a sore throat. On high pollution days, try to limit outdoor time.    Suck on throat lozenges, cough drops, hard candy, ice chips, or frozen fruit-juice bars. Use the sugar-free versions if your diet or medical condition requires them.  Gargle to ease irritation  Gargling every hour or 2 can ease irritation. Try gargling with 1 of these solutions:    1/4 teaspoon of salt in 1/2 cup of warm water    An over-the-counter anesthetic gargle  Use medicine for more relief  Over-the-counter medicine can reduce sore throat symptoms. Ask your pharmacist if you have questions about which medicine to use. To prevent possible medicine interactions, let the pharmacist know what medicines you take. To decrease symptoms:    Ease pain with anesthetic sprays. Aspirin or an aspirin substitute also helps. Remember, never give aspirin to anyone 18 or younger. Don't take aspirin if you are already taking blood  thinners.     For sore throats caused by allergies, try antihistamines to block the allergic reaction.  Unless a sore throat is caused by a bacterial infection, antibiotics won t help you.  Prevent future sore throats  Prevention tips include:    Stop smoking or reduce contact with secondhand smoke. Smoke irritates the tender throat lining.    Limit contact with pets and with allergy-causing substances, such as pollen and mold.    Wash your hands often when you re around someone with a sore throat or cold. This will keep viruses or bacteria from spreading.    Limit outdoor time when air pollution is bad.    Don t strain your vocal cords.  When to call your healthcare provider  Contact your healthcare provider if you have:    Fever of 100.4 F (38.0 C) or higher, or as directed by your healthcare provider    White spots on the throat    Great Trouble swallowing    A skin rash    Recent exposure to someone else with strep bacteria    Severe hoarseness and swollen glands in the neck or jaw  Call 911  Call 911 if any of the following occur:    Trouble breathing or catching your breath    Drooling and problems swallowing    Wheezing    Unable to talk    Feeling dizzy or faint    Feeling of doom  [employer] last reviewed this educational content on 9/1/2019 2000-2020 [employer]. [address] [place] [address], [place] [address]. All rights reserved. This information is not intended as a substitute for professional medical care. Always follow your healthcare professional's instructions.               No follow-ups on file.    Chanel Cruz MD, MD  [facility]    Phone call duration:  12 minutes

## 2020-11-02 NOTE — PATIENT INSTRUCTIONS
1. Emergency contraception (EC) attempts to prevent a pregnancy after unprotected sex or birth control failure (missed birth control pills, broken condom, etc.).  It is safe and effective, especially if taken soon.  2. EC can lower the chance of getting pregnant from 60-95 percent, depending on how quickly it is used.  3. Emergency contraception DOES NOT end a pregnancy and WILL NOT work if a woman is already pregnant.   4. EC contains only progestin, a hormone found in all birth control pills.  Side effects are minimal but can include mild nausea, headache, dizziness or breast tenderness. Sometimes the next period will come a few days early or late.  5. Plan B One-step is available without a prescription.  6. Insurance may or may not pay for EC.        Patient Education     Self-Care for Sore Throats     Sore throats happen for many reasons, such as colds, allergies, cigarette smoke, air pollution, and infections caused by viruses or bacteria. In any case, your throat becomes red and sore. Your goal for self-care is to reduce your discomfort while giving your throat a chance to heal.  Moisten and soothe your throat  Tips include the following:    Try a sip of water first thing after waking up.    Keep your throat moist by drinking 6 or more glasses of clear liquids every day.    Run a cool-air humidifier in your room overnight.    Stay away from cigarette smoke.     Check the air quality index,if air pollution gives you a sore throat. On high pollution days, try to limit outdoor time.    Suck on throat lozenges, cough drops, hard candy, ice chips, or frozen fruit-juice bars. Use the sugar-free versions if your diet or medical condition requires them.  Gargle to ease irritation  Gargling every hour or 2 can ease irritation. Try gargling with 1 of these solutions:    1/4 teaspoon of salt in 1/2 cup of warm water    An over-the-counter anesthetic gargle  Use medicine for more relief  Over-the-counter medicine can  reduce sore throat symptoms. Ask your pharmacist if you have questions about which medicine to use. To prevent possible medicine interactions, let the pharmacist know what medicines you take. To decrease symptoms:    Ease pain with anesthetic sprays. Aspirin or an aspirin substitute also helps. Remember, never give aspirin to anyone 18 or younger. Don't take aspirin if you are already taking blood thinners.     For sore throats caused by allergies, try antihistamines to block the allergic reaction.  Unless a sore throat is caused by a bacterial infection, antibiotics won t help you.  Prevent future sore throats  Prevention tips include:    Stop smoking or reduce contact with secondhand smoke. Smoke irritates the tender throat lining.    Limit contact with pets and with allergy-causing substances, such as pollen and mold.    Wash your hands often when you re around someone with a sore throat or cold. This will keep viruses or bacteria from spreading.    Limit outdoor time when air pollution is bad.    Don t strain your vocal cords.  When to call your healthcare provider  Contact your healthcare provider if you have:    Fever of 100.4 F (38.0 C) or higher, or as directed by your healthcare provider    White spots on the throat    Great Trouble swallowing    A skin rash    Recent exposure to someone else with strep bacteria    Severe hoarseness and swollen glands in the neck or jaw  Call 911  Call 911 if any of the following occur:    Trouble breathing or catching your breath    Drooling and problems swallowing    Wheezing    Unable to talk    Feeling dizzy or faint    Feeling of doom  [employer] last reviewed this educational content on 9/1/2019 2000-2020 [employer]. [address] [place] [address], [place] [address]. All rights reserved. This information is not intended as a substitute for professional medical care. Always follow your healthcare professional's instructions.

## 2020-11-04 RX ORDER — FUROSEMIDE 20 MG
20 TABLET ORAL DAILY
Qty: 14 TABLET | Refills: 0 | OUTPATIENT
Start: 2020-11-04

## 2020-11-15 DIAGNOSIS — M25.511 CHRONIC RIGHT SHOULDER PAIN: ICD-10-CM

## 2020-11-15 DIAGNOSIS — G89.29 CHRONIC RIGHT SHOULDER PAIN: ICD-10-CM

## 2020-11-15 DIAGNOSIS — M54.40 CHRONIC MIDLINE LOW BACK PAIN WITH SCIATICA, SCIATICA LATERALITY UNSPECIFIED: ICD-10-CM

## 2020-11-15 DIAGNOSIS — G89.29 CHRONIC MIDLINE LOW BACK PAIN WITH SCIATICA, SCIATICA LATERALITY UNSPECIFIED: ICD-10-CM

## 2020-11-18 RX ORDER — METHOCARBAMOL 750 MG/1
TABLET, FILM COATED ORAL
Qty: 90 TABLET | Refills: 1 | Status: SHIPPED | OUTPATIENT
Start: 2020-11-18 | End: 2022-10-18

## 2020-12-10 ENCOUNTER — TELEPHONE (OUTPATIENT)
Dept: FAMILY MEDICINE | Facility: CLINIC | Age: 40
End: 2020-12-10

## 2020-12-10 DIAGNOSIS — F41.1 GAD (GENERALIZED ANXIETY DISORDER): ICD-10-CM

## 2020-12-10 DIAGNOSIS — F43.10 PTSD (POST-TRAUMATIC STRESS DISORDER): ICD-10-CM

## 2020-12-10 RX ORDER — PAROXETINE 40 MG/1
TABLET, FILM COATED ORAL
Qty: 30 TABLET | Refills: 0 | Status: SHIPPED | OUTPATIENT
Start: 2020-12-10 | End: 2021-01-13

## 2020-12-10 RX ORDER — PRAZOSIN HYDROCHLORIDE 2 MG/1
CAPSULE ORAL
Qty: 90 CAPSULE | Refills: 0 | Status: SHIPPED | OUTPATIENT
Start: 2020-12-10 | End: 2021-01-07

## 2020-12-10 RX ORDER — HYDROXYZINE HYDROCHLORIDE 50 MG/1
50 TABLET, FILM COATED ORAL EVERY 6 HOURS PRN
Qty: 120 TABLET | Refills: 0 | Status: ON HOLD | OUTPATIENT
Start: 2020-12-10 | End: 2021-07-18

## 2020-12-10 NOTE — TELEPHONE ENCOUNTER
Reason for Call:  Medication or medication refill:    Do you use a [facility]?  No   Name of the pharmacy and phone number for the current request  [facility] [address] [place] [address]     Name of the medication requested  paxil  Hydroxyzine  Prazosin      Other request no     Can we leave a detailed message on this number? YES    Phone number patient can be reached at: Cell number on file:    Telephone Information:   Mobile [phone]       Best Time anytime asap    Call taken on 12/10/2020 at 10:22 AM by Aubree Saleh

## 2020-12-11 ENCOUNTER — OFFICE VISIT (OUTPATIENT)
Dept: BEHAVIORAL HEALTH | Facility: CLINIC | Age: 40
End: 2020-12-11
Payer: COMMERCIAL

## 2020-12-11 ENCOUNTER — TELEPHONE (OUTPATIENT)
Dept: BEHAVIORAL HEALTH | Facility: CLINIC | Age: 40
End: 2020-12-11

## 2020-12-11 DIAGNOSIS — F33.1 MODERATE RECURRENT MAJOR DEPRESSION (H): Primary | ICD-10-CM

## 2020-12-11 PROCEDURE — 90832 PSYTX W PT 30 MINUTES: CPT | Mod: 95 | Performed by: SOCIAL WORKER

## 2020-12-11 NOTE — TELEPHONE ENCOUNTER
called pt to in form her that pcp had rx her meds yesterday.  Pt will pick them up today.      Pt will also schedule follow up with her pcp.

## 2020-12-11 NOTE — PROGRESS NOTES
"[facility] Care [facility]  December 11, 2020    Alisia Lin is a 40 year old female who is being evaluated via a telephone visit.      The patient has been notified of the following:     \"We have found that certain health care needs can be provided without the need for a face to face visit.  This service lets us provide the care you need with a short phone conversation.      I will have full access to your [facility] medical record during this entire phone call.   I will be taking notes for your medical record.     Since this is like an office visit, we will bill your insurance company for this service.  Please check with your medical insurance if this type of telephone visit/virtual care is covered.  You may be responsible for the cost of this service if insurance coverage is denied.      There are potential benefits and risks of telephone visits (e.g. limits to patient confidentiality) that differ from in-person visits.?  Confidentiality still applies for telephone services, and nobody will record the visit.  It is important to be in a quiet, private space that is free of distractions (including cell phone or other devices) during the visit.??     If during the course of the call I believe a telephone visit is not appropriate, you will not be charged for this service\"    Consent has been obtained for this service by care team member: yes.    Behavioral Health Clinician Progress Note    Voice recognition technology may have been utilized for some of the information in this medical record.      Patient Name: Alisia Lin         Service Type: Individual           Service Location:  in clinic      Session Start Time:  1100am  Session End Time: 1130am      Session Length: 16 - 37      Attendees: Client    Visit Activities (Refresh list every visit): [facility] Only    Phone call (patient / identified key support person reached)      Diagnostic Assessment Date: 9/11/2020 [place] DA completed for " enrollment in [place] day treatment.  Please see epic.    Treatment Plan Review Date: To be developed. Patient was referred to a community therapist.       See Flowsheets for today's PHQ-9 and NATHANAEL-7 results  Previous PHQ-9:   PHQ-9 SCORE 9/11/2020 9/15/2020 10/21/2020   PHQ-9 Total Score - - -   PHQ-9 Total Score MyChart - - 22 (Severe depression)   PHQ-9 Total Score 16 23 22     Previous NATHANAEL-7:   NATHANAEL-7 SCORE 9/11/2020 9/15/2020 10/21/2020   Total Score - - -   Total Score - - 20 (severe anxiety)   Total Score 18 21 20       KYLE LEVEL:  No flowsheet data found.    DATA  Extended Session (60+ minutes): No  Interactive Complexity: No  Crisis: No    Treatment Objective(s) Addressed in This Session:  Target Behavior(s):  Depressed Mood: Decrease frequency and intensity of feeling down, depressed, hopeless  Improve quantity and quality of night time sleep / decrease daytime naps  Identify negative self-talk and behaviors: challenge core beliefs, myths, and actions  Psychological distress related to Pain      Current Stressors / Issues    Patient reconnecting with the [facility] for community mental health resources.  [facility] has been in contact with patient in September at which time she was referred to [place] day treatment program.  Patient felt there was some benefit but is seeking more one-to-one therapy.  Writer noticed multiple no-shows.  Patient was discharged to day treatment 2 months ago.    Patient reports he is continue to experience severe panic attacks, racing thoughts, waking up middle the night, being triggered by trauma.  Patient reports at times she cannot shut down her brain.  Patient has not met with a psychiatrist in the past year.  Patient has past  diagnoses of bipolar disorder, generalized anxiety, panic attacks, ocd and depression per [facility] records.  Patient reports that she is now sober and finding it more difficult to cope.  Provide education to patient that in her description of her daily symptoms it  "appears to be a combination of her anxiety and hypomania.    Plan    Patient was referred to community psychiatrist.    Patient was referred to community therapist.    Patient reports she is out of her Paxil and is experiencing withdrawal symptoms of \"brain zaps\" and tingling.  [facility] will reach out to her PCP for refill.  Noted patient had missed her appointment yesterday with her PCP.  Patient where she is having difficulties with her phone.    Counseled patient about behavioral health home.  Patient was open to the moment.  [facility] will connect with [facility] social work team    Treatment Objective(s) / Homework:  No improvement - PREPARATION (Decided to change - considering how); Intervened by negotiating a change plan and determining options / strategies for behavior change, identifying triggers, exploring social supports, and working towards setting a date to begin behavior change    Motivational Interviewing    MI Intervention: Supported Autonomy, Collaboration, Evocation, Permission to raise concern or advise and Open-ended questions     Change Talk Expressed by the Patient: Reasons to change    Provider Response to Change Talk: E - Evoked more info from patient about behavior change, A - Affirmed patient's thoughts, decisions, or attempts at behavior change, R - Reflected patient's change talk and S - Summarized patient's change talk statements    Also provided psychoeducation about behavioral health condition, symptoms, and treatment options        Care Plan review completed: No    Medication Review:  No changes to current psychiatric medication(s)    Medication Compliance:  Yes    Changes in Health Issues:   None reported    Chemical Use Review:   Substance Use: Chemical use reviewed, no active concerns identified    patient reports 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 Patient admits a history of abusing OxyContin but has been sober for the past 5 years. Patient understands that is his history of " chemical addiction that prevents her being prescribed pain medications.     Tobacco Use: No current tobacco use.      Assessment: Current Emotional / Mental Status (status of significant symptoms):    Risk status (Self / Other harm or suicidal ideation)  Patient denies current fears or concerns for personal safety.  Patient denies current or recent suicidal ideation or behaviors.  Patient denies current or recent homicidal ideation or behaviors.  Patient denies current or recent self injurious behavior or ideation.  Patient denies other safety concerns.  A safety and risk management plan has not been developed at this time, however patient was encouraged to call [place] [phone] should there be a change in any of these risk factors.    Appearance:   Appropriate   Eye Contact:   Fair   Psychomotor Behavior: Normal   Attitude:   Cooperative   Orientation:   All  Speech   Rate / Production: Normal    Volume:  Soft   Mood:    Anxious   Affect:    Expansive  Worrisome   Thought Content:  Clear   Thought Form:  Coherent  Flight of Ideas   Insight:    Fair     Diagnoses:  1. Moderate recurrent major depression (H)        Collateral Reports Completed:  Not Applicable    Plan: (Homework, other):  Patient was given information about behavioral services and encouraged to schedule a follow up appointment with the clinic [facility] as needed.  She was also given information about mental health symptoms and treatment options  and information about mental health symptoms and a referral was made to [facility] for Counseling and/or Community Psychiatry.  CD Recommendations: No indications of CD issues.  CLAYTON Perera, [facility]

## 2020-12-12 DIAGNOSIS — F41.1 GAD (GENERALIZED ANXIETY DISORDER): ICD-10-CM

## 2020-12-12 DIAGNOSIS — F31.9 BIPOLAR 1 DISORDER (H): ICD-10-CM

## 2020-12-12 DIAGNOSIS — F33.1 MODERATE RECURRENT MAJOR DEPRESSION (H): ICD-10-CM

## 2020-12-14 ENCOUNTER — TELEPHONE (OUTPATIENT)
Dept: BEHAVIORAL HEALTH | Facility: CLINIC | Age: 40
End: 2020-12-14

## 2020-12-14 NOTE — LETTER
December 14, 2020    [facility]  Behavioral Health Home  [address] [place] [address]      Dear Alisia Lin:    I am the Behavioral Health Home Social Work Care Coordinator that works closely with your Primary Care Provider (PCP), Sepideh Hines, to support your healthy living goals.  This letter serves to inform you that you are eligible for Behavioral Health Home Services that is a paid service through your insurance and is free of cost to you. Behavioral Health Home ([facility]) is a program created to help integrate your primary care clinic and provide services in addition to caring for your physical health. The following are examples of topics that we can assist with if you are to enroll in Behavioral Health Home ([facility]):  - Housing  - Transportation  - Financial Resources  - Coordination with the [facility] for Benefits (MA, SNAP benefits, etc)  - Disability Eligibility and Benefits  - Medical Appointments and Medication Costs  - Employment and Education  - Disability Related Information and Education  - Referrals to mental health services, chemical dependency assessment/treatment, etc     I have enclosed the [facility] Handout that offers basic information of what our program entails. If you are interested in learning more about and/or enrolling in Behavioral Health Home services, you may ask your PCP to connect you with me or feel free to contact me to schedule an appointment. My contact information is listed below.    I look forward to hearing from you!          Hansa Aragon [facility]  Behavioral Health [place] ([facility])   [phone]  [email or web address]

## 2020-12-14 NOTE — TELEPHONE ENCOUNTER
Behavioral Health Home Services  [facility] [place]        Social Work Care Navigator Note      Patient: Alisia Martins Prebish  Date: December 14, 2020  Preferred Name: Alisia    Previous PHQ-9:   PHQ-9 SCORE 7/7/2020 9/1/2020 10/21/2020   PHQ-9 Total Score - - -   PHQ-9 Total Score MyChart - - 22 (Severe depression)   PHQ-9 Total Score 19 24 22   Some encounter information is confidential and restricted. Go to Review Flowsheets activity to see all data.     Previous NATHANAEL-7:   NATHANAEL-7 SCORE 7/7/2020 9/1/2020 10/21/2020   Total Score - - -   Total Score - - 20 (severe anxiety)   Total Score 21 21 20   Some encounter information is confidential and restricted. Go to Review Flowsheets activity to see all data.     KYLE LEVEL:  No flowsheet data found.    Preferred Contact:  No data recorded    Type of Contact Today: Phone call (not reached/unavailable)      Data: (subjective / Objective):  Attempted to reach patient for [facility] offer. No answer and voicemail box was full. [facility] sent letter to patient regarding offer.         Next 5 appointments (look out 90 days)    Dec 18, 2020  8:30 AM  Return Visit with CLAYTON Terry  [facility] Mental [facility] ([facility]) [address] [place] [address] [phone]   Dec 30, 2020  8:30 AM  Return Visit with CLAYTON Terry  [facility] Mental [facility] ([facility]) [address] [place] [id number] [phone]   Jan 06, 2021 11:20 AM  Office Visit with Chanel Cruz MD  [facility] ([facility]) [address] [place] [id number] [phone]

## 2020-12-15 RX ORDER — OLANZAPINE 5 MG/1
TABLET ORAL
Start: 2020-12-15

## 2020-12-15 RX ORDER — PROPRANOLOL HYDROCHLORIDE 10 MG/1
TABLET ORAL
Start: 2020-12-15

## 2020-12-18 ENCOUNTER — TELEPHONE (OUTPATIENT)
Dept: BEHAVIORAL HEALTH | Facility: CLINIC | Age: 40
End: 2020-12-18

## 2020-12-18 NOTE — TELEPHONE ENCOUNTER
----- Message from Ton Zheng sent at 12/16/2020  1:02 PM CST -----    We did not reach Alisia Lakshmi Lin, we left a message with our contact number [phone].  If we do not hear from them within the next 3 business days we will mail a letter offering our scheduling services.    If they do call to schedule, in the future, we will inform you of the outcome.    Thank you for your referral,  [facility]

## 2020-12-18 NOTE — TELEPHONE ENCOUNTER
Patient had a 830am telemedicine phone call.  Attempted to reach patient but unable to leave voicemail as full.

## 2021-01-10 ENCOUNTER — HEALTH MAINTENANCE LETTER (OUTPATIENT)
Age: 41
End: 2021-01-10

## 2021-02-18 DIAGNOSIS — M79.89 LEG SWELLING: ICD-10-CM

## 2021-02-19 DIAGNOSIS — F41.1 GAD (GENERALIZED ANXIETY DISORDER): ICD-10-CM

## 2021-02-19 DIAGNOSIS — F43.10 PTSD (POST-TRAUMATIC STRESS DISORDER): ICD-10-CM

## 2021-02-19 DIAGNOSIS — F33.1 MODERATE RECURRENT MAJOR DEPRESSION (H): ICD-10-CM

## 2021-02-19 DIAGNOSIS — F31.9 BIPOLAR 1 DISORDER (H): ICD-10-CM

## 2021-02-19 NOTE — LETTER
[facility]  [address] [place] [address]  [phone]          2021    Alisia Martins Prebish                                                                                                                      [address]  [facility] [address]            Dear Alisia,    We tried contacting you and are unable to reach you.    Sepideh Hines CNP, received refill requests for the following medications:  hydrOXYzine (ATARAX) 50 MG tablet  OLANZapine (ZYPREXA) 5 MG tablet  prazosin (MINIPRESS) 2 MG capsule    Please request the Olanzapine refill from your Psychiatrist.    You are due for an office visit with Sepideh before further refills will be ordered of these medications.    You may schedule through [employer] or by calling Central [phone].        Sincerely,         Your [facility] Care Team

## 2021-02-23 RX ORDER — FUROSEMIDE 20 MG
TABLET ORAL
Qty: 14 TABLET | Refills: 0 | OUTPATIENT
Start: 2021-02-23

## 2021-02-23 NOTE — TELEPHONE ENCOUNTER
"--Dr. Cruz discontinued furosemide (LASIX) 20 MG tablet 1/6/21 for reason \"None.\"    --1/6/21 No show.    --Last visit:  11/2/2020 House.    --Office visit 11/2/20 Aminta - \"Leg swelling  Comment: We discussed Lasix or water pills and not most ideal and not helpful long-term.  She should use compression socks.  Okay to consider Lasix for 2 weeks.  She recently had hypomagnesemia and we may need to monitor her electrolytes if she is requiring to use Lasix for more than 2 weeks.  She understood.  Hold off on prescription prophylactic electrolytes but encouraged her to continue with dietary changes\"    --Future Visit: NONE      --Message sent to pharmacy - Refusal reason: Medication has been discontinued (DISCONTINUED 1/6/21 BY . PT TO CALL CLINIC IF WOULD LIKE REFIL).  Prasad RN      "

## 2021-02-24 NOTE — TELEPHONE ENCOUNTER
Routing refill request to provider for review/approval because:  Patient needs to be seen  Patient was a no show for appointment in January    Prazosin  Last Written Prescription Date:  1/7/2021  Last Fill Quantity: 270,  # refills: 0   Last office visit: 10/21/2020 with prescribing provider:     Future Office Visit:    Olanzapine  Last Written Prescription Date:  11/13/2020  Last Fill Quantity: 30,  # refills: 0   Last office visit: 10/21/2020 with prescribing provider:     Future Office Visit:    Hydroxyzine   Last Written Prescription Date:  12/10/2020  Last Fill Quantity: 120,  # refills: 0   Last office visit: 10/21/2020 with prescribing provider:     Future Office Visit:

## 2021-02-25 RX ORDER — PRAZOSIN HYDROCHLORIDE 2 MG/1
CAPSULE ORAL
Qty: 270 CAPSULE | Refills: 0 | OUTPATIENT
Start: 2021-02-25

## 2021-02-25 RX ORDER — HYDROXYZINE HYDROCHLORIDE 50 MG/1
50 TABLET, FILM COATED ORAL EVERY 6 HOURS PRN
Qty: 120 TABLET | Refills: 0 | OUTPATIENT
Start: 2021-02-25

## 2021-02-25 RX ORDER — OLANZAPINE 5 MG/1
TABLET ORAL
Qty: 30 TABLET | Refills: 0 | OUTPATIENT
Start: 2021-02-25

## 2021-02-26 NOTE — TELEPHONE ENCOUNTER
Attempted to call pt phone number and it went right to her vm- did not ring, and her vm mailbox is full.   Pt does not read her my charts- but I will still send a my chart msg to make an appointment.   We can try calling her again.  Aniya العراقي RN

## 2021-03-03 NOTE — TELEPHONE ENCOUNTER
Attempted to reach pt again.  VM is full.  She will need to see the missed call.  mychart was also sent to pt. Tried to reach pt twice by phone.  PCP- meds were refused.  Would you want letter mailed?  TUSHAR Bo

## 2021-03-03 NOTE — TELEPHONE ENCOUNTER
Yes please mail letter, thank you.    Pt needs follow up, the zyprexa is managed by her psychiatrist.      Sepideh Hines, CNP

## 2021-04-05 ENCOUNTER — PATIENT OUTREACH (OUTPATIENT)
Dept: CARE COORDINATION | Facility: CLINIC | Age: 41
End: 2021-04-05

## 2021-04-05 DIAGNOSIS — Z71.89 OTHER SPECIFIED COUNSELING: Primary | Chronic | ICD-10-CM

## 2021-04-05 NOTE — LETTER
[facility] CARE COORDINATION  [facility]  [address], [place] [address]  April 6, 2021    Alisia Martins Prebish  [address]  [facility] [address]      Dear Alisia,    I am a clinic community health worker who works with SONJA Meek CNP at [facility]  [address], [place] [address]. I have been trying to reach you recently to introduce Clinic Care Coordination and to see if there was anything I could assist you with.  Below is a description of clinic care coordination and how I can further assist you.      The clinic care coordination team is made up of a registered nurse,  and community health worker who understand the health care system. The goal of clinic care coordination is to help you manage your health and improve access to the health care system in the most efficient manner. The team can assist you in meeting your health care goals by providing education, coordinating services, strengthening the communication among your providers and supporting you with any resource needs.    Please feel free to contact me at [phone] with any questions or concerns. We are focused on providing you with the highest-quality healthcare experience possible and that all starts with you.     Sincerely,     JOEL Leyva

## 2021-04-05 NOTE — PROGRESS NOTES
Clinic Care Coordination Contact  [facility]/Voicemail       Clinical Data: Care Coordinator Outreach  Outreach attempted x 1.  Left message on patient's voicemail with call back information and requested return call.  Plan:Care Coordinator will try to reach patient again in 1-2 business days.

## 2021-04-28 ENCOUNTER — TELEPHONE (OUTPATIENT)
Dept: FAMILY MEDICINE | Facility: CLINIC | Age: 41
End: 2021-04-28

## 2021-04-28 NOTE — TELEPHONE ENCOUNTER
Medical records release information from [employer] [facility]  Faxed to medical records department [phone]

## 2021-05-14 ENCOUNTER — TELEPHONE (OUTPATIENT)
Dept: FAMILY MEDICINE | Facility: CLINIC | Age: 41
End: 2021-05-14

## 2021-05-14 NOTE — TELEPHONE ENCOUNTER
Please schedule pt for OV to complete this workability form.  If she is being evaluated elsewhere for work injury the form should be completed by whomever is managing the condition.    Sepideh Hines, CNP

## 2021-05-14 NOTE — TELEPHONE ENCOUNTER
Reason for Call:  Form, our goal is to have forms completed with 72 hours, however, some forms may require a visit or additional information.    Type of letter, form or note:  medical  Records medical sorce statement    Who is the form from?: [facility] [employer]  (if other please explain)    Where did the form come from: form was faxed in    What clinic location was the form placed at?: [facility]    Where the form was placed: placed in pod B providers form folder Box/Folder    What number is listed as a contact on the form?: [phone]       Additional comments:     Call taken on 5/14/2021 at 12:38 PM by Nola Niño

## 2021-06-03 ENCOUNTER — RECORDS - HEALTHEAST (OUTPATIENT)
Dept: ADMINISTRATIVE | Facility: CLINIC | Age: 41
End: 2021-06-03

## 2021-06-12 NOTE — TELEPHONE ENCOUNTER
Writer spoke to patient and she was in the middle of a medical office visit.  Pt had forgotten this appointment was scheduled.  Pt agreed to call back and schedule again with this provider.    Provider was notified and says she can schedule again.

## 2021-07-02 ENCOUNTER — PATIENT OUTREACH (OUTPATIENT)
Dept: CARE COORDINATION | Facility: CLINIC | Age: 41
End: 2021-07-02

## 2021-07-02 DIAGNOSIS — F11.21 NARCOTIC DEPENDENCE, IN REMISSION (H): Primary | ICD-10-CM

## 2021-07-02 NOTE — PROGRESS NOTES
Clinic Care Coordination Contact  Community Health Worker Initial Outreach            Patient accepts CC: Yes. Patient scheduled for assessment with SW on 07-05 at 2:00pm. Patient noted desire to discuss Resources on a few things .

## 2021-07-05 ENCOUNTER — PATIENT OUTREACH (OUTPATIENT)
Dept: NURSING | Facility: CLINIC | Age: 41
End: 2021-07-05
Payer: COMMERCIAL

## 2021-07-05 ASSESSMENT — ACTIVITIES OF DAILY LIVING (ADL): DEPENDENT_IADLS:: INDEPENDENT

## 2021-07-05 NOTE — PROGRESS NOTES
Clinic Care Coordination Contact    Clinic Care Coordination Contact  OUTREACH    Referral Information:  Referral Source: ED Follow-Up    Reason for Referral: Care Transition: Inpatient to outpatient      Additional pertinent details:  Order placed from external discharge report      Clinical Staff have discussed the Care Coordination Referral with the patient and/or caregiver: no       Primary Diagnosis: Psychosocial    Chief Complaint   Patient presents with     Clinic Care Coordination - Initial     SW        Universal Utilization: multiple ed visits in the past month (6/5;6/9 and 6/29) for drug related presentation    Clinic Utilization  Difficulty keeping appointments:: No  Compliance Concerns: No  No-Show Concerns: No  No PCP office visit in Past Year: No  Utilization    Last refreshed: 7/5/2021 12:54 AM: Hospital Admissions 0           Last refreshed: 7/5/2021 12:54 AM: ED Visits 1           Last refreshed: 7/5/2021 12:54 AM: No Show Count (past year) 17              Current as of: 7/5/2021 12:54 AM              Clinical Concerns:  Current Medical Concerns:    Patient Active Problem List   Diagnosis     Moderate recurrent major depression (H)     Migraine with aura     Tobacco use disorder     ASCUS on Pap smear     Chronic low back pain     Anxiety     Benign neoplasm of colon     Degeneration of intervertebral disc     Spondylosis     Displacement of intervertebral disc     Intermittent asthma, uncomplicated     Back pain     Shoulder pain, right     Narcotic dependence, in remission (H)     Abnormal thyroid function test     NATHANAEL (generalized anxiety disorder)     MDD (major depressive disorder), recurrent severe, without psychosis (H)     Morbid obesity (H)         Current Behavioral Concerns: Patient has history significant for opioid abuse. Patient presented to the ED on 6/29 with a heroin overdose and was given narcan to wake her up and start her pulse after it had stopped.    Education Provided to  patient: Resources for housing; patient declined substance abuse resources     Pain  Pain (GOAL):: No  Health Maintenance Reviewed: Due/Overdue   Health Maintenance Due   Topic Date Due     ADVANCE CARE PLANNING  Never done     COVID-19 Vaccine (1) Never done     HEPATITIS B IMMUNIZATION (1 of 3 - Risk 3-dose series) Never done     HPV TEST  03/13/2018     PAP  03/13/2018     PREVENTIVE CARE VISIT  07/14/2018     URINE DRUG SCREEN  05/29/2020     PHQ-9  01/21/2021     ASTHMA CONTROL TEST  04/21/2021     ASTHMA ACTION PLAN  07/07/2021       Clinical Pathway: None    Medication Management:  Current Outpatient Medications   Medication     hydrOXYzine (ATARAX) 50 MG tablet     ibuprofen (ADVIL/MOTRIN) 600 MG tablet     PARoxetine (PAXIL) 40 MG tablet     prazosin (MINIPRESS) 2 MG capsule     propranolol (INDERAL) 10 MG tablet     atenolol (TENORMIN) 25 MG tablet     methocarbamol (ROBAXIN) 750 MG tablet     OLANZapine (ZYPREXA) 5 MG tablet     PARoxetine (PAXIL) 20 MG tablet     VENTOLIN  (90 BASE) MCG/ACT Inhaler     No current facility-administered medications for this visit.           Functional Status:  Dependent ADLs:: Independent  Dependent IADLs:: Independent  Bed or wheelchair confined:: No  Fallen 2 or more times in the past year?: No  Any fall with injury in the past year?: No    Living Situation:  Current living arrangement:: Other  Type of residence:: Homeless    Lifestyle & Psychosocial Needs:        Diet:: Regular  Inadequate nutrition (GOAL):: Yes  Tube Feeding: No  Inadequate activity/exercise (GOAL):: Yes  Significant changes in sleep pattern (GOAL): Yes  Transportation means:: Regular car     Restorationist or spiritual beliefs that impact treatment:: No  Mental health DX:: No  Mental health management concern (GOAL):: No  Chemical Dependency Status: No Current Concerns  Informal Support system:: None   Socioeconomic History     Marital status: Single     Spouse name: Not on file     Number of  "children: 2     Years of education: 14     Highest education level: Not on file   Occupational History     Occupation: self employed     Comment:      Employer: [employer]     Tobacco Use     Smoking status: Current Every Day Smoker     Packs/day: 1.50     Years: 10.00     Pack years: 15.00     Types: Other     Last attempt to quit: 10/1/2016     Years since quittin.7     Smokeless tobacco: Current User     Tobacco comment: less than 1/2 ppd   Substance and Sexual Activity     Alcohol use: No     Comment: JAYA 18 days ago     Drug use: No     Comment: clean for 18 days     Sexual activity: Yes     Partners: Male          [facility] called patient and introduced self, title and role. Patient shares that \"everything is wrong and I need help with everything.\" CC asked a few questions to see what help would be most pertinent. Patient would like housing resources like a womans shelter or safe house.    Patient has worked with [employer] before. Patient shares they are wonderful and she would be willing to go back there. [facility] inquired if patient was in domestic violence situation and she shares not at this time but is interested in a place where she can have her own room as well as her daughter and potentially her grandson.     Patient given [facility] resources (Emergency shelter contact call [phone])      Patient shares that she has PTSD and it interrupts her sleep often. Often to the point where she has woken up screaming. She shares that she takes prozasin for this but it is not entirely effective for her. This is the primary reason for her request to have her own bedroom at this time.    Patient was seen in the ED on ,  and  for substance use related visits. Patient declines resources for substance abuse. [facility] offered program for treatment through [facility] and patient declined stating it not being necessary.     Resources and Interventions:  Current Resources:    "   Community Resources: None  Supplies used at home:: None  Equipment Currently Used at Home: none  Employment Status: unemployed)   )    Advance Care Plan/Directive  Advanced Care Plans/Directives on file:: No  Advanced Care Plan/Directive Status: Declined Further Information    Referrals Placed: Other (Shelter resources- [facility] resources declined)     Goals:   Goals        General    Mental Health Management (pt-stated)     Notes - Note created  7/5/2021  1:19 PM by Re Almonte LSW    Goal Statement: I will get stable housing  Date Goal set: 07/5/21  Barriers[place]; opioid use  Strengths: motivated to get housing   Date to Achieve By: 10/5/21     Patient expressed understanding of goal: yes  Action steps to achieve this goal:  1. I will call the number for the [facility] shelter advocates at [phone]  2. I will take the shelter resource option I am given  3. I will keep in touch with care coordination.              Patient/Caregiver understanding: Patient verbalized understanding to the plan    Outreach Frequency: monthly  Future Appointments              Today  [facility]          Plan:   1. Patient will call the  using [facility] gave today  2. Patient will follow directions of  to ensure housing  3. Patient will follow back with care coordination  4. [facility] will send intro letter and ccp to patient  5. [facility] will follow up in 2 weeks.    ELVER Gaston   [facility] Care Coordination  Tel: [phone]

## 2021-07-18 ENCOUNTER — HOSPITAL ENCOUNTER (EMERGENCY)
Dept: CT IMAGING | Facility: CLINIC | Age: 41
End: 2021-07-18
Attending: EMERGENCY MEDICINE
Payer: COMMERCIAL

## 2021-07-18 ENCOUNTER — HOSPITAL ENCOUNTER (INPATIENT)
Facility: CLINIC | Age: 41
LOS: 1 days | Discharge: HOME | End: 2021-07-18
Attending: EMERGENCY MEDICINE | Admitting: INTERNAL MEDICINE
Payer: COMMERCIAL

## 2021-07-18 ENCOUNTER — HOSPITAL ENCOUNTER (INPATIENT)
Dept: CARDIOLOGY | Facility: CLINIC | Age: 41
End: 2021-07-18
Attending: INTERNAL MEDICINE
Payer: COMMERCIAL

## 2021-07-18 VITALS
DIASTOLIC BLOOD PRESSURE: 81 MMHG | TEMPERATURE: 98.2 F | BODY MASS INDEX: 37.55 KG/M2 | OXYGEN SATURATION: 99 % | WEIGHT: 178.9 LBS | HEIGHT: 58 IN | HEART RATE: 92 BPM | SYSTOLIC BLOOD PRESSURE: 145 MMHG | RESPIRATION RATE: 18 BRPM

## 2021-07-18 DIAGNOSIS — R07.9 CHEST PAIN, UNSPECIFIED TYPE: ICD-10-CM

## 2021-07-18 DIAGNOSIS — E83.42 HYPOMAGNESEMIA: ICD-10-CM

## 2021-07-18 DIAGNOSIS — I47.10 SVT (SUPRAVENTRICULAR TACHYCARDIA) (H): ICD-10-CM

## 2021-07-18 DIAGNOSIS — I47.0 RE-ENTRY VENTRICULAR ARRHYTHMIA (H): Primary | ICD-10-CM

## 2021-07-18 LAB
ALBUMIN SERPL-MCNC: 3.3 G/DL (ref 3.5–5)
ALBUMIN SERPL-MCNC: 3.5 G/DL (ref 3.5–5)
ALP SERPL-CCNC: 104 U/L (ref 45–120)
ALP SERPL-CCNC: 96 U/L (ref 45–120)
ALT SERPL W P-5'-P-CCNC: 14 U/L (ref 0–45)
ALT SERPL W P-5'-P-CCNC: 17 U/L (ref 0–45)
AMPHETAMINES UR QL SCN: ABNORMAL
ANION GAP SERPL CALCULATED.3IONS-SCNC: 11 MMOL/L (ref 5–18)
ANION GAP SERPL CALCULATED.3IONS-SCNC: 8 MMOL/L (ref 5–18)
AST SERPL W P-5'-P-CCNC: 15 U/L (ref 0–40)
AST SERPL W P-5'-P-CCNC: 22 U/L (ref 0–40)
ATRIAL RATE - MUSE: 138 BPM
BARBITURATES UR QL: ABNORMAL
BASOPHILS # BLD AUTO: 0 10E3/UL (ref 0–0.2)
BASOPHILS # BLD AUTO: 0.1 10E3/UL (ref 0–0.2)
BASOPHILS NFR BLD AUTO: 0 %
BASOPHILS NFR BLD AUTO: 1 %
BENZODIAZ UR QL: ABNORMAL
BILIRUB SERPL-MCNC: 0.3 MG/DL (ref 0–1)
BILIRUB SERPL-MCNC: 0.4 MG/DL (ref 0–1)
BNP SERPL-MCNC: <10 PG/ML (ref 0–64)
BUN SERPL-MCNC: 17 MG/DL (ref 8–22)
BUN SERPL-MCNC: 20 MG/DL (ref 8–22)
CALCIUM SERPL-MCNC: 8.1 MG/DL (ref 8.5–10.5)
CALCIUM SERPL-MCNC: 8.3 MG/DL (ref 8.5–10.5)
CANNABINOIDS UR QL SCN: ABNORMAL
CHLORIDE BLD-SCNC: 105 MMOL/L (ref 98–107)
CHLORIDE BLD-SCNC: 107 MMOL/L (ref 98–107)
CO2 SERPL-SCNC: 19 MMOL/L (ref 22–31)
CO2 SERPL-SCNC: 22 MMOL/L (ref 22–31)
COCAINE UR QL: ABNORMAL
CREAT SERPL-MCNC: 0.79 MG/DL (ref 0.6–1.1)
CREAT SERPL-MCNC: 0.84 MG/DL (ref 0.6–1.1)
CREAT UR-MCNC: 58 MG/DL
DIASTOLIC BLOOD PRESSURE - MUSE: 97 MMHG
EOSINOPHIL # BLD AUTO: 0.1 10E3/UL (ref 0–0.7)
EOSINOPHIL # BLD AUTO: 0.1 10E3/UL (ref 0–0.7)
EOSINOPHIL NFR BLD AUTO: 1 %
EOSINOPHIL NFR BLD AUTO: 1 %
ERYTHROCYTE [DISTWIDTH] IN BLOOD BY AUTOMATED COUNT: 15 % (ref 10–15)
ERYTHROCYTE [DISTWIDTH] IN BLOOD BY AUTOMATED COUNT: 15.1 % (ref 10–15)
GFR SERPL CREATININE-BSD FRML MDRD: 86 ML/MIN/1.73M2
GFR SERPL CREATININE-BSD FRML MDRD: >90 ML/MIN/1.73M2
GLUCOSE BLD-MCNC: 141 MG/DL (ref 70–125)
GLUCOSE BLD-MCNC: 87 MG/DL (ref 70–125)
HCG SERPL-ACNC: <2 MLU/ML (ref 0–4)
HCT VFR BLD AUTO: 31.3 % (ref 35–47)
HCT VFR BLD AUTO: 31.5 % (ref 35–47)
HGB BLD-MCNC: 10.7 G/DL (ref 11.7–15.7)
HGB BLD-MCNC: 10.9 G/DL (ref 11.7–15.7)
HOLD SPECIMEN: NORMAL
IMM GRANULOCYTES # BLD: 0.2 10E3/UL
IMM GRANULOCYTES # BLD: 0.3 10E3/UL
IMM GRANULOCYTES NFR BLD: 2 %
IMM GRANULOCYTES NFR BLD: 2 %
INR PPP: 0.97 (ref 0.85–1.15)
INTERPRETATION ECG - MUSE: NORMAL
LIPASE SERPL-CCNC: 48 U/L (ref 0–52)
LYMPHOCYTES # BLD AUTO: 4.5 10E3/UL (ref 0.8–5.3)
LYMPHOCYTES # BLD AUTO: 5.3 10E3/UL (ref 0.8–5.3)
LYMPHOCYTES NFR BLD AUTO: 36 %
LYMPHOCYTES NFR BLD AUTO: 41 %
MAGNESIUM SERPL-MCNC: 1 MG/DL (ref 1.8–2.6)
MAGNESIUM SERPL-MCNC: 3 MG/DL (ref 1.8–2.6)
MCH RBC QN AUTO: 29.9 PG (ref 26.5–33)
MCH RBC QN AUTO: 29.9 PG (ref 26.5–33)
MCHC RBC AUTO-ENTMCNC: 34.2 G/DL (ref 31.5–36.5)
MCHC RBC AUTO-ENTMCNC: 34.6 G/DL (ref 31.5–36.5)
MCV RBC AUTO: 87 FL (ref 78–100)
MCV RBC AUTO: 87 FL (ref 78–100)
MONOCYTES # BLD AUTO: 1.1 10E3/UL (ref 0–1.3)
MONOCYTES # BLD AUTO: 1.3 10E3/UL (ref 0–1.3)
MONOCYTES NFR BLD AUTO: 10 %
MONOCYTES NFR BLD AUTO: 9 %
NEUTROPHILS # BLD AUTO: 5.2 10E3/UL (ref 1.6–8.3)
NEUTROPHILS # BLD AUTO: 7.8 10E3/UL (ref 1.6–8.3)
NEUTROPHILS NFR BLD AUTO: 46 %
NEUTROPHILS NFR BLD AUTO: 51 %
NRBC # BLD AUTO: 0 10E3/UL
NRBC # BLD AUTO: 0 10E3/UL
NRBC BLD AUTO-RTO: 0 /100
NRBC BLD AUTO-RTO: 0 /100
OPIATES UR QL SCN: ABNORMAL
OXYCODONE UR QL: ABNORMAL
P AXIS - MUSE: 63 DEGREES
PCP UR QL SCN: ABNORMAL
PLATELET # BLD AUTO: 308 10E3/UL (ref 150–450)
PLATELET # BLD AUTO: 315 10E3/UL (ref 150–450)
POTASSIUM BLD-SCNC: 3.5 MMOL/L (ref 3.5–5)
POTASSIUM BLD-SCNC: 3.5 MMOL/L (ref 3.5–5)
POTASSIUM BLD-SCNC: 3.7 MMOL/L (ref 3.5–5)
PR INTERVAL - MUSE: 122 MS
PROCALCITONIN SERPL-MCNC: <0.02 NG/ML (ref 0–0.49)
PROT SERPL-MCNC: 6.7 G/DL (ref 6–8)
PROT SERPL-MCNC: 7.3 G/DL (ref 6–8)
QRS DURATION - MUSE: 88 MS
QT - MUSE: 292 MS
QTC - MUSE: 442 MS
R AXIS - MUSE: 72 DEGREES
RBC # BLD AUTO: 3.58 10E6/UL (ref 3.8–5.2)
RBC # BLD AUTO: 3.64 10E6/UL (ref 3.8–5.2)
SARS-COV-2 RNA RESP QL NAA+PROBE: NEGATIVE
SODIUM SERPL-SCNC: 135 MMOL/L (ref 136–145)
SODIUM SERPL-SCNC: 137 MMOL/L (ref 136–145)
SYSTOLIC BLOOD PRESSURE - MUSE: 188 MMHG
T AXIS - MUSE: 34 DEGREES
TROPONIN I SERPL-MCNC: <0.01 NG/ML (ref 0–0.29)
TROPONIN I SERPL-MCNC: <0.01 NG/ML (ref 0–0.29)
TSH SERPL DL<=0.005 MIU/L-ACNC: 1.32 UIU/ML (ref 0.3–5)
VENTRICULAR RATE- MUSE: 138 BPM
WBC # BLD AUTO: 11.2 10E3/UL (ref 4–11)
WBC # BLD AUTO: 14.8 10E3/UL (ref 4–11)

## 2021-07-18 PROCEDURE — 80307 DRUG TEST PRSMV CHEM ANLYZR: CPT | Performed by: EMERGENCY MEDICINE

## 2021-07-18 PROCEDURE — 250N000011 HC RX IP 250 OP 636: Performed by: EMERGENCY MEDICINE

## 2021-07-18 PROCEDURE — 99207 PR CDG-CODE CATEGORY CHANGED: CPT | Performed by: INTERNAL MEDICINE

## 2021-07-18 PROCEDURE — 84484 ASSAY OF TROPONIN QUANT: CPT | Performed by: INTERNAL MEDICINE

## 2021-07-18 PROCEDURE — 250N000011 HC RX IP 250 OP 636: Performed by: INTERNAL MEDICINE

## 2021-07-18 PROCEDURE — 83880 ASSAY OF NATRIURETIC PEPTIDE: CPT | Performed by: EMERGENCY MEDICINE

## 2021-07-18 PROCEDURE — 83735 ASSAY OF MAGNESIUM: CPT | Performed by: EMERGENCY MEDICINE

## 2021-07-18 PROCEDURE — 85610 PROTHROMBIN TIME: CPT | Performed by: EMERGENCY MEDICINE

## 2021-07-18 PROCEDURE — 84443 ASSAY THYROID STIM HORMONE: CPT | Performed by: EMERGENCY MEDICINE

## 2021-07-18 PROCEDURE — 84450 TRANSFERASE (AST) (SGOT): CPT | Performed by: INTERNAL MEDICINE

## 2021-07-18 PROCEDURE — 85025 COMPLETE CBC W/AUTO DIFF WBC: CPT | Performed by: EMERGENCY MEDICINE

## 2021-07-18 PROCEDURE — 99207 PR APP CREDIT; MD BILLING SHARED VISIT: CPT | Performed by: INTERNAL MEDICINE

## 2021-07-18 PROCEDURE — 36592 COLLECT BLOOD FROM PICC: CPT | Performed by: EMERGENCY MEDICINE

## 2021-07-18 PROCEDURE — 87635 SARS-COV-2 COVID-19 AMP PRB: CPT | Performed by: EMERGENCY MEDICINE

## 2021-07-18 PROCEDURE — 93005 ELECTROCARDIOGRAM TRACING: CPT | Performed by: EMERGENCY MEDICINE

## 2021-07-18 PROCEDURE — 999N000208 ECHOCARDIOGRAM COMPLETE

## 2021-07-18 PROCEDURE — 36415 COLL VENOUS BLD VENIPUNCTURE: CPT | Performed by: INTERNAL MEDICINE

## 2021-07-18 PROCEDURE — 84702 CHORIONIC GONADOTROPIN TEST: CPT | Performed by: INTERNAL MEDICINE

## 2021-07-18 PROCEDURE — 93306 TTE W/DOPPLER COMPLETE: CPT | Mod: 26 | Performed by: INTERNAL MEDICINE

## 2021-07-18 PROCEDURE — 96365 THER/PROPH/DIAG IV INF INIT: CPT

## 2021-07-18 PROCEDURE — 84132 ASSAY OF SERUM POTASSIUM: CPT | Performed by: INTERNAL MEDICINE

## 2021-07-18 PROCEDURE — 84155 ASSAY OF PROTEIN SERUM: CPT | Performed by: INTERNAL MEDICINE

## 2021-07-18 PROCEDURE — 84484 ASSAY OF TROPONIN QUANT: CPT | Performed by: EMERGENCY MEDICINE

## 2021-07-18 PROCEDURE — 250N000013 HC RX MED GY IP 250 OP 250 PS 637: Performed by: INTERNAL MEDICINE

## 2021-07-18 PROCEDURE — 99285 EMERGENCY DEPT VISIT HI MDM: CPT | Mod: 25

## 2021-07-18 PROCEDURE — 83735 ASSAY OF MAGNESIUM: CPT | Performed by: INTERNAL MEDICINE

## 2021-07-18 PROCEDURE — 258N000003 HC RX IP 258 OP 636: Performed by: INTERNAL MEDICINE

## 2021-07-18 PROCEDURE — 84145 PROCALCITONIN (PCT): CPT | Performed by: INTERNAL MEDICINE

## 2021-07-18 PROCEDURE — 83690 ASSAY OF LIPASE: CPT | Performed by: EMERGENCY MEDICINE

## 2021-07-18 PROCEDURE — 96375 TX/PRO/DX INJ NEW DRUG ADDON: CPT

## 2021-07-18 PROCEDURE — 85025 COMPLETE CBC W/AUTO DIFF WBC: CPT | Performed by: INTERNAL MEDICINE

## 2021-07-18 PROCEDURE — 250N000013 HC RX MED GY IP 250 OP 250 PS 637: Performed by: EMERGENCY MEDICINE

## 2021-07-18 PROCEDURE — 99236 HOSP IP/OBS SAME DATE HI 85: CPT | Performed by: INTERNAL MEDICINE

## 2021-07-18 PROCEDURE — 71275 CT ANGIOGRAPHY CHEST: CPT

## 2021-07-18 PROCEDURE — 93005 ELECTROCARDIOGRAM TRACING: CPT

## 2021-07-18 PROCEDURE — 80053 COMPREHEN METABOLIC PANEL: CPT | Performed by: EMERGENCY MEDICINE

## 2021-07-18 PROCEDURE — C9803 HOPD COVID-19 SPEC COLLECT: HCPCS

## 2021-07-18 PROCEDURE — 200N000001 HC R&B ICU

## 2021-07-18 PROCEDURE — 96376 TX/PRO/DX INJ SAME DRUG ADON: CPT

## 2021-07-18 PROCEDURE — 255N000002 HC RX 255 OP 636: Performed by: INTERNAL MEDICINE

## 2021-07-18 RX ORDER — LIDOCAINE 4 G/G
1 PATCH TOPICAL
Status: DISCONTINUED | OUTPATIENT
Start: 2021-07-18 | End: 2021-07-18 | Stop reason: HOSPADM

## 2021-07-18 RX ORDER — ASPIRIN 81 MG/1
81 TABLET ORAL DAILY
Status: DISCONTINUED | OUTPATIENT
Start: 2021-07-18 | End: 2021-07-18 | Stop reason: HOSPADM

## 2021-07-18 RX ORDER — NALOXONE HYDROCHLORIDE 0.4 MG/ML
0.2 INJECTION, SOLUTION INTRAMUSCULAR; INTRAVENOUS; SUBCUTANEOUS
Status: DISCONTINUED | OUTPATIENT
Start: 2021-07-18 | End: 2021-07-18 | Stop reason: HOSPADM

## 2021-07-18 RX ORDER — HYDROCODONE BITARTRATE AND ACETAMINOPHEN 5; 325 MG/1; MG/1
1 TABLET ORAL EVERY 6 HOURS PRN
Status: COMPLETED | OUTPATIENT
Start: 2021-07-18 | End: 2021-07-18

## 2021-07-18 RX ORDER — HYDROMORPHONE HYDROCHLORIDE 1 MG/ML
0.3 INJECTION, SOLUTION INTRAMUSCULAR; INTRAVENOUS; SUBCUTANEOUS EVERY 4 HOURS PRN
Status: DISCONTINUED | OUTPATIENT
Start: 2021-07-18 | End: 2021-07-18 | Stop reason: HOSPADM

## 2021-07-18 RX ORDER — LORAZEPAM 2 MG/ML
1 INJECTION INTRAMUSCULAR ONCE
Status: COMPLETED | OUTPATIENT
Start: 2021-07-18 | End: 2021-07-18

## 2021-07-18 RX ORDER — NALOXONE HYDROCHLORIDE 0.4 MG/ML
0.4 INJECTION, SOLUTION INTRAMUSCULAR; INTRAVENOUS; SUBCUTANEOUS
Status: DISCONTINUED | OUTPATIENT
Start: 2021-07-18 | End: 2021-07-18 | Stop reason: HOSPADM

## 2021-07-18 RX ORDER — DEXTROAMPHETAMINE SACCHARATE, AMPHETAMINE ASPARTATE, DEXTROAMPHETAMINE SULFATE AND AMPHETAMINE SULFATE 2.5; 2.5; 2.5; 2.5 MG/1; MG/1; MG/1; MG/1
10 TABLET ORAL 3 TIMES DAILY PRN
Status: DISCONTINUED | OUTPATIENT
Start: 2021-07-18 | End: 2021-07-18 | Stop reason: HOSPADM

## 2021-07-18 RX ORDER — LURASIDONE HYDROCHLORIDE 40 MG/1
40 TABLET, FILM COATED ORAL DAILY PRN
COMMUNITY
End: 2022-10-18

## 2021-07-18 RX ORDER — IOPAMIDOL 755 MG/ML
100 INJECTION, SOLUTION INTRAVASCULAR ONCE
Status: COMPLETED | OUTPATIENT
Start: 2021-07-18 | End: 2021-07-18

## 2021-07-18 RX ORDER — MAGNESIUM HYDROXIDE/ALUMINUM HYDROXICE/SIMETHICONE 120; 1200; 1200 MG/30ML; MG/30ML; MG/30ML
30 SUSPENSION ORAL EVERY 4 HOURS PRN
Status: DISCONTINUED | OUTPATIENT
Start: 2021-07-18 | End: 2021-07-18 | Stop reason: HOSPADM

## 2021-07-18 RX ORDER — ACETAMINOPHEN 500 MG
1000 TABLET ORAL 3 TIMES DAILY PRN
COMMUNITY

## 2021-07-18 RX ORDER — LIDOCAINE 40 MG/G
CREAM TOPICAL
Status: DISCONTINUED | OUTPATIENT
Start: 2021-07-18 | End: 2021-07-18 | Stop reason: HOSPADM

## 2021-07-18 RX ORDER — MAGNESIUM SULFATE HEPTAHYDRATE 40 MG/ML
2 INJECTION, SOLUTION INTRAVENOUS ONCE
Status: COMPLETED | OUTPATIENT
Start: 2021-07-18 | End: 2021-07-18

## 2021-07-18 RX ORDER — MAGNESIUM OXIDE 400 MG/1
400 TABLET ORAL ONCE
Status: COMPLETED | OUTPATIENT
Start: 2021-07-18 | End: 2021-07-18

## 2021-07-18 RX ORDER — HYDROXYZINE HYDROCHLORIDE 25 MG/1
50 TABLET, FILM COATED ORAL 3 TIMES DAILY PRN
Status: DISCONTINUED | OUTPATIENT
Start: 2021-07-18 | End: 2021-07-18 | Stop reason: HOSPADM

## 2021-07-18 RX ORDER — ALBUTEROL SULFATE 90 UG/1
2 AEROSOL, METERED RESPIRATORY (INHALATION) EVERY 4 HOURS PRN
Status: DISCONTINUED | OUTPATIENT
Start: 2021-07-18 | End: 2021-07-18 | Stop reason: HOSPADM

## 2021-07-18 RX ORDER — MAGNESIUM CHLORIDE 71.5 G/G
2 TABLET ORAL DAILY
Qty: 60 TABLET | Refills: 0 | Status: SHIPPED | OUTPATIENT
Start: 2021-07-18 | End: 2022-10-03

## 2021-07-18 RX ORDER — HYDROXYZINE PAMOATE 50 MG/1
50 CAPSULE ORAL 3 TIMES DAILY PRN
COMMUNITY
Start: 2021-06-29 | End: 2022-10-18

## 2021-07-18 RX ORDER — PANTOPRAZOLE SODIUM 20 MG/1
40 TABLET, DELAYED RELEASE ORAL ONCE
Status: COMPLETED | OUTPATIENT
Start: 2021-07-18 | End: 2021-07-18

## 2021-07-18 RX ORDER — NITROGLYCERIN 0.4 MG/1
0.4 TABLET SUBLINGUAL EVERY 5 MIN PRN
Status: COMPLETED | OUTPATIENT
Start: 2021-07-18 | End: 2021-07-18

## 2021-07-18 RX ORDER — LIDOCAINE 4 G/G
1 PATCH TOPICAL EVERY 24 HOURS
Qty: 15 PATCH | Refills: 0 | Status: SHIPPED | OUTPATIENT
Start: 2021-07-18 | End: 2022-10-03

## 2021-07-18 RX ORDER — ACETAMINOPHEN 650 MG/1
650 SUPPOSITORY RECTAL EVERY 6 HOURS PRN
Status: DISCONTINUED | OUTPATIENT
Start: 2021-07-18 | End: 2021-07-18 | Stop reason: HOSPADM

## 2021-07-18 RX ORDER — PAROXETINE 20 MG/1
40 TABLET, FILM COATED ORAL DAILY
Status: DISCONTINUED | OUTPATIENT
Start: 2021-07-18 | End: 2021-07-18 | Stop reason: HOSPADM

## 2021-07-18 RX ORDER — SODIUM CHLORIDE 9 MG/ML
INJECTION, SOLUTION INTRAVENOUS CONTINUOUS
Status: DISCONTINUED | OUTPATIENT
Start: 2021-07-18 | End: 2021-07-18 | Stop reason: HOSPADM

## 2021-07-18 RX ORDER — KETOROLAC TROMETHAMINE 30 MG/ML
30 INJECTION, SOLUTION INTRAMUSCULAR; INTRAVENOUS ONCE
Status: COMPLETED | OUTPATIENT
Start: 2021-07-18 | End: 2021-07-18

## 2021-07-18 RX ORDER — DEXTROAMPHETAMINE SACCHARATE, AMPHETAMINE ASPARTATE, DEXTROAMPHETAMINE SULFATE AND AMPHETAMINE SULFATE 2.5; 2.5; 2.5; 2.5 MG/1; MG/1; MG/1; MG/1
10 TABLET ORAL 3 TIMES DAILY PRN
COMMUNITY
End: 2022-10-03

## 2021-07-18 RX ORDER — LORAZEPAM 2 MG/ML
0.25 INJECTION INTRAMUSCULAR EVERY 6 HOURS PRN
Status: DISCONTINUED | OUTPATIENT
Start: 2021-07-18 | End: 2021-07-18 | Stop reason: HOSPADM

## 2021-07-18 RX ORDER — ASPIRIN 81 MG/1
81 TABLET ORAL DAILY
COMMUNITY
End: 2022-10-03

## 2021-07-18 RX ORDER — METHOCARBAMOL 750 MG/1
750 TABLET, FILM COATED ORAL 3 TIMES DAILY PRN
Status: DISCONTINUED | OUTPATIENT
Start: 2021-07-18 | End: 2021-07-18 | Stop reason: HOSPADM

## 2021-07-18 RX ORDER — ACETAMINOPHEN 325 MG/1
650 TABLET ORAL EVERY 6 HOURS PRN
Status: DISCONTINUED | OUTPATIENT
Start: 2021-07-18 | End: 2021-07-18 | Stop reason: HOSPADM

## 2021-07-18 RX ORDER — ONDANSETRON 2 MG/ML
4 INJECTION INTRAMUSCULAR; INTRAVENOUS EVERY 8 HOURS PRN
Status: DISCONTINUED | OUTPATIENT
Start: 2021-07-18 | End: 2021-07-18 | Stop reason: HOSPADM

## 2021-07-18 RX ADMIN — DICLOFENAC SODIUM 4 G: 10 GEL TOPICAL at 09:22

## 2021-07-18 RX ADMIN — SODIUM CHLORIDE 100 ML/HR: 9 INJECTION, SOLUTION INTRAVENOUS at 04:52

## 2021-07-18 RX ADMIN — PAROXETINE HYDROCHLORIDE 40 MG: 20 TABLET, FILM COATED ORAL at 12:01

## 2021-07-18 RX ADMIN — ONDANSETRON 4 MG: 2 INJECTION INTRAMUSCULAR; INTRAVENOUS at 06:41

## 2021-07-18 RX ADMIN — HYDROXYZINE HYDROCHLORIDE 50 MG: 25 TABLET, FILM COATED ORAL at 12:01

## 2021-07-18 RX ADMIN — PANTOPRAZOLE SODIUM 40 MG: 20 TABLET, DELAYED RELEASE ORAL at 04:44

## 2021-07-18 RX ADMIN — HYDROCODONE BITARTRATE AND ACETAMINOPHEN 1 TABLET: 5; 325 TABLET ORAL at 04:45

## 2021-07-18 RX ADMIN — IOPAMIDOL 100 ML: 755 INJECTION, SOLUTION INTRAVENOUS at 01:30

## 2021-07-18 RX ADMIN — PERFLUTREN 2 ML: 6.52 INJECTION, SUSPENSION INTRAVENOUS at 10:45

## 2021-07-18 RX ADMIN — NITROGLYCERIN 0.4 MG: 0.4 TABLET SUBLINGUAL at 00:45

## 2021-07-18 RX ADMIN — ALUMINUM HYDROXIDE, MAGNESIUM HYDROXIDE, AND SIMETHICONE 30 ML: 200; 200; 20 SUSPENSION ORAL at 06:41

## 2021-07-18 RX ADMIN — MAGNESIUM SULFATE HEPTAHYDRATE 2 G: 40 INJECTION, SOLUTION INTRAVENOUS at 01:39

## 2021-07-18 RX ADMIN — ACETAMINOPHEN 650 MG: 325 TABLET ORAL at 04:45

## 2021-07-18 RX ADMIN — Medication 0.3 MG: at 05:59

## 2021-07-18 RX ADMIN — LORAZEPAM 1 MG: 2 INJECTION INTRAMUSCULAR; INTRAVENOUS at 00:36

## 2021-07-18 RX ADMIN — MAGNESIUM SULFATE HEPTAHYDRATE 2 G: 40 INJECTION, SOLUTION INTRAVENOUS at 04:53

## 2021-07-18 RX ADMIN — ASPIRIN 81 MG: 81 TABLET, DELAYED RELEASE ORAL at 12:01

## 2021-07-18 RX ADMIN — KETOROLAC TROMETHAMINE 30 MG: 30 INJECTION, SOLUTION INTRAMUSCULAR; INTRAVENOUS at 01:38

## 2021-07-18 RX ADMIN — LIDOCAINE 1 PATCH: 246 PATCH TOPICAL at 12:00

## 2021-07-18 RX ADMIN — MAGNESIUM OXIDE TAB 400 MG (241.3 MG ELEMENTAL MG) 400 MG: 400 (241.3 MG) TAB at 01:38

## 2021-07-18 RX ADMIN — NITROGLYCERIN 0.4 MG: 0.4 TABLET SUBLINGUAL at 06:05

## 2021-07-18 RX ADMIN — LORAZEPAM 1 MG: 2 INJECTION INTRAMUSCULAR; INTRAVENOUS at 02:43

## 2021-07-18 RX ADMIN — HYDROCODONE BITARTRATE AND ACETAMINOPHEN 1 TABLET: 5; 325 TABLET ORAL at 12:01

## 2021-07-18 RX ADMIN — DICLOFENAC SODIUM 4 G: 10 GEL TOPICAL at 12:02

## 2021-07-18 RX ADMIN — NITROGLYCERIN 0.4 MG: 0.4 TABLET SUBLINGUAL at 00:40

## 2021-07-18 ASSESSMENT — ENCOUNTER SYMPTOMS
BACK PAIN: 1
NAUSEA: 1

## 2021-07-18 ASSESSMENT — MIFFLIN-ST. JEOR
SCORE: 1357.15
SCORE: 1361.24

## 2021-07-18 ASSESSMENT — ACTIVITIES OF DAILY LIVING (ADL)
WEAR_GLASSES_OR_BLIND: YES
WALKING_OR_CLIMBING_STAIRS_DIFFICULTY: NO
FALL_HISTORY_WITHIN_LAST_SIX_MONTHS: NO
DOING_ERRANDS_INDEPENDENTLY_DIFFICULTY: NO
DIFFICULTY_COMMUNICATING: YES
DIFFICULTY_EATING/SWALLOWING: NO
CONCENTRATING,_REMEMBERING_OR_MAKING_DECISIONS_DIFFICULTY: YES
DRESSING/BATHING_DIFFICULTY: NO
COMMUNICATION: DIFFICULTY SPEAKING
TOILETING_ISSUES: NO

## 2021-07-18 NOTE — PHARMACY-ADMISSION MEDICATION HISTORY
Pharmacy note:    Although I was not present for the interview nor did I personally see the patient, to my knowledge the intern has compiled the PTA medication list to the best of their ability given the information available at this time.    Nadia Chung, [facility]  July 18, 2021  8:38 AM          Pharmacy Note - Admission Medication History    Pertinent Provider Information: Pt claimed to be on adderall 10mg IR tabs - stated she had so much left over from previous dose changes that she hasn't had to pick any up for a long time.     ______________________________________________________________________    Prior To Admission (PTA) med list completed and updated in EMR.       Prior to Admission Medications   Prescriptions Last Dose Informant Patient Reported? Taking?   PARoxetine (PAXIL) 40 MG tablet 7/17/2021  No Yes   Sig: TAKE 1 TABLET(40 MG) BY MOUTH EVERY MORNING   VENTOLIN  (90 BASE) MCG/ACT Inhaler Past Week at Unknown time  No Yes   Sig: INHALE 2 PUFFS INTO THE LUNGS EVERY 4 HOURS AS NEEDED FOR SHORTNESS OF BREATH OR DIFFICULT BREATHING OR WHEEZING   acetaminophen (TYLENOL) 500 MG tablet 7/17/2021  Yes Yes   Sig: Take 1,000 mg by mouth 3 times daily as needed for mild pain   amphetamine-dextroamphetamine (ADDERALL) 10 MG tablet 7/17/2021  Yes Yes   Sig: Take 10 mg by mouth 3 times daily as needed   aspirin 81 MG EC tablet 7/17/2021  Yes Yes   Sig: Take 81 mg by mouth daily   hydrOXYzine (VISTARIL) 50 MG capsule 7/17/2021  Yes Yes   Sig: Take 50 mg by mouth 3 times daily as needed   ibuprofen (ADVIL/MOTRIN) 600 MG tablet 7/17/2021  No Yes   Sig: TAKE 1 TABLET BY MOUTH TWICE DAILY WITH MEALS   lurasidone (LATUDA) 40 MG TABS tablet 7/17/2021  Yes Yes   Sig: Take 40 mg by mouth daily as needed   methocarbamol (ROBAXIN) 750 MG tablet Past Week  No Yes   Sig: TAKE 1 TABLET(750 MG) BY MOUTH THREE TIMES DAILY AS NEEDED FOR MUSCLE SPASMS   Patient taking differently: Take 1,500 mg by mouth 3 times daily as needed  for muscle spasms    prazosin (MINIPRESS) 2 MG capsule 7/17/2021  No Yes   Sig: TAKE 3 CAPSULES BY MOUTH EVERY NIGHT AT BEDTIME   propranolol (INDERAL) 10 MG tablet 7/17/2021  No Yes   Sig: TAKE 1 TABLET(10 MG) BY MOUTH THREE TIMES DAILY AS NEEDED FOR ANXIETY      Facility-Administered Medications: None       Information source(s): Patient and CareEverywhere/SureScripts  Method of interview communication: phone    Summary of Changes to PTA Med List  New: latuda, adderall, aspirin, tylenol  Discontinued: atenolol, olanzapine, paroxetine 20mg  Changed: hydroxyzine, methocarbamol    Patient was asked about OTC/herbal products specifically.  PTA med list reflects this.    In the past week, patient estimated taking medication this percent of the time:  greater than 90%.    Allergies were reviewed, assessed, and updated with the patient.      Patient did not bring any medications to the hospital and can't retrieve from home. No multi-dose medications are available for use during hospital stay.     The information provided in this note is only as accurate as the sources available at the time of the update(s).    Thank you for the opportunity to participate in the care of this patient.    Michelle Acevedo  7/18/2021 8:23 AM

## 2021-07-18 NOTE — ED NOTES
Patient was approved a regular diet. Patient given a turkey sandwich, orange juice, and apple sauce.

## 2021-07-18 NOTE — ED PROVIDER NOTES
"EMERGENCY DEPARTMENT ENCOUNTER      NAME: Alisia Lin  AGE: 40 year old female  YOB: 1980  MRN: [id number]  EVALUATION DATE & TIME: 7/18/2021 12:16 AM    PCP: Sepideh Hines    ED PROVIDER: Marilynn Perla M.D.      Chief Complaint   Patient presents with     Chest Pain     Tachycardia         FINAL IMPRESSION:  1. SVT (supraventricular tachycardia) (H)    2. Hypomagnesemia    3. Chest pain, unspecified type        MEDICAL DECISION MAKING:    Pertinent Labs & Imaging studies reviewed. (See chart for details)  ED Course as of Jul 18 0305   Sun Jul 18, 2021   0031 Afebrile.  Vital signs here with tachycardia, hypertension.  Mild tachypnea.  Likely secondary to anxiety.  Patient is coming in for evaluation of chest discomfort.  Was sitting at home, developed significant chest pain.  She felt as if an elephant was sitting on her chest.  Medics arrived, seem to be in SVT, converted with 6 of adenosine in the field with reversion to sinus tachycardia at a rate in the 130s.  On arrival here she is complaining of chest discomfort, radiation to her back.  She describes this as dull but occasionally sharp.  She has nausea associated with this.  It does extend down her left arm as well.  She states she has had this in the past when she had a \"heart attack\".  She was recently seen in the hospital for cardiac arrest secondary to opiate overdose several weeks ago.  Since that she has been seen multiple times for hypertension and tachycardia but has never had stents or cardiac testing performed.  Her initial EKG here shows sinus tachycardia at a rate of 138.  There is no ST elevations or depressions.  Normal axis.  Intervals are intact.  Normal axis.  QTC is 442.  As compared with previous EKG from September 5, 2020 no significant change noted.      0033 Physical exam for patient here is pertinent only for tachycardia, anxiety but otherwise unremarkable.She did already received nitro, did not have any " improvement but did get 100 mics of fentanyl from medics.  We will start with more nitroglycerin here, give a milligram of Ativan for some anxiety.  We will plan on labs, will also get CTA chest abdomen pelvis that she is describing pain that radiates to her back which is quite severe.      0225 Patient is now starting to calm down, heart rate is down to the low 100s, upper 90s.  Blood pressure down to the 120s.  She is requesting food.  Her magnesium here did come back significantly low, repleted initially both orally and IV.  Given her SVT, critically low magnesium at 1.0 may need admission for hypomagnesemia, chest pain work-up.              Critical care: 0 minutes excluding separately billable procedures.  Includes bedside management, time reviewing test results, review of records, discussing the case with staff, documenting the medical record and time spent with family members (or surrogate decision makers) discussing specific treatment issues.          ED COURSE:  12:12 AM I met with the patient, obtained history, performed an initial exam, and discussed options and plan for diagnostics and treatment here in the ED.   3:05 AM I spoke with Dr. Britt with the hospitalist service who agrees to admit the patient.      The importance of close follow up was discussed. We reviewed warning signs and symptoms, and I instructed Ms. Lin to return to the emergency department immediately if she develops any new or worsening symptoms. I provided additional verbal discharge instructions. Ms. Lin expressed understanding and agreement with this plan of care, her questions were answered, and she was discharged in stable condition.     MEDICATIONS GIVEN IN THE EMERGENCY:  Medications   nitroGLYcerin (NITROSTAT) sublingual tablet 0.4 mg (0.4 mg Sublingual Given 7/18/21 0045)   magnesium sulfate 2 g in water intermittent infusion (has no administration in time range)   LORazepam (ATIVAN) injection 1 mg (1 mg  Intravenous Given 7/18/21 0036)   magnesium sulfate 2 g in water intermittent infusion (0 g Intravenous Stopped 7/18/21 0259)   magnesium oxide (MAG-OX) tablet 400 mg (400 mg Oral Given 7/18/21 0138)   ketorolac (TORADOL) injection 30 mg (30 mg Intravenous Given 7/18/21 0138)   LORazepam (ATIVAN) injection 1 mg (1 mg Intravenous Given 7/18/21 0243)       NEW PRESCRIPTIONS STARTED AT TODAY'S ER VISIT:  New Prescriptions    No medications on file          =================================================================    HPI    Patient information was obtained from: Patient and EMS    Use of : N/A       Alisia Lin is a 40 year old female with a history of opiate dependence, asthma, and depression, who presents, via EMS, for chest pain.    The patient reports developing chest pain while sitting earlier this evening.  She describes the pain as a pressure with episodes of sharp pain.  The pain radiates to her back, shoulders, and jaw. She does report associated nausea.  EMS arrived and found the patient to be in SVT.  She was given one Nitroglycerin and 381 mg of Asprin before EMS arrived, no relief.  EMS gave her of Fentanyl and mg of Adenosine.  She reports pain like this before in the past six months.  Her only other complain is bilateral feet swelling.  No other complaints at this time.     Per chart review, the patient was admitted at [facility] from 6/29-7/1/21 for cardiac arrest following a heroine overdose.  The patient was unresponsive without a pulse in the ED and CPR was started.  She was on IV Narcan.  She was then admitted to ICU for further evaluation and she did well overnight and was eventually discharged on 7/1/21.        REVIEW OF SYSTEMS   Review of Systems   Cardiovascular: Positive for chest pain.   Gastrointestinal: Positive for nausea.   Musculoskeletal: Positive for back pain (from radiating CP).        Positive for bilateral feet swelling  Positive for shoulder pain from radiating  CP   All other systems reviewed and are negative.     PAST MEDICAL HISTORY:  Past Medical History:   Diagnosis Date     Acute stress reaction 5/31/2014     Anxiety      Arthritis      Asthma     Flovent BID     Back pain 1/26/2016     Chemical dependency (H) 06/05/2014    heroin, Norco     Chronic low back pain 3/19/2012     Influenza A 10/17/2016    with influenza pneumonia.  Hospitalized [facility]     Low TSH level 8/29/2017     Migraine with aura, without mention of intractable migraine without mention of status migrainosus     occas, Last one 11/2013. 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     Other, mixed, or unspecified nondependent drug abuse, in remission 1/1/07    Treatment for narcotic use      Papanicolaou smear of cervix with atypical squamous cells of undetermined significance (ASC-US) 3/2008    colp - WNL, HPV 53     Pyelonephritis 12/23/2015     Tobacco use disorder     1-1.5 ppd, zyban unsuccessful     Unspecified contraceptive management     ortho tricyclen       PAST SURGICAL HISTORY:  Past Surgical History:   Procedure Laterality Date     HC ENLARGE BREAST WITH IMPLANT  2002    BILATERAL     HC REMOVAL OF TONSILS,<11 Y/O      Description: Tonsillectomy;  Recorded: 10/03/2011;  Annotations: SHE THINKS IT WAS 5 OR 6 YEARS AGO     HC REMOVAL OF TONSILS,<11 Y/O      Description: Tonsillectomy;  Recorded: 10/03/2011;     HC REMOVE TONSILS/ADENOIDS,12+ Y/O  2006     HC TOOTH EXTRACTION W/FORCEP  14 yo    wisdom teeth     IUD PARAGARD  3/3/08    Removed with mirena placed. Mirena has now been removed as well.      LEEP TX, CERVICAL  age 15?     ORTHOPEDIC SURGERY      Lumbar fusion 2009     MD ARTHRODESIS ANT INTERBODY MIN DISCECTOMY,LUMBAR      Description: Lumbar Vertebral  Fusion;  Recorded: 10/03/2011;     [facility] ENLARGE BREAST      Description: Breast Surgery Enlargement Procedure;  Recorded: 10/03/2011;  Annotations: DATE 2009     [facility] ENLARGE BREAST      Description: Breast Surgery Enlargement Procedure Bilateral;  Recorded: 10/03/2011;       CURRENT MEDICATIONS:      Current Facility-Administered Medications:      magnesium sulfate 2 g in water intermittent infusion, 2 g, Intravenous, Once, Patricia Perla MD     nitroGLYcerin (NITROSTAT) sublingual tablet 0.4 mg, 0.4 mg, Sublingual, Q5 Min PRN, Patricia Perla MD, 0.4 mg at 07/18/21 0045    Current Outpatient Medications:      atenolol (TENORMIN) 25 MG tablet, Take 25 mg by mouth, Disp: , Rfl:      hydrOXYzine (ATARAX) 50 MG tablet, Take 1 tablet (50 mg) by mouth every 6 hours as needed for itching or anxiety, Disp: 120 tablet, Rfl: 0     ibuprofen (ADVIL/MOTRIN) 600 MG tablet, TAKE 1 TABLET BY MOUTH TWICE DAILY WITH MEALS, Disp: 180 tablet, Rfl: 1     methocarbamol (ROBAXIN) 750 MG tablet, TAKE 1 TABLET(750 MG) BY MOUTH THREE TIMES DAILY AS NEEDED FOR MUSCLE SPASMS, Disp: 90 tablet, Rfl: 1     OLANZapine (ZYPREXA) 5 MG tablet, TAKE 1 TABLET(5 MG) BY MOUTH AT BEDTIME, Disp: 30 tablet, Rfl: 0     PARoxetine (PAXIL) 20 MG tablet, TAKE 1 TABLET(20 MG) BY MOUTH EVERY MORNING, Disp: 30 tablet, Rfl: 0     PARoxetine (PAXIL) 40 MG tablet, TAKE 1 TABLET(40 MG) BY MOUTH EVERY MORNING, Disp: 90 tablet, Rfl: 0     prazosin (MINIPRESS) 2 MG capsule, TAKE 3 CAPSULES BY MOUTH EVERY NIGHT AT BEDTIME, Disp: 270 capsule, Rfl: 0     propranolol (INDERAL) 10 MG tablet, TAKE 1 TABLET(10 MG) BY MOUTH THREE TIMES DAILY AS NEEDED FOR ANXIETY, Disp: 90 tablet, Rfl: 0     VENTOLIN  (90 BASE) MCG/ACT Inhaler, INHALE 2 PUFFS INTO THE LUNGS EVERY 4 HOURS AS NEEDED FOR SHORTNESS OF BREATH OR DIFFICULT BREATHING OR WHEEZING, Disp: 18 g, Rfl: 0    ALLERGIES:  Allergies   Allergen Reactions     Compazine      Heart Problems/ Body went completley stiff for 8  hrs.     Nortriptyline      Skelaxin [Metaxalone]        FAMILY HISTORY:  Family History   Problem Relation Age of Onset     Hypertension Mother      Alcohol/Drug Mother         alcohol, sober for 19 years     Depression Mother         on medication     Lipids Mother         on medication     Alcohol/Drug Father         alcohol, still actively drinking     Lipids Father         on medication     Anxiety Disorder Father      Substance Abuse Father      C.A.D. Paternal Grandmother      Diabetes Paternal Grandmother      Breast Cancer Paternal Grandmother      Arthritis Paternal Grandmother      Cancer Paternal Grandmother         breast cancer     Cancer Paternal Grandfather         colon cancer     Asthma Daughter         x2     Thyroid Disease No family hx of        SOCIAL HISTORY:   Social History     Socioeconomic History     Marital status: Single     Spouse name: Not on file     Number of children: 2     Years of education: 14     Highest education level: Not on file   Occupational History     Occupation: self employed     Comment:      Employer: [employer]   Tobacco Use     Smoking status: Current Every Day Smoker     Packs/day: 1.50     Years: 10.00     Pack years: 15.00     Types: Other     Last attempt to quit: 10/1/2016     Years since quittin.7     Smokeless tobacco: Current User     Tobacco comment: less than 1/2 ppd   Substance and Sexual Activity     Alcohol use: No     Comment: JAYA 18 days ago     Drug use: No     Comment: clean for 18 days     Sexual activity: Yes     Partners: Male   Other Topics Concern      Service No     Blood Transfusions No     Caffeine Concern Not Asked     Occupational Exposure Yes     Comment: Works at [employer]     Hobby Hazards Not Asked     Sleep Concern Not Asked     Stress Concern Not Asked     Weight Concern Not Asked     Special Diet Not Asked     Back Care Not Asked     Exercise Not Asked     Bike Helmet Not Asked     Seat Belt Not  "Asked     Self-Exams Not Asked     Parent/sibling w/ CABG, MI or angioplasty before 65F 55M? No   Social History Narrative            Living with Father of youngest child  (3 girls 22, 18 and 10 )   Will be grandma around April 2018    No legal issues currently (possible identity theft).     Recently job loss ([employer])        Social Determinants of Health     Financial Resource Strain:      Difficulty of Paying Living Expenses:    Food Insecurity:      Worried About Running Out of Food in the Last Year:      Ran Out of Food in the Last Year:    Transportation Needs:      Lack of Transportation (Medical):      Lack of Transportation (Non-Medical):    Physical Activity:      Days of Exercise per Week:      Minutes of Exercise per Session:    Stress:      Feeling of Stress :    Social Connections:      Frequency of Communication with Friends and Family:      Frequency of Social Gatherings with Friends and Family:      Attends Synagogue Services:      Active Member of Clubs or Organizations:      Attends Club or Organization Meetings:      Marital Status:    Intimate Partner Violence:      Fear of Current or Ex-Partner:      Emotionally Abused:      Physically Abused:      Sexually Abused:        PHYSICAL EXAM:    Vitals: BP (!) 147/75   Pulse 109   Temp 98.6  F (37  C) (Oral)   Resp 25   Ht 1.473 m (4' 10\")   Wt 80.7 kg (178 lb)   LMP 06/27/2021 (Approximate)   SpO2 99%   Breastfeeding No   BMI 37.20 kg/m     General:. Alert and interactive, comfortable appearing.  HENT: Oropharynx without erythema or exudates. MMM.  TMs clear bilaterally.  Eyes: Pupils mid-sized and equally reactive.   Neck: Full AROM.  No midline tenderness to palpation.  Cardiovascular: Tachycardic, regular rhythm. Peripheral pulses 2+ bilaterally.  Chest/Pulmonary: Normal work of breathing. Lung sounds clear and equal throughout, no wheezes or crackles. No chest wall tenderness or deformities.  Abdomen: Soft, nondistended. " Nontender without guarding or rebound.  Back/Spine: No CVA or midline tenderness.  Extremities: Normal ROM of all major joints. No lower extremity edema.   Skin: Warm and dry. Normal skin color.   Neuro: Speech clear. CNs grossly intact. Moves all extremities appropriately. Strength and sensation grossly intact to all extremities.   Psych: Anxious affect, Normal mood, cooperative, memory appropriate.     LAB:  All pertinent labs reviewed and interpreted.  Labs Ordered and Resulted from Time of ED Arrival Up to the Time of Departure from the ED   COMPREHENSIVE METABOLIC PANEL - Abnormal; Notable for the following components:       Result Value    Sodium 135 (*)     Carbon Dioxide (CO2) 19 (*)     Calcium 8.1 (*)     Glucose 141 (*)     All other components within normal limits   MAGNESIUM - Abnormal; Notable for the following components:    Magnesium 1.0 (*)     All other components within normal limits   CBC WITH PLATELETS AND DIFFERENTIAL - Abnormal; Notable for the following components:    WBC Count 14.8 (*)     RBC Count 3.64 (*)     Hemoglobin 10.9 (*)     Hematocrit 31.5 (*)     Absolute Immature Granulocytes 0.3 (*)     All other components within normal limits   LIPASE - Normal   TROPONIN I - Normal   B-TYPE NATRIURETIC PEPTIDE ([facility] ONLY) - Normal   INR - Normal   TSH WITH FREE T4 REFLEX - Normal   EXTRA BLUE TOP TUBE   EXTRA GREEN TOP (LITHIUM HEPARIN) TUBE   EXTRA PURPLE TOP TUBE   DRUGS OF ABUSE 1 PANEL, URINE ([facility] ONLY)   CALL   EXTRA TUBE    Narrative:     The following orders were created for panel order [place] Draw.  Procedure                               Abnormality         Status                     ---------                               -----------         ------                     Extra Blue Top Tube[[id number]]                              Final result               Extra Green Top (Lithium...[[id number]]                      Final result               Extra Purple Top Tube[[id number]]                             Final result                 Please view results for these tests on the individual orders.   CBC WITH PLATELETS & DIFFERENTIAL    Narrative:     The following orders were created for panel order CBC with platelets + differential.  Procedure                               Abnormality         Status                     ---------                               -----------         ------                     CBC with platelets and d...[[id number]]  Abnormal            Final result                 Please view results for these tests on the individual orders.       RADIOLOGY:  CTA Chest Abdomen Pelvis w Contrast   Preliminary Result   IMPRESSION:    1. No acute abnormality identified in the chest, abdomen or pelvis.   2. The aorta is normal in caliber without dissection.           EKG:  See MDM    I, Adalberto Yuan, am serving as a scribe to document services personally performed by Dr. Marilynn Perla  based on my observation and the provider's statements to me. I, Marilynn Perla MD attest that Adalberto Yuan is acting in a scribe capacity, has observed my performance of the services and has documented them in accordance with my direction.      Marilynn Perla M.D.  Emergency Medicine  [facility] EMERGENCY ROOM  [address] [facility] [id number] [phone]  Dept: [phone]      Patricia Perla MD  07/18/21 0354

## 2021-07-18 NOTE — DISCHARGE SUMMARY
[facility] MEDICINE  DISCHARGE SUMMARY     Primary Care Physician: Sepideh Hines  Admission Date: 7/18/2021   Discharge Provider: Lizzy Pineda MD Discharge Date: 7/18/2021   Diet: REGULAR    Code Status: Full Code   Activity: activity as tolerated   [facility]      Condition at Discharge: Stable      REASON FOR PRESENTATION(See Admission Note for Details)   Chest pain    PRINCIPAL & ACTIVE DISCHARGE DIAGNOSES     Active Problems:    SVT (supraventricular tachycardia) (H)  Atypical chest pain suspicious for musculoskeletal etiology  Severe hypomagnesemia replaced  History of narcotic dependence      SIGNIFICANT FINDINGS (Imaging, labs):   Echocardiogram  1. Normal left ventricular size and systolic performance with a visually  estimated ejection fraction of 60-65%.  2. No significant valvular heart disease is identified on this study.  3. Normal right ventricular size and systolic performance.    CTA chest abdomen pelvis  IMPRESSION:   1. No acute abnormality identified in the chest, abdomen or pelvis.  2. The aorta is normal in caliber without dissection.     TSH normal 1.32  Magnesium 1---> 3  Urine tox screen positive for benzodiazepines which she received in the ED.     PENDING LABS       PROCEDURES ( this hospitalization only)        RECOMMENDATION FOR F/U VISIT       DISPOSITION     Home    SUMMARY OF HOSPITAL COURSE:    38-year-old lady with past medical history significant for PTSD, tobacco abuse, opioid dependence, substance abuse, depression recently hospitalized at [facility] for overdose fentanyl, heroin, presented with complaints of nonexertional left-sided chest pain radiating to the left arm, back associated nausea.  EMS found her to be in SVT, received 6 mg of adenosine with conversion to sinus tachycardia.  EKG otherwise did not show any acute ST-T wave changes.  Serial troponins were negative.  She was found to have severe hypomagnesemia at 1.  She was  given IV magnesium.  Likely her SVTs from electrolyte disturbances.  Echocardiogram showed normal EF of 60 to 65%.  CTA chest abdomen pelvis no acute changes.  She had reproducible tenderness on palpation of her chest.  Was started on lidocaine patch, diclofenac gel.  She was recommended to follow-up with cardiology for further work-up.    Discharge Medications with Med changes:        Review of your medicines      START taking      Dose / Directions   diclofenac 1 % topical gel  Commonly known as: VOLTAREN  Used for: Chest pain, unspecified type      Dose: 4 g  Apply 4 g topically 4 times daily  Quantity: 100 g  Refills: 0     Lidocaine 4 % Patch  Commonly known as: LIDOCARE  Used for: Chest pain, unspecified type      Dose: 1 patch  Place 1 patch onto the skin every 24 hours To prevent lidocaine toxicity, patient should be patch free for 12 hrs daily.  Quantity: 15 patch  Refills: 0     Slow-Mag 71.5-119 MG Tbec  Used for: Hypomagnesemia  Generic drug: Magnesium Cl-Calcium Carbonate      Dose: 2 tablet  Take 2 tablets by mouth daily  Quantity: 60 tablet  Refills: 0        CONTINUE these medicines which may have CHANGED, or have new prescriptions. If we are uncertain of the size of tablets/capsules you have at home, strength may be listed as something that might have changed.      Dose / Directions   methocarbamol 750 MG tablet  Commonly known as: ROBAXIN  This may have changed: See the new instructions.  Used for: Chronic midline low back pain with sciatica, sciatica laterality unspecified, Chronic right shoulder pain      TAKE 1 TABLET(750 MG) BY MOUTH THREE TIMES DAILY AS NEEDED FOR MUSCLE SPASMS  Quantity: 90 tablet  Refills: 1        CONTINUE these medicines which have NOT CHANGED      Dose / Directions   acetaminophen 500 MG tablet  Commonly known as: TYLENOL      Dose: 1,000 mg  Take 1,000 mg by mouth 3 times daily as needed for mild pain  Refills: 0     amphetamine-dextroamphetamine 10 MG tablet  Commonly  known as: ADDERALL      Dose: 10 mg  Take 10 mg by mouth 3 times daily as needed  Refills: 0     aspirin 81 MG EC tablet      Dose: 81 mg  Take 81 mg by mouth daily  Refills: 0     hydrOXYzine 50 MG capsule  Commonly known as: VISTARIL      Dose: 50 mg  Take 50 mg by mouth 3 times daily as needed  Refills: 0     ibuprofen 600 MG tablet  Commonly known as: ADVIL/MOTRIN  Used for: Chronic midline thoracic back pain      TAKE 1 TABLET BY MOUTH TWICE DAILY WITH MEALS  Quantity: 180 tablet  Refills: 1     lurasidone 40 MG Tabs tablet  Commonly known as: LATUDA      Dose: 40 mg  Take 40 mg by mouth daily as needed  Refills: 0     PARoxetine 40 MG tablet  Commonly known as: PAXIL  Used for: NATHANAEL (generalized anxiety disorder)      TAKE 1 TABLET(40 MG) BY MOUTH EVERY MORNING  Quantity: 90 tablet  Refills: 0     prazosin 2 MG capsule  Commonly known as: MINIPRESS  Used for: PTSD (post-traumatic stress disorder)      TAKE 3 CAPSULES BY MOUTH EVERY NIGHT AT BEDTIME  Quantity: 270 capsule  Refills: 0     propranolol 10 MG tablet  Commonly known as: INDERAL  Used for: NATHANAEL (generalized anxiety disorder)      TAKE 1 TABLET(10 MG) BY MOUTH THREE TIMES DAILY AS NEEDED FOR ANXIETY  Quantity: 90 tablet  Refills: 0     Ventolin  (90 Base) MCG/ACT inhaler  Used for: Intermittent asthma, uncomplicated  Generic drug: albuterol      INHALE 2 PUFFS INTO THE LUNGS EVERY 4 HOURS AS NEEDED FOR SHORTNESS OF BREATH OR DIFFICULT BREATHING OR WHEEZING  Quantity: 18 g  Refills: 0           Where to get your medicines      [id number] - [place], [place]  [address] [place] [address] AT [address] [address]    Phone: [phone]     diclofenac 1 % topical gel    Lidocaine 4 % Patch    Slow-Mag 71.5-119 MG Tbec             Rationale for medication changes:    Lidocaine patch, diclofenac gel for chest pain        Consults         Immunizations given this  encounter         Discharge Procedure Orders   Reason for your hospital stay   Order Comments: Chest pain, arrhythmias     Follow-up and recommended labs and tests    Order Comments: Follow up with primary care provider, Sepideh Hines, within 7 days for hospital follow- up.  No follow up labs or test are needed.    Follow-up with cardiology In 2 weeks     Activity   Order Comments: Your activity upon discharge: activity as tolerated     Order Specific Question Answer Comments   Is discharge order? Yes      Diet   Order Comments: Follow this diet upon discharge: Orders Placed This Encounter      Combination Diet Regular Diet Adult     Order Specific Question Answer Comments   Is discharge order? Yes      Examination     Pertinent positives on exam:  Heart S1-S2 heard regular rate and rhythm  Lungs: Clear to auscultation bilaterally  Chest: Reproducible tenderness on the left inframammary area    Please see EMR for more detailed significant labs, imaging, consultant notes etc.  Total time spent on discharge: > 35 minutes    Lisa Pineda MD  [facility] Hospitalist Service: Ph:[phone]    CC:Sepideh Hines

## 2021-07-18 NOTE — H&P
[facility] MEDICINE ADMISSION HISTORY AND PHYSICAL       Assessment & Plan      1. Chest pain - still on going. Negative  CTA. Negative trop. Negative lipase. She takes Advil 600 mgs TID for years, r/o PUD/GERD. Serial trops to continue. Try GI cocktail, PPI, repeat labs. Advised to avoid NSAIDs.  2. Reported SVT - was given adenosine by EMS, currently tachycardic at 105  3. Low Mag 1.0 --  Could be from diarrhea or use of ETOH  4. Recent admission for substance abuse issues, had CPR for unresponsiveness  5. WBC at 14 thousand - repeat CBC, if escalating or has signs of infection, consider further evaluation     VTE prophylaxis: walk  IV fluids: Per order set   Diet:  regular  GI prophylaxis: Not indicated at this point, re-assess if needed.   Pain, sleep, and vomiting medications: Per order set  Code Status: Full  COVID test result:  negative   COVID vaccination: not yet  Barriers to discharge: admitting clinical condition  Discharge Disposition and goals:  Unable to determine at this point, pending clinical progress and response to treatment. Patient may need transfer to SNF or [facility] if unsafe to go home and needed treatment inappropriate at home setting OR may need home health care evaluation if care can be delivered at home settings. Consider referral to care manager/        Care plan was created based on available information provided, including patient's condition at the time of encounter.   This plan was discussed with patient and/or family members using layman's terms and have agreed to proceed.   At the end of night shift (9PM - 730A), this case will be presented to the AM Hospitalist.    It is recommended to revise care plan if there is change in condition and/or new clinical information that are not available during my encounter.     All or some of home medication/s were not resumed on admission due to safety reasons or contraindications. Dosing and frequency may  also have been modified. Please resume/review them during your visit.     70 minutes of total visit duration and greater than 50% was spent in direct evaluation of patient and coordination of care including discussion of diagnostic test results and recommended treatment. .      William Britt MD, MPH, FACP, [facility]  Internal Medicine - Hospitalist        Chief Complaint Chest pain      HISTORY     - Met her in the ED. She was asleep and woke up after. She came in with chest pain and palpitation. Pain was in the mid and not SOB. No cough or colds. She was at the house of her grandson, when this happening, and they called EMS. She was found in SVT with HR of 170. She was given adenosine. She has no fever.     - She was just discharged a few days ago from another hospital after she presented with unresponsiveness and required CPR/5 compressions. She has history of substance abuse and was started on narcan drip.    - She denies use of prohibited drugs but admits using ETOH once in a while, last drink was yesterday.  - She also reported having diarrhea for 2 days now, 7x/day. She denies abdominal pain.  - She also reported taking Advil 600 mgs TID for years.  - In the ED, CTA was done. See results. Her Mag was 1.0 and was given Mg replacements. She has WBC of 14 thousand. LFTs, lipase and troponin were OK.     - ROS --- No headache. No dizziness. No weakness. No palpitations. No abdominal pain. No nausea or vomiting. No urinary symptoms. No bleeding symptoms. No weight loss. Rest of 12 point ROS was reviewed and negative.       Past Medical History     Past Medical History:   Diagnosis Date     Acute stress reaction 5/31/2014     Anxiety      Arthritis      Asthma     Flovent BID     Back pain 1/26/2016     Chemical dependency (H) 06/05/2014    heroin, Norco     Chronic low back pain 3/19/2012     Influenza A 10/17/2016    with influenza pneumonia.  Hospitalized [facility]     Low TSH level 8/29/2017     Migraine with aura,  without mention of intractable migraine without mention of status migrainosus     occas, Last one 11/2013. 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     Other, mixed, or unspecified nondependent drug abuse, in remission 1/1/07    Treatment for narcotic use      Papanicolaou smear of cervix with atypical squamous cells of undetermined significance (ASC-US) 3/2008    colp - WNL, HPV 53     Pyelonephritis 12/23/2015     Tobacco use disorder     1-1.5 ppd, zyban unsuccessful     Unspecified contraceptive management     ortho tricyclen         Surgical History     Past Surgical History:   Procedure Laterality Date     HC ENLARGE BREAST WITH IMPLANT  2002    BILATERAL     HC REMOVAL OF TONSILS,<13 Y/O      Description: Tonsillectomy;  Recorded: 10/03/2011;  Annotations: SHE THINKS IT WAS 5 OR 6 YEARS AGO     HC REMOVAL OF TONSILS,<13 Y/O      Description: Tonsillectomy;  Recorded: 10/03/2011;     HC REMOVE TONSILS/ADENOIDS,12+ Y/O  2006     HC TOOTH EXTRACTION W/FORCEP  16 yo    wisdom teeth     IUD PARAGARD  3/3/08    Removed with mirena placed. Mirena has now been removed as well.      LEEP TX, CERVICAL  age 15?     ORTHOPEDIC SURGERY      Lumbar fusion 2009     [place] ARTHRODESIS ANT INTERBODY MIN DISCECTOMY,LUMBAR      Description: Lumbar Vertebral Fusion;  Recorded: 10/03/2011;     [facility] ENLARGE BREAST      Description: Breast Surgery Enlargement Procedure;  Recorded: 10/03/2011;  Annotations: DATE 2009     [facility] ENLARGE BREAST      Description: Breast Surgery Enlargement Procedure Bilateral;  Recorded: 10/03/2011;        Family History      Family History   Problem Relation Age of Onset     Hypertension Mother      Alcohol/Drug Mother         alcohol, sober for 19 years     Depression Mother          on medication     Lipids Mother         on medication     Alcohol/Drug Father         alcohol, still actively drinking     Lipids Father         on medication     Anxiety Disorder Father      Substance Abuse Father      C.A.D. Paternal Grandmother      Diabetes Paternal Grandmother      Breast Cancer Paternal Grandmother      Arthritis Paternal Grandmother      Cancer Paternal Grandmother         breast cancer     Cancer Paternal Grandfather         colon cancer     Asthma Daughter         x2     Thyroid Disease No family hx of          Social History      .  Social History     Socioeconomic History     Marital status: Single     Spouse name: Not on file     Number of children: 2     Years of education: 14     Highest education level: Not on file   Occupational History     Occupation: self employed     Comment: [employer]     Employer: [employer]   Tobacco Use     Smoking status: Current Every Day Smoker     Packs/day: 1.50     Years: 10.00     Pack years: 15.00     Types: Other     Last attempt to quit: 10/1/2016     Years since quittin.7     Smokeless tobacco: Current User     Tobacco comment: less than 1/2 ppd   Substance and Sexual Activity     Alcohol use: No     Comment: JAYA 18 days ago     Drug use: No     Comment: clean for 18 days     Sexual activity: Yes     Partners: Male   Other Topics Concern      Service No     Blood Transfusions No     Caffeine Concern Not Asked     Occupational Exposure Yes     Comment: Works at [employer]     Hobby Hazards Not Asked     Sleep Concern Not Asked     Stress Concern Not Asked     Weight Concern Not Asked     Special Diet Not Asked     Back Care Not Asked     Exercise Not Asked     Bike Helmet Not Asked     Seat Belt Not Asked     Self-Exams Not Asked     Parent/sibling w/ CABG, MI or angioplasty before 65F 55M? No   Social History Narrative            Living with Father of youngest child  (3 girls 22, 18 and 10 )   Will be grandma around April  2018    No legal issues currently (possible identity theft).     Recently job loss ([employer])        Social Determinants of Health     Financial Resource Strain:      Difficulty of Paying Living Expenses:    Food Insecurity:      Worried About Running Out of Food in the Last Year:      Ran Out of Food in the Last Year:    Transportation Needs:      Lack of Transportation (Medical):      Lack of Transportation (Non-Medical):    Physical Activity:      Days of Exercise per Week:      Minutes of Exercise per Session:    Stress:      Feeling of Stress :    Social Connections:      Frequency of Communication with Friends and Family:      Frequency of Social Gatherings with Friends and Family:      Attends Jewish Services:      Active Member of Clubs or Organizations:      Attends Club or Organization Meetings:      Marital Status:    Intimate Partner Violence:      Fear of Current or Ex-Partner:      Emotionally Abused:      Physically Abused:      Sexually Abused:           Allergies        Allergies   Allergen Reactions     Compazine      Heart Problems/ Body went completley stiff for 8 hrs.     Nortriptyline      Skelaxin [Metaxalone]          Prior to Admission Medications      No current facility-administered medications on file prior to encounter.  atenolol (TENORMIN) 25 MG tablet, Take 25 mg by mouth  hydrOXYzine (ATARAX) 50 MG tablet, Take 1 tablet (50 mg) by mouth every 6 hours as needed for itching or anxiety  ibuprofen (ADVIL/MOTRIN) 600 MG tablet, TAKE 1 TABLET BY MOUTH TWICE DAILY WITH MEALS  methocarbamol (ROBAXIN) 750 MG tablet, TAKE 1 TABLET(750 MG) BY MOUTH THREE TIMES DAILY AS NEEDED FOR MUSCLE SPASMS  OLANZapine (ZYPREXA) 5 MG tablet, TAKE 1 TABLET(5 MG) BY MOUTH AT BEDTIME  PARoxetine (PAXIL) 20 MG tablet, TAKE 1 TABLET(20 MG) BY MOUTH EVERY MORNING  PARoxetine (PAXIL) 40 MG tablet, TAKE 1 TABLET(40 MG) BY MOUTH EVERY MORNING  prazosin (MINIPRESS) 2 MG capsule, TAKE 3 CAPSULES BY  "MOUTH EVERY NIGHT AT BEDTIME  propranolol (INDERAL) 10 MG tablet, TAKE 1 TABLET(10 MG) BY MOUTH THREE TIMES DAILY AS NEEDED FOR ANXIETY  VENTOLIN  (90 BASE) MCG/ACT Inhaler, INHALE 2 PUFFS INTO THE LUNGS EVERY 4 HOURS AS NEEDED FOR SHORTNESS OF BREATH OR DIFFICULT BREATHING OR WHEEZING            Review of Systems     A 12 point comprehensive review of systems was negative except as noted above in HPI.    PHYSICAL EXAMINATION       Vitals      Vitals: BP (!) 147/75   Pulse 109   Temp 98.6  F (37  C) (Oral)   Resp 25   Ht 1.473 m (4' 10\")   Wt 80.7 kg (178 lb)   LMP 06/27/2021 (Approximate)   SpO2 99%   Breastfeeding No   BMI 37.20 kg/m    BMI= Body mass index is 37.2 kg/m .      Examination     General Appearance:  Alert, cooperative, no distress  Head:    Normocephalic, without obvious abnormality, atraumatic  EENT:  PERRL, conjunctiva/corneas clear, EOM's intact.   Neck:   Supple, symmetrical, trachea midline, no adenopathy; no NVE  Back:  Symmetric, no curvature, no CVA tenderness  Chest/Lungs: Clear to auscultation bilaterally, respirations unlabored, No tenderness or deformity. No abdominal breathing or use of accessory muscles.   Heart:    Regular rate and rhythm, S1 and S2 normal, no murmur, rub   or gallop  Abdomen: Soft, non-tender, bowel sounds active all four quadrants, not peritoneal on palpation. Not distended  Extremities:  Extremities normal, atraumatic, no swelling   Skin:  Skin color, texture, turgor normal, no rashes or lesion  Neurologic:  Awake and alert, No lateralizing or localizing signs       Pertinent Lab     Results for orders placed or performed during the hospital encounter of 07/18/21   CTA Chest Abdomen Pelvis w Contrast    Impression    IMPRESSION:   1. No acute abnormality identified in the chest, abdomen or pelvis.  2. The aorta is normal in caliber without dissection.    Extra Blue Top Tube   Result Value Ref Range    Hold Specimen [facility]    Extra Green Top (Lithium " Heparin) Tube   Result Value Ref Range    Hold Specimen [facility]    Extra Purple Top Tube   Result Value Ref Range    Hold Specimen [facility]    Comprehensive metabolic panel   Result Value Ref Range    Sodium 135 (L) 136 - 145 mmol/L    Potassium 3.5 3.5 - 5.0 mmol/L    Chloride 105 98 - 107 mmol/L    Carbon Dioxide (CO2) 19 (L) 22 - 31 mmol/L    Anion Gap 11 5 - 18 mmol/L    Urea Nitrogen 20 8 - 22 mg/dL    Creatinine 0.84 0.60 - 1.10 mg/dL    Calcium 8.1 (L) 8.5 - 10.5 mg/dL    Glucose 141 (H) 70 - 125 mg/dL    Alkaline Phosphatase 104 45 - 120 U/L    AST 22 0 - 40 U/L    ALT 17 0 - 45 U/L    Protein Total 7.3 6.0 - 8.0 g/dL    Albumin 3.5 3.5 - 5.0 g/dL    Bilirubin Total 0.3 0.0 - 1.0 mg/dL    GFR Estimate 86 >60 mL/min/1.73m2   Result Value Ref Range    Lipase 48 0 - 52 U/L   Troponin I (now)   Result Value Ref Range    Troponin I <0.01 0.00 - 0.29 ng/mL   B-Type Natriuretic Peptide ([facility] Only)   Result Value Ref Range    BNP <10 0 - 64 pg/mL   Result Value Ref Range    INR 0.97 0.85 - 1.15   Result Value Ref Range    Magnesium 1.0 (LL) 1.8 - 2.6 mg/dL   TSH with free T4 reflex   Result Value Ref Range    TSH 1.32 0.30 - 5.00 uIU/mL   CBC with platelets and differential   Result Value Ref Range    WBC Count 14.8 (H) 4.0 - 11.0 10e3/uL    RBC Count 3.64 (L) 3.80 - 5.20 10e6/uL    Hemoglobin 10.9 (L) 11.7 - 15.7 g/dL    Hematocrit 31.5 (L) 35.0 - 47.0 %    MCV 87 78 - 100 fL    MCH 29.9 26.5 - 33.0 pg    MCHC 34.6 31.5 - 36.5 g/dL    RDW 15.0 10.0 - 15.0 %    Platelet Count 315 150 - 450 10e3/uL    % Neutrophils 51 %    % Lymphocytes 36 %    % Monocytes 9 %    % Eosinophils 1 %    % Basophils 1 %    % Immature Granulocytes 2 %    NRBCs per 100 WBC 0 <1 /100    Absolute Neutrophils 7.8 1.6 - 8.3 10e3/uL    Absolute Lymphocytes 5.3 0.8 - 5.3 10e3/uL    Absolute Monocytes 1.3 0.0 - 1.3 10e3/uL    Absolute Eosinophils 0.1 0.0 - 0.7 10e3/uL    Absolute Basophils 0.1 0.0 - 0.2 10e3/uL    Absolute Immature Granulocytes 0.3 (H)  <=0.0 10e3/uL    Absolute NRBCs 0.0 10e3/uL           Pertinent Radiology

## 2021-07-18 NOTE — PLAN OF CARE
AVS given and reviewed with patient. Discussed importance of follow up with primary MD. New medications and where to .  Patient able to state understanding and agreement with plan.  Discharged with cousin for .

## 2021-07-18 NOTE — ED NOTES
"Pt up to the bathroom on her own power with no increased distress. Pt is on her phone talking with no increased distress. She intermittently c/o left sided chest pain, and grasps her left breast stating \"maral, maral\". Her skin remains pink warm and dry. She is breathing with smooth even respirations.   "

## 2021-07-19 ENCOUNTER — TELEPHONE (OUTPATIENT)
Dept: FAMILY MEDICINE | Facility: CLINIC | Age: 41
End: 2021-07-19

## 2021-07-19 NOTE — TELEPHONE ENCOUNTER
ED / Discharge Outreach Protocol    Patient Contact    Attempt # 1    Was call answered?  No.  Left message on voicemail with information to call me back.    Kim Le RN

## 2021-07-20 NOTE — TELEPHONE ENCOUNTER
ED / Discharge Outreach Protocol    Patient Contact    Attempt # 2    Was call answered?  No.  Left message on voicemail with information to call me back.  TUSHAR Bo

## 2021-07-21 ENCOUNTER — PATIENT OUTREACH (OUTPATIENT)
Dept: CARE COORDINATION | Facility: CLINIC | Age: 41
End: 2021-07-21

## 2021-07-21 NOTE — PROGRESS NOTES
Clinic Care Coordination Contact  [facility]/Voicemail       Clinical Data: Care Coordinator Outreach  Outreach attempted x 1.  Left message on patient's voicemail with call back information and requested return call.  Plan:  Care Coordinator will try to reach patient again in 10 business days.    [facility] Care Coordination  Tel: [phone]

## 2021-08-01 ENCOUNTER — HOSPITAL ENCOUNTER (EMERGENCY)
Facility: CLINIC | Age: 41
Discharge: HOME | End: 2021-08-01
Attending: EMERGENCY MEDICINE | Admitting: EMERGENCY MEDICINE
Payer: COMMERCIAL

## 2021-08-01 ENCOUNTER — APPOINTMENT (OUTPATIENT)
Dept: RADIOLOGY | Facility: CLINIC | Age: 41
End: 2021-08-01
Attending: EMERGENCY MEDICINE
Payer: COMMERCIAL

## 2021-08-01 VITALS
RESPIRATION RATE: 23 BRPM | HEIGHT: 58 IN | WEIGHT: 160 LBS | HEART RATE: 82 BPM | OXYGEN SATURATION: 95 % | BODY MASS INDEX: 33.58 KG/M2 | TEMPERATURE: 98.5 F | DIASTOLIC BLOOD PRESSURE: 58 MMHG | SYSTOLIC BLOOD PRESSURE: 111 MMHG

## 2021-08-01 DIAGNOSIS — E83.42 HYPOMAGNESEMIA: ICD-10-CM

## 2021-08-01 DIAGNOSIS — R19.5 LOOSE STOOLS: ICD-10-CM

## 2021-08-01 DIAGNOSIS — F41.9 ANXIETY: ICD-10-CM

## 2021-08-01 DIAGNOSIS — R07.89 ATYPICAL CHEST PAIN: ICD-10-CM

## 2021-08-01 LAB
ALBUMIN SERPL-MCNC: 3.6 G/DL (ref 3.5–5)
ALP SERPL-CCNC: 91 U/L (ref 45–120)
ALT SERPL W P-5'-P-CCNC: 19 U/L (ref 0–45)
ANION GAP SERPL CALCULATED.3IONS-SCNC: 12 MMOL/L (ref 5–18)
AST SERPL W P-5'-P-CCNC: 23 U/L (ref 0–40)
ATRIAL RATE - MUSE: 92 BPM
BASOPHILS # BLD AUTO: 0.1 10E3/UL (ref 0–0.2)
BASOPHILS NFR BLD AUTO: 1 %
BILIRUB SERPL-MCNC: 0.3 MG/DL (ref 0–1)
BUN SERPL-MCNC: 11 MG/DL (ref 8–22)
CALCIUM SERPL-MCNC: 8.5 MG/DL (ref 8.5–10.5)
CHLORIDE BLD-SCNC: 105 MMOL/L (ref 98–107)
CO2 SERPL-SCNC: 21 MMOL/L (ref 22–31)
CREAT SERPL-MCNC: 0.77 MG/DL (ref 0.6–1.1)
DIASTOLIC BLOOD PRESSURE - MUSE: 104 MMHG
EOSINOPHIL # BLD AUTO: 0.1 10E3/UL (ref 0–0.7)
EOSINOPHIL NFR BLD AUTO: 1 %
ERYTHROCYTE [DISTWIDTH] IN BLOOD BY AUTOMATED COUNT: 14.4 % (ref 10–15)
GFR SERPL CREATININE-BSD FRML MDRD: >90 ML/MIN/1.73M2
GLUCOSE BLD-MCNC: 116 MG/DL (ref 70–125)
HCT VFR BLD AUTO: 35.2 % (ref 35–47)
HGB BLD-MCNC: 11.7 G/DL (ref 11.7–15.7)
IMM GRANULOCYTES # BLD: 0.2 10E3/UL
IMM GRANULOCYTES NFR BLD: 1 %
INTERPRETATION ECG - MUSE: NORMAL
LYMPHOCYTES # BLD AUTO: 3.3 10E3/UL (ref 0.8–5.3)
LYMPHOCYTES NFR BLD AUTO: 24 %
MAGNESIUM SERPL-MCNC: 1.1 MG/DL (ref 1.8–2.6)
MCH RBC QN AUTO: 28.9 PG (ref 26.5–33)
MCHC RBC AUTO-ENTMCNC: 33.2 G/DL (ref 31.5–36.5)
MCV RBC AUTO: 87 FL (ref 78–100)
MONOCYTES # BLD AUTO: 0.9 10E3/UL (ref 0–1.3)
MONOCYTES NFR BLD AUTO: 7 %
NEUTROPHILS # BLD AUTO: 9.3 10E3/UL (ref 1.6–8.3)
NEUTROPHILS NFR BLD AUTO: 66 %
NRBC # BLD AUTO: 0 10E3/UL
NRBC BLD AUTO-RTO: 0 /100
P AXIS - MUSE: 58 DEGREES
PLATELET # BLD AUTO: 400 10E3/UL (ref 150–450)
POTASSIUM BLD-SCNC: 3.9 MMOL/L (ref 3.5–5)
PR INTERVAL - MUSE: 136 MS
PROT SERPL-MCNC: 7.3 G/DL (ref 6–8)
QRS DURATION - MUSE: 88 MS
QT - MUSE: 362 MS
QTC - MUSE: 447 MS
R AXIS - MUSE: 52 DEGREES
RBC # BLD AUTO: 4.05 10E6/UL (ref 3.8–5.2)
SODIUM SERPL-SCNC: 138 MMOL/L (ref 136–145)
SYSTOLIC BLOOD PRESSURE - MUSE: 164 MMHG
T AXIS - MUSE: 59 DEGREES
TROPONIN I SERPL-MCNC: <0.01 NG/ML (ref 0–0.29)
VENTRICULAR RATE- MUSE: 92 BPM
WBC # BLD AUTO: 13.8 10E3/UL (ref 4–11)

## 2021-08-01 PROCEDURE — 83735 ASSAY OF MAGNESIUM: CPT | Performed by: EMERGENCY MEDICINE

## 2021-08-01 PROCEDURE — 250N000011 HC RX IP 250 OP 636: Performed by: EMERGENCY MEDICINE

## 2021-08-01 PROCEDURE — 82040 ASSAY OF SERUM ALBUMIN: CPT | Performed by: EMERGENCY MEDICINE

## 2021-08-01 PROCEDURE — 71045 X-RAY EXAM CHEST 1 VIEW: CPT

## 2021-08-01 PROCEDURE — 250N000013 HC RX MED GY IP 250 OP 250 PS 637: Performed by: EMERGENCY MEDICINE

## 2021-08-01 PROCEDURE — 36415 COLL VENOUS BLD VENIPUNCTURE: CPT | Performed by: EMERGENCY MEDICINE

## 2021-08-01 PROCEDURE — 96375 TX/PRO/DX INJ NEW DRUG ADDON: CPT

## 2021-08-01 PROCEDURE — 99285 EMERGENCY DEPT VISIT HI MDM: CPT | Mod: 25

## 2021-08-01 PROCEDURE — 93005 ELECTROCARDIOGRAM TRACING: CPT | Performed by: EMERGENCY MEDICINE

## 2021-08-01 PROCEDURE — 96365 THER/PROPH/DIAG IV INF INIT: CPT

## 2021-08-01 PROCEDURE — 93005 ELECTROCARDIOGRAM TRACING: CPT

## 2021-08-01 PROCEDURE — 85025 COMPLETE CBC W/AUTO DIFF WBC: CPT | Performed by: EMERGENCY MEDICINE

## 2021-08-01 PROCEDURE — 84484 ASSAY OF TROPONIN QUANT: CPT | Performed by: EMERGENCY MEDICINE

## 2021-08-01 RX ORDER — ONDANSETRON 2 MG/ML
4 INJECTION INTRAMUSCULAR; INTRAVENOUS EVERY 30 MIN PRN
Status: DISCONTINUED | OUTPATIENT
Start: 2021-08-01 | End: 2021-08-01 | Stop reason: HOSPADM

## 2021-08-01 RX ORDER — MAGNESIUM SULFATE HEPTAHYDRATE 40 MG/ML
2 INJECTION, SOLUTION INTRAVENOUS ONCE
Status: COMPLETED | OUTPATIENT
Start: 2021-08-01 | End: 2021-08-01

## 2021-08-01 RX ORDER — LORAZEPAM 2 MG/ML
1 INJECTION INTRAMUSCULAR ONCE
Status: COMPLETED | OUTPATIENT
Start: 2021-08-01 | End: 2021-08-01

## 2021-08-01 RX ORDER — MAGNESIUM OXIDE 400 MG/1
800 TABLET ORAL ONCE
Status: COMPLETED | OUTPATIENT
Start: 2021-08-01 | End: 2021-08-01

## 2021-08-01 RX ADMIN — ONDANSETRON 4 MG: 2 INJECTION INTRAMUSCULAR; INTRAVENOUS at 03:29

## 2021-08-01 RX ADMIN — MAGNESIUM SULFATE HEPTAHYDRATE 2 G: 40 INJECTION, SOLUTION INTRAVENOUS at 04:07

## 2021-08-01 RX ADMIN — LORAZEPAM 1 MG: 2 INJECTION INTRAMUSCULAR; INTRAVENOUS at 03:29

## 2021-08-01 RX ADMIN — Medication 800 MG: at 04:07

## 2021-08-01 ASSESSMENT — ENCOUNTER SYMPTOMS
ROS GI COMMENTS: POSITIVE FOR LOOSE STOOLS
NAUSEA: 1
PALPITATIONS: 1
NERVOUS/ANXIOUS: 1
SHORTNESS OF BREATH: 0

## 2021-08-01 ASSESSMENT — MIFFLIN-ST. JEOR: SCORE: 1275.51

## 2021-08-01 NOTE — ED TRIAGE NOTES
Pt here via EMS with complaints of heart palpations, chest pain, dizziness and feeling shaky. Pt reports she woke up around 0000 with heart palpations. Pt is also complaining of nausea and diarrhea x 1 month. Pt was admitted at  7/17 for low Mg and SVT. Pt is rating pain 4/10 on verbal pain scale.

## 2021-08-01 NOTE — DISCHARGE INSTRUCTIONS
As discussed in the ER recommend follow-up with GI to work-up the loose stools that have persisted.  Also recommend magnesium supplement to help prevent hypomagnesemia again as long as you are having the loose stools take 1 tablet daily.

## 2021-08-01 NOTE — ED PROVIDER NOTES
NAME: Alisia Lin  AGE: 40 year old female  YOB: 1980  MRN: [id number]  EVALUATION DATE & TIME: 8/1/2021  2:49 AM    PCP: Sepideh Hines    ED PROVIDER: Mikel Johnson M.D.      Chief Complaint   Patient presents with     Chest Pain     FINAL IMPRESSION:  1. Loose stools    2. Hypomagnesemia    3. Anxiety    4. Atypical chest pain      MEDICAL DECISION MAKING:    3:07 AM I met with the patient, obtained history, performed an initial exam, and discussed options and plan for diagnostics and treatment here in the ED.   4:04 AM I went to recheck on the patient. I updated her on results.      Patient was clinically assessed and consented to treatment. After assessment, medical decision making and workup were discussed with the patient. The patient was agreeable to plan for testing, workup, and treatment.  Pertinent Labs & Imaging studies reviewed. (See chart for details)         Alisia Lin is a 40 year oldfemale who presents with chest pain and palpitations.   Differential diagnosis includes but not limited to anxiety attack, acute coronary syndrome, SVT, hypomagnesemia, hypokalemia, pulmonary embolism.  Patient presenting in July with similar symptoms and had SVT.  Patient however here is not significantly tachycardic though high normal heart rate.  EKG is unremarkable for SVT or any acute ischemia.  Pain is improved now and onset was over 3 hours ago we will check troponin and labs.  Patient given anxiety medicine and feeling much better.  I feel like some of this is likely anxiety though she did have hypomagnesemia before due to loose stools which have been ongoing since June.  Patient has discussed following up with her primary doctor however has not yet for this.  Labs obtained showed otherwise unremarkable CBC except for slightly elevated white blood cell count which could be related to the loose stools though they are not causing abdominal pain or profuse like C. difficile or  infectious diarrhea it is more that her regular stools are loose and likely contributing to the hypomagnesemia.  Her magnesium was low at 1.1 and will be repleted in the ER with oral and IV.  Additionally potassium and other electrolytes were unremarkable and creatinine was normal.  Urine troponin was negative and after discussion with patient she was feeling much better following some Ativan in the ER and will be plan for likely discharge home.  Chest x-ray was also unremarkable and following completion of electrolytes will plan for discharge.    The importance of close follow up was discussed. We reviewed warning signs and symptoms, and I instructed Ms. Lin to return to the emergency department immediately if she develops any new or worsening symptoms. I provided additional verbal discharge instructions. Ms. Lin expressed understanding and agreement with this plan of care, her questions were answered, and she was discharged in stable condition.       0 minutes of critical care time    MEDICATIONS GIVEN IN THE EMERGENCY:  Medications   ondansetron (ZOFRAN) injection 4 mg (4 mg Intravenous Given 8/1/21 0329)   LORazepam (ATIVAN) injection 1 mg (1 mg Intravenous Given 8/1/21 0329)   magnesium sulfate 2 g in water intermittent infusion (2 g Intravenous New Bag 8/1/21 0407)   magnesium oxide (MAG-OX) tablet 800 mg (800 mg Oral Given 8/1/21 0407)       NEW PRESCRIPTIONS STARTED AT TODAY'S ER VISIT:  New Prescriptions    MAGNESIUM OXIDE (MAG-OX) 400 (241.3 MG) MG TABLET    Take 1 tablet (400 mg) by mouth daily          =================================================================    HPI    Patient information was obtained from: Patient    Use of : N/A       Alisia Lin is a 40 year old female with a history of opiate dependence, asthma, SVT, and depression, who presents, via EMS, for chest pain.    The patient reports awaking from sleep about two hours PTA with heart palpitations.  She also  reports chest pain that she rates as a 4/10 in severity.  She has taken four baby Asprin since onset.  She reports she took her anxiety mediation including her Vistaril when she woke up as she felt anxious due to the palpitations. She reports having nausea as well.  She has had loose stools for the past month but has had negative Covid-19 testing.  No shortness of breath or other complaints at this time.     Per chart review, the patient was admitted at [facility] from 6/29-7/1/21 for cardiac arrest following a heroine overdose.  The patient was unresponsive without a pulse in the ED and CPR was started.  She was on IV Narcan.  She was then admitted to ICU for further evaluation and she did well overnight and was eventually discharged on 7/1/21.  The patient was admitted at [facility] hospital on 7/18/21 for SVT most likely due to hypomagnesia.  She was treated while admitted and echo showed normal EF of 60-65 percent.  CTA chest abdomen and pelvis showed no acute changes.  Serial troponins were negative.       REVIEW OF SYSTEMS   Review of Systems   Respiratory: Negative for shortness of breath.    Cardiovascular: Positive for chest pain and palpitations (4/10).   Gastrointestinal: Positive for nausea.        Positive for loose stools   Psychiatric/Behavioral: The patient is nervous/anxious.    All other systems reviewed and are negative.     PAST MEDICAL HISTORY:  Past Medical History:   Diagnosis Date     Acute stress reaction 5/31/2014     Anxiety      Arthritis      Asthma     Flovent BID     Back pain 1/26/2016     Chemical dependency (H) 06/05/2014    heroin, Norco     Chronic low back pain 3/19/2012     Influenza A 10/17/2016    with influenza pneumonia.  Hospitalized [facility]     Low TSH level 8/29/2017     Migraine with aura, without mention of intractable migraine without mention of status migrainosus     occas, Last one 11/2013. 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     Other, mixed, or unspecified nondependent drug abuse, in remission 1/1/07    Treatment for narcotic use      Papanicolaou smear of cervix with atypical squamous cells of undetermined significance (ASC-US) 3/2008    colp - WNL, HPV 53     Pyelonephritis 12/23/2015     Tobacco use disorder     1-1.5 ppd, zyban unsuccessful     Unspecified contraceptive management     ortho tricyclen       PAST SURGICAL HISTORY:  Past Surgical History:   Procedure Laterality Date     HC ENLARGE BREAST WITH IMPLANT  2002    BILATERAL     HC REMOVAL OF TONSILS,<11 Y/O      Description: Tonsillectomy;  Recorded: 10/03/2011;  Annotations: SHE THINKS IT WAS 5 OR 6 YEARS AGO     HC REMOVAL OF TONSILS,<11 Y/O      Description: Tonsillectomy;  Recorded: 10/03/2011;     HC REMOVE TONSILS/ADENOIDS,12+ Y/O  2006     HC TOOTH EXTRACTION W/FORCEP  16 yo    wisdom teeth     IUD PARAGARD  3/3/08    Removed with mirena placed. Mirena has now been removed as well.      LEEP TX, CERVICAL  age 15?     ORTHOPEDIC SURGERY      Lumbar fusion 2009     MO ARTHRODESIS ANT INTERBODY MIN DISCECTOMY,LUMBAR      Description: Lumbar Vertebral Fusion;  Recorded: 10/03/2011;     [facility] ENLARGE BREAST      Description: Breast Surgery Enlargement Procedure;  Recorded: 10/03/2011;  Annotations: DATE 2009     [facility] ENLARGE BREAST      Description: Breast Surgery Enlargement Procedure Bilateral;  Recorded: 10/03/2011;       CURRENT MEDICATIONS:      Current Facility-Administered Medications:      ondansetron (ZOFRAN) injection 4 mg, 4 mg, Intravenous, Q30 Min PRN, Mikel Johnson MD, 4 mg at 08/01/21 7477    Current Outpatient Medications:      magnesium oxide (MAG-OX) 400 (241.3 Mg) MG tablet, Take 1 tablet (400 mg) by mouth daily, Disp: 20 tablet, Rfl: 0     acetaminophen  (TYLENOL) 500 MG tablet, Take 1,000 mg by mouth 3 times daily as needed for mild pain, Disp: , Rfl:      amphetamine-dextroamphetamine (ADDERALL) 10 MG tablet, Take 10 mg by mouth 3 times daily as needed, Disp: , Rfl:      aspirin 81 MG EC tablet, Take 81 mg by mouth daily, Disp: , Rfl:      diclofenac (VOLTAREN) 1 % topical gel, Apply 4 g topically 4 times daily, Disp: 100 g, Rfl: 0     hydrOXYzine (VISTARIL) 50 MG capsule, Take 50 mg by mouth 3 times daily as needed, Disp: , Rfl:      ibuprofen (ADVIL/MOTRIN) 600 MG tablet, TAKE 1 TABLET BY MOUTH TWICE DAILY WITH MEALS, Disp: 180 tablet, Rfl: 1     Lidocaine (LIDOCARE) 4 % Patch, Place 1 patch onto the skin every 24 hours To prevent lidocaine toxicity, patient should be patch free for 12 hrs daily., Disp: 15 patch, Rfl: 0     lurasidone (LATUDA) 40 MG TABS tablet, Take 40 mg by mouth daily as needed, Disp: , Rfl:      Magnesium Cl-Calcium Carbonate (SLOW-MAG) 71.5-119 MG TBEC, Take 2 tablets by mouth daily, Disp: 60 tablet, Rfl: 0     methocarbamol (ROBAXIN) 750 MG tablet, TAKE 1 TABLET(750 MG) BY MOUTH THREE TIMES DAILY AS NEEDED FOR MUSCLE SPASMS (Patient taking differently: Take 1,500 mg by mouth 3 times daily as needed for muscle spasms ), Disp: 90 tablet, Rfl: 1     PARoxetine (PAXIL) 40 MG tablet, TAKE 1 TABLET(40 MG) BY MOUTH EVERY MORNING, Disp: 90 tablet, Rfl: 0     prazosin (MINIPRESS) 2 MG capsule, TAKE 3 CAPSULES BY MOUTH EVERY NIGHT AT BEDTIME, Disp: 270 capsule, Rfl: 0     propranolol (INDERAL) 10 MG tablet, TAKE 1 TABLET(10 MG) BY MOUTH THREE TIMES DAILY AS NEEDED FOR ANXIETY, Disp: 90 tablet, Rfl: 0     VENTOLIN  (90 BASE) MCG/ACT Inhaler, INHALE 2 PUFFS INTO THE LUNGS EVERY 4 HOURS AS NEEDED FOR SHORTNESS OF BREATH OR DIFFICULT BREATHING OR WHEEZING, Disp: 18 g, Rfl: 0    ALLERGIES:  Allergies   Allergen Reactions     Compazine      Heart Problems/ Body went completley stiff for 8 hrs.     Nortriptyline      Skelaxin [Metaxalone]         FAMILY HISTORY:  Family History   Problem Relation Age of Onset     Hypertension Mother      Alcohol/Drug Mother         alcohol, sober for 19 years     Depression Mother         on medication     Lipids Mother         on medication     Alcohol/Drug Father         alcohol, still actively drinking     Lipids Father         on medication     Anxiety Disorder Father      Substance Abuse Father      C.A.D. Paternal Grandmother      Diabetes Paternal Grandmother      Breast Cancer Paternal Grandmother      Arthritis Paternal Grandmother      Cancer Paternal Grandmother         breast cancer     Cancer Paternal Grandfather         colon cancer     Asthma Daughter         x2     Thyroid Disease No family hx of        SOCIAL HISTORY:   Social History     Socioeconomic History     Marital status: Single     Spouse name: Not on file     Number of children: 2     Years of education: 14     Highest education level: Not on file   Occupational History     Occupation: self employed     Comment:      Employer: [employer]   Tobacco Use     Smoking status: Current Every Day Smoker     Packs/day: 1.50     Years: 10.00     Pack years: 15.00     Types: Other     Last attempt to quit: 10/1/2016     Years since quittin.8     Smokeless tobacco: Current User     Tobacco comment: less than 1/2 ppd   Substance and Sexual Activity     Alcohol use: No     Comment: JAAY 18 days ago     Drug use: No     Comment: clean for 18 days     Sexual activity: Yes     Partners: Male   Other Topics Concern      Service No     Blood Transfusions No     Caffeine Concern Not Asked     Occupational Exposure Yes     Comment: Works at [employer]     Hobby Hazards Not Asked     Sleep Concern Not Asked     Stress Concern Not Asked     Weight Concern Not Asked     Special Diet Not Asked     Back Care Not Asked     Exercise Not Asked     Bike Helmet Not Asked     Seat Belt Not Asked     Self-Exams Not Asked     Parent/sibling  "w/ CABG, MI or angioplasty before 65F 55M? No   Social History Narrative            Living with Father of youngest child  (3 girls 22, 18 and 10 )   Will be grandma around April 2018    No legal issues currently (possible identity theft).     Recently job loss ([employer])        Social Determinants of Health     Financial Resource Strain:      Difficulty of Paying Living Expenses:    Food Insecurity:      Worried About Running Out of Food in the Last Year:      Ran Out of Food in the Last Year:    Transportation Needs:      Lack of Transportation (Medical):      Lack of Transportation (Non-Medical):    Physical Activity:      Days of Exercise per Week:      Minutes of Exercise per Session:    Stress:      Feeling of Stress :    Social Connections:      Frequency of Communication with Friends and Family:      Frequency of Social Gatherings with Friends and Family:      Attends Cheondoism Services:      Active Member of Clubs or Organizations:      Attends Club or Organization Meetings:      Marital Status:    Intimate Partner Violence:      Fear of Current or Ex-Partner:      Emotionally Abused:      Physically Abused:      Sexually Abused:        PHYSICAL EXAM:    Vitals: /69   Pulse 89   Temp 98.5  F (36.9  C) (Oral)   Resp 16   Ht 1.473 m (4' 10\")   Wt 72.6 kg (160 lb)   LMP 08/01/2021   SpO2 97%   BMI 33.44 kg/m     Physical Exam  Vitals and nursing note reviewed.   Constitutional:       General: She is not in acute distress.     Appearance: She is well-developed and normal weight. She is not ill-appearing or toxic-appearing.   HENT:      Head: Normocephalic.   Eyes:      Extraocular Movements: Extraocular movements intact.      Pupils: Pupils are equal, round, and reactive to light.   Cardiovascular:      Rate and Rhythm: Normal rate and regular rhythm.  No extrasystoles are present.     Chest Wall: PMI is not displaced.      Heart sounds: Normal heart sounds.   Pulmonary:      Effort: " Pulmonary effort is normal. No tachypnea, accessory muscle usage or respiratory distress.      Breath sounds: Normal breath sounds. No stridor.   Chest:      Chest wall: No tenderness.   Abdominal:      Palpations: Abdomen is soft.      Tenderness: There is no abdominal tenderness.   Musculoskeletal:      Cervical back: Normal range of motion.      Right lower leg: No tenderness. No edema.      Left lower leg: No tenderness. No edema.   Skin:     General: Skin is warm and dry.      Coloration: Skin is not pale.   Neurological:      General: No focal deficit present.      Mental Status: She is alert and oriented to person, place, and time.   Psychiatric:         Mood and Affect: Mood is anxious.           LAB:  All pertinent labs reviewed and interpreted.  Labs Ordered and Resulted from Time of ED Arrival Up to the Time of Departure from the ED   MAGNESIUM - Abnormal; Notable for the following components:       Result Value    Magnesium 1.1 (*)     All other components within normal limits   COMPREHENSIVE METABOLIC PANEL - Abnormal; Notable for the following components:    Carbon Dioxide (CO2) 21 (*)     All other components within normal limits   CBC WITH PLATELETS AND DIFFERENTIAL - Abnormal; Notable for the following components:    WBC Count 13.8 (*)     Absolute Neutrophils 9.3 (*)     Absolute Immature Granulocytes 0.2 (*)     All other components within normal limits   TROPONIN I - Normal   DRUGS OF ABUSE 1+ PANEL, URINE ([facility] ONLY)   PERIPHERAL IV CATHETER   CARDIAC CONTINUOUS MONITORING   PULSE OXIMETRY NURSING   CBC WITH PLATELETS & DIFFERENTIAL    Narrative:     The following orders were created for panel order CBC with platelets differential.  Procedure                               Abnormality         Status                     ---------                               -----------         ------                     CBC with platelets and d...[[id number]]  Abnormal            Final result                  Please view results for these tests on the individual orders.   URINE DRUGS OF ABUSE SCREEN    Narrative:     The following orders were created for panel order Urine Drugs of Abuse Screen Panel 1+ - Drug Screen plus Methadone.  Procedure                               Abnormality         Status                     ---------                               -----------         ------                     Drugs of Abuse 1+ Panel,...[[id number]]                                                   Please view results for these tests on the individual orders.     RADIOLOGY:  XR Chest Port 1 View   Final Result   IMPRESSION: Heart size is normal. Lungs are clear bilaterally. Mediastinum and visualized bony structures are unremarkable.        EKG:   Performed at: 2:55 on 01-Aug-2021  Impression: Normal  Rate: 92 BPM  Rhythm: Sinus  QRS Interval: 88 ms  QTc Interval: 362/447 ms  Comparison: When compared from EKG from 18-Jul-2021, vent rate has decreased by 46 bpm.   I have independently reviewed and interpreted the EKG(s) documented above.     PROCEDURES:   Procedures       I, Adalberto Yuan, am serving as a scribe to document services personally performed by Dr. Mikel Johnson  based on my observation and the provider's statements to me. I, Mikel Johnson MD attest that Adalberto Yuan is acting in a scribe capacity, has observed my performance of the services and has documented them in accordance with my direction.      Mikel Johnson M.D.  Emergency Medicine  [facility] EMERGENCY ROOM  [address] [address]  [phone]  Dept: [phone]      Mikel Johnson MD  08/01/21 0418

## 2021-08-01 NOTE — ED NOTES
Bed: [facility]ED-05  Expected date: 8/1/21  Expected time:   Means of arrival:   Comments:  40 yr F heart palpations HR 70's zofran given

## 2021-08-02 ENCOUNTER — PATIENT OUTREACH (OUTPATIENT)
Dept: CARE COORDINATION | Facility: CLINIC | Age: 41
End: 2021-08-02

## 2021-08-02 NOTE — PROGRESS NOTES
Clinic Care Coordination Contact  [facility]/Voicemail       Clinical Data: Care Coordinator Outreach  Outreach attempted x 2.  Left message on patient's voicemail with call back information and requested return call.  Plan:Care Coordinator will try to reach patient again in 1-2 business days.    Re Almonte [facility] Care Coordination  Tel: [phone]

## 2021-08-02 NOTE — ED NOTES
Placed orders for EKG.  Date of exam was on 08/01/2021 at 02:55  Jovana Porras RN 8/1/2021 8:53 PM

## 2021-08-04 ENCOUNTER — PATIENT OUTREACH (OUTPATIENT)
Dept: NURSING | Facility: CLINIC | Age: 41
End: 2021-08-04
Payer: COMMERCIAL

## 2021-08-11 ENCOUNTER — PATIENT OUTREACH (OUTPATIENT)
Dept: CARE COORDINATION | Facility: CLINIC | Age: 41
End: 2021-08-11

## 2021-08-11 NOTE — PROGRESS NOTES
Clinic Care Coordination Contact  [facility]/Voicemail       Clinical Data: Care Coordinator Outreach  Outreach attempted x 1.  Left message on patient's voicemail with call back information and requested return call.  Plan: Care Coordinator will try to reach patient again in 1-2 business days.  CCP sent to patient via [employer].    Re Almonte [facility] Care Coordination  Tel: [phone]

## 2021-08-11 NOTE — LETTER
[facility]  Complex Care Plan  About Me:    Patient Name:  Alisia Velazquez    YOB: 1980  Age:         40 year old   [facility] MRN:    [id number] Telephone Information:  Home Phone [phone]   Mobile [phone]       Address:  [address]  [facility] [address] Email address:  [email or web address]      Emergency Contact(s)    Name Relationship Lgl Grd Work Phone Home Phone Mobile Phone   1. GABBI VELAZQUEZ Mother   [phone]   2. MITZY VELAZQUEZ Father   [phone]           Primary language:  English     needed? No   [facility]:  [phone] op. 1  Other communication barriers:    Preferred Method of Communication:  Mail  Current living arrangement:    Mobility Status/ Medical Equipment:      Health Maintenance  Health Maintenance Reviewed:      My Access Plan  Medical Emergency 911   Primary Clinic Line [facility] - [phone]   24 Hour Appointment Line [phone] or  [phone]) (toll-free)   24 Hour Nurse Line [phone] (toll-free)   Preferred Urgent Care     Preferred Hospital     Preferred Pharmacy [facility] #[facility] [address] AT [address]     Behavioral Health Crisis Line The National Suicide Prevention Lifeline at 1-594.434.5392 or 911             My Care Team Members  Patient Care Team       Relationship Specialty Notifications Start End    Sepideh Hines APRN CNP PCP - General Nurse Practitioner - Family  3/13/18     Phone: [phone] Fax: [phone]         [address] [facility] [address]    Michael Lemos MD Assigned PCP   3/14/21     Phone: [phone] Fax: [phone]         [facility] [address] [place] [address]    Re Almonte LSW Lead Care Coordinator Primary Care - CC Admissions 7/5/21     Phone: [phone]          Lakshmi Lagunas Community Health Worker  Admissions 8/4/21             My Care Plans  Self Management and Treatment Plan  Goals and (Comments)  Goals        General     Mental Health Management (pt-stated)      Notes - Note created  7/5/2021  1:19 PM by Re Almonte LSW     Goal Statement: I will get stable housing  Date Goal set: 07/5/21  Barriers: [place]; opioid use  Strengths: motivated to get housing   Date to Achieve By: 10/5/21     Patient expressed understanding of goal: yes  Action steps to achieve this goal:  1. I will call the number for the [facility] advocates at [phone]  2. I will take the shelter resource option I am given  3. I will keep in touch with care coordination.               Action Plans on File:   Asthma        Depression          Advance Care Plans/Directives Type:        My Medical and Care Information  Problem List   Patient Active Problem List   Diagnosis     Moderate recurrent major depression (H)     Migraine with aura     Tobacco use disorder     ASCUS on Pap smear     Chronic low back pain     Anxiety     Benign neoplasm of colon     Degeneration of intervertebral disc     Spondylosis     Displacement of intervertebral disc     Intermittent asthma, uncomplicated     Back pain     Shoulder pain, right     Narcotic dependence, in remission (H)     Abnormal thyroid function test     NATHANAEL (generalized anxiety disorder)     MDD (major depressive disorder), recurrent severe, without psychosis (H)     Morbid obesity (H)     SVT (supraventricular tachycardia) (H)      Current Medications and Allergies:  See printed Medication Report.    Care Coordination Start Date: No linked episodes   Frequency of Care Coordination:     Form Last Updated: 08/11/2021

## 2021-08-16 NOTE — PROGRESS NOTES
Clinic Care Coordination Contact  [facility]/Voicemail       Clinical Data: Care Coordinator Outreach  Outreach attempted x 2.  Left message on patient's voicemail with call back information and requested return call.  Plan: Care Coordinator will do no further outreaches at this time.    Re Almonte [facility] Care Coordination  Tel: [phone]

## 2021-10-23 ENCOUNTER — HEALTH MAINTENANCE LETTER (OUTPATIENT)
Age: 41
End: 2021-10-23

## 2022-02-12 ENCOUNTER — HEALTH MAINTENANCE LETTER (OUTPATIENT)
Age: 42
End: 2022-02-12

## 2022-03-14 ENCOUNTER — LAB REQUISITION (OUTPATIENT)
Dept: LAB | Facility: CLINIC | Age: 42
End: 2022-03-14
Payer: COMMERCIAL

## 2022-03-14 DIAGNOSIS — R63.4 ABNORMAL WEIGHT LOSS: ICD-10-CM

## 2022-03-14 LAB
ALBUMIN SERPL-MCNC: 4.2 G/DL (ref 3.4–5)
ALP SERPL-CCNC: 111 U/L (ref 40–150)
ALT SERPL W P-5'-P-CCNC: 32 U/L (ref 0–50)
ANION GAP SERPL CALCULATED.3IONS-SCNC: 9 MMOL/L (ref 3–14)
AST SERPL W P-5'-P-CCNC: 34 U/L (ref 0–45)
BILIRUB SERPL-MCNC: 1.1 MG/DL (ref 0.2–1.3)
BUN SERPL-MCNC: 10 MG/DL (ref 7–30)
CALCIUM SERPL-MCNC: 9.8 MG/DL (ref 8.5–10.1)
CHLORIDE BLD-SCNC: 101 MMOL/L (ref 94–109)
CO2 SERPL-SCNC: 26 MMOL/L (ref 20–32)
CREAT SERPL-MCNC: 0.74 MG/DL (ref 0.52–1.04)
GFR SERPL CREATININE-BSD FRML MDRD: >90 ML/MIN/1.73M2
GLUCOSE BLD-MCNC: 106 MG/DL (ref 70–99)
HBV CORE AB SERPL QL IA: NONREACTIVE
HBV SURFACE AB SERPL IA-ACNC: 0 M[IU]/ML
HBV SURFACE AG SERPL QL IA: NONREACTIVE
HIV 1+2 AB+HIV1P24 AG SERPLBLD IA.RAPID: NON REACTIVE
HIV 1+2 AB+HIV1P24 AG SERPLBLD IA.RAPID: NON REACTIVE
HIV INTERPRETATION: NORMAL
POTASSIUM BLD-SCNC: 4.2 MMOL/L (ref 3.4–5.3)
PROT SERPL-MCNC: 8.1 G/DL (ref 6.8–8.8)
SODIUM SERPL-SCNC: 136 MMOL/L (ref 133–144)
T4 FREE SERPL-MCNC: 0.92 NG/DL (ref 0.76–1.46)
TSH SERPL DL<=0.005 MIU/L-ACNC: 1.05 MU/L (ref 0.4–4)

## 2022-03-14 PROCEDURE — 86706 HEP B SURFACE ANTIBODY: CPT | Performed by: INTERNAL MEDICINE

## 2022-03-14 PROCEDURE — 86704 HEP B CORE ANTIBODY TOTAL: CPT | Performed by: INTERNAL MEDICINE

## 2022-03-14 PROCEDURE — 84439 ASSAY OF FREE THYROXINE: CPT | Mod: ORL | Performed by: INTERNAL MEDICINE

## 2022-03-14 PROCEDURE — 87340 HEPATITIS B SURFACE AG IA: CPT | Mod: ORL | Performed by: INTERNAL MEDICINE

## 2022-03-14 PROCEDURE — 87806 HIV AG W/HIV1&2 ANTB W/OPTIC: CPT | Mod: ORL | Performed by: INTERNAL MEDICINE

## 2022-03-14 PROCEDURE — 87186 SC STD MICRODIL/AGAR DIL: CPT | Performed by: INTERNAL MEDICINE

## 2022-03-14 PROCEDURE — 86803 HEPATITIS C AB TEST: CPT | Mod: ORL | Performed by: INTERNAL MEDICINE

## 2022-03-14 PROCEDURE — 84443 ASSAY THYROID STIM HORMONE: CPT | Mod: ORL | Performed by: INTERNAL MEDICINE

## 2022-03-14 PROCEDURE — 80053 COMPREHEN METABOLIC PANEL: CPT | Performed by: INTERNAL MEDICINE

## 2022-03-15 LAB — HCV AB SERPL QL IA: REACTIVE

## 2022-03-16 LAB — BACTERIA UR CULT: ABNORMAL

## 2022-04-21 ENCOUNTER — ANCILLARY PROCEDURE (OUTPATIENT)
Dept: MAMMOGRAPHY | Facility: CLINIC | Age: 42
End: 2022-04-21
Attending: REGISTERED NURSE
Payer: COMMERCIAL

## 2022-04-21 DIAGNOSIS — R22.31 AXILLARY LUMP, RIGHT: ICD-10-CM

## 2022-04-21 PROCEDURE — G0279 TOMOSYNTHESIS, MAMMO: HCPCS | Performed by: RADIOLOGY

## 2022-04-21 PROCEDURE — 77066 DX MAMMO INCL CAD BI: CPT | Performed by: RADIOLOGY

## 2022-04-21 PROCEDURE — 76882 US LMTD JT/FCL EVL NVASC XTR: CPT | Mod: RT | Performed by: RADIOLOGY

## 2022-07-01 NOTE — TELEPHONE ENCOUNTER
Admission Date: 9/15/2020    Identify any current concerns with potential impact to admission:     medication/medical concerns: weight gain reported - recommend call to PCP     immediate safety concerns: none currently    Does patient have safety plan? yes  Note: Please copy safety plan copied into [facility] Encounter     Other (insurance/childcare/transportation/housing/planned absences/etc): does have some consulations    Patient's [place] is[place] [employer]/[place] . Does patient need appointment with provider? NA    If patient has Medical Assistance ([place]) is LOCUS and Functional Assessment completed? NA    If patient is in Partial Hospitalization Program is LOCUS completed? NA                                                                                      (delete if not applicable):   For Dual Disorder Outpatient Program:     Patient reported her last use of substances as: Sept 2.     Patient reports the following concerns in regards to withdrawal/intoxication: none        Completed by: VICENTE Ford/VINAYAK      
PAST MEDICAL HISTORY:  Chronic back pain

## 2022-08-03 ENCOUNTER — APPOINTMENT (OUTPATIENT)
Dept: RADIOLOGY | Facility: CLINIC | Age: 42
End: 2022-08-03
Attending: EMERGENCY MEDICINE
Payer: COMMERCIAL

## 2022-08-03 ENCOUNTER — HOSPITAL ENCOUNTER (EMERGENCY)
Facility: CLINIC | Age: 42
Discharge: HOME | End: 2022-08-03
Attending: EMERGENCY MEDICINE | Admitting: EMERGENCY MEDICINE
Payer: COMMERCIAL

## 2022-08-03 VITALS
TEMPERATURE: 98.1 F | HEIGHT: 59 IN | SYSTOLIC BLOOD PRESSURE: 136 MMHG | DIASTOLIC BLOOD PRESSURE: 79 MMHG | BODY MASS INDEX: 31.25 KG/M2 | HEART RATE: 110 BPM | RESPIRATION RATE: 16 BRPM | WEIGHT: 155 LBS | OXYGEN SATURATION: 98 %

## 2022-08-03 DIAGNOSIS — E83.42 HYPOMAGNESEMIA: ICD-10-CM

## 2022-08-03 DIAGNOSIS — E87.6 HYPOKALEMIA: ICD-10-CM

## 2022-08-03 DIAGNOSIS — F10.930 ALCOHOL WITHDRAWAL SYNDROME WITHOUT COMPLICATION (H): ICD-10-CM

## 2022-08-03 LAB
ANION GAP SERPL CALCULATED.3IONS-SCNC: 19 MMOL/L (ref 5–18)
ANION GAP SERPL CALCULATED.3IONS-SCNC: 6 MMOL/L (ref 5–18)
ATRIAL RATE - MUSE: 105 BPM
BASOPHILS # BLD AUTO: 0.1 10E3/UL (ref 0–0.2)
BASOPHILS NFR BLD AUTO: 1 %
BUN SERPL-MCNC: 10 MG/DL (ref 8–22)
BUN SERPL-MCNC: 11 MG/DL (ref 8–22)
CALCIUM SERPL-MCNC: 7.4 MG/DL (ref 8.5–10.5)
CALCIUM SERPL-MCNC: 8.5 MG/DL (ref 8.5–10.5)
CHLORIDE BLD-SCNC: 101 MMOL/L (ref 98–107)
CHLORIDE BLD-SCNC: 93 MMOL/L (ref 98–107)
CO2 SERPL-SCNC: 28 MMOL/L (ref 22–31)
CO2 SERPL-SCNC: 29 MMOL/L (ref 22–31)
CREAT SERPL-MCNC: 0.71 MG/DL (ref 0.6–1.1)
CREAT SERPL-MCNC: 0.75 MG/DL (ref 0.6–1.1)
DIASTOLIC BLOOD PRESSURE - MUSE: 88 MMHG
EOSINOPHIL # BLD AUTO: 0 10E3/UL (ref 0–0.7)
EOSINOPHIL NFR BLD AUTO: 0 %
ERYTHROCYTE [DISTWIDTH] IN BLOOD BY AUTOMATED COUNT: 13.5 % (ref 10–15)
ETHANOL SERPL-MCNC: 72 MG/DL
GFR SERPL CREATININE-BSD FRML MDRD: >90 ML/MIN/1.73M2
GFR SERPL CREATININE-BSD FRML MDRD: >90 ML/MIN/1.73M2
GLUCOSE BLD-MCNC: 101 MG/DL (ref 70–125)
GLUCOSE BLD-MCNC: 105 MG/DL (ref 70–125)
HCT VFR BLD AUTO: 40.3 % (ref 35–47)
HGB BLD-MCNC: 15 G/DL (ref 11.7–15.7)
HOLD SPECIMEN: NORMAL
HOLD SPECIMEN: NORMAL
IMM GRANULOCYTES # BLD: 0 10E3/UL
IMM GRANULOCYTES NFR BLD: 1 %
INTERPRETATION ECG - MUSE: NORMAL
LYMPHOCYTES # BLD AUTO: 3.2 10E3/UL (ref 0.8–5.3)
LYMPHOCYTES NFR BLD AUTO: 46 %
MAGNESIUM SERPL-MCNC: 1 MG/DL (ref 1.8–2.6)
MAGNESIUM SERPL-MCNC: 2 MG/DL (ref 1.8–2.6)
MCH RBC QN AUTO: 33.9 PG (ref 26.5–33)
MCHC RBC AUTO-ENTMCNC: 37.2 G/DL (ref 31.5–36.5)
MCV RBC AUTO: 91 FL (ref 78–100)
MONOCYTES # BLD AUTO: 0.6 10E3/UL (ref 0–1.3)
MONOCYTES NFR BLD AUTO: 9 %
NEUTROPHILS # BLD AUTO: 2.9 10E3/UL (ref 1.6–8.3)
NEUTROPHILS NFR BLD AUTO: 43 %
NRBC # BLD AUTO: 0 10E3/UL
NRBC BLD AUTO-RTO: 0 /100
P AXIS - MUSE: 64 DEGREES
PLATELET # BLD AUTO: 341 10E3/UL (ref 150–450)
POTASSIUM BLD-SCNC: 2.5 MMOL/L (ref 3.5–5)
POTASSIUM BLD-SCNC: 3.4 MMOL/L (ref 3.5–5)
PR INTERVAL - MUSE: 132 MS
QRS DURATION - MUSE: 88 MS
QT - MUSE: 360 MS
QTC - MUSE: 475 MS
R AXIS - MUSE: 83 DEGREES
RBC # BLD AUTO: 4.42 10E6/UL (ref 3.8–5.2)
SODIUM SERPL-SCNC: 136 MMOL/L (ref 136–145)
SODIUM SERPL-SCNC: 140 MMOL/L (ref 136–145)
SYSTOLIC BLOOD PRESSURE - MUSE: 154 MMHG
T AXIS - MUSE: 59 DEGREES
TROPONIN I SERPL-MCNC: <0.01 NG/ML (ref 0–0.29)
TROPONIN I SERPL-MCNC: <0.01 NG/ML (ref 0–0.29)
TROPONIN T BLD-MCNC: 0.02 UG/L
VENTRICULAR RATE- MUSE: 105 BPM
WBC # BLD AUTO: 6.8 10E3/UL (ref 4–11)

## 2022-08-03 PROCEDURE — 83735 ASSAY OF MAGNESIUM: CPT | Performed by: EMERGENCY MEDICINE

## 2022-08-03 PROCEDURE — 96376 TX/PRO/DX INJ SAME DRUG ADON: CPT

## 2022-08-03 PROCEDURE — 250N000011 HC RX IP 250 OP 636: Performed by: EMERGENCY MEDICINE

## 2022-08-03 PROCEDURE — 99285 EMERGENCY DEPT VISIT HI MDM: CPT | Mod: 25

## 2022-08-03 PROCEDURE — 96368 THER/DIAG CONCURRENT INF: CPT

## 2022-08-03 PROCEDURE — 84484 ASSAY OF TROPONIN QUANT: CPT | Performed by: EMERGENCY MEDICINE

## 2022-08-03 PROCEDURE — 258N000003 HC RX IP 258 OP 636: Performed by: EMERGENCY MEDICINE

## 2022-08-03 PROCEDURE — 96375 TX/PRO/DX INJ NEW DRUG ADDON: CPT

## 2022-08-03 PROCEDURE — 82077 ASSAY SPEC XCP UR&BREATH IA: CPT | Performed by: EMERGENCY MEDICINE

## 2022-08-03 PROCEDURE — 96365 THER/PROPH/DIAG IV INF INIT: CPT

## 2022-08-03 PROCEDURE — 93005 ELECTROCARDIOGRAM TRACING: CPT | Performed by: EMERGENCY MEDICINE

## 2022-08-03 PROCEDURE — 36415 COLL VENOUS BLD VENIPUNCTURE: CPT | Performed by: EMERGENCY MEDICINE

## 2022-08-03 PROCEDURE — 80048 BASIC METABOLIC PNL TOTAL CA: CPT | Performed by: EMERGENCY MEDICINE

## 2022-08-03 PROCEDURE — 96366 THER/PROPH/DIAG IV INF ADDON: CPT

## 2022-08-03 PROCEDURE — 85025 COMPLETE CBC W/AUTO DIFF WBC: CPT | Performed by: EMERGENCY MEDICINE

## 2022-08-03 PROCEDURE — 250N000009 HC RX 250: Performed by: EMERGENCY MEDICINE

## 2022-08-03 PROCEDURE — 250N000013 HC RX MED GY IP 250 OP 250 PS 637: Performed by: EMERGENCY MEDICINE

## 2022-08-03 PROCEDURE — 71045 X-RAY EXAM CHEST 1 VIEW: CPT

## 2022-08-03 RX ORDER — POTASSIUM CHLORIDE 7.45 MG/ML
10 INJECTION INTRAVENOUS ONCE
Status: COMPLETED | OUTPATIENT
Start: 2022-08-03 | End: 2022-08-03

## 2022-08-03 RX ORDER — LORAZEPAM 2 MG/ML
1 INJECTION INTRAMUSCULAR ONCE
Status: COMPLETED | OUTPATIENT
Start: 2022-08-03 | End: 2022-08-03

## 2022-08-03 RX ORDER — POTASSIUM CHLORIDE 1.5 G/1.58G
20 POWDER, FOR SOLUTION ORAL ONCE
Status: COMPLETED | OUTPATIENT
Start: 2022-08-03 | End: 2022-08-03

## 2022-08-03 RX ORDER — KETOROLAC TROMETHAMINE 15 MG/ML
15 INJECTION, SOLUTION INTRAMUSCULAR; INTRAVENOUS ONCE
Status: COMPLETED | OUTPATIENT
Start: 2022-08-03 | End: 2022-08-03

## 2022-08-03 RX ORDER — MAGNESIUM SULFATE HEPTAHYDRATE 40 MG/ML
2 INJECTION, SOLUTION INTRAVENOUS ONCE
Status: COMPLETED | OUTPATIENT
Start: 2022-08-03 | End: 2022-08-03

## 2022-08-03 RX ORDER — LORAZEPAM 1 MG/1
1 TABLET ORAL ONCE
Status: COMPLETED | OUTPATIENT
Start: 2022-08-03 | End: 2022-08-03

## 2022-08-03 RX ORDER — LORAZEPAM 1 MG/1
1 TABLET ORAL EVERY 6 HOURS PRN
Qty: 10 TABLET | Refills: 0 | Status: SHIPPED | OUTPATIENT
Start: 2022-08-03 | End: 2022-10-03

## 2022-08-03 RX ADMIN — LORAZEPAM 1 MG: 2 INJECTION INTRAMUSCULAR; INTRAVENOUS at 14:26

## 2022-08-03 RX ADMIN — SODIUM CHLORIDE 500 ML: 9 INJECTION, SOLUTION INTRAVENOUS at 11:29

## 2022-08-03 RX ADMIN — POTASSIUM CHLORIDE 10 MEQ: 7.46 INJECTION, SOLUTION INTRAVENOUS at 11:05

## 2022-08-03 RX ADMIN — KETOROLAC TROMETHAMINE 15 MG: 15 INJECTION, SOLUTION INTRAMUSCULAR; INTRAVENOUS at 10:21

## 2022-08-03 RX ADMIN — POTASSIUM CHLORIDE 20 MEQ: 1.5 POWDER, FOR SOLUTION ORAL at 11:06

## 2022-08-03 RX ADMIN — THIAMINE HYDROCHLORIDE: 100 INJECTION, SOLUTION INTRAMUSCULAR; INTRAVENOUS at 10:03

## 2022-08-03 RX ADMIN — LORAZEPAM 1 MG: 1 TABLET ORAL at 13:15

## 2022-08-03 RX ADMIN — LORAZEPAM 1 MG: 2 INJECTION INTRAMUSCULAR; INTRAVENOUS at 10:21

## 2022-08-03 RX ADMIN — MAGNESIUM SULFATE HEPTAHYDRATE 2 G: 40 INJECTION, SOLUTION INTRAVENOUS at 11:48

## 2022-08-03 RX ADMIN — LORAZEPAM 1 MG: 2 INJECTION INTRAMUSCULAR; INTRAVENOUS at 09:25

## 2022-08-03 NOTE — ED TRIAGE NOTES
Patient arrives with complaints of alcohol withdrawal after recent alcohol abuse- 1 pint of dayanara per day. Reports nausea, vomiting, tremors, headache, and history of seizure x1. She also endorses chest tightness. Recent depression which she has been drinking to cope with. Denies suicidal ideations.      Triage Assessment     Row Name 08/03/22 0910       Triage Assessment (Adult)    Airway WDL WDL       Respiratory WDL    Respiratory WDL X  hyperventilation        Skin Circulation/Temperature WDL    Skin Circulation/Temperature WDL WDL       Cardiac WDL    Cardiac WDL X;all    Cardiac Rhythm tachycardic       Chest Pain Assessment    Chest Pain Location --  entire chest feels tight    Character tightness    Associated Signs/Symptoms nausea;tachycardia;vomiting       Peripheral/Neurovascular WDL    Peripheral Neurovascular WDL WDL       Cognitive/Neuro/Behavioral WDL    Cognitive/Neuro/Behavioral WDL X;all    Level of Consciousness alert    Orientation disoriented to;time       Pupils (CN II)    Pupil Size Left 3 mm    Pupil Size Right 3 mm       Bambi Coma Scale    Best Eye Response 4-->(E4) spontaneous    Best Motor Response 6-->(M6) obeys commands    Best Verbal Response 5-->(V5) oriented    Bambi Coma Scale Score 15

## 2022-08-03 NOTE — ED NOTES
AIDET performed. Patient updated on plan of care. Patient's pain assessed. Bed in low position, side rails up. Visitor at bedside: none. PT is in a hallway bed no call light available.

## 2022-08-03 NOTE — ED PROVIDER NOTES
EMERGENCY DEPARTMENT ENCOUNTER      NAME: Alisia Lin  AGE: 41 year old female  YOB: 1980  MRN: [id number]  EVALUATION DATE & TIME: 8/3/2022  9:03 AM    PCP: Sepideh Hines    ED PROVIDER: Richard Rivera D.O.      Chief Complaint   Patient presents with     Alcohol Intoxication       FINAL IMPRESSION:  1. Alcohol withdrawal syndrome without complication (H)    2. Hypokalemia    3. Hypomagnesemia        ED COURSE & MEDICAL DECISION MAKIN:11 AM I met with the patient to gather history and to perform my initial exam. I discussed the plan for care while in the Emergency Department.  10:14 AM I rechecked on the patient and she has a progressively worsened headaches from lorazepam.     ED Course as of 22 1436   Wed Aug 03, 2022   1430 Patient reports resting HR is normally 110bpm       Pertinent Labs & Imaging studies reviewed. (See chart for details)  41 year old female presents to the Emergency Department for evaluation of alcohol withdrawal symptoms.  Patient was tremulous and tachycardic and anxious in the emergency department.  This did seem to improve with Ativan.  Patient otherwise was fairly asymptomatic and her acute findings on exam were unremarkable including normal troponins.  As her symptoms, including chest pain, improved so significantly with Ativan, I do suspect her symptoms are related to her alcohol withdrawal.  She has no previous history of seizures other severe withdrawal symptoms in the past.  I was unable to get her heart rate below 110, and I was concerned that this could be secondary to her withdrawal, however after discussion with the patient, I found that the patient reports her resting heart rate to be at 110 beats a minute.  With this, I do suspect that her remaining tachycardia now is to her normal baseline and does not represent alcohol withdrawal.  Therefore I do believe her to be safe for discharge to home but will discharge on Ativan.  I will only  "discharge with a short course and she will follow-up otherwise with her primary care provider.  She verbalized understanding agreement this plan.  Return precautions discussed.    At the conclusion of the encounter I discussed the results of all of the tests and the disposition. The questions were answered. The patient or family acknowledged understanding and was agreeable with the care plan.      HPI    Patient information was obtained from: Patient    Use of : N/A      Alisia Lin is a 41 year old female with pertinent history of malignant neoplasm of breast, chronic low back pain, HPV, hypercholesteremia, obesity, chest pain, bipolar disorder alcohol withdrawal, overdose of heroin, anxiety, and SI who presents via EMS for the evaluation of alcohol intoxication.     Patients report alcohol withdrawal for weeks and months now. Her last drink was just last night. She states she does not feel good and that \"something is not right\". Patient is drinking liquor a brand named Kelsey. She also associated chest tightness, nausea, vomiting, tremors, and headaches . She denies usage of marijuana and smoking. She does vape. Patient has a history of enlarged breast with implants and triple fusion. Patients have allergies to compazine, nortriptyline, and skelaxin. Otherwise, patient denies diarrhea, shortness of breath, congestion, fever, and sore throat. No other complaints at this time.    REVIEW OF SYSTEMS  Constitutional:  Denies fever, chills, weight loss or weakness  Eyes:  No pain, discharge, redness  HENT:  Denies sore throat, ear pain, congestion  Respiratory: No SOB, wheeze or cough  Cardiovascular:  No palpitations. Positive for chest tightness   GI:  Denies abdominal pain and diarrhea. Positive for nausea and vomiting  : Denies dysuria, hematuria  Musculoskeletal:  Denies any new muscle/joint pain, swelling or loss of function.  Skin:  Denies rash, pallor  Neurologic:  Denies focal weakness or " sensory changes. Positive for headache and tremor   Lymph: Denies swollen nodes    All other systems negative unless noted in HPI.    PAST MEDICAL HISTORY:  Past Medical History:   Diagnosis Date     Acute stress reaction 5/31/2014     Anxiety      Arthritis      Asthma     Flovent BID     Back pain 1/26/2016     Chemical dependency (H) 06/05/2014    heroin, Norco     Chronic low back pain 3/19/2012     Influenza A 10/17/2016    with influenza pneumonia.  Hospitalized [facility]     Low TSH level 8/29/2017     Migraine with aura, without mention of intractable migraine without mention of status migrainosus     occas, Last one 11/2013. 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     Other, mixed, or unspecified nondependent drug abuse, in remission 1/1/07    Treatment for narcotic use      Papanicolaou smear of cervix with atypical squamous cells of undetermined significance (ASC-US) 3/2008    colp - WNL, HPV 53     Pyelonephritis 12/23/2015     Tobacco use disorder     1-1.5 ppd, zyban unsuccessful     Unspecified contraceptive management     ortho tricyclen       PAST SURGICAL HISTORY:  Past Surgical History:   Procedure Laterality Date     HC ENLARGE BREAST WITH IMPLANT  2002    BILATERAL     HC REMOVAL OF TONSILS,<11 Y/O      Description: Tonsillectomy;  Recorded: 10/03/2011;  Annotations: SHE THINKS IT WAS 5 OR 6 YEARS AGO     HC REMOVAL OF TONSILS,<11 Y/O      Description: Tonsillectomy;  Recorded: 10/03/2011;     HC REMOVE TONSILS/ADENOIDS,12+ Y/O  2006     HC TOOTH EXTRACTION W/FORCEP  16 yo    wisdom teeth     IUD PARAGARD  3/3/08    Removed with mirena placed. Mirena has now been removed as well.      LEEP TX, CERVICAL  age 15?     ORTHOPEDIC SURGERY      Lumbar fusion 2009     ID  ARTHRODESIS ANT INTERBODY MIN DISCECTOMY,LUMBAR      Description: Lumbar Vertebral Fusion;  Recorded: 10/03/2011;     [facility] ENLARGE BREAST      Description: Breast Surgery Enlargement Procedure;  Recorded: 10/03/2011;  Annotations: DATE 2009     [facility] ENLARGE BREAST      Description: Breast Surgery Enlargement Procedure Bilateral;  Recorded: 10/03/2011;         CURRENT MEDICATIONS:    No current facility-administered medications for this encounter.     Current Outpatient Medications   Medication     LORazepam (ATIVAN) 1 MG tablet     acetaminophen (TYLENOL) 500 MG tablet     amphetamine-dextroamphetamine (ADDERALL) 10 MG tablet     aspirin 81 MG EC tablet     diclofenac (VOLTAREN) 1 % topical gel     hydrOXYzine (VISTARIL) 50 MG capsule     ibuprofen (ADVIL/MOTRIN) 600 MG tablet     Lidocaine (LIDOCARE) 4 % Patch     lurasidone (LATUDA) 40 MG TABS tablet     Magnesium Cl-Calcium Carbonate (SLOW-MAG) 71.5-119 MG TBEC     magnesium oxide (MAG-OX) 400 (241.3 Mg) MG tablet     methocarbamol (ROBAXIN) 750 MG tablet     PARoxetine (PAXIL) 40 MG tablet     prazosin (MINIPRESS) 2 MG capsule     propranolol (INDERAL) 10 MG tablet     VENTOLIN  (90 BASE) MCG/ACT Inhaler         ALLERGIES:  Allergies   Allergen Reactions     Compazine      Heart Problems/ Body went completley stiff for 8 hrs.     Nortriptyline      Skelaxin [Metaxalone]        FAMILY HISTORY:  Family History   Problem Relation Age of Onset     Hypertension Mother      Alcohol/Drug Mother         alcohol, sober for 19 years     Depression Mother         on medication     Lipids Mother         on medication     Alcohol/Drug Father         alcohol, still actively drinking     Lipids Father         on medication     Anxiety Disorder Father      Substance Abuse Father      C.A.D. Paternal Grandmother      Diabetes Paternal Grandmother      Breast Cancer Paternal Grandmother      Arthritis Paternal Grandmother      Cancer Paternal Grandmother         breast  "cancer     Cancer Paternal Grandfather         colon cancer     Asthma Daughter         x2     Thyroid Disease No family hx of        SOCIAL HISTORY:  Social History     Socioeconomic History     Marital status: Single     Number of children: 2     Years of education: 14   Occupational History     Occupation: self employed     Comment:      Employer: [employer]   Tobacco Use     Smoking status: Current Every Day Smoker     Packs/day: 1.50     Years: 10.00     Pack years: 15.00     Types: Other     Last attempt to quit: 10/1/2016     Years since quittin.8     Smokeless tobacco: Current User     Tobacco comment: less than 1/2 ppd   Substance and Sexual Activity     Alcohol use: No     Comment: JAYA 18 days ago     Drug use: No     Comment: clean for 18 days     Sexual activity: Yes     Partners: Male   Other Topics Concern      Service No     Blood Transfusions No     Occupational Exposure Yes     Comment: Works at [employer]     Parent/sibling w/ CABG, MI or angioplasty before 65F 55M? No   Social History Narrative            Living with Father of youngest child  (3 girls 22, 18 and 10 )   Will be grandma around 2018    No legal issues currently (possible identity theft).     Recently job loss ([employer])          VITALS:  Patient Vitals for the past 24 hrs:   BP Temp Temp src Pulse Resp SpO2 Height Weight   22 1431 (!) 144/92 -- -- 111 16 98 % -- --   22 1204 (!) 147/97 -- -- 119 20 97 % -- --   22 1118 (!) 156/86 -- -- 103 22 98 % -- --   22 0916 -- -- -- -- -- -- 1.499 m (4' 11\") 70.3 kg (155 lb)   22 0909 (!) 154/88 98.1  F (36.7  C) Temporal 110 26 98 % -- --       PHYSICAL EXAM    VITAL SIGNS: BP (!) 144/92   Pulse 111   Temp 98.1  F (36.7  C) (Temporal)   Resp 16   Ht 1.499 m (4' 11\")   Wt 70.3 kg (155 lb)   LMP 2022   SpO2 98%   BMI 31.31 kg/m      General Appearance: Well-appearing, well-nourished, anxious and " tremulous  Head:  Normocephalic, without obvious abnormality, atraumatic  Eyes:  PERRL, conjunctiva/corneas clear, EOM's intact,  ENT:  Lips, mucosa, and tongue normal, membranes are moist without pallor  Neck:  Normal ROM, symmetrical, trachea midline    Cardio:  Tachycardia, no murmur, rub or gallop, 2+ pulses symmetric in all extremities  Pulm:  Clear to auscultation bilaterally, respirations unlabored,  Abdomen:  Soft, non-tender, no rebound or guarding.  Musculoskeletal: Full ROM, no edema, no cyanosis, good ROM of major joints  Integument:  Warm, Dry, No erythema, No rash.    Neurologic:  Alert & oriented.  No focal deficits appreciated.  Ambulatory.  Psychiatric:  Affect normal, Judgment normal, Mood normal.      LABS  Results for orders placed or performed during the hospital encounter of 08/03/22 (from the past 24 hour(s))   ECG 12-LEAD WITH MUSE (E)   Result Value Ref Range    Systolic Blood Pressure 154 mmHg    Diastolic Blood Pressure 88 mmHg    Ventricular Rate 105 BPM    Atrial Rate 105 BPM    WV Interval 132 ms    QRS Duration 88 ms     ms    QTc 475 ms    P Axis 64 degrees    R AXIS 83 degrees    T Axis 59 degrees    Interpretation ECG       Sinus tachycardia  Otherwise normal ECG  When compared with ECG of 01-AUG-2021 02:55,  No significant change was found  Confirmed by SEE ED PROVIDER NOTE FOR, ECG INTERPRETATION ([id number]),  DONAVON TERRAZAS ([id number]) on 8/3/2022 9:26:08 AM     CBC with platelets + differential    Narrative    The following orders were created for panel order CBC with platelets + differential.  Procedure                               Abnormality         Status                     ---------                               -----------         ------                     CBC with platelets and d...[[id number]]  Abnormal            Final result                 Please view results for these tests on the individual orders.   Basic metabolic panel   Result Value Ref Range    Sodium  140 136 - 145 mmol/L    Potassium 2.5 (LL) 3.5 - 5.0 mmol/L    Chloride 93 (L) 98 - 107 mmol/L    Carbon Dioxide (CO2) 28 22 - 31 mmol/L    Anion Gap 19 (H) 5 - 18 mmol/L    Urea Nitrogen 11 8 - 22 mg/dL    Creatinine 0.75 0.60 - 1.10 mg/dL    Calcium 8.5 8.5 - 10.5 mg/dL    Glucose 101 70 - 125 mg/dL    GFR Estimate >90 >60 mL/min/1.73m2   Alcohol level blood   Result Value Ref Range    Alcohol, Blood 72 (H) None detected mg/dL   Troponin I (now)   Result Value Ref Range    Troponin I <0.01 0.00 - 0.29 ng/mL   CBC with platelets and differential   Result Value Ref Range    WBC Count 6.8 4.0 - 11.0 10e3/uL    RBC Count 4.42 3.80 - 5.20 10e6/uL    Hemoglobin 15.0 11.7 - 15.7 g/dL    Hematocrit 40.3 35.0 - 47.0 %    MCV 91 78 - 100 fL    MCH 33.9 (H) 26.5 - 33.0 pg    MCHC 37.2 (H) 31.5 - 36.5 g/dL    RDW 13.5 10.0 - 15.0 %    Platelet Count 341 150 - 450 10e3/uL    % Neutrophils 43 %    % Lymphocytes 46 %    % Monocytes 9 %    % Eosinophils 0 %    % Basophils 1 %    % Immature Granulocytes 1 %    NRBCs per 100 WBC 0 <1 /100    Absolute Neutrophils 2.9 1.6 - 8.3 10e3/uL    Absolute Lymphocytes 3.2 0.8 - 5.3 10e3/uL    Absolute Monocytes 0.6 0.0 - 1.3 10e3/uL    Absolute Eosinophils 0.0 0.0 - 0.7 10e3/uL    Absolute Basophils 0.1 0.0 - 0.2 10e3/uL    Absolute Immature Granulocytes 0.0 <=0.4 10e3/uL    Absolute NRBCs 0.0 10e3/uL   iStat Troponin, POCT   Result Value Ref Range    TROPPC POCT 0.02 <=0.12 ug/L   Magnesium   Result Value Ref Range    Magnesium 1.0 (LL) 1.8 - 2.6 mg/dL   [place] Draw    Narrative    The following orders were created for panel order [place] Draw.  Procedure                               Abnormality         Status                     ---------                               -----------         ------                     Extra Blue Top Tube[[id number]]                              Final result               Extra Red Top Tube[[id number]]                               Final result                  Please view results for these tests on the individual orders.   Extra Blue Top Tube   Result Value Ref Range    Hold Specimen JIC    Extra Red Top Tube   Result Value Ref Range    Hold Specimen JIC    XR Chest Port 1 View    Narrative    EXAM: XR CHEST PORT 1 VIEW  LOCATION: [facility]  DATE/TIME: 8/3/2022 9:27 AM    INDICATION: chest pain  COMPARISON: 8/1/2021      Impression    IMPRESSION: Negative chest.   Basic metabolic panel   Result Value Ref Range    Sodium 136 136 - 145 mmol/L    Potassium 3.4 (L) 3.5 - 5.0 mmol/L    Chloride 101 98 - 107 mmol/L    Carbon Dioxide (CO2) 29 22 - 31 mmol/L    Anion Gap 6 5 - 18 mmol/L    Urea Nitrogen 10 8 - 22 mg/dL    Creatinine 0.71 0.60 - 1.10 mg/dL    Calcium 7.4 (L) 8.5 - 10.5 mg/dL    Glucose 105 70 - 125 mg/dL    GFR Estimate >90 >60 mL/min/1.73m2   Troponin I (now)   Result Value Ref Range    Troponin I <0.01 0.00 - 0.29 ng/mL   Magnesium   Result Value Ref Range    Magnesium 2.0 1.8 - 2.6 mg/dL         RADIOLOGY  XR Chest Port 1 View   Final Result   IMPRESSION: Negative chest.             EKG:    Rate: 105 bmp  Rhythm: Sinus tachycardia  Axis: Normal  Interval: Normal  Conduction: Normal  QRS: Normal  ST: Normal  T-wave: Normal  QT: Not prolonged  Comparison EKG: No significant change compared to 1 August 2021  Impression:  No acute ischemic change   I have independently reviewed and interpreted today's EKG, pending Cardiologist read      MEDICATIONS GIVEN IN THE EMERGENCY:  Medications   LORazepam (ATIVAN) injection 1 mg (1 mg Intravenous Given 8/3/22 0925)   sodium chloride 0.9 % 1,000 mL with Infuvite Adult 10 mL, thiamine 100 mg, folic acid 1 mg infusion ( Intravenous Stopped 8/3/22 1128)   LORazepam (ATIVAN) injection 1 mg (1 mg Intravenous Given 8/3/22 1021)   ketorolac (TORADOL) injection 15 mg (15 mg Intravenous Given 8/3/22 1021)   potassium chloride (KLOR-CON) Packet 20 mEq (20 mEq Oral Given 8/3/22 1106)   potassium chloride  10 mEq in 100 mL sterile water intermittent infusion (premix) (0 mEq Intravenous Stopped 8/3/22 1216)   magnesium sulfate 2 g in water intermittent infusion (0 g Intravenous Stopped 8/3/22 1303)   0.9% sodium chloride BOLUS (0 mLs Intravenous Stopped 8/3/22 1303)   LORazepam (ATIVAN) tablet 1 mg (1 mg Oral Given 8/3/22 1315)   LORazepam (ATIVAN) injection 1 mg (1 mg Intravenous Given 8/3/22 1426)       NEW PRESCRIPTIONS STARTED AT TODAY'S ER VISIT  New Prescriptions    LORAZEPAM (ATIVAN) 1 MG TABLET    Take 1 tablet (1 mg) by mouth every 6 hours as needed for withdrawal      I, Randi Salinas, am serving as a scribe to document services personally performed by Richard Rivera D.O, based on my observations and the provider's statements to me.  I, Richard Rivera D.O, attest that Randi Salinas is acting in a scribe capacity, has observed my performance of the services and has documented them in accordance with my direction.    Richard Rivera D.O.  Emergency Medicine  [facility] EMERGENCY ROOM  [address] [id number] [phone]  Dept: [phone]     Richard Rivera DO  08/04/22 2043

## 2022-08-23 ENCOUNTER — ALLIED HEALTH/NURSE VISIT (OUTPATIENT)
Dept: FAMILY MEDICINE | Facility: CLINIC | Age: 42
End: 2022-08-23
Payer: COMMERCIAL

## 2022-08-23 VITALS — SYSTOLIC BLOOD PRESSURE: 120 MMHG | DIASTOLIC BLOOD PRESSURE: 80 MMHG

## 2022-08-23 DIAGNOSIS — Z01.30 BP CHECK: Primary | ICD-10-CM

## 2022-08-23 PROCEDURE — 99207 PR NO CHARGE NURSE ONLY: CPT

## 2022-10-03 ENCOUNTER — OFFICE VISIT (OUTPATIENT)
Dept: FAMILY MEDICINE | Facility: CLINIC | Age: 42
End: 2022-10-03
Payer: COMMERCIAL

## 2022-10-03 VITALS
OXYGEN SATURATION: 98 % | TEMPERATURE: 98.3 F | WEIGHT: 172 LBS | BODY MASS INDEX: 34.68 KG/M2 | HEART RATE: 127 BPM | RESPIRATION RATE: 18 BRPM | HEIGHT: 59 IN | DIASTOLIC BLOOD PRESSURE: 78 MMHG | SYSTOLIC BLOOD PRESSURE: 118 MMHG

## 2022-10-03 DIAGNOSIS — Z23 HIGH PRIORITY FOR 2019-NCOV VACCINE: ICD-10-CM

## 2022-10-03 DIAGNOSIS — Z00.00 ROUTINE GENERAL MEDICAL EXAMINATION AT A HEALTH CARE FACILITY: Primary | ICD-10-CM

## 2022-10-03 DIAGNOSIS — Z12.4 CERVICAL CANCER SCREENING: ICD-10-CM

## 2022-10-03 DIAGNOSIS — I10 BENIGN ESSENTIAL HYPERTENSION: ICD-10-CM

## 2022-10-03 DIAGNOSIS — Z13.29 SCREENING FOR THYROID DISORDER: ICD-10-CM

## 2022-10-03 DIAGNOSIS — B18.2 CHRONIC HEPATITIS C WITHOUT HEPATIC COMA (H): ICD-10-CM

## 2022-10-03 DIAGNOSIS — Z23 NEED FOR PROPHYLACTIC VACCINATION AND INOCULATION AGAINST INFLUENZA: ICD-10-CM

## 2022-10-03 DIAGNOSIS — Z80.3 FAMILY HISTORY OF MALIGNANT NEOPLASM OF BREAST: ICD-10-CM

## 2022-10-03 DIAGNOSIS — Z13.1 SCREENING FOR DIABETES MELLITUS: ICD-10-CM

## 2022-10-03 DIAGNOSIS — R60.9 EDEMA, UNSPECIFIED TYPE: ICD-10-CM

## 2022-10-03 DIAGNOSIS — Z13.220 LIPID SCREENING: ICD-10-CM

## 2022-10-03 DIAGNOSIS — R00.0 TACHYCARDIA: ICD-10-CM

## 2022-10-03 DIAGNOSIS — Z79.899 ENCOUNTER FOR LONG-TERM (CURRENT) USE OF MEDICATIONS: ICD-10-CM

## 2022-10-03 DIAGNOSIS — Z11.59 NEED FOR HEPATITIS C SCREENING TEST: ICD-10-CM

## 2022-10-03 DIAGNOSIS — N63.0 MASS OF BREAST, UNSPECIFIED LATERALITY: ICD-10-CM

## 2022-10-03 DIAGNOSIS — K64.4 EXTERNAL HEMORRHOIDS: ICD-10-CM

## 2022-10-03 DIAGNOSIS — E83.42 HYPOMAGNESEMIA: ICD-10-CM

## 2022-10-03 LAB
ALBUMIN SERPL-MCNC: 3.6 G/DL (ref 3.4–5)
ALP SERPL-CCNC: 72 U/L (ref 40–150)
ALT SERPL W P-5'-P-CCNC: 51 U/L (ref 0–50)
AMPHETAMINES UR QL: NOT DETECTED
ANION GAP SERPL CALCULATED.3IONS-SCNC: 10 MMOL/L (ref 3–14)
AST SERPL W P-5'-P-CCNC: 51 U/L (ref 0–45)
BARBITURATES UR QL SCN: NOT DETECTED
BENZODIAZ UR QL SCN: DETECTED
BILIRUB SERPL-MCNC: 0.4 MG/DL (ref 0.2–1.3)
BUN SERPL-MCNC: 10 MG/DL (ref 7–30)
BUPRENORPHINE UR QL: NOT DETECTED
CALCIUM SERPL-MCNC: 9.4 MG/DL (ref 8.5–10.1)
CANNABINOIDS UR QL: NOT DETECTED
CHLORIDE BLD-SCNC: 102 MMOL/L (ref 94–109)
CHOLEST SERPL-MCNC: 300 MG/DL
CO2 SERPL-SCNC: 27 MMOL/L (ref 20–32)
COCAINE UR QL SCN: NOT DETECTED
CREAT SERPL-MCNC: 0.76 MG/DL (ref 0.52–1.04)
CREAT UR-MCNC: 154 MG/DL
D-METHAMPHET UR QL: NOT DETECTED
FASTING STATUS PATIENT QL REPORTED: NO
GFR SERPL CREATININE-BSD FRML MDRD: >90 ML/MIN/1.73M2
GLUCOSE BLD-MCNC: 90 MG/DL (ref 70–99)
HBA1C MFR BLD: 5.2 % (ref 0–5.6)
HDLC SERPL-MCNC: 75 MG/DL
LDLC SERPL CALC-MCNC: 155 MG/DL
MAGNESIUM SERPL-MCNC: 1.3 MG/DL (ref 1.6–2.3)
METHADONE UR QL SCN: NOT DETECTED
MICROALBUMIN UR-MCNC: 7 MG/L
MICROALBUMIN/CREAT UR: 4.55 MG/G CR (ref 0–25)
NONHDLC SERPL-MCNC: 225 MG/DL
OPIATES UR QL SCN: NOT DETECTED
OXYCODONE UR QL SCN: NOT DETECTED
PCP UR QL SCN: NOT DETECTED
POTASSIUM BLD-SCNC: 3.4 MMOL/L (ref 3.4–5.3)
PROPOXYPH UR QL: NOT DETECTED
PROT SERPL-MCNC: 7 G/DL (ref 6.8–8.8)
SODIUM SERPL-SCNC: 139 MMOL/L (ref 133–144)
TRICYCLICS UR QL SCN: NOT DETECTED
TRIGL SERPL-MCNC: 350 MG/DL
TSH SERPL DL<=0.005 MIU/L-ACNC: 1.85 MU/L (ref 0.4–4)

## 2022-10-03 PROCEDURE — 93000 ELECTROCARDIOGRAM COMPLETE: CPT | Performed by: NURSE PRACTITIONER

## 2022-10-03 PROCEDURE — 90686 IIV4 VACC NO PRSV 0.5 ML IM: CPT | Performed by: NURSE PRACTITIONER

## 2022-10-03 PROCEDURE — 84443 ASSAY THYROID STIM HORMONE: CPT | Performed by: NURSE PRACTITIONER

## 2022-10-03 PROCEDURE — 0124A COVID-19,PF,PFIZER BOOSTER BIVALENT: CPT | Performed by: NURSE PRACTITIONER

## 2022-10-03 PROCEDURE — 83735 ASSAY OF MAGNESIUM: CPT | Performed by: NURSE PRACTITIONER

## 2022-10-03 PROCEDURE — 90471 IMMUNIZATION ADMIN: CPT | Performed by: NURSE PRACTITIONER

## 2022-10-03 PROCEDURE — G0145 SCR C/V CYTO,THINLAYER,RESCR: HCPCS | Performed by: NURSE PRACTITIONER

## 2022-10-03 PROCEDURE — 99396 PREV VISIT EST AGE 40-64: CPT | Mod: 25 | Performed by: NURSE PRACTITIONER

## 2022-10-03 PROCEDURE — 82043 UR ALBUMIN QUANTITATIVE: CPT | Performed by: NURSE PRACTITIONER

## 2022-10-03 PROCEDURE — 83036 HEMOGLOBIN GLYCOSYLATED A1C: CPT | Performed by: NURSE PRACTITIONER

## 2022-10-03 PROCEDURE — 91312 COVID-19,PF,PFIZER BOOSTER BIVALENT: CPT | Performed by: NURSE PRACTITIONER

## 2022-10-03 PROCEDURE — 99215 OFFICE O/P EST HI 40 MIN: CPT | Mod: 25 | Performed by: NURSE PRACTITIONER

## 2022-10-03 PROCEDURE — 80061 LIPID PANEL: CPT | Performed by: NURSE PRACTITIONER

## 2022-10-03 PROCEDURE — 80053 COMPREHEN METABOLIC PANEL: CPT | Performed by: NURSE PRACTITIONER

## 2022-10-03 PROCEDURE — 90472 IMMUNIZATION ADMIN EACH ADD: CPT | Performed by: NURSE PRACTITIONER

## 2022-10-03 PROCEDURE — 86803 HEPATITIS C AB TEST: CPT | Performed by: NURSE PRACTITIONER

## 2022-10-03 PROCEDURE — 90743 HEPB VACC 2 DOSE ADOLESC IM: CPT | Performed by: NURSE PRACTITIONER

## 2022-10-03 PROCEDURE — 87624 HPV HI-RISK TYP POOLED RSLT: CPT | Performed by: NURSE PRACTITIONER

## 2022-10-03 PROCEDURE — 87902 NFCT AGT GNTYP ALYS HEP C: CPT | Performed by: NURSE PRACTITIONER

## 2022-10-03 PROCEDURE — 80306 DRUG TEST PRSMV INSTRMNT: CPT | Performed by: NURSE PRACTITIONER

## 2022-10-03 PROCEDURE — 36415 COLL VENOUS BLD VENIPUNCTURE: CPT | Performed by: NURSE PRACTITIONER

## 2022-10-03 RX ORDER — CHLORTHALIDONE 25 MG/1
1 TABLET ORAL DAILY
COMMUNITY
Start: 2022-03-14 | End: 2022-10-03

## 2022-10-03 RX ORDER — MAGNESIUM CHLORIDE 71.5 G/G
2 TABLET ORAL DAILY
Qty: 60 TABLET | Refills: 0 | Status: SHIPPED | OUTPATIENT
Start: 2022-10-03 | End: 2023-08-22

## 2022-10-03 RX ORDER — AMLODIPINE BESYLATE 5 MG/1
5 TABLET ORAL
COMMUNITY
Start: 2022-09-21 | End: 2022-10-03

## 2022-10-03 RX ORDER — FUROSEMIDE 20 MG
20 TABLET ORAL ONCE
Qty: 1 TABLET | Refills: 0 | Status: SHIPPED | OUTPATIENT
Start: 2022-10-03 | End: 2023-08-22

## 2022-10-03 RX ORDER — LOSARTAN POTASSIUM 25 MG/1
25 TABLET ORAL DAILY
Qty: 90 TABLET | Refills: 3 | Status: SHIPPED | OUTPATIENT
Start: 2022-10-03

## 2022-10-03 ASSESSMENT — ENCOUNTER SYMPTOMS
SHORTNESS OF BREATH: 0
WEAKNESS: 0
HEMATURIA: 0
HEARTBURN: 1
PALPITATIONS: 1
ABDOMINAL PAIN: 0
FREQUENCY: 0
DIARRHEA: 0
DIZZINESS: 0
PARESTHESIAS: 0
FEVER: 0
MYALGIAS: 1
NERVOUS/ANXIOUS: 1
ARTHRALGIAS: 0
HEMATOCHEZIA: 0
SORE THROAT: 0
JOINT SWELLING: 0
DYSURIA: 0
HEADACHES: 0
BREAST MASS: 0
CONSTIPATION: 0
NAUSEA: 0
CHILLS: 0
COUGH: 0
EYE PAIN: 0

## 2022-10-03 ASSESSMENT — PATIENT HEALTH QUESTIONNAIRE - PHQ9
SUM OF ALL RESPONSES TO PHQ QUESTIONS 1-9: 6
SUM OF ALL RESPONSES TO PHQ QUESTIONS 1-9: 6
10. IF YOU CHECKED OFF ANY PROBLEMS, HOW DIFFICULT HAVE THESE PROBLEMS MADE IT FOR YOU TO DO YOUR WORK, TAKE CARE OF THINGS AT HOME, OR GET ALONG WITH OTHER PEOPLE: SOMEWHAT DIFFICULT

## 2022-10-03 ASSESSMENT — ASTHMA QUESTIONNAIRES
QUESTION_4 LAST FOUR WEEKS HOW OFTEN HAVE YOU USED YOUR RESCUE INHALER OR NEBULIZER MEDICATION (SUCH AS ALBUTEROL): NOT AT ALL
ACT_TOTALSCORE: 25
QUESTION_5 LAST FOUR WEEKS HOW WOULD YOU RATE YOUR ASTHMA CONTROL: COMPLETELY CONTROLLED
ACT_TOTALSCORE: 25
QUESTION_1 LAST FOUR WEEKS HOW MUCH OF THE TIME DID YOUR ASTHMA KEEP YOU FROM GETTING AS MUCH DONE AT WORK, SCHOOL OR AT HOME: NONE OF THE TIME
QUESTION_2 LAST FOUR WEEKS HOW OFTEN HAVE YOU HAD SHORTNESS OF BREATH: NOT AT ALL
QUESTION_3 LAST FOUR WEEKS HOW OFTEN DID YOUR ASTHMA SYMPTOMS (WHEEZING, COUGHING, SHORTNESS OF BREATH, CHEST TIGHTNESS OR PAIN) WAKE YOU UP AT NIGHT OR EARLIER THAN USUAL IN THE MORNING: NOT AT ALL

## 2022-10-03 NOTE — NURSING NOTE
Prior to immunization administration, verified patients identity using patient s name and date of birth. Please see Immunization Activity for additional information.     Screening Questionnaire for Adult Immunization    Are you sick today?   No   Do you have allergies to medications, food, a vaccine component or latex?   Yes   Have you ever had a serious reaction after receiving a vaccination?   No   Do you have a long-term health problem with heart, lung, kidney, or metabolic disease (e.g., diabetes), asthma, a blood disorder, no spleen, complement component deficiency, a cochlear implant, or a spinal fluid leak?  Are you on long-term aspirin therapy?   No   Do you have cancer, leukemia, HIV/AIDS, or any other immune system problem?   No   Do you have a parent, brother, or sister with an immune system problem?   No   In the past 3 months, have you taken medications that affect  your immune system, such as prednisone, other steroids, or anticancer drugs; drugs for the treatment of rheumatoid arthritis, Crohn s disease, or psoriasis; or have you had radiation treatments?   No   Have you had a seizure, or a brain or other nervous system problem?   No   During the past year, have you received a transfusion of blood or blood    products, or been given immune (gamma) globulin or antiviral drug?   No   For women: Are you pregnant or is there a chance you could become       pregnant during the next month?   No   Have you received any vaccinations in the past 4 weeks?   No     Immunization questionnaire was positive for at least one answer.        Per orders of Patricia Esquivel, injection of Hep B vaccine, flu shot, and bivalent pfizer vaccine given by Xuan Ortiz. Patient instructed to remain in clinic for 15 minutes afterwards, and to report any adverse reaction to me immediately.       Screening performed by Xuan Ortiz on 10/3/2022 at 2:48 PM.
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
## 2022-10-04 LAB — HCV AB SERPL QL IA: REACTIVE

## 2022-10-04 NOTE — TELEPHONE ENCOUNTER
Diagnosis, Referred by & from: Hemorrhoids   Appt date: 1/12/2023   NOTES STATUS DETAILS   OFFICE NOTE from referring provider Internal [facility]:  10/3/22 - [facility] OV with Patricia Esquivel NP   OFFICE NOTE from other specialist N/A    DISCHARGE SUMMARY from hospital N/A    DISCHARGE REPORT from the ER Care Everywhere / Internal [facility]:  8/1/21 - ED OV with Dr. Elizabeth Knott:  4/2/21 - ED OV with LUPE Doshi  3/31/21 - ED OV with LUPE Lui  7/5/20 - ED OV with LUPE Tubbs   OPERATIVE REPORT N/A    MEDICATION LIST Internal    LABS N/A    DIAGNOSTIC PROCEDURES     COLONOSCOPY Care Everywhere [facility]:  4/28/15 - Colonoscopy  4/11/13 - Colonoscopy   IMAGING (DISC & REPORT)      CT Received / Internal [facility]:  7/18/21 - CTA Chest/Abd/Pelvis    [facility]:  10/7/20 - CT Abd/Pelvis    [facility]:  4/11/18 - CT Abdomen   XRAY Received [facility]:  11/15/19 - XR Abdomen   ULTRASOUND  (ENDOANAL/ENDORECTAL) Internal ealth:  11/9/22 - US Pelvic  12/23/15 - US Renal     Records Requested  10/04/22    Facility  [facility]  Fax: [phone]  [facility]  Fax: [phone]   Outcome * 10/4/22 2:05 PM Faxed req to [facility] and [facility] for images to be pushed to [place] PACs. - Rosangela    * 10/25/22 8:06 AM Images received from [facility] and attached to the patient in PACs.  Faxed 2nd req to [facility] for images to be pushed. - Rosangela    * 11/15/22 7:33 AM Images received from [facility] and attached to the patient in PACs. - Rosangela

## 2022-10-05 LAB
BKR LAB AP GYN ADEQUACY: NORMAL
BKR LAB AP GYN INTERPRETATION: NORMAL
BKR LAB AP HPV REFLEX: NORMAL
BKR LAB AP PREVIOUS ABNORMAL: NORMAL
HCV RNA SERPL NAA+PROBE-ACNC: NOT DETECTED IU/ML
PATH REPORT.COMMENTS IMP SPEC: NORMAL
PATH REPORT.COMMENTS IMP SPEC: NORMAL
PATH REPORT.RELEVANT HX SPEC: NORMAL

## 2022-10-07 LAB
HUMAN PAPILLOMA VIRUS 16 DNA: NEGATIVE
HUMAN PAPILLOMA VIRUS 18 DNA: NEGATIVE
HUMAN PAPILLOMA VIRUS FINAL DIAGNOSIS: NORMAL
HUMAN PAPILLOMA VIRUS OTHER HR: NEGATIVE

## 2022-10-14 ENCOUNTER — ANCILLARY PROCEDURE (OUTPATIENT)
Dept: MAMMOGRAPHY | Facility: CLINIC | Age: 42
End: 2022-10-14
Attending: NURSE PRACTITIONER
Payer: COMMERCIAL

## 2022-10-14 DIAGNOSIS — N63.0 MASS OF BREAST, UNSPECIFIED LATERALITY: ICD-10-CM

## 2022-10-14 PROCEDURE — G0279 TOMOSYNTHESIS, MAMMO: HCPCS | Mod: RT | Performed by: RADIOLOGY

## 2022-10-14 PROCEDURE — 77065 DX MAMMO INCL CAD UNI: CPT | Mod: RT | Performed by: RADIOLOGY

## 2022-10-14 PROCEDURE — 76882 US LMTD JT/FCL EVL NVASC XTR: CPT | Mod: RT | Performed by: RADIOLOGY

## 2022-10-18 ENCOUNTER — VIRTUAL VISIT (OUTPATIENT)
Dept: FAMILY MEDICINE | Facility: CLINIC | Age: 42
End: 2022-10-18
Payer: COMMERCIAL

## 2022-10-18 ENCOUNTER — NURSE TRIAGE (OUTPATIENT)
Dept: NURSING | Facility: CLINIC | Age: 42
End: 2022-10-18

## 2022-10-18 DIAGNOSIS — F31.9 BIPOLAR 1 DISORDER (H): ICD-10-CM

## 2022-10-18 DIAGNOSIS — R45.86 VARIABLE MOOD: Primary | ICD-10-CM

## 2022-10-18 DIAGNOSIS — F41.1 GAD (GENERALIZED ANXIETY DISORDER): ICD-10-CM

## 2022-10-18 PROCEDURE — 99214 OFFICE O/P EST MOD 30 MIN: CPT | Mod: 95 | Performed by: PHYSICIAN ASSISTANT

## 2022-10-18 RX ORDER — HYDROXYZINE PAMOATE 25 MG/1
25 CAPSULE ORAL 4 TIMES DAILY PRN
Qty: 60 CAPSULE | Refills: 0 | Status: SHIPPED | OUTPATIENT
Start: 2022-10-18 | End: 2022-11-28

## 2022-10-18 RX ORDER — LURASIDONE HYDROCHLORIDE 20 MG/1
TABLET, FILM COATED ORAL
Qty: 60 TABLET | Refills: 1 | Status: SHIPPED | OUTPATIENT
Start: 2022-10-18 | End: 2022-11-28

## 2022-10-18 NOTE — LETTER
My Asthma Action Plan    Name: Alisia ALVARADO [facility]   YOB: 1980  Date: 10/18/2022   My doctor: Boogie Chaudhary PA-C   My clinic: [facility]        My Rescue Medicine:   Albuterol inhaler (Proair/Ventolin/Proventil HFA)  2-4 puffs EVERY 4 HOURS as needed. Use a spacer if recommended by your provider.   My Asthma Severity:   Intermittent / Exercise Induced  Know your asthma triggers:              GREEN ZONE   Good Control    I feel good    No cough or wheeze    Can work, sleep and play without asthma symptoms       Take your asthma control medicine every day.     1. If exercise triggers your asthma, take your rescue medication    15 minutes before exercise or sports, and    During exercise if you have asthma symptoms  2. Spacer to use with inhaler: If you have a spacer, make sure to use it with your inhaler             YELLOW ZONE Getting Worse  I have ANY of these:    I do not feel good    Cough or wheeze    Chest feels tight    Wake up at night   1. Keep taking your Green Zone medications  2. Start taking your rescue medicine:    every 20 minutes for up to 1 hour. Then every 4 hours for 24-48 hours.  3. If you stay in the Yellow Zone for more than 12-24 hours, contact your doctor.  4. If you do not return to the Green Zone in 12-24 hours or you get worse, start taking your oral steroid medicine if prescribed by your provider.           RED ZONE Medical Alert - Get Help  I have ANY of these:    I feel awful    Medicine is not helping    Breathing getting harder    Trouble walking or talking    Nose opens wide to breathe       1. Take your rescue medicine NOW  2. If your provider has prescribed an oral steroid medicine, start taking it NOW  3. Call your doctor NOW  4. If you are still in the Red Zone after 20 minutes and you have not reached your doctor:    Take your rescue medicine again and    Call 911 or go to the emergency room right away    See your regular doctor within 2 weeks  of an Emergency Room or Urgent Care visit for follow-up treatment.          Annual Reminders:  Meet with Asthma Educator,  Flu Shot in the Fall, consider Pneumonia Vaccination for patients with asthma (aged 19 and older).    Pharmacy:    [id number] - [place], [place] - [address] AT [facility] & [address] [place] [address] PHARMACY [place] - [place], [place] - [address]  [facility] #[id number] - [place], [place] - [address] AT [facility] & [facility][id number] - [place], [place] - [address]  [facility] #[id number] - [place], [place] - [address] AT [address] [place] [address] & [facility] #[id number] - [place], [place] - [address] AT [facility] (MAIL SERVICE) [facility] - [place], [place] - [address] AT [place] & [id number] - [place], [place] - [address] AT [facility]    Electronically signed by Boogie Chaudhary PA-C   Date: 10/18/22                    Asthma Triggers  How To Control Things That Make Your Asthma Worse    Triggers are things that make your asthma worse.  Look at the list below to help you find your triggers and   what you can do about them. You can help prevent asthma flare-ups by staying away from your triggers.      Trigger                                                          What you can do   Cigarette Smoke  Tobacco smoke can make asthma worse. Do not allow smoking in your home, car or around you.  Be sure no one smokes at a child s day care or school.  If you smoke, ask your health care provider for ways to help you quit.  Ask family members to quit too.  Ask your health care provider for a referral to Quit Plan to help you quit smoking, or call 0-024-807-PLAN.     Colds, Flu, Bronchitis  These are common triggers of asthma. Wash your hands often.  Don t touch your eyes, nose or  mouth.  Get a flu shot every year.     Dust Mites  These are tiny bugs that live in cloth or carpet. They are too small to see. Wash sheets and blankets in hot water every week.   Encase pillows and mattress in dust mite proof covers.  Avoid having carpet if you can. If you have carpet, vacuum weekly.   Use a dust mask and HEPA vacuum.   Pollen and Outdoor Mold  Some people are allergic to trees, grass, or weed pollen, or molds. Try to keep your windows closed.  Limit time out doors when pollen count is high.   Ask you health care provider about taking medicine during allergy season.     Animal Dander  Some people are allergic to skin flakes, urine or saliva from pets with fur or feathers. Keep pets with fur or feathers out of your home.    If you can t keep the pet outdoors, then keep the pet out of your bedroom.  Keep the bedroom door closed.  Keep pets off cloth furniture and away from stuffed toys.     Mice, Rats, and Cockroaches  Some people are allergic to the waste from these pests.   Cover food and garbage.  Clean up spills and food crumbs.  Store grease in the refrigerator.   Keep food out of the bedroom.   Indoor Mold  This can be a trigger if your home has high moisture. Fix leaking faucets, pipes, or other sources of water.   Clean moldy surfaces.  Dehumidify basement if it is damp and smelly.   Smoke, Strong Odors, and Sprays  These can reduce air quality. Stay away from strong odors and sprays, such as perfume, powder, hair spray, paints, smoke incense, paint, cleaning products, candles and new carpet.   Exercise or Sports  Some people with asthma have this trigger. Be active!  Ask your doctor about taking medicine before sports or exercise to prevent symptoms.    Warm up for 5-10 minutes before and after sports or exercise.     Other Triggers of Asthma  Cold air:  Cover your nose and mouth with a scarf.  Sometimes laughing or crying can be a trigger.  Some medicines and food can trigger asthma.

## 2022-10-18 NOTE — PROGRESS NOTES
Alisia is a 42 year old who is being evaluated via a billable telephone visit.      What phone number would you like to be contacted at? [phone]  How would you like to obtain your AVS? Emiliano Waktins   Alisia is a 42 year old, presenting for the following health issues:  Anxiety (See hospital visit 9/18/22, and office visit 10/3/22)      HPI     Following up on: Anxiety    Last visit this was discussed: hospital visit 9/18/22 and office visit 10/3/22      Concern re: history of alcoholism and bipolar. Seems to be manic over the phone. Denies sleeping much. Denies hallucinations or delusions . History of CD (percocet).  Denies depression . Does not want a depression med. History of ptsd. Not suicidal.     Review of Systems   Constitutional, HEENT, cardiovascular, pulmonary, GI, , musculoskeletal, neuro, skin, endocrine and psych systems are negative, except as otherwise noted.      Objective           Vitals:  No vitals were obtained today due to virtual visit.    Physical Exam   healthy, alert and no distress  PSYCH: Alert and oriented times 3; coherent speech, normal   rate and volume, able to articulate logical thoughts, able   to abstract reason, no tangential thoughts, no hallucinations   or delusions  Her affect is normal  RESP: No cough, no audible wheezing, able to talk in full sentences  Remainder of exam unable to be completed due to telephone visits    Alisia was seen today for anxiety.    Diagnoses and all orders for this visit:    Variable mood  -     lurasidone (LATUDA) 20 MG TABS tablet; Take 1 tab daily for 1 week, then increase to 2 tabs daily thereafter.    Bipolar 1 disorder (H)  -     lurasidone (LATUDA) 20 MG TABS tablet; Take 1 tab daily for 1 week, then increase to 2 tabs daily thereafter.    NATHANAEL (generalized anxiety disorder)  -     hydrOXYzine (VISTARIL) 25 MG capsule; Take 1 capsule (25 mg) by mouth 4 times daily as needed for anxiety    start latuda. Recommend a psych  referral.  work on lifestyle modification  Recheck with her pcp in 1 mos     Phone call duration: 22 minutes

## 2022-10-18 NOTE — TELEPHONE ENCOUNTER
"Nurse Triage SBAR    Is this a 2nd Level Triage? NO    Situation: Patient calling with anxiety and brain fog. Patient has been under increased stress lately and feels anxious and disoriented. Consent: not needed    Background: Patient has history of alcohol abuse, but stopped drinking \"hard liquor\" 10 days ago. Does not want to use alcohol to medicate    Patient also has a history of ADD and anxiety and depression - does not medication    Taking new natural supplement for perimenopause - has been bleeding since Oct 3    Recently fired from job - suing for discrimination    Assessment:   Cannot remember  Cannot drive  Feels confusion  Loss of appetite, nausea  No fever  BP is normal  No tremors  Not suicidal    Protocol Recommended Disposition:   See in Office today    Recommendation: Advised patient to schedule an appointment or go to urgent care. Mental health crisis number also given to patient. Care advice given. Patient verbalized understanding and agreed with plan.       Kelle Valle RN [facility] 10/18/2022 10:04 AM    Reason for Disposition    Patient sounds very upset or troubled to the triager    Additional Information    Negative: SEVERE difficulty breathing (e.g., struggling for each breath, speaks in single words)    Negative: Bluish (or gray) lips or face    Negative: Difficult to awaken or acting confused (e.g., disoriented, slurred speech)    Negative: Hysterical or combative behavior    Negative: Sounds like a life-threatening emergency to the triager    Negative: Chest pain    Negative: Palpitations, skipped heart beat, or rapid heart beat    Negative: Cough is main symptom    Negative: Suicide thoughts, threats, attempts, or questions    Negative: Depression is main problem or symptom (e.g., feelings of sadness or hopelessness)    Negative: Difficulty breathing and persists > 10 minutes and not relieved by reassurance provided by triager    Negative: Lightheadedness or dizziness and " persists > 10 minutes and not relieved by reassurance provided by triager    Negative: Substance use (drug use) or unhealthy alcohol use, known or suspected, and feeling very shaky (i.e., visible tremors of hands)    Negative: Patient sounds very sick or weak to the triager    Protocols used: ANXIETY AND PANIC ATTACK-A-[place]

## 2022-10-25 ENCOUNTER — VIRTUAL VISIT (OUTPATIENT)
Dept: FAMILY MEDICINE | Facility: CLINIC | Age: 42
End: 2022-10-25
Payer: COMMERCIAL

## 2022-10-25 DIAGNOSIS — N83.202 LEFT OVARIAN CYST: ICD-10-CM

## 2022-10-25 DIAGNOSIS — F31.9 BIPOLAR 1 DISORDER (H): Primary | ICD-10-CM

## 2022-10-25 DIAGNOSIS — N93.9 VAGINAL BLEEDING: ICD-10-CM

## 2022-10-25 PROCEDURE — 99215 OFFICE O/P EST HI 40 MIN: CPT | Mod: 95 | Performed by: NURSE PRACTITIONER

## 2022-10-25 RX ORDER — ARIPIPRAZOLE 5 MG/1
5 TABLET ORAL DAILY
Qty: 90 TABLET | Refills: 0 | Status: SHIPPED | OUTPATIENT
Start: 2022-10-25 | End: 2023-08-22

## 2022-10-25 NOTE — PROGRESS NOTES
Alisia is a 42 year old who is being evaluated via a billable telephone visit.      What phone number would you like to be contacted at? [phone]  How would you like to obtain your AVS? Mail a copy    Assessment & Plan     Multiple issues today and our visit was limited by her tangential thought process.  Overall, she is very open to psychiatric help and would like to improve her edilia.  She admits she is manic.  She has been drinking today and I advised against any alcohol.  She felt better on the Latuda at the lower dosing, feels the higher dose makes her tired, though we discussed this could be related to ETOH.  She can go back to 20mg and we will add Abilify.  There is a component of anxiety and she would benefit from treating this as well, but I have placed a psych referral for their input.  I will follow up with her in 1 month and I made the appointment during our visit.    In regards to her vaginal bleeding, it is hard to differentiate if her symptoms are norma-menopausal with her current edilia and alcohol use.  We discussed the bleeding could be related to a variety of things like fibroids.  I have ordered a pelvic ultrasound and will have her follow up with OBGYN.      Vaginal bleeding  - US Pelvic Complete with Transvaginal; Future  - Ob/Gyn Referral; Future      Bipolar 1 disorder (H)  - Adult Mental Health  Referral; Future  - Adult Mental Health  Referral; Future  - ARIPiprazole (ABILIFY) 5 MG tablet; Take 1 tablet (5 mg) by mouth daily    Review of external notes as documented elsewhere in note  Ordering of each unique test  Prescription drug management  I spent a total of 50 minutes on the day of the visit.   Time spent doing chart review, history and exam, documentation and further activities per the note           Return in about 1 month (around 11/25/2022) for Depression/anxiety medication follow up.    SONJA [facility]  "ROSA Crump is a 42 year old, presenting for the following health issues:  Menopausal Sx      History of Present Illness       Reason for visit:  MENOPAUSE    She eats 2-3 servings of fruits and vegetables daily.She consumes 0 sweetened beverage(s) daily.She exercises with enough effort to increase her heart rate 30 to 60 minutes per day.  She exercises with enough effort to increase her heart rate 4 days per week.   She is taking medications regularly.     Met with Boogie Rivera and was started on Latuda.  Feeling very overwhelmed, not sleeping, break through bleeding, feeling menopausal.  Reverting back to OCD tendencies.  Having hot flashes, still dealing with tachycardia.      Was prescribed Latuda.  Taking 2 tablets, feels very exhausted.      Wanted to go back on Adderall.     On October 9th, has had heavy periods that have persisted.  Having clots.  Feeling hot flashes,     A lot of stress right now in life, dealing with multiple family and personal issues.  Anxiety is very high.  Feels like she is not handling it well.      States she is not drinking any hard liquor, Today has had 3 glasses of wine. Feels the wine helps her to be hungry, otherwise she doesn't eat.        Review of Systems   Constitutional, HEENT, cardiovascular, pulmonary, gi and gu systems are negative, except as otherwise noted.      Objective    Vitals - Patient Reported  Weight (Patient Reported): 73.5 kg (162 lb)  Height (Patient Reported): 410 cm (13' 5.42\")  BMI (Based on Pt Reported Ht/Wt): 4.37      Vitals:  No vitals were obtained today due to virtual visit.    Physical Exam   alert and Manic, tangential.  PSYCH: Alert and oriented times 3; Very tangential,  Her affect is Manic  RESP: No cough, no audible wheezing, able to talk in full sentences  Remainder of exam unable to be completed due to telephone visits                Phone call duration: 26 minutes    "

## 2022-11-09 ENCOUNTER — ANCILLARY PROCEDURE (OUTPATIENT)
Dept: ULTRASOUND IMAGING | Facility: CLINIC | Age: 42
End: 2022-11-09
Attending: NURSE PRACTITIONER
Payer: COMMERCIAL

## 2022-11-09 DIAGNOSIS — N93.9 VAGINAL BLEEDING: ICD-10-CM

## 2022-11-09 PROCEDURE — 76856 US EXAM PELVIC COMPLETE: CPT | Performed by: OBSTETRICS & GYNECOLOGY

## 2022-11-09 PROCEDURE — 76830 TRANSVAGINAL US NON-OB: CPT | Performed by: OBSTETRICS & GYNECOLOGY

## 2022-11-11 ENCOUNTER — HOSPITAL ENCOUNTER (EMERGENCY)
Facility: CLINIC | Age: 42
Discharge: HOME | End: 2022-11-11
Attending: EMERGENCY MEDICINE | Admitting: EMERGENCY MEDICINE
Payer: COMMERCIAL

## 2022-11-11 VITALS
HEIGHT: 59 IN | BODY MASS INDEX: 33.26 KG/M2 | SYSTOLIC BLOOD PRESSURE: 159 MMHG | OXYGEN SATURATION: 98 % | RESPIRATION RATE: 18 BRPM | HEART RATE: 105 BPM | WEIGHT: 165 LBS | TEMPERATURE: 98.1 F | DIASTOLIC BLOOD PRESSURE: 95 MMHG

## 2022-11-11 DIAGNOSIS — F41.9 ANXIETY: ICD-10-CM

## 2022-11-11 DIAGNOSIS — F10.930 ALCOHOL WITHDRAWAL SYNDROME WITHOUT COMPLICATION (H): ICD-10-CM

## 2022-11-11 LAB
ALBUMIN SERPL-MCNC: 4.5 G/DL (ref 3.5–5)
ALP SERPL-CCNC: 90 U/L (ref 45–120)
ALT SERPL W P-5'-P-CCNC: 67 U/L (ref 0–45)
ANION GAP SERPL CALCULATED.3IONS-SCNC: 15 MMOL/L (ref 5–18)
AST SERPL W P-5'-P-CCNC: 65 U/L (ref 0–40)
BASOPHILS # BLD AUTO: 0.1 10E3/UL (ref 0–0.2)
BASOPHILS NFR BLD AUTO: 1 %
BILIRUB SERPL-MCNC: 0.5 MG/DL (ref 0–1)
BUN SERPL-MCNC: 8 MG/DL (ref 8–22)
CALCIUM SERPL-MCNC: 9.2 MG/DL (ref 8.5–10.5)
CHLORIDE BLD-SCNC: 100 MMOL/L (ref 98–107)
CO2 SERPL-SCNC: 23 MMOL/L (ref 22–31)
CREAT SERPL-MCNC: 0.74 MG/DL (ref 0.6–1.1)
EOSINOPHIL # BLD AUTO: 0 10E3/UL (ref 0–0.7)
EOSINOPHIL NFR BLD AUTO: 0 %
ERYTHROCYTE [DISTWIDTH] IN BLOOD BY AUTOMATED COUNT: 12.4 % (ref 10–15)
ETHANOL SERPL-MCNC: 87 MG/DL
GFR SERPL CREATININE-BSD FRML MDRD: >90 ML/MIN/1.73M2
GLUCOSE BLD-MCNC: 99 MG/DL (ref 70–125)
HCT VFR BLD AUTO: 39.9 % (ref 35–47)
HGB BLD-MCNC: 13.9 G/DL (ref 11.7–15.7)
IMM GRANULOCYTES # BLD: 0.1 10E3/UL
IMM GRANULOCYTES NFR BLD: 1 %
LIPASE SERPL-CCNC: 15 U/L (ref 0–52)
LYMPHOCYTES # BLD AUTO: 2.5 10E3/UL (ref 0.8–5.3)
LYMPHOCYTES NFR BLD AUTO: 29 %
MAGNESIUM SERPL-MCNC: 1.5 MG/DL (ref 1.8–2.6)
MCH RBC QN AUTO: 32.9 PG (ref 26.5–33)
MCHC RBC AUTO-ENTMCNC: 34.8 G/DL (ref 31.5–36.5)
MCV RBC AUTO: 95 FL (ref 78–100)
MONOCYTES # BLD AUTO: 0.7 10E3/UL (ref 0–1.3)
MONOCYTES NFR BLD AUTO: 8 %
NEUTROPHILS # BLD AUTO: 5.3 10E3/UL (ref 1.6–8.3)
NEUTROPHILS NFR BLD AUTO: 61 %
NRBC # BLD AUTO: 0 10E3/UL
NRBC BLD AUTO-RTO: 0 /100
PHOSPHATE SERPL-MCNC: 3.5 MG/DL (ref 2.5–4.5)
PLATELET # BLD AUTO: 351 10E3/UL (ref 150–450)
POTASSIUM BLD-SCNC: 3.7 MMOL/L (ref 3.5–5)
PROT SERPL-MCNC: 7.9 G/DL (ref 6–8)
RBC # BLD AUTO: 4.22 10E6/UL (ref 3.8–5.2)
SODIUM SERPL-SCNC: 138 MMOL/L (ref 136–145)
WBC # BLD AUTO: 8.6 10E3/UL (ref 4–11)

## 2022-11-11 PROCEDURE — 96374 THER/PROPH/DIAG INJ IV PUSH: CPT

## 2022-11-11 PROCEDURE — 85014 HEMATOCRIT: CPT | Performed by: EMERGENCY MEDICINE

## 2022-11-11 PROCEDURE — 90791 PSYCH DIAGNOSTIC EVALUATION: CPT

## 2022-11-11 PROCEDURE — 80053 COMPREHEN METABOLIC PANEL: CPT | Performed by: EMERGENCY MEDICINE

## 2022-11-11 PROCEDURE — 250N000011 HC RX IP 250 OP 636: Performed by: EMERGENCY MEDICINE

## 2022-11-11 PROCEDURE — 84100 ASSAY OF PHOSPHORUS: CPT | Performed by: EMERGENCY MEDICINE

## 2022-11-11 PROCEDURE — 83735 ASSAY OF MAGNESIUM: CPT | Performed by: EMERGENCY MEDICINE

## 2022-11-11 PROCEDURE — 83690 ASSAY OF LIPASE: CPT | Performed by: EMERGENCY MEDICINE

## 2022-11-11 PROCEDURE — 36415 COLL VENOUS BLD VENIPUNCTURE: CPT | Performed by: EMERGENCY MEDICINE

## 2022-11-11 PROCEDURE — 250N000013 HC RX MED GY IP 250 OP 250 PS 637: Performed by: EMERGENCY MEDICINE

## 2022-11-11 PROCEDURE — 82077 ASSAY SPEC XCP UR&BREATH IA: CPT | Performed by: EMERGENCY MEDICINE

## 2022-11-11 PROCEDURE — 99285 EMERGENCY DEPT VISIT HI MDM: CPT | Mod: 25

## 2022-11-11 RX ORDER — DIAZEPAM 5 MG
10 TABLET ORAL EVERY 30 MIN PRN
Status: DISCONTINUED | OUTPATIENT
Start: 2022-11-11 | End: 2022-11-11 | Stop reason: HOSPADM

## 2022-11-11 RX ORDER — FOLIC ACID 1 MG/1
1 TABLET ORAL DAILY
Status: DISCONTINUED | OUTPATIENT
Start: 2022-11-11 | End: 2022-11-11 | Stop reason: HOSPADM

## 2022-11-11 RX ORDER — DIAZEPAM 10 MG/2ML
5-10 INJECTION, SOLUTION INTRAMUSCULAR; INTRAVENOUS EVERY 30 MIN PRN
Status: DISCONTINUED | OUTPATIENT
Start: 2022-11-11 | End: 2022-11-11 | Stop reason: HOSPADM

## 2022-11-11 RX ORDER — MAGNESIUM OXIDE 400 MG/1
400 TABLET ORAL ONCE
Status: COMPLETED | OUTPATIENT
Start: 2022-11-11 | End: 2022-11-11

## 2022-11-11 RX ORDER — LORAZEPAM 2 MG/ML
2 INJECTION INTRAMUSCULAR ONCE
Status: COMPLETED | OUTPATIENT
Start: 2022-11-11 | End: 2022-11-11

## 2022-11-11 RX ORDER — MULTIPLE VITAMINS W/ MINERALS TAB 9MG-400MCG
1 TAB ORAL DAILY
Status: DISCONTINUED | OUTPATIENT
Start: 2022-11-11 | End: 2022-11-11 | Stop reason: HOSPADM

## 2022-11-11 RX ORDER — FLUMAZENIL 0.1 MG/ML
0.2 INJECTION, SOLUTION INTRAVENOUS
Status: DISCONTINUED | OUTPATIENT
Start: 2022-11-11 | End: 2022-11-11 | Stop reason: HOSPADM

## 2022-11-11 RX ADMIN — FOLIC ACID 1 MG: 1 TABLET ORAL at 06:25

## 2022-11-11 RX ADMIN — MAGNESIUM OXIDE TAB 400 MG (241.3 MG ELEMENTAL MG) 400 MG: 400 (241.3 MG) TAB at 06:26

## 2022-11-11 RX ADMIN — LORAZEPAM 2 MG: 2 INJECTION INTRAMUSCULAR; INTRAVENOUS at 05:44

## 2022-11-11 RX ADMIN — MULTIPLE VITAMINS W/ MINERALS TAB 1 TABLET: TAB at 06:26

## 2022-11-11 RX ADMIN — DIAZEPAM 10 MG: 5 TABLET ORAL at 06:23

## 2022-11-11 RX ADMIN — Medication 100 MG: at 06:25

## 2022-11-11 ASSESSMENT — ENCOUNTER SYMPTOMS
SLEEP DISTURBANCE: 1
NERVOUS/ANXIOUS: 1

## 2022-11-11 NOTE — DISCHARGE INSTRUCTIONS
Aftercare Plan      Today you were seen by a licensed mental health professional through Triage and Transition services, [facility] ([facility])  for a crisis assessment in the Emergency Department at [facility].  It is recommended that you follow up with your established providers (psychiatrist, mental health therapist, and/or primary care doctor - as relevant) as soon as possible.     Coordinators from [facility] will be calling you in the next 24-48 hours to ensure that you have the resources you need.  You can also contact [facility] coordinators directly at [phone]. You may have been scheduled for or offered an appointment with a mental health provider. [facility] maintains an extensive network of licensed behavioral health providers to connect patients with the services they need.  We do not charge providers a fee to participate in our referral network.  We match patients with providers based on a patient's specific needs, insurance coverage, and location.  Our first effort will be to refer you to a provider within your care system, and will utilize providers outside your care system as needed.      If I am feeling unsafe or I am in a crisis, I will:     Contact my established care providers   Call the National Suicide Prevention Lifeline: 988  Go to the nearest emergency room   Call 911   [facility] Crisis Line Number: [phone]     Things that I am able to do with others to cope or help me better: Below is a list of many resources to include walk in and no cost CD assessment services.  As noted above, [facility] will also follow up with you.   [facility] Connection has additional resource (868) 804-4821;  info@[facility].org.  Another good resources in [facility] [place], [place] ([phone].  Given that withdrawal was a worry not long ago, it is possible that inpatient treatment will be recommended.      Not all of the below resources will be best for you but there are many so that you may choose  "the most convenient to you.     Your county has a mental health crisis team you can call 24/7: [facility] Mobile Crisis  [phone]     Crisis Lines  Crisis Text Line  Text 885268  You will be connected with a trained live crisis counselor to provide support.    [place] Mental [facility] Warm Line  Peer to peer support  Monday thru Saturday, 12 pm to 10 pm  [phone] or [phone]  Text \"Support\" to 45577    National [place] on Mental Illness (SAGAR)  599.407.2450 or 1.888.SAGAR.HELPS    [facility] 24 Hour Helpline  ([phone] 204-1463    [facility] [phone]. PHONES ANSWERED 24 HOURS/DAY    Substance Use Disorder Direct Access Resources and Treatment referral sources     It is recommended that you abstain from all mood altering chemicals. Please contact the sober support hotline (240-020-5064) as needed; phones are answered 24 hours a day, 7 days a week.    To access substance use treatment you must have a comprehensive assessment completed to begin any treatment program.     If uninsured, please contact your county of residence for eligibility screen to substance use disorder evaluation and treatment:    [place] - [phone]   [place] - [phone]   [facility] [phone]   [facility] [phone]   [facility] [phone]   [facility] [phone]   [facility] [phone]   [place] - [phone]     If you have private insurance, call the customer service number on the back of your insurance card to find an in-network substance abuse use disorder assessment. The ideal provider will be a treatment facility, licensed in the state of [place].     Community VIOLET Evaluations: Clients may call their county for a full list of providers - Availability and services listed belo are subject to change, please call the provider to confirm    [facility]  [phone] [address] [place], [place], [address]  *Please call the above number to schedule a comprehensive assessment " for determination of level of care needs. In person and virtual appointments available Mon-Fri.    [facility], [address] Floor, [address], [place] [address] (next to the outpatient lab)    Phone: [phone]   Provides bridging services to people with Opiate Use Disorders (OUD) seeking care. This is a front door to Medication Assisted Treatments (MAT), ages 16+  Walk In hours: Monday-Friday 9:00am-3:00pm    [facility]  [phone]  Walk in Assessments: Mon-Friday 7a-1:45p  [address], [place], [address]    [facility] - [facility]  Central Access [phone]  [address] [place], [place], [address]  *by appointment only    [facility]  [phone] (phone consultation available )  Locations in: [place], [facility], and [place], [place]  [facility] virtual [facility] [phone] or visit [email or web address]   Also offers LGBTQ programming     [facility]  [phone]  [address]  [place], [place], [address]  *Currently only offered via telehealth - call to set up an appointment    [facility]  [address] [place] [address], [address]  [facility]  For more information to to make a referral call:  [phone]  Walk-in on   9-11 a.m.    [facility]  [phone]  [address] [place], [place], [address]  *available by appointments only    [facility] - LBGTQ pecific  [phone]  [address] [place], [place], [address]  *available by appointment only    [facility]  [phone]  [address] [place], [place], [address]  *walk in assessments available M-F starting at 7 am.    [facility]  [phone]  Locations: [facility], [facility], and [place]  *Walk in assessments availble M-F starting at 8 am -virtual only    [facility] [facility]  [phone]  [place] [address]    [facility]  [facility] Locations: [place], [place], [place], [place], [facility], [place], [place]ie [place]   [phone]  *available by appointment only    [facility]  [phone]  [address]  [place], [place], [address]    [facility]  [phone]  [address]  [place], [place], [address]  *available by appointment    [facility]  [phone]  [address]  [place], [place], [address]  *walk ins available M-TH from 9-3    [facility]  MAT program: [phone]  [address] [place], [place], [address]    [place]  [phone]  Same day substance use disorder assessments are available Monday - Friday, via walk-in or by appointment at the [place] location.  [address] [place] [address], [address], [place] [address]     [facility] - adolescent and adult SUDs services  [phone]  Offer services Monday through Friday, as well as evening hours Monday through Thursday. Normally, a first appointment will be scheduled within one week  [email or web address]  Locations all over [place]    If you are intoxicated, you may be required to detox at a detox facility before starting treatment. The following are detox facilities that you can self present to. All detox facilities are able to help you complete an assessment prior to discharge if you choose:    [facility]: Arrive at a [facility] for immediate medical evaluation    [facility]: [address] [place], [place], [address].         [phone]    [facility]: [address] [place], [place], [address]  [phone]     [facility] ([facility]): [address] [place], [place], [address]  [phone]     [facility]: [address], [place], [address], [phone]         Ways to help cope with sobriety:    -- Take prescribed  medicines as scheduled  -- Keep follow-up appointments  -- Talk to others about your concerns  -- Get regular exercise  -- Practice deep breathing skills  -- Eat a healthy diet  -- Use community resources, including hotline numbers, [facility] crisis and support meetings  -- Stay sober and avoid places/people/things associated with substance use  --Maintain a daily schedule/routine  --Get at least 7-8 hours of sleep per night  --Create a list 10--20 healthy activities that you can do that are enjoyable and do not involve substance use  --Create daily goals (approx. 1-4 goals) per day and work to achieve them throughout the day.       Free Resources:    [facility] ([facility])  [facility] connects people seeking recovery to resources that help foster and sustain long-term recovery. Whether you are seeking resources for treatment, transportation, housing, job training, education, health care or other pathways to recovery, [facility] is a great place to start.  Phone: 190.403.5595. www.[place]ViewRay.SoapBox Soaps (Great listing of all types of recovery and non-recovery related resources)    Alcoholics Anonymous  Phone: 7-648-ALCOHOL  Website: HTTP://WWW.AA.ORG/  [facility] [place] (677-281-7570 or http://aaCoinPass.org)  [facility] [place] (586-823-0827 or www.aasaul.org)     Narcotics Anonymous  Phone: 492.839.3482  Website: www.[employer].    [employer] [facility] [address], [place], [place],  Phone: [phone]  Drop-in Hours: Monday-Friday 9-11:30 am. By appointment at other times.  Provides: [facility] is a drop-in center on the east side [facility] that provides a safe space for individuals who are homeless and have a history of chemical use. Sobriety is not a requirement but drugs and alcohol are not allowed on the property.  Services: Non-clients can access drop-in services such as Recovery and Harm Reduction Groups, referrals to case management, community activities, shower facilities, and a  pool table. Individuals who are homeless and have chemical health needs may be eligible for enrollment into [facility]'s case management program. Clients and  work together to access benefits, treatment, health care, shelter, and external housing resources.

## 2022-11-11 NOTE — ED PROVIDER NOTES
EMERGENCY DEPARTMENT ENCOUNTER      [facility]  AGE[facility] 42 year old female  YOB: 1980  MRN: [id number]  EVALUATION DATE & TIME: 11/11/2022  5:10 AM    PCP: Sepideh Hines    ED PROVIDER: Marilynn Perla M.D.      Chief Complaint   Patient presents with     Anxiety         FINAL IMPRESSION:  1. Anxiety    2. Alcohol withdrawal syndrome without complication (H)        MEDICAL DECISION MAKING:    Pertinent Labs & Imaging studies reviewed. (See chart for details)  ED Course as of 11/11/22 0636   Fri Nov 11, 2022   0541 Afebrile.  Vital signs here with tachycardia, tachypnea, hypertension likely secondary to anxiety versus alcohol withdrawal.  She is coming in for evaluation of anxiety.  She says that she has a history of bipolar disorder, she takes Abilify and Latuda and hydroxyzine as needed and has been taking all of her medications however states that over the last 4 to 5 days she has not been able to sleep, feels slightly paranoid, feels like her mind is racing.  She says that she feels manic.  She is an every day daily drinker, has not been able to drink in the last day secondary to her mind racing.  She is concerned she may start to go through withdrawal or if withdrawal is worsening this period of edilia.  She is not having any thoughts of self-harm or harming others.  No auditory or visual hallucinations.  She just is not able to sit still, states that she has been going and has not slept in 4 days.  Has never been admitted psychiatrically for her edilia, has never felt this way this badly in the past.  She states its been approximately 24 hours since her last drink.  Quite anxious here in the emergency department.  She is pacing around the emergency department.  But will stop and answer questions appropriately, very easily redirectable.    Physical exam for patient here appears moderately anxious, rapid pressured speech.  Able to be redirected.  No suicidal or homicidal ideation.   Heart rate is tachycardic.  Mucous membranes are slightly dry.  She does have slight tremulous and some mild asterixis noted but no significant tongue fasciculations.  Abdomen with some very mild epigastric tenderness to palpation.  Otherwise unremarkable.     0545   Concern for possible edilia versus anxiety causing patient's issues but could be compounded with early alcohol withdrawal as well.  We will start labs for patient here, fluids, start the alcohol withdrawal protocol.  Give her 2 mg of Ativan here to start with for her anxiety.  She may score further however I do believe that the assessment for her alcohol withdrawal will likely be mottled with this anxiety that she has been having.  She is amenable for staying and being evaluated by DEC.  She does not appear to be intoxicated at this time.       Patient feeling much improved after medications.  Heart rate is down to the 90s.  She had a long talk with the DEC .  Was able to come up with a plan for outpatient resources.  She feels comfortable with discharged home at this time.  She knows she is not going home with any as needed medications given her history of substance abuse.  She is in agreement with this plan.  Plan for discharge at this time, able to contract for safety, much more calm at this time.  We will be able to find a ride.  Medical Decision Making    Supplemental history from: N/A    External Record(s) Reviewed: Inpatient Record and Outpatient Record    Differential Diagnosis: See MDM charting for differential considered.     I performed an independent interpretation of the: N/A    Discussed with radiology regarding test interpretation: N/A    Discussion of management with another provider: See ED Course    The following testing was considered but ultimately not selected: None     I considered prescription management with: Symptomatic Management    The patient's care impacted: None    Consideration of Admission/Observation: N/A -  Patient discharged without consideration for admission    Care significantly affected by Social Determinants of Health including: Alcohol Abuse and/or Recreational Drug Use       Critical care: 0 minutes excluding separately billable procedures.  Includes bedside management, time reviewing test results, review of records, discussing the case with staff, documenting the medical record and time spent with family members (or surrogate decision makers) discussing specific treatment issues.          ED COURSE:  5:24 AM I met with the patient, obtained history, performed an initial exam, and discussed options and plan for diagnostics and treatment here in the ED.    The importance of close follow up was discussed. We reviewed warning signs and symptoms, and I instructed Ms. Lin to return to the emergency department immediately if she develops any new or worsening symptoms. I provided additional verbal discharge instructions. Ms. Lin expressed understanding and agreement with this plan of care, her questions were answered, and she was discharged in stable condition.     MEDICATIONS GIVEN IN THE EMERGENCY:  Medications   flumazenil (ROMAZICON) injection 0.2 mg (has no administration in time range)   diazepam (VALIUM) tablet 10 mg (10 mg Oral Given 11/11/22 0623)     Or   diazepam (VALIUM) injection 5-10 mg ( Intravenous See Alternative 11/11/22 0623)   thiamine (B-1) tablet 100 mg (100 mg Oral Given 11/11/22 0625)   folic acid (FOLVITE) tablet 1 mg (1 mg Oral Given 11/11/22 0625)   multivitamin w/minerals (THERA-VIT-M) tablet 1 tablet (1 tablet Oral Given 11/11/22 0626)   LORazepam (ATIVAN) injection 2 mg (2 mg Intravenous Given 11/11/22 0544)   magnesium oxide (MAG-OX) tablet 400 mg (400 mg Oral Given 11/11/22 0626)       NEW PRESCRIPTIONS STARTED AT TODAY'S ER VISIT:  New Prescriptions    No medications on file          =================================================================    HPI    Patient information  was obtained from: Patient    Use of : N/A         Alisia Lin is a 42 year old female with a history of asthma, chronic pain syndrome, depression, mood disorder, NATHANAEL, PTSD, tobacco use disorder, alcohol dependence, substance use disorder, opioid dependence, narcotic dependence in remission, who presents to the ED via EMS for the evaluation of anxiety.    Patient reports a history of bipolar disorder. She takes Abilify, Latuda, and Hydroxyzine as needed. She notes she has been taking all of her medications however states that over the last 4-5 days she has not been able to sleep, feels slightly paranoid. Patient reports she feels like her mind is racing and feels manic. Patient reports she is drinks daily and has not been jennifer to drink in the last day secondary to her mind racing. Patient notes her last drink was about 24 hours ago. She is concerned she may start to go through withdrawal or if withdrawal is worsening this period of edilia. Patient reports she has never been admitted psychiatrically for her edilia, but has never felt this way this badly in the past. Otherwise, she denies any suicidal or homicidal ideation. No other complaints at this time.    Per chart review, patient was seen at [facility] on 9/18-22/9/20/22 for alcohol withdrawal. In the ED, patient was mildly tachycardic.  EKG unremarkable. Troponin normal. K 3.0, mg 0.8. Patient was started on minds protocol. Withdrawal largely resolved. Psychiatry consulted who recommended vistaril, which was prescribed, and outpatient chemical treatment and psychotherapy. Patient discharged home with valium 5 mg tablets.    REVIEW OF SYSTEMS   Review of Systems   Psychiatric/Behavioral: Positive for sleep disturbance. Negative for suicidal ideas. The patient is nervous/anxious.         Negative for homicidal ideation   All other systems reviewed and are negative.    PAST MEDICAL HISTORY:  Past Medical History:   Diagnosis Date     Acute stress  reaction 5/31/2014     Anxiety      Arthritis      Asthma     Flovent BID     Back pain 1/26/2016     Chemical dependency (H) 06/05/2014    heroin, Norco     Chronic low back pain 3/19/2012     Influenza A 10/17/2016    with influenza pneumonia.  Hospitalized [facility]     Low TSH level 8/29/2017     Migraine with aura, without mention of intractable migraine without mention of status migrainosus     occas, Last one 11/2013. 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     Other, mixed, or unspecified nondependent drug abuse, in remission 1/1/07    Treatment for narcotic use      Papanicolaou smear of cervix with atypical squamous cells of undetermined significance (ASC-US) 3/2008    colp - WNL, HPV 53     Pyelonephritis 12/23/2015     Tobacco use disorder     1-1.5 ppd, zyban unsuccessful     Unspecified contraceptive management     ortho tricyclen       PAST SURGICAL HISTORY:  Past Surgical History:   Procedure Laterality Date     HC ENLARGE BREAST WITH IMPLANT  2002    BILATERAL     HC REMOVAL OF TONSILS,<11 Y/O      Description: Tonsillectomy;  Recorded: 10/03/2011;  Annotations: SHE THINKS IT WAS 5 OR 6 YEARS AGO     HC REMOVAL OF TONSILS,<11 Y/O      Description: Tonsillectomy;  Recorded: 10/03/2011;     HC REMOVE TONSILS/ADENOIDS,12+ Y/O  2006     HC TOOTH EXTRACTION W/FORCEP  16 yo    wisdom teeth     IUD PARAGARD  3/3/08    Removed with mirena placed. Mirena has now been removed as well.      LEEP TX, CERVICAL  age 15?     ORTHOPEDIC SURGERY      Lumbar fusion 2009     [place] ARTHRODESIS ANT INTERBODY MIN DISCECTOMY,LUMBAR      Description: Lumbar Vertebral Fusion;  Recorded: 10/03/2011;     ZZC ENLARGE BREAST      Description: Breast Surgery Enlargement Procedure;  Recorded: 10/03/2011;   Annotations: DATE 2009     [facility] ENLARGE BREAST      Description: Breast Surgery Enlargement Procedure Bilateral;  Recorded: 10/03/2011;       CURRENT MEDICATIONS:      Current Facility-Administered Medications:      diazepam (VALIUM) tablet 10 mg, 10 mg, Oral, Q30 Min PRN, 10 mg at 11/11/22 0623 **OR** diazepam (VALIUM) injection 5-10 mg, 5-10 mg, Intravenous, Q30 Min PRN, Marilynn Perla MD     flumazenil (ROMAZICON) injection 0.2 mg, 0.2 mg, Intravenous, q1 min prn, Marilynn Perla MD     folic acid (FOLVITE) tablet 1 mg, 1 mg, Oral, Daily, Marilynn Perla MD, 1 mg at 11/11/22 0625     multivitamin w/minerals (THERA-VIT-M) tablet 1 tablet, 1 tablet, Oral, Daily, Marilynn Perla MD, 1 tablet at 11/11/22 0626     thiamine (B-1) tablet 100 mg, 100 mg, Oral, Daily, Marilynn Perla MD, 100 mg at 11/11/22 0625    Current Outpatient Medications:      acetaminophen (TYLENOL) 500 MG tablet, Take 1,000 mg by mouth 3 times daily as needed for mild pain, Disp: , Rfl:      ARIPiprazole (ABILIFY) 5 MG tablet, Take 1 tablet (5 mg) by mouth daily, Disp: 90 tablet, Rfl: 0     B Complex Vitamins (VITAMIN B-COMPLEX PO), , Disp: , Rfl:      Ferrous Sulfate (IRON PO), , Disp: , Rfl:      furosemide (LASIX) 20 MG tablet, Take 1 tablet (20 mg) by mouth once for 1 dose, Disp: 1 tablet, Rfl: 0     hydrOXYzine (VISTARIL) 25 MG capsule, Take 1 capsule (25 mg) by mouth 4 times daily as needed for anxiety, Disp: 60 capsule, Rfl: 0     ibuprofen (ADVIL/MOTRIN) 600 MG tablet, TAKE 1 TABLET BY MOUTH TWICE DAILY WITH MEALS, Disp: 180 tablet, Rfl: 1     losartan (COZAAR) 25 MG tablet, Take 1 tablet (25 mg) by mouth daily, Disp: 90 tablet, Rfl: 3     lurasidone (LATUDA) 20 MG TABS tablet, Take 1 tab daily for 1 week, then increase to 2 tabs daily thereafter., Disp: 60 tablet, Rfl: 1     Magnesium Cl-Calcium Carbonate (SLOW-MAG) 71.5-119 MG TBEC, Take 2 tablets by mouth daily, Disp: 60 tablet, Rfl: 0     magnesium oxide (MAG-OX) 400 (241.3 Mg) MG  tablet, Take 1 tablet (400 mg) by mouth daily, Disp: 20 tablet, Rfl: 0     UNABLE TO FIND, MEDICATION NAME: amberen, Disp: , Rfl:      VENTOLIN  (90 BASE) MCG/ACT Inhaler, INHALE 2 PUFFS INTO THE LUNGS EVERY 4 HOURS AS NEEDED FOR SHORTNESS OF BREATH OR DIFFICULT BREATHING OR WHEEZING, Disp: 18 g, Rfl: 0    ALLERGIES:  Allergies   Allergen Reactions     Compazine      Heart Problems/ Body went completley stiff for 8 hrs.     Nortriptyline      Skelaxin [Metaxalone]        FAMILY HISTORY:  Family History   Problem Relation Age of Onset     Hypertension Mother      Alcohol/Drug Mother         alcohol, sober for 19 years     Depression Mother         on medication     Lipids Mother         on medication     Breast Cancer Mother      Alcohol/Drug Father         alcohol, still actively drinking     Lipids Father         on medication     Anxiety Disorder Father      Substance Abuse Father      C.A.D. Paternal Grandmother      Diabetes Paternal Grandmother      Breast Cancer Paternal Grandmother      Arthritis Paternal Grandmother      Cancer Paternal Grandmother         breast cancer     Cancer Paternal Grandfather         colon cancer     Asthma Daughter         x2     Thyroid Disease No family hx of        SOCIAL HISTORY:   Social History     Socioeconomic History     Marital status: Single     Number of children: 2     Years of education: 14   Occupational History     Occupation: self employed     Comment:      Employer: [employer]   Tobacco Use     Smoking status: Every Day     Types: Other     Smokeless tobacco: Current     Tobacco comments:     less than 1/2 ppd   Vaping Use     Vaping Use: Every day     Substances: Nicotine   Substance and Sexual Activity     Alcohol use: Yes     Comment: only drinking wine since 10/8/22     Drug use: No     Comment: clean for 18 days     Sexual activity: Yes     Partners: Male   Other Topics Concern      Service No     Blood Transfusions No      "Occupational Exposure Yes     Comment: Works at [employer]     Parent/sibling w/ CABG, MI or angioplasty before 65F 55M? No   Social History Narrative            Living with Father of youngest child  (3 girls 22, 18 and 10 )   Will be grandma around April 2018    No legal issues currently (possible identity theft).     Recently job loss ([employer])          PHYSICAL EXAM:    Vitals: BP (!) 159/95   Pulse (!) 128   Temp 98.1  F (36.7  C) (Temporal)   Resp 22   Ht 1.499 m (4' 11\")   Wt 74.8 kg (165 lb)   LMP 09/12/2022 (Approximate)   SpO2 98%   BMI 33.33 kg/m     General:. Alert and interactive, appears moderately anxious, rapid pressured speech. Able to be redirected.  HENT: Oropharynx without erythema or exudates. Mucous membranes are slightly dry.  TMs clear bilaterally.  Eyes: Pupils mid-sized and equally reactive.   Neck: Full AROM.  No midline tenderness to palpation.  Cardiovascular: Tachycardic, Regular rhythm. Peripheral pulses 2+ bilaterally.  Chest/Pulmonary: Normal work of breathing. Lung sounds clear and equal throughout, no wheezes or crackles. No chest wall tenderness or deformities.  Abdomen: Soft, nondistended. Mild epigastric tenderness to palpation without guarding or rebound.  Back/Spine: No CVA or midline tenderness.  Extremities: Normal ROM of all major joints. No lower extremity edema.   Skin: Warm and dry. Normal skin color.   Neuro: Speech clear. CNs grossly intact. Moves all extremities appropriately. Strength and sensation grossly intact to all extremities. Slight tremulous and some mild asterixis noted but no significant tongue fasciculations.  Psych: Normal affect/mood, cooperative, memory appropriate. No suicidal or homicidal ideation.     LAB:  All pertinent labs reviewed and interpreted.  Labs Ordered and Resulted from Time of ED Arrival to Time of ED Departure   COMPREHENSIVE METABOLIC PANEL - Abnormal       Result Value    Sodium 138      Potassium 3.7      " Chloride 100      Carbon Dioxide (CO2) 23      Anion Gap 15      Urea Nitrogen 8      Creatinine 0.74      Calcium 9.2      Glucose 99      Alkaline Phosphatase 90      AST 65 (*)     ALT 67 (*)     Protein Total 7.9      Albumin 4.5      Bilirubin Total 0.5      GFR Estimate >90     MAGNESIUM - Abnormal    Magnesium 1.5 (*)    ETHYL ALCOHOL LEVEL - Abnormal    Alcohol, Blood 87 (*)    LIPASE - Normal    Lipase 15     PHOSPHORUS - Normal    Phosphorus 3.5     CBC WITH PLATELETS AND DIFFERENTIAL    WBC Count 8.6      RBC Count 4.22      Hemoglobin 13.9      Hematocrit 39.9      MCV 95      MCH 32.9      MCHC 34.8      RDW 12.4      Platelet Count 351      % Neutrophils 61      % Lymphocytes 29      % Monocytes 8      % Eosinophils 0      % Basophils 1      % Immature Granulocytes 1      NRBCs per 100 WBC 0      Absolute Neutrophils 5.3      Absolute Lymphocytes 2.5      Absolute Monocytes 0.7      Absolute Eosinophils 0.0      Absolute Basophils 0.1      Absolute Immature Granulocytes 0.1      Absolute NRBCs 0.0         RADIOLOGY:  No orders to display           I, Carolyn Shankar, am serving as a scribe to document services personally performed by Dr. Marilynn Perla  based on my observation and the provider's statements to me. I, Marilynn Perla MD attest that Carolyn Shankar is acting in a scribe capacity, has observed my performance of the services and has documented them in accordance with my direction.      Marilynn Perla M.D.  Emergency Medicine  [facility] EMERGENCY ROOM  [address] [facility] [id number] [phone]  Dept: [phone]     Marilynn Perla MD  11/11/22 0676

## 2022-11-11 NOTE — CONSULTS
Diagnostic Evaluation Consultation  Crisis Assessment    Patient was assessed: Jose  Patient location:  ED  Was a release of information signed: No. Reason: No relevant providers for referral       Referral Data and Chief Complaint  Alisia Lin is a 42 year old, who uses she/her pronouns, and presents to the ED via EMS. Patient is referred to the ED by self. Patient is presenting to the ED for the following concerns: anxiety not being addressed by Rx meds.      Informed Consent and Assessment Methods     Patient is her own guardian. Writer met with patient and explained the crisis assessment process, including applicable information disclosures and limits to confidentiality, assessed understanding of the process, and obtained consent to proceed with the assessment. Patient was observed to be able to participate in the assessment as evidenced by voluntary engagement in assessment . Assessment methods included conducting a formal interview with patient, review of medical records, collaboration with medical staff, and obtaining relevant collateral information from family and community providers when available..     Over the course of this crisis assessment provided reassurance, offered validation and assisted in processing patient's thoughts and feeling relating to VIOLET and treatment . Patient's response to interventions was cooperative, engaged     Summary of Patient Situation    Patient is not suicidal or homicidal.  Patient does not engage in SIB.  Patient states she has had anxiety for 4 days, Hydroxyzine is inadequate for her needs she says.   Patient says she is not drinking beer or hard liquor since he last detox 9/22/2022-- she is only drinking wine to manage anxiety.       Patient indicates she is ready to get sober.  She say her med manager on 10/25 who will not prescribe an opioid or other more addictive medication for patient.  The patient was using an e-cigarette in the ED.  She had been given Ativan  and Valium in ED as she was increasingly pacing the ED and has since calmed.     Patient has had a hx of ED visits with intermittent concern that she may be drug seeking.  Patient's LAKE is approximately .087 at time of screen     Brief Psychosocial History    Patient has a large support network of family in the area.  She has 5 biological children, 5 step children and many grandchildren.   Patient lives with fiance and a cousin. Patient is not working.         Significant Clinical History    Patient has prior dx to include VIOLET, PTSD.  She was hospitalized per records July 2021 for Heroin and Fentanyl overdose, unintentional.  Patient's last known hospitalization was 9/19 for AUD, VIOLET, chronic pain, and ETOH withdrawal.  Patient did not follow through with OP VIOLET tx as was recommended in discharge summary.   Patient does not have a therapist.  She does not attend AA, NA, S SMART or any other support service.     Patient has been IP CD tx 2x at [place],  2017 and 2007 she reports  Records indicate patient has more CD treatments than these two IP treatments.  Patient does see a medication manager who, as noted, Rx patient Hydroxyzine.  Patient states this is not working for anxiety   Patient acknowledges ETOH use can be the cause -- or exacerbate anxiety/depression.  During 9/2020 assessment, patient denied VIOLET.    Patient states she was abstinent from all substances from 2007 -2012 and has been sober many times.       Patient has never attempted suicide and though she has had SI passive type in past.      Collateral Information    ED staff, medical records      Risk Assessment  ESS-6  1.a. Over the past 2 weeks, have you had thoughts of killing yourself? No  1.b. Have you ever attempted to kill yourself and, if yes, when did this last happen? No   2. Recent or current suicide plan? No   3. Recent or current intent to act on ideation? No  4. Lifetime psychiatric hospitalization? Yes  5. Pattern of excessive  substance use? Yes  6. Current irritability, agitation, or aggression? No  Scoring note: BOTH 1a and 1b must be yes for it to score 1 point, if both are not yes it is zero. All others are 1 point per number. If all questions 1a/1b - 6 are no, risk is negligible. If one of 1a/1b is yes, then risk is mild. If either question 2 or 3, but not both, is yes, then risk is automatically moderate regardless of total score. If both 2 and 3 are yes, risk is automatically high regardless of total score.      Score: 3, moderate risk      Does the patient have access to lethal means? No     Does the patient engage in non-suicidal self-injurious behavior (NSSI/SIB)? no     Does the patient have thoughts of harming others? No     Is the patient engaging in sexually inappropriate behavior?  no        Current Substance Abuse     Is there recent substance abuse?  Yes     Was a urine drug screen or blood alcohol level obtained: .087 LAKE       Mental Status Exam     Affect: Other: initially highly labile; calm after medications    Appearance: Appropriate    Attention Span/Concentration: Attentive  Eye Contact: Engaged   Fund of Knowledge: Appropriate    Language /Speech Content: Fluent   Language /Speech Volume: Normal    Language /Speech Rate/Productions: Normal    Recent Memory: Variable   Remote Memory: Variable   Mood: highly anxious and irritable prior to ED medications/calm after    Orientation to Person: Yes    Orientation to Place: Yes   Orientation to Time of Day: Yes    Orientation to Date: Yes    Situation (Do they understand why they are here?): Yes    Psychomotor Behavior: Normal    Thought Content: Clear   Thought Form: Intact      History of commitment: No       Medication    Psychotropic medications:  See chart; unclear if patient is compliant;   Medication changes made in the last two weeks: No       Current Care Team    Primary Care Provider:  None listed  Psychiatrist:  Medication manager is Patricia Esquivel CNP  "  Therapist: No  : No     CTSS or ARMHS: No  ACT Team: No  Other: No      Diagnosis    300.00 (F41.9) Unspecified Anxiety Disorder   Substance-Related & Addictive Disorders Alcohol Use Disorder   303.90 (F10.20) Moderate Polysubstance dependence and abuse  - by history   309.81 (F43.10) Posttraumatic Stress Disorder (includes Posttraumatic Stress Disorder for Children 6 Years and Younger)  Without dissociative symptoms - by history     Clinical Summary and Substantiation of Recommendations    Patient has had a hx of ED visits with some concern that she may be drug seeking.  Patient's initial presentation was highly anxious and angry that she could not get an anxiety medication from her medication manager other than Hydroxyzine.  Patient presented initially to writer as abstinent from ETOH.  She is drinking a what she describes as \"1/2 a box of a 32 box of wine\" daily.  She says she has stopped \"hard liquor\" and beer since he last known detox in mid September.     The patient was given a number of resources for walk in, appointment, free and other CD assessment and treatment options.  The patient was so open to any and all suggestions that this writer does wonder weather patient will pursue support or if she was looking for medications by visiting ED and appearing to be rapidly escalating in the waiting area of the ED.      The patient has trauma, VIOLET and anxiety, that may be at least somewhat secondary to VIOLET.  She is not at risk for HI, SI or SIB      Disposition    Recommended disposition: Rule 25/VIOLET Assessment       Reviewed case and recommendations with attending provider. Attending Name: Marilynn Perla MD        Attending concurs with disposition: Yes       Patient concurs with disposition: Yes       Guardian concurs with disposition: NA      Final disposition: Rule 25/VIOLET Assessment (patient to follow through with many resources and [facility])    Outpatient Details (if applicable):   Aftercare " plan and appointments placed in the AVS and provided to patient: Yes. Given to patient by ED staff     Was lethal means counseling provided as a part of aftercare planning? Yes - describe patient denies intent or access;       Assessment Details    Patient interview started at: 554 am and completed at: 617 am.     Total duration spent on the patient case in minutes: .75 hrs      CPT code(s) utilized: 32243 - Psychotherapy for Crisis - 60 (30-74*) min       Prudence Topete, LICSW, MSW, LICSW, LMHP  DEC - Triage & Transition Services  Callback: [phone]        Aftercare Plan      Today you were seen by a licensed mental health professional through Triage and Transition services, [facility] ([facility])  for a crisis assessment in the Emergency Department at [facility].  It is recommended that you follow up with your established providers (psychiatrist, mental health therapist, and/or primary care doctor - as relevant) as soon as possible.     Coordinators from [facility] will be calling you in the next 24-48 hours to ensure that you have the resources you need.  You can also contact [facility] coordinators directly at [phone]. You may have been scheduled for or offered an appointment with a mental health provider. [facility] maintains an extensive network of licensed behavioral health providers to connect patients with the services they need.  We do not charge providers a fee to participate in our referral network.  We match patients with providers based on a patient's specific needs, insurance coverage, and location.  Our first effort will be to refer you to a provider within your care system, and will utilize providers outside your care system as needed.      If I am feeling unsafe or I am in a crisis, I will:     Contact my established care providers   Call the National Suicide Prevention Lifeline: 988  Go to the nearest emergency room   Call 911   [facility] Crisis Line Number: [phone]  "    Things that I am able to do with others to cope or help me better: Below is a list of many resources to include walk in and no cost CD assessment services.  As noted above, [facility] will also follow up with you.   Recovery Connection has additional resource (099) 846-3362;  info@[facility].org.  Another good resources in [facility], [place] ([phone].  Given that withdrawal was a worry not long ago, it is possible that inpatient treatment will be recommended.      Not all of the below resources will be best for you but there are many so that you may choose the most convenient to you.     Your county has a mental health crisis team you can call 24/7: [facility] Mobile Crisis  [phone]     Crisis Lines  Crisis Text Line  Text 124926  You will be connected with a trained live crisis counselor to provide support.    [facility] Warm Line  Peer to peer support  Monday thru Saturday, 12 pm to 10 pm  [phone] or 1.307.656.3490  Text \"Support\" to 76833    National [place] on Mental Illness (SAGAR)  421.285.8397 or 1.888.SAGAR.HELPS    [facility] 24 Hour Helpline  ([phone] [phone]    [facility] [phone]. PHONES ANSWERED 24 HOURS/DAY    Substance Use Disorder Direct Access Resources and Treatment referral sources     It is recommended that you abstain from all mood altering chemicals. Please contact the sober support hotline (118-863-8037) as needed; phones are answered 24 hours a day, 7 days a week.    To access substance use treatment you must have a comprehensive assessment completed to begin any treatment program.     If uninsured, please contact your county of residence for eligibility screen to substance use disorder evaluation and treatment:    [place] - [phone]   [place] - [phone]   [place] - [phone]   [place] - [phone]   [place] - [phone]   [facility] [phone]   [facility] [phone]   [place] - [phone]     If you " have private insurance, call the customer service number on the back of your insurance card to find an in-network substance abuse use disorder assessment. The ideal provider will be a treatment facility, licensed in the state of [place].     Community VIOLET Evaluations: Clients may call their county for a full list of providers - Availability and services listed belo are subject to change, please call the provider to confirm    [facility]  [phone]  [facility] [place], [place], [address]  *Please call the above number to schedule a comprehensive assessment for determination of level of care needs. In person and virtual appointments available Mon-Fri.    [facility] [address], [address], [place] [address] (next to the outpatient lab)    Phone: [phone]   Provides bridging services to people with Opiate Use Disorders (OUD) seeking care. This is a front door to Medication Assisted Treatments (MAT), ages 16+  Walk In hours: Monday-Friday 9:00am-3:00pm    [facility]  [phone]  Walk in Assessments: Mon-Friday 7a-1:45p  [address], [place], [address]    [facility] - [facility]  Central Access [phone]  [address] [place] [address], [address]  *by appointment only    [facility]  [phone] (phone consultation available )  Locations in: [place], [place], [facility], and [place], [place]  [facility] virtual IOP [phone] or visit [email or web address]   Also offers LGBTQ programming     [facility]  [phone]  [address]  [place], [place], [address]  *Currently only offered via telehealth - call to set up an appointment    [facility] Adult Mental Health  [address] [place] [address], [address]  [facility]  For more information to to make a referral call:  [phone]  Walk-in on   9-11 a.m.    [facility]  [phone]  [address] [place], [place], [address]  *available by appointments only    [facility]  [phone]  [address], [place], [address]  *available by appointment only    [facility]  [phone]  [address] [place], [place], [address]  *walk in assessments available M-F starting at 7 am.    [facility]  [phone]  Locations: [place], [facility], [facility], and [place]  *Walk in assessments availble M-F starting at 8 am -virtual only    [facility]  [phone]  [address] [place] [address]    [facility]  Virtual + Locations: [place], [place], [place], [place], [facility], [place], [place]   [phone]  *available by appointment only    [facility]  [phone]  [address]  [place], [place], [address]    [facility]  [phone]  [address], [place], [address]  *available by appointment    [facility]  [phone]  [address] [place], [place], [address]  *walk ins available M-TH from 9-3    [facility]  MAT program: [phone]  [address] [place], [place], [address]    [place]  [phone]  Same day substance use disorder assessments are available Monday - Friday, via walk-in or by appointment at the [place] location.  [address], [place] [address]     [facility] - adolescent and adult SUDs services  [phone]  Offer services Monday through Friday, as well as evening hours Monday through Thursday. Normally, a first appointment will be scheduled within one week  [email or web address]  Locations all over [place]    If you are intoxicated, you may be required to detox at a detox facility before starting treatment. The following are detox facilities that you can self present to. All detox facilities  are able to help you complete an assessment prior to discharge if you choose:    [facility]: Arrive at a [facility] for immediate medical evaluation    [facility]: [address] [place], [place], [address].         [phone]    [facility]: [address] [place], [place], [address]  [phone]     [facility] ([facility]): [address] [place], [place], [address]  [phone]     [place] [facility]: [address] [place], [address], [phone]         Ways to help cope with sobriety:    -- Take prescribed medicines as scheduled  -- Keep follow-up appointments  -- Talk to others about your concerns  -- Get regular exercise  -- Practice deep breathing skills  -- Eat a healthy diet  -- Use community resources, including hotline numbers, [facility] crisis and support meetings  -- Stay sober and avoid places/people/things associated with substance use  --Maintain a daily schedule/routine  --Get at least 7-8 hours of sleep per night  --Create a list 10--20 healthy activities that you can do that are enjoyable and do not involve substance use  --Create daily goals (approx. 1-4 goals) per day and work to achieve them throughout the day.       Free Resources:    [place] [facility] ([facility])  [facility] connects people seeking recovery to resources that help foster and sustain long-term recovery. Whether you are seeking resources for treatment, transportation, housing, job training, education, health care or other pathways to recovery, [facility] is a great place to start.  Phone: 951.517.2492. www.[place].org (Great listing of all types of recovery and non-recovery related resources)    Alcoholics Anonymous  Phone: 4-734-ALCOHOL  Website: HTTP://WWW.AA.ORG/  [facility] [place] (860-482-2288 or http://a[place].org)  AA [place] (773-454-7370 or www.aastpaul.org)     Narcotics Anonymous  Phone: 315.288.8777  Website: www.[employer].aCommerce.    [facility] [address], [place], [place],  Phone: [phone]  Drop-in Hours: Monday-Friday 9-11:30 am. By appointment at other times.  Provides: [facility] is a drop-in center on the east side [facility] that provides a safe space for individuals who are homeless and have a history of chemical use. Sobriety is not a requirement but drugs and alcohol are not allowed on the property.  Services: Non-clients can access drop-in services such as Recovery and Harm Reduction Groups, referrals to case management, community activities, shower facilities, and a pool table. Individuals who are homeless and have chemical health needs may be eligible for enrollment into [facility]'s case management program. Clients and  work together to access benefits, treatment, health care, shelter, and external housing resources.

## 2022-11-11 NOTE — ED TRIAGE NOTES
"Patient has anxiety, has been in a state of anxiety x 4 days and \"cant handle it anymore\".  Has a prescription for hydroxizine that hasn't been effective.       "

## 2022-11-15 ENCOUNTER — HOSPITAL ENCOUNTER (EMERGENCY)
Facility: CLINIC | Age: 42
Discharge: HOME | End: 2022-11-15
Attending: EMERGENCY MEDICINE | Admitting: EMERGENCY MEDICINE
Payer: COMMERCIAL

## 2022-11-15 VITALS
WEIGHT: 160 LBS | SYSTOLIC BLOOD PRESSURE: 143 MMHG | DIASTOLIC BLOOD PRESSURE: 96 MMHG | OXYGEN SATURATION: 96 % | TEMPERATURE: 98 F | RESPIRATION RATE: 18 BRPM | HEIGHT: 59 IN | HEART RATE: 116 BPM | BODY MASS INDEX: 32.25 KG/M2

## 2022-11-15 DIAGNOSIS — F41.9 ANXIETY: ICD-10-CM

## 2022-11-15 PROCEDURE — 99283 EMERGENCY DEPT VISIT LOW MDM: CPT

## 2022-11-15 PROCEDURE — 250N000013 HC RX MED GY IP 250 OP 250 PS 637: Performed by: EMERGENCY MEDICINE

## 2022-11-15 RX ORDER — DIAZEPAM 5 MG
5 TABLET ORAL ONCE
Status: COMPLETED | OUTPATIENT
Start: 2022-11-15 | End: 2022-11-15

## 2022-11-15 RX ORDER — DIAZEPAM 5 MG
5 TABLET ORAL EVERY 6 HOURS PRN
Qty: 20 TABLET | Refills: 0 | Status: SHIPPED | OUTPATIENT
Start: 2022-11-15 | End: 2022-11-28

## 2022-11-15 RX ADMIN — DIAZEPAM 5 MG: 5 TABLET ORAL at 05:57

## 2022-11-15 RX ADMIN — DIAZEPAM 5 MG: 5 TABLET ORAL at 06:36

## 2022-11-15 ASSESSMENT — ACTIVITIES OF DAILY LIVING (ADL): ADLS_ACUITY_SCORE: 33

## 2022-11-15 NOTE — ED TRIAGE NOTES
PT states that she has been feeling axious for the last 5-6 days but tonight she called EMS because she was unable to stop pacing and feeling restless. PT took her hydroxyzine at 1800.      Triage Assessment     Row Name 11/15/22 0256       Triage Assessment (Adult)    Airway WDL WDL       Respiratory WDL    Respiratory WDL WDL       Skin Circulation/Temperature WDL    Skin Circulation/Temperature WDL WDL       Cardiac WDL    Cardiac WDL WDL       Peripheral/Neurovascular WDL    Peripheral Neurovascular WDL WDL       Cognitive/Neuro/Behavioral WDL    Cognitive/Neuro/Behavioral WDL WDL

## 2022-11-15 NOTE — ED PROVIDER NOTES
EMERGENCY DEPARTMENT ENCOUNTER            IMPRESSION:  Anxiety - multifactorial related to alcohol withdrawal or mental illness      MEDICAL DECISION MAKING:  Patient returns to the emergency department for ongoing anxiety.  She was seen recently in the emergency department and had a full work-up and treated for anxiety.  She has a history of generalized anxiety disorder and chemical dependency and alcohol.  She has been tapering off her alcohol    On initial evaluation she was tachycardic.  She seems slightly anxious.  She is awake alert responsive.    Labs were checked recently and were unremarkable    She was given Valium with significant overall improvement in her anxiety    I suspect she has some alcohol withdrawal and generalized anxiety.    She will be discharged home with limited supply of Valium      =================================================================  CHIEF COMPLAINT:  Chief Complaint   Patient presents with     Anxiety         HPI  Alisia Lin is a 42 year old female with a history of anxiety and narcotic abuse who presents to the ED by EMS for evaluation of anxiety. Patient states that she has been feeling shaky, pacing, and rocking and feels like she is going to pass out. She notes that she has been cutting down on alcohol consumption because she wants to. She only drinks wine to help herself eat.    Per chart review, patient was seen on 11/11 (~4 days ago) at [facility] for anxiety. Patient noted that she had been unable to sleep and felt paranoid like her mind was racing. Patient is an every day drinker and was concerned she was going through withdrawal. Patient given 2 mg of Ativan and evaluated by DEC. Patient was discharged home in stable condition.      I, Lauren Dumont am serving as a scribe to document services personally performed by Dr. Tad Gonzalez MD, based on my observation and the provider's statements to me. I, Dr. Tda Gonzalez MD attest that Lauren Dumont is acting in a  vidal lucero, has observed my performance of the services and has documented them in accordance with my direction.      REVIEW OF SYSTEMS   Constitutional: Denies fever, chills, unintentional weight loss or fatigue   Eyes: Denies visual changes or discharge    HENT: Denies sore throat, ear pain or neck pain  Respiratory: Denies cough or shortness of breath    Cardiovascular: Denies chest pain, palpitations or leg swelling  GI: Denies abdominal pain, nausea, vomiting, or dark, bloody stools.    : Denies hematuria, dysuria, or flank pain  Musculoskeletal: Denies any new back pain or new muscle/joint pains  Skin: Denies rash or wound  Neurologic: Denies current headache, new weakness, focal weakness, or sensory changes    Lymphatic: Denies swollen glands    Psychiatric: Denies depression, suicidal ideation or homicidal ideation. Endorses anxiety.    Remainder of systems reviewed, unless noted in HPI all others negative.      PAST MEDICAL HISTORY:  Past Medical History:   Diagnosis Date     Acute stress reaction 5/31/2014     Anxiety      Arthritis      Asthma     Flovent BID     Back pain 1/26/2016     Chemical dependency (H) 06/05/2014    heroin, Norco     Chronic low back pain 3/19/2012     Influenza A 10/17/2016    with influenza pneumonia.  Hospitalized [facility]     Low TSH level 8/29/2017     Migraine with aura, without mention of intractable migraine without mention of status migrainosus     occas, Last one 11/2013. 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     Other, mixed, or unspecified nondependent drug abuse, in remission 1/1/07    Treatment for narcotic use      Papanicolaou smear of cervix with atypical squamous cells of undetermined significance (ASC-US) 3/2008    colp - WNL,  HPV 53     Pyelonephritis 12/23/2015     Tobacco use disorder     1-1.5 ppd, zyban unsuccessful     Unspecified contraceptive management     ortho tricyclen       PAST SURGICAL HISTORY:  Past Surgical History:   Procedure Laterality Date     HC ENLARGE BREAST WITH IMPLANT  2002    BILATERAL     HC REMOVAL OF TONSILS,<13 Y/O      Description: Tonsillectomy;  Recorded: 10/03/2011;  Annotations: SHE THINKS IT WAS 5 OR 6 YEARS AGO     HC REMOVAL OF TONSILS,<13 Y/O      Description: Tonsillectomy;  Recorded: 10/03/2011;     HC REMOVE TONSILS/ADENOIDS,12+ Y/O  2006     HC TOOTH EXTRACTION W/FORCEP  14 yo    wisdom teeth     IUD PARAGARD  3/3/08    Removed with mirena placed. Mirena has now been removed as well.      LEEP TX, CERVICAL  age 15?     ORTHOPEDIC SURGERY      Lumbar fusion 2009     MI ARTHRODESIS ANT INTERBODY MIN DISCECTOMY,LUMBAR      Description: Lumbar Vertebral Fusion;  Recorded: 10/03/2011;     RUST ENLARGE BREAST      Description: Breast Surgery Enlargement Procedure;  Recorded: 10/03/2011;  Annotations: DATE 2009     RUST ENLARGE BREAST      Description: Breast Surgery Enlargement Procedure Bilateral;  Recorded: 10/03/2011;         CURRENT MEDICATIONS:    diazepam (VALIUM) 5 MG tablet  acetaminophen (TYLENOL) 500 MG tablet  ARIPiprazole (ABILIFY) 5 MG tablet  B Complex Vitamins (VITAMIN B-COMPLEX PO)  Ferrous Sulfate (IRON PO)  furosemide (LASIX) 20 MG tablet  hydrOXYzine (VISTARIL) 25 MG capsule  ibuprofen (ADVIL/MOTRIN) 600 MG tablet  losartan (COZAAR) 25 MG tablet  lurasidone (LATUDA) 20 MG TABS tablet  Magnesium Cl-Calcium Carbonate (SLOW-MAG) 71.5-119 MG TBEC  magnesium oxide (MAG-OX) 400 (241.3 Mg) MG tablet  UNABLE TO FIND  VENTOLIN  (90 BASE) MCG/ACT Inhaler        ALLERGIES:  Allergies   Allergen Reactions     Compazine      Heart Problems/ Body went completley stiff for 8 hrs.     Nortriptyline      Skelaxin [Metaxalone]        FAMILY HISTORY:  Family History   Problem Relation Age of  "Onset     Hypertension Mother      Alcohol/Drug Mother         alcohol, sober for 19 years     Depression Mother         on medication     Lipids Mother         on medication     Breast Cancer Mother      Alcohol/Drug Father         alcohol, still actively drinking     Lipids Father         on medication     Anxiety Disorder Father      Substance Abuse Father      C.A.D. Paternal Grandmother      Diabetes Paternal Grandmother      Breast Cancer Paternal Grandmother      Arthritis Paternal Grandmother      Cancer Paternal Grandmother         breast cancer     Cancer Paternal Grandfather         colon cancer     Asthma Daughter         x2     Thyroid Disease No family hx of        SOCIAL HISTORY:   Social History     Socioeconomic History     Marital status: Single     Spouse name: None     Number of children: 2     Years of education: 14     Highest education level: None   Occupational History     Occupation: self employed     Comment:      Employer: [employer]   Tobacco Use     Smoking status: Every Day     Types: Other     Smokeless tobacco: Current     Tobacco comments:     less than 1/2 ppd   Vaping Use     Vaping Use: Every day     Substances: Nicotine   Substance and Sexual Activity     Alcohol use: Yes     Comment: only drinking wine since 10/8/22     Drug use: No     Comment: clean for 18 days     Sexual activity: Yes     Partners: Male   Other Topics Concern      Service No     Blood Transfusions No     Occupational Exposure Yes     Comment: Works at  blood [employer]     Parent/sibling w/ CABG, MI or angioplasty before 65F 55M? No   Social History Narrative            Living with Father of youngest child  (3 girls 22, 18 and 10 )   Will be grandma around April 2018    No legal issues currently (possible identity theft).     Recently job loss ([employer])          PHYSICAL EXAM:    BP (!) 143/96   Pulse 116   Temp 98  F (36.7  C) (Oral)   Resp 18   Ht 1.499 m (4' 11\") "   Wt 72.6 kg (160 lb)   SpO2 96%   BMI 32.32 kg/m      Constitutional: She appears anxious  Head: Normocephalic, atraumatic.  ENT: Mucous membranes moist. Posterior oropharynx appears normal.  Eyes: Pupils midrange and reactive ,no conjunctival discharge  Neck: No lymphadenopathy, no stridor, supple, no soft tissue swelling  Chest: No tenderness   Respiratory: Respirations even, unlabored. Lungs clear to ascultation bilaterally, in no acute respiratory distress.  Cardiovascular: Tachycardia  GI: Abdomen soft, non-tender to palpation in all 4 quadrants. No guarding or rebound. Bowel sounds intact on all 4 quadrants.   Back: No CVA tenderness.    Musculoskeletal: Moves all 4 extremities equally, strength symmetrical on bilateral uppers and lowers.  No peripheral edema  Integument: Warm, dry. No rash. No bruising or petechiae.  Lymphatic: No cervical lymphadenopathy  Neurologic: Alert & oriented x 3. Normal speech. Grossly normal motor and sensory function. No focal deficits noted.  NIHSS = 0  Psychiatric: Normal mood and affect. Normal judgement.    ED COURSE:    5:06 AM I met with the patient to gather history and to perform my initial exam. I discussed the plan for care while in the Emergency Department. PPE (gloves and surgical mask) was worn during patient encounters.  5:50 AM I updated and reevaluated patient.       MEDICATIONS GIVEN IN THE EMERGENCY:  Medications   diazepam (VALIUM) tablet 5 mg (5 mg Oral Given 11/15/22 0557)   diazepam (VALIUM) tablet 5 mg (5 mg Oral Given 11/15/22 0636)           NEW PRESCRIPTIONS STARTED AT TODAY'S ER VISIT:  New Prescriptions    DIAZEPAM (VALIUM) 5 MG TABLET    Take 1 tablet (5 mg) by mouth every 6 hours as needed for anxiety, sleep or withdrawal          FINAL DIAGNOSIS:    ICD-10-CM    1. Anxiety  F41.9                At the conclusion of the encounter I discussed the results of all of the tests and the disposition. The questions were answered. The patient or family  acknowledged understanding and was agreeable with the care plan.     NAME: Alisia Lin  AGE: 42 year old female  YOB: 1980  MRN: [id number]  EVALUATION DATE & TIME: No admission date for patient encounter.    PCP: Maria Luisa, [facility]    ED PROVIDER: Tad Gonzalez M.D.      Lauren BROWN, am serving as a scribe to document services personally performed by Dr. Tad Gonzalez based on my observation and the provider's statements to me. ITad MD attest that Lauren Dumont is acting in a scribe capacity, has observed my performance of the services and has documented them in accordance with my direction.    Tad Gonzalez M.D.  Emergency Medicine  [facility] EMERGENCY ROOM  [facility] [facility] [address]  [phone]  Dept: [phone]  11/15/2022       Tad Gonzalez MD  11/15/22 0651

## 2022-11-15 NOTE — DISCHARGE INSTRUCTIONS
Your symptoms may be related to anxiety or alcohol withdrawal.  Valium prescribed for your symptoms, do not take this medication in combination with alcohol

## 2022-11-28 ENCOUNTER — OFFICE VISIT (OUTPATIENT)
Dept: FAMILY MEDICINE | Facility: CLINIC | Age: 42
End: 2022-11-28
Payer: COMMERCIAL

## 2022-11-28 VITALS
WEIGHT: 178 LBS | RESPIRATION RATE: 15 BRPM | HEART RATE: 132 BPM | OXYGEN SATURATION: 95 % | DIASTOLIC BLOOD PRESSURE: 82 MMHG | HEIGHT: 58 IN | SYSTOLIC BLOOD PRESSURE: 119 MMHG | TEMPERATURE: 97.5 F | BODY MASS INDEX: 37.36 KG/M2

## 2022-11-28 DIAGNOSIS — F41.1 GAD (GENERALIZED ANXIETY DISORDER): ICD-10-CM

## 2022-11-28 DIAGNOSIS — F41.9 ANXIETY: ICD-10-CM

## 2022-11-28 DIAGNOSIS — N95.1 PERIMENOPAUSAL: ICD-10-CM

## 2022-11-28 DIAGNOSIS — F33.1 MODERATE RECURRENT MAJOR DEPRESSION (H): Primary | ICD-10-CM

## 2022-11-28 DIAGNOSIS — N94.6 DYSMENORRHEA: ICD-10-CM

## 2022-11-28 DIAGNOSIS — R00.0 TACHYCARDIA: ICD-10-CM

## 2022-11-28 DIAGNOSIS — F31.9 BIPOLAR 1 DISORDER (H): ICD-10-CM

## 2022-11-28 PROCEDURE — 96127 BRIEF EMOTIONAL/BEHAV ASSMT: CPT | Performed by: NURSE PRACTITIONER

## 2022-11-28 PROCEDURE — 99215 OFFICE O/P EST HI 40 MIN: CPT | Performed by: NURSE PRACTITIONER

## 2022-11-28 RX ORDER — NORETHINDRONE ACETATE AND ETHINYL ESTRADIOL .02; 1 MG/1; MG/1
1 TABLET ORAL DAILY
Qty: 84 TABLET | Refills: 3 | Status: SHIPPED | OUTPATIENT
Start: 2022-11-28

## 2022-11-28 RX ORDER — PROPRANOLOL HYDROCHLORIDE 10 MG/1
10 TABLET ORAL 3 TIMES DAILY
Qty: 240 TABLET | Refills: 1 | Status: SHIPPED | OUTPATIENT
Start: 2022-11-28

## 2022-11-28 RX ORDER — HYDROXYZINE PAMOATE 25 MG/1
25 CAPSULE ORAL 4 TIMES DAILY PRN
Qty: 90 CAPSULE | Refills: 3 | Status: SHIPPED | OUTPATIENT
Start: 2022-11-28 | End: 2023-08-22

## 2022-11-28 RX ORDER — PAROXETINE 20 MG/1
20 TABLET, FILM COATED ORAL EVERY MORNING
Qty: 90 TABLET | Refills: 1 | Status: SHIPPED | OUTPATIENT
Start: 2022-11-28

## 2022-11-28 ASSESSMENT — PAIN SCALES - GENERAL: PAINLEVEL: NO PAIN (0)

## 2022-11-28 ASSESSMENT — PATIENT HEALTH QUESTIONNAIRE - PHQ9: SUM OF ALL RESPONSES TO PHQ QUESTIONS 1-9: 14

## 2022-11-28 NOTE — PROGRESS NOTES
Assessment & Plan     Moderate recurrent major depression (H)  Will resume her Paxil and we discussed selective serotonin reuptake inhibitor will likely help with her perimenopausal symptoms as well.  Ultimately, I would like psychiatry input in managing her edilia and Bipolar disorder.  We are planning on starting propranolol today to help manage her anxiety and tachycardia.   - PARoxetine (PAXIL) 20 MG tablet; Take 1 tablet (20 mg) by mouth every morning    Bipolar 1 disorder (H)  - Adult Mental Health  Referral; Future    Anxiety  - propranolol (INDERAL) 10 MG tablet; Take 1 tablet (10 mg) by mouth 3 times daily    NATHANAEL (generalized anxiety disorder)  Can continue PRN hydroxyzine and hopefull that propranolol and selective serotonin reuptake inhibitor will give better control of these symptoms over time.  - hydrOXYzine (VISTARIL) 25 MG capsule; Take 1 capsule (25 mg) by mouth 4 times daily as needed for anxiety    Tachycardia  Persistent tachycardia with unsure eitiology.  We are going to try beta blocker.  She has had multiple recent ER visits for alcohol intoxication and I suspect this continues to contribute. It is also likely her edilia is contributing.   I reviewed her labs which did not show electrolyte abnormality or anemia.  We may need to consider longer acting beta blockade, though it would be best if she stops drinking.  Her BP was well controlled today, so we will want to monitor for orthostatic hypotension and may need to adjust her meds if adding XR beta blocker.  - propranolol (INDERAL) 10 MG tablet; Take 1 tablet (10 mg) by mouth 3 times daily    Dysmenorrhea  Will place on OCP to see if we can reduce bleeding, should follow up with GYN.  - norethindrone-ethinyl estradiol (MICROGESTIN 1/20) 1-20 MG-MCG tablet; Take 1 tablet by mouth daily    Perimenopausal  selective serotonin reuptake inhibitor initiated today.  Discussed trial of gabapentin for night sweats, she was previously on it and  did not like how it made her feel.        Review of external notes as documented elsewhere in note  Prescription drug management  I spent a total of 45 minutes on the day of the visit.   Time spent doing chart review, history and exam, documentation and further activities per the note           No follow-ups on file.   Follow-up Visit   Expected date:  Jan 28, 2023 (Approximate)      Follow Up Appointment Details:     Follow-up with whom?: Me    Follow-Up for what?: Acute Issue Recheck    Additional Details: medication follow up    How?: In Person                    SON[facility]    June Crump is a 42 year old presenting for the following health issues:  Abnormal Bleeding Problem (Menstrual problems - Still bleeding since 9/9/22  and paperwork to sign )      Abnormal Bleeding Problem    History of Present Illness       Reason for visit:  Folllow up    She eats 0-1 servings of fruits and vegetables daily.She consumes 1 sweetened beverage(s) daily.She exercises with enough effort to increase her heart rate 10 to 19 minutes per day.  She exercises with enough effort to increase her heart rate 3 or less days per week.   She is taking medications regularly.       Stopped her Latuda and Abilify, felt it was making her Keerthi worse.  Wanting to go back on Paxil which she was previously on.   Taking BP meds, but continues to be tachycardia.    Having pre-menopausal symptoms.  Feeling hot flashes, especially at night.  Has also had continuous menstrual bleeding since our last visit.  States her groin and vaginal area are raw from wearing pads.  Has appointment scheduled with OBGYN in February, wanting to try birth control to see if bleeding will stop prior to that appointment.  Not smoking cigarettes, but does vape nicotine products.    Agreeable to psych consult, has not been able to get scheduled with them.         Review of Systems   Genitourinary: Positive for  "menstrual problem.      Constitutional, HEENT, cardiovascular, pulmonary, gi and gu systems are negative, except as otherwise noted.      Objective    /82 (BP Location: Left arm, Patient Position: Sitting, Cuff Size: Adult Regular)   Pulse (!) 132   Temp 97.5  F (36.4  C) (Temporal)   Resp 15   Ht 1.48 m (4' 10.27\")   Wt 80.7 kg (178 lb)   LMP 09/09/2022 (Exact Date)   SpO2 95%   BMI 36.86 kg/m    Body mass index is 36.86 kg/m .  Physical Exam   GENERAL: healthy, alert and no distress  EYES: Eyes grossly normal to inspection, PERRL and conjunctivae and sclerae normal  MS: no gross musculoskeletal defects noted, no edema  PSYCH: anxious, insight intact and easily redirected, hyperactive.                    "

## 2023-01-05 DIAGNOSIS — F31.9 BIPOLAR 1 DISORDER (H): ICD-10-CM

## 2023-01-06 RX ORDER — ARIPIPRAZOLE 5 MG/1
TABLET ORAL
Qty: 90 TABLET | Refills: 0 | OUTPATIENT
Start: 2023-01-06

## 2023-01-06 NOTE — TELEPHONE ENCOUNTER
Abilify was stopped per 11/28/22 progress note.  Deejay.  JEANA Romero RN  [facility]

## 2023-01-10 ENCOUNTER — MYC MEDICAL ADVICE (OUTPATIENT)
Dept: SURGERY | Facility: CLINIC | Age: 43
End: 2023-01-10

## 2023-01-12 ENCOUNTER — PRE VISIT (OUTPATIENT)
Dept: SURGERY | Facility: CLINIC | Age: 43
End: 2023-01-12
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
## 2023-08-19 ENCOUNTER — HEALTH MAINTENANCE LETTER (OUTPATIENT)
Age: 43
End: 2023-08-19

## 2023-08-22 ENCOUNTER — APPOINTMENT (OUTPATIENT)
Dept: CT IMAGING | Facility: CLINIC | Age: 43
End: 2023-08-22
Attending: STUDENT IN AN ORGANIZED HEALTH CARE EDUCATION/TRAINING PROGRAM
Payer: COMMERCIAL

## 2023-08-22 ENCOUNTER — HOSPITAL ENCOUNTER (INPATIENT)
Facility: CLINIC | Age: 43
LOS: 2 days | Discharge: HOME | End: 2023-08-25
Attending: STUDENT IN AN ORGANIZED HEALTH CARE EDUCATION/TRAINING PROGRAM | Admitting: FAMILY MEDICINE
Payer: COMMERCIAL

## 2023-08-22 DIAGNOSIS — F10.939 ALCOHOL WITHDRAWAL SEIZURE WITH COMPLICATION (H): ICD-10-CM

## 2023-08-22 DIAGNOSIS — F10.90 ALCOHOL USE DISORDER: Primary | ICD-10-CM

## 2023-08-22 DIAGNOSIS — R56.9 ALCOHOL WITHDRAWAL SEIZURE WITH COMPLICATION (H): ICD-10-CM

## 2023-08-22 LAB
ALBUMIN SERPL-MCNC: 4.9 G/DL (ref 3.5–5)
ALP SERPL-CCNC: 102 U/L (ref 45–120)
ALT SERPL W P-5'-P-CCNC: 264 U/L (ref 0–45)
ANION GAP SERPL CALCULATED.3IONS-SCNC: 22 MMOL/L (ref 5–18)
AST SERPL W P-5'-P-CCNC: 298 U/L (ref 0–40)
BASOPHILS # BLD AUTO: 0.1 10E3/UL (ref 0–0.2)
BASOPHILS NFR BLD AUTO: 1 %
BILIRUB DIRECT SERPL-MCNC: 0.4 MG/DL
BILIRUB SERPL-MCNC: 1.1 MG/DL (ref 0–1)
BUN SERPL-MCNC: 8 MG/DL (ref 8–22)
CALCIUM SERPL-MCNC: 9.2 MG/DL (ref 8.5–10.5)
CHLORIDE BLD-SCNC: 99 MMOL/L (ref 98–107)
CO2 SERPL-SCNC: 22 MMOL/L (ref 22–31)
CREAT SERPL-MCNC: 0.79 MG/DL (ref 0.6–1.1)
EOSINOPHIL # BLD AUTO: 0.1 10E3/UL (ref 0–0.7)
EOSINOPHIL NFR BLD AUTO: 1 %
ERYTHROCYTE [DISTWIDTH] IN BLOOD BY AUTOMATED COUNT: 13.9 % (ref 10–15)
ETHANOL SERPL-MCNC: 158 MG/DL
GFR SERPL CREATININE-BSD FRML MDRD: >90 ML/MIN/1.73M2
GLUCOSE BLD-MCNC: 79 MG/DL (ref 70–125)
HCT VFR BLD AUTO: 37.1 % (ref 35–47)
HGB BLD-MCNC: 13.2 G/DL (ref 11.7–15.7)
IMM GRANULOCYTES # BLD: 0.1 10E3/UL
IMM GRANULOCYTES NFR BLD: 1 %
LIPASE SERPL-CCNC: 16 U/L (ref 0–52)
LYMPHOCYTES # BLD AUTO: 3.9 10E3/UL (ref 0.8–5.3)
LYMPHOCYTES NFR BLD AUTO: 43 %
MCH RBC QN AUTO: 31 PG (ref 26.5–33)
MCHC RBC AUTO-ENTMCNC: 35.6 G/DL (ref 31.5–36.5)
MCV RBC AUTO: 87 FL (ref 78–100)
MONOCYTES # BLD AUTO: 0.7 10E3/UL (ref 0–1.3)
MONOCYTES NFR BLD AUTO: 8 %
NEUTROPHILS # BLD AUTO: 4.2 10E3/UL (ref 1.6–8.3)
NEUTROPHILS NFR BLD AUTO: 46 %
NRBC # BLD AUTO: 0 10E3/UL
NRBC BLD AUTO-RTO: 0 /100
PLATELET # BLD AUTO: 320 10E3/UL (ref 150–450)
POTASSIUM BLD-SCNC: 3.7 MMOL/L (ref 3.5–5)
PROT SERPL-MCNC: 8.4 G/DL (ref 6–8)
RBC # BLD AUTO: 4.26 10E6/UL (ref 3.8–5.2)
SODIUM SERPL-SCNC: 143 MMOL/L (ref 136–145)
WBC # BLD AUTO: 9.1 10E3/UL (ref 4–11)

## 2023-08-22 PROCEDURE — G0378 HOSPITAL OBSERVATION PER HR: HCPCS

## 2023-08-22 PROCEDURE — 96374 THER/PROPH/DIAG INJ IV PUSH: CPT

## 2023-08-22 PROCEDURE — 99285 EMERGENCY DEPT VISIT HI MDM: CPT | Mod: 25

## 2023-08-22 PROCEDURE — 70450 CT HEAD/BRAIN W/O DYE: CPT

## 2023-08-22 PROCEDURE — C9113 INJ PANTOPRAZOLE SODIUM, VIA: HCPCS | Mod: JZ | Performed by: STUDENT IN AN ORGANIZED HEALTH CARE EDUCATION/TRAINING PROGRAM

## 2023-08-22 PROCEDURE — 250N000011 HC RX IP 250 OP 636: Mod: JZ | Performed by: STUDENT IN AN ORGANIZED HEALTH CARE EDUCATION/TRAINING PROGRAM

## 2023-08-22 PROCEDURE — 96375 TX/PRO/DX INJ NEW DRUG ADDON: CPT

## 2023-08-22 PROCEDURE — 36415 COLL VENOUS BLD VENIPUNCTURE: CPT | Performed by: STUDENT IN AN ORGANIZED HEALTH CARE EDUCATION/TRAINING PROGRAM

## 2023-08-22 PROCEDURE — 82248 BILIRUBIN DIRECT: CPT | Performed by: STUDENT IN AN ORGANIZED HEALTH CARE EDUCATION/TRAINING PROGRAM

## 2023-08-22 PROCEDURE — 85025 COMPLETE CBC W/AUTO DIFF WBC: CPT | Performed by: STUDENT IN AN ORGANIZED HEALTH CARE EDUCATION/TRAINING PROGRAM

## 2023-08-22 PROCEDURE — 258N000003 HC RX IP 258 OP 636: Performed by: STUDENT IN AN ORGANIZED HEALTH CARE EDUCATION/TRAINING PROGRAM

## 2023-08-22 PROCEDURE — 82077 ASSAY SPEC XCP UR&BREATH IA: CPT | Performed by: STUDENT IN AN ORGANIZED HEALTH CARE EDUCATION/TRAINING PROGRAM

## 2023-08-22 PROCEDURE — 83690 ASSAY OF LIPASE: CPT | Performed by: STUDENT IN AN ORGANIZED HEALTH CARE EDUCATION/TRAINING PROGRAM

## 2023-08-22 PROCEDURE — HZ2ZZZZ DETOXIFICATION SERVICES FOR SUBSTANCE ABUSE TREATMENT: ICD-10-PCS | Performed by: FAMILY MEDICINE

## 2023-08-22 PROCEDURE — 96361 HYDRATE IV INFUSION ADD-ON: CPT

## 2023-08-22 RX ORDER — FUROSEMIDE 20 MG
20 TABLET ORAL DAILY
COMMUNITY

## 2023-08-22 RX ORDER — GABAPENTIN 600 MG/1
600 TABLET ORAL DAILY
COMMUNITY

## 2023-08-22 RX ORDER — HYDROXYZINE PAMOATE 50 MG/1
50 CAPSULE ORAL 4 TIMES DAILY PRN
COMMUNITY

## 2023-08-22 RX ORDER — LORAZEPAM 1 MG/1
1 TABLET ORAL ONCE
Status: DISCONTINUED | OUTPATIENT
Start: 2023-08-22 | End: 2023-08-22

## 2023-08-22 RX ORDER — LORAZEPAM 2 MG/ML
2 INJECTION INTRAMUSCULAR ONCE
Status: COMPLETED | OUTPATIENT
Start: 2023-08-22 | End: 2023-08-22

## 2023-08-22 RX ORDER — FERROUS SULFATE 325(65) MG
325 TABLET ORAL
COMMUNITY

## 2023-08-22 RX ADMIN — LORAZEPAM 2 MG: 2 INJECTION INTRAMUSCULAR; INTRAVENOUS at 19:09

## 2023-08-22 RX ADMIN — SODIUM CHLORIDE, POTASSIUM CHLORIDE, SODIUM LACTATE AND CALCIUM CHLORIDE 1000 ML: 600; 310; 30; 20 INJECTION, SOLUTION INTRAVENOUS at 19:09

## 2023-08-22 RX ADMIN — PANTOPRAZOLE SODIUM 40 MG: 40 INJECTION, POWDER, FOR SOLUTION INTRAVENOUS at 19:09

## 2023-08-22 ASSESSMENT — ACTIVITIES OF DAILY LIVING (ADL)
ADLS_ACUITY_SCORE: 35

## 2023-08-22 NOTE — ED PROVIDER NOTES
EMERGENCY DEPARTMENT ENCOUNTER       ED Course & Medical Decision Making     6:44 PM I met with the patient, obtained history, performed an initial exam, and discussed options and plan for diagnostics and treatment here in the ED.    Final Impression  42 year old female presents for alcohol withdrawal with what sounds like alcohol withdrawal seizure at home.  Patient has a longstanding history of alcohol abuse, has been drinking 3 bottles of dayanara per day for the last week or so, last drink was 1800 yesterday.  Patient reports having an unwitnessed loss of consciousness at home, when she awoke she had loss of bladder control and had fallen to the ground indicative of likely alcohol withdrawal seizure.  Physical exam unremarkable, no obvious traumatic injuries of head or bleeding per mouth.  CT head unremarkable, no cranial bleeding or skull fracture seen.  Moderate elevations of LFTs in the 2  range, the lipase normal.  Remainder of CBC and BMP unremarkable.  Alcohol level of 158.  Will admit for alcohol withdrawal with suspected alcohol withdrawal seizure.    Prior to making a final disposition on this patient the results of patient's tests and other diagnostic studies were discussed with the patient. All questions were answered. Patient expressed understanding of the plan and was amenable to it.    Medical Decision Making    History:  Supplemental history from: NA    Work Up:  Chart documentation includes differential considered and any EKGs or imaging independently interpreted by provider, where specified.    External consultation:  Discussion of management with another provider: Hospitalist    Complicating factors:  Care impacted by chronic illness: Chronic alcohol abuse, chronic tobacco use  Care affected by social determinants of health: Alcohol Abuse and/or Recreational Drug Use    Disposition considerations: Admit.      Medications   lactated ringers BOLUS 1,000 mL (0 mLs Intravenous Stopped 8/22/23  "2057)   pantoprazole (PROTONIX) IV push injection 40 mg (40 mg Intravenous $Given 8/22/23 1909)   LORazepam (ATIVAN) injection 2 mg (2 mg Intravenous $Given 8/22/23 1909)       New Prescriptions    No medications on file       Final Impression     1. Alcohol withdrawal seizure with complication (H)        Chief Complaint     Chief Complaint   Patient presents with    Alcohol Problem    Seizures     Pt presents w/ possible seizure and c/o alcohol withdrawal. Pt reports hx of withdrawal seizures. Pt reports drinking 3 bottles of dayanara daily for like last week. Last drink was 1800 yesterday. Pt reports lost of bladder during episode, was unwitnessed. ABCs intact.     HPI     Alisia Lin is a 42 year old female with a pertinent history of alcohol abuse, hepC, cirrhosis DTs, withdrawal seziures, drug abuse, PTSD, and chronic low back pain who presents for evaluation of alcohol withdrawal symptoms.    Patient reports today experiencing alcohol withdrawal symptoms of shakiness, nausea, loose stools, urinary urgency, and feeling her \"blood pressure rise\". She drinks 3 bottles of dayanara daily and her last drink was yesterday (8/21). State she has been heavily drinking for the past 1.5 months. She was admitted for alcohol withdrawal seizures from 5/20-5/22. Patient is concerned she might be having a seizure, which prompted her to the ED. Endorses her symptoms feel similar to when she had a seizure withdrawal. Denies any other symptoms at this time.    Per chart review, the patient was admitted for alcohol withdrawal seizures at [facility] from 5/20-5/22/23. Patient had a witnessed seizure at this morning that lasted 5 minutes per daughter.   She notes that she has been drinking approximately 2 pints of dayanara for the last several weeks with last drink being last night due to neighbor death. Patient reported headache, nausea and tremors. Head CT revealed no intracerebral hemorrhage. Diagnosed with cirrhosis though " relapsed about a week ago, drinking a pint of dayanara daily. Given IV fluids and withdrawal protocols with improvements.  Seen by Psychiatry, for rehabilitation on Monday. Discharged.    I, Elsa Stephenson am serving as a scribe to document services personally performed by Dr. Jose Brown MD, based on my observation and the provider's statements to me. I, Dr. Jose Brown MD attest that Elsa Stephenson is acting in a scribe capacity, has observed my performance of the services and has documented them in accordance with my direction.    Physical Exam     BP (!) 140/83   Pulse 92   Temp 98.2  F (36.8  C) (Temporal)   Resp 18   LMP 08/15/2023   SpO2 98%   Constitutional: Awake, alert, in no acute distress.  Head: Normocephalic, atraumatic.  ENT: Mucous membranes moist.    Eyes: Conjunctiva normal. Pupils equal round reactive to light  Respiratory: Respirations even, unlabored, in no acute respiratory distress.  Cardiovascular: Regular rate and rhythm. Good peripheral perfusion.  GI: Abdomen soft, non-tender.  Musculoskeletal: Moves all 4 extremities equally.  Integument: Warm, dry.  Neurologic: Alert & oriented. Normal speech. Grossly normal motor and sensory function. No focal deficits noted.  Psychiatric: Normal mood    Labs & Imaging     Results for orders placed or performed during the hospital encounter of 08/22/23   Head CT w/o contrast    Impression    IMPRESSION:  1.  No acute intracranial process.   Basic metabolic panel   Result Value Ref Range    Sodium 143 136 - 145 mmol/L    Potassium 3.7 3.5 - 5.0 mmol/L    Chloride 99 98 - 107 mmol/L    Carbon Dioxide (CO2) 22 22 - 31 mmol/L    Anion Gap 22 (H) 5 - 18 mmol/L    Urea Nitrogen 8 8 - 22 mg/dL    Creatinine 0.79 0.60 - 1.10 mg/dL    Calcium 9.2 8.5 - 10.5 mg/dL    Glucose 79 70 - 125 mg/dL    GFR Estimate >90 >60 mL/min/1.73m2   Result Value Ref Range    Lipase 16 0 - 52 U/L   Hepatic function panel   Result Value Ref Range    Bilirubin Total 1.1  (H) 0.0 - 1.0 mg/dL    Bilirubin Direct 0.4 <=0.5 mg/dL    Protein Total 8.4 (H) 6.0 - 8.0 g/dL    Albumin 4.9 3.5 - 5.0 g/dL    Alkaline Phosphatase 102 45 - 120 U/L     (H) 0 - 40 U/L     (H) 0 - 45 U/L   CBC with platelets and differential   Result Value Ref Range    WBC Count 9.1 4.0 - 11.0 10e3/uL    RBC Count 4.26 3.80 - 5.20 10e6/uL    Hemoglobin 13.2 11.7 - 15.7 g/dL    Hematocrit 37.1 35.0 - 47.0 %    MCV 87 78 - 100 fL    MCH 31.0 26.5 - 33.0 pg    MCHC 35.6 31.5 - 36.5 g/dL    RDW 13.9 10.0 - 15.0 %    Platelet Count 320 150 - 450 10e3/uL    % Neutrophils 46 %    % Lymphocytes 43 %    % Monocytes 8 %    % Eosinophils 1 %    % Basophils 1 %    % Immature Granulocytes 1 %    NRBCs per 100 WBC 0 <1 /100    Absolute Neutrophils 4.2 1.6 - 8.3 10e3/uL    Absolute Lymphocytes 3.9 0.8 - 5.3 10e3/uL    Absolute Monocytes 0.7 0.0 - 1.3 10e3/uL    Absolute Eosinophils 0.1 0.0 - 0.7 10e3/uL    Absolute Basophils 0.1 0.0 - 0.2 10e3/uL    Absolute Immature Granulocytes 0.1 <=0.4 10e3/uL    Absolute NRBCs 0.0 10e3/uL   Alcohol level blood   Result Value Ref Range    Alcohol, Blood 158 (H) None detected mg/dL        Jose Brown MD  08/22/23 0191

## 2023-08-22 NOTE — ED TRIAGE NOTES
Pt presents w/ possible seizure and c/o alcohol withdrawal. Pt reports hx of withdrawal seizures. Pt reports drinking 3 bottles of dayanara daily for like last week. Last drink was 1800 yesterday. Pt reports lost of bladder during episode, was unwitnessed. ABCs intact.      Triage Assessment       Row Name 08/22/23 3898       Triage Assessment (Adult)    Airway WDL WDL       Respiratory WDL    Respiratory WDL WDL       Skin Circulation/Temperature WDL    Skin Circulation/Temperature WDL WDL       Cardiac WDL    Cardiac WDL WDL       Peripheral/Neurovascular WDL    Peripheral Neurovascular WDL WDL       Cognitive/Neuro/Behavioral WDL    Cognitive/Neuro/Behavioral WDL WDL

## 2023-08-23 LAB
ALBUMIN SERPL-MCNC: 3.3 G/DL (ref 3.5–5)
ALP SERPL-CCNC: 60 U/L (ref 45–120)
ALT SERPL W P-5'-P-CCNC: 20 U/L (ref 0–45)
ANION GAP SERPL CALCULATED.3IONS-SCNC: 11 MMOL/L (ref 5–18)
AST SERPL W P-5'-P-CCNC: 18 U/L (ref 0–40)
BILIRUB SERPL-MCNC: 1 MG/DL (ref 0–1)
BUN SERPL-MCNC: 12 MG/DL (ref 8–22)
CALCIUM SERPL-MCNC: 8.6 MG/DL (ref 8.5–10.5)
CHLORIDE BLD-SCNC: 105 MMOL/L (ref 98–107)
CO2 SERPL-SCNC: 19 MMOL/L (ref 22–31)
CREAT SERPL-MCNC: 0.75 MG/DL (ref 0.6–1.1)
ERYTHROCYTE [DISTWIDTH] IN BLOOD BY AUTOMATED COUNT: 14.6 % (ref 10–15)
GFR SERPL CREATININE-BSD FRML MDRD: >90 ML/MIN/1.73M2
GLUCOSE BLD-MCNC: 94 MG/DL (ref 70–125)
HCT VFR BLD AUTO: 36.7 % (ref 35–47)
HGB BLD-MCNC: 12.7 G/DL (ref 11.7–15.7)
MCH RBC QN AUTO: 31.2 PG (ref 26.5–33)
MCHC RBC AUTO-ENTMCNC: 34.6 G/DL (ref 31.5–36.5)
MCV RBC AUTO: 90 FL (ref 78–100)
PLATELET # BLD AUTO: 244 10E3/UL (ref 150–450)
POTASSIUM BLD-SCNC: 3.4 MMOL/L (ref 3.5–5)
PROT SERPL-MCNC: 6.6 G/DL (ref 6–8)
RBC # BLD AUTO: 4.07 10E6/UL (ref 3.8–5.2)
SODIUM SERPL-SCNC: 135 MMOL/L (ref 136–145)
WBC # BLD AUTO: 8.1 10E3/UL (ref 4–11)

## 2023-08-23 PROCEDURE — 250N000009 HC RX 250: Performed by: STUDENT IN AN ORGANIZED HEALTH CARE EDUCATION/TRAINING PROGRAM

## 2023-08-23 PROCEDURE — 250N000013 HC RX MED GY IP 250 OP 250 PS 637

## 2023-08-23 PROCEDURE — 96376 TX/PRO/DX INJ SAME DRUG ADON: CPT

## 2023-08-23 PROCEDURE — 85027 COMPLETE CBC AUTOMATED: CPT | Performed by: STUDENT IN AN ORGANIZED HEALTH CARE EDUCATION/TRAINING PROGRAM

## 2023-08-23 PROCEDURE — 258N000003 HC RX IP 258 OP 636: Performed by: STUDENT IN AN ORGANIZED HEALTH CARE EDUCATION/TRAINING PROGRAM

## 2023-08-23 PROCEDURE — 99223 1ST HOSP IP/OBS HIGH 75: CPT | Mod: AI | Performed by: FAMILY MEDICINE

## 2023-08-23 PROCEDURE — 210N000002 HC R&B HEART CARE

## 2023-08-23 PROCEDURE — 96375 TX/PRO/DX INJ NEW DRUG ADDON: CPT

## 2023-08-23 PROCEDURE — 36415 COLL VENOUS BLD VENIPUNCTURE: CPT | Performed by: STUDENT IN AN ORGANIZED HEALTH CARE EDUCATION/TRAINING PROGRAM

## 2023-08-23 PROCEDURE — 250N000011 HC RX IP 250 OP 636: Performed by: STUDENT IN AN ORGANIZED HEALTH CARE EDUCATION/TRAINING PROGRAM

## 2023-08-23 PROCEDURE — 80053 COMPREHEN METABOLIC PANEL: CPT | Performed by: STUDENT IN AN ORGANIZED HEALTH CARE EDUCATION/TRAINING PROGRAM

## 2023-08-23 PROCEDURE — 87081 CULTURE SCREEN ONLY: CPT | Performed by: EMERGENCY MEDICINE

## 2023-08-23 PROCEDURE — 250N000013 HC RX MED GY IP 250 OP 250 PS 637: Performed by: STUDENT IN AN ORGANIZED HEALTH CARE EDUCATION/TRAINING PROGRAM

## 2023-08-23 PROCEDURE — G0378 HOSPITAL OBSERVATION PER HR: HCPCS

## 2023-08-23 RX ORDER — CLONIDINE HYDROCHLORIDE 0.1 MG/1
0.1 TABLET ORAL EVERY 8 HOURS
Status: DISCONTINUED | OUTPATIENT
Start: 2023-08-23 | End: 2023-08-25 | Stop reason: HOSPADM

## 2023-08-23 RX ORDER — FOLIC ACID 1 MG/1
1 TABLET ORAL DAILY
Status: DISCONTINUED | OUTPATIENT
Start: 2023-08-24 | End: 2023-08-25 | Stop reason: HOSPADM

## 2023-08-23 RX ORDER — PAROXETINE 20 MG/1
20 TABLET, FILM COATED ORAL EVERY MORNING
Status: DISCONTINUED | OUTPATIENT
Start: 2023-08-23 | End: 2023-08-25 | Stop reason: HOSPADM

## 2023-08-23 RX ORDER — GABAPENTIN 300 MG/1
600 CAPSULE ORAL EVERY 8 HOURS
Status: DISCONTINUED | OUTPATIENT
Start: 2023-08-26 | End: 2023-08-25 | Stop reason: HOSPADM

## 2023-08-23 RX ORDER — DIAZEPAM 10 MG/2ML
5-10 INJECTION, SOLUTION INTRAMUSCULAR; INTRAVENOUS EVERY 30 MIN PRN
Status: DISCONTINUED | OUTPATIENT
Start: 2023-08-22 | End: 2023-08-25 | Stop reason: HOSPADM

## 2023-08-23 RX ORDER — FERROUS SULFATE 325(65) MG
325 TABLET ORAL
Status: DISCONTINUED | OUTPATIENT
Start: 2023-08-23 | End: 2023-08-25 | Stop reason: HOSPADM

## 2023-08-23 RX ORDER — MULTIPLE VITAMINS W/ MINERALS TAB 9MG-400MCG
1 TAB ORAL DAILY
Status: DISCONTINUED | OUTPATIENT
Start: 2023-08-24 | End: 2023-08-25 | Stop reason: HOSPADM

## 2023-08-23 RX ORDER — ACETAMINOPHEN 325 MG/1
650 TABLET ORAL EVERY 6 HOURS PRN
Status: DISCONTINUED | OUTPATIENT
Start: 2023-08-23 | End: 2023-08-24

## 2023-08-23 RX ORDER — LIDOCAINE 40 MG/G
CREAM TOPICAL
Status: DISCONTINUED | OUTPATIENT
Start: 2023-08-23 | End: 2023-08-25 | Stop reason: HOSPADM

## 2023-08-23 RX ORDER — ONDANSETRON 4 MG/1
4 TABLET, ORALLY DISINTEGRATING ORAL EVERY 6 HOURS PRN
Status: DISCONTINUED | OUTPATIENT
Start: 2023-08-23 | End: 2023-08-25 | Stop reason: HOSPADM

## 2023-08-23 RX ORDER — GABAPENTIN 600 MG/1
1200 TABLET ORAL ONCE
Status: COMPLETED | OUTPATIENT
Start: 2023-08-23 | End: 2023-08-23

## 2023-08-23 RX ORDER — NORETHINDRONE ACETATE AND ETHINYL ESTRADIOL .02; 1 MG/1; MG/1
1 TABLET ORAL DAILY
Status: DISCONTINUED | OUTPATIENT
Start: 2023-08-23 | End: 2023-08-25 | Stop reason: HOSPADM

## 2023-08-23 RX ORDER — GABAPENTIN 300 MG/1
900 CAPSULE ORAL EVERY 8 HOURS
Status: DISCONTINUED | OUTPATIENT
Start: 2023-08-23 | End: 2023-08-25 | Stop reason: HOSPADM

## 2023-08-23 RX ORDER — ACETAMINOPHEN 650 MG/1
650 SUPPOSITORY RECTAL EVERY 6 HOURS PRN
Status: DISCONTINUED | OUTPATIENT
Start: 2023-08-23 | End: 2023-08-24

## 2023-08-23 RX ORDER — GABAPENTIN 300 MG/1
300 CAPSULE ORAL EVERY 8 HOURS
Status: DISCONTINUED | OUTPATIENT
Start: 2023-08-28 | End: 2023-08-25 | Stop reason: HOSPADM

## 2023-08-23 RX ORDER — ONDANSETRON 2 MG/ML
4 INJECTION INTRAMUSCULAR; INTRAVENOUS EVERY 6 HOURS PRN
Status: DISCONTINUED | OUTPATIENT
Start: 2023-08-23 | End: 2023-08-25 | Stop reason: HOSPADM

## 2023-08-23 RX ORDER — FLUMAZENIL 0.1 MG/ML
0.2 INJECTION, SOLUTION INTRAVENOUS
Status: DISCONTINUED | OUTPATIENT
Start: 2023-08-22 | End: 2023-08-25 | Stop reason: HOSPADM

## 2023-08-23 RX ORDER — DIAZEPAM 5 MG
10 TABLET ORAL EVERY 30 MIN PRN
Status: DISCONTINUED | OUTPATIENT
Start: 2023-08-22 | End: 2023-08-25 | Stop reason: HOSPADM

## 2023-08-23 RX ORDER — SODIUM CHLORIDE 9 MG/ML
INJECTION, SOLUTION INTRAVENOUS CONTINUOUS
Status: DISCONTINUED | OUTPATIENT
Start: 2023-08-23 | End: 2023-08-24

## 2023-08-23 RX ORDER — HALOPERIDOL 5 MG/ML
2.5-5 INJECTION INTRAMUSCULAR EVERY 6 HOURS PRN
Status: DISCONTINUED | OUTPATIENT
Start: 2023-08-22 | End: 2023-08-25 | Stop reason: HOSPADM

## 2023-08-23 RX ORDER — GABAPENTIN 100 MG/1
100 CAPSULE ORAL EVERY 8 HOURS
Status: DISCONTINUED | OUTPATIENT
Start: 2023-08-30 | End: 2023-08-25 | Stop reason: HOSPADM

## 2023-08-23 RX ADMIN — SODIUM CHLORIDE: 9 INJECTION, SOLUTION INTRAVENOUS at 14:05

## 2023-08-23 RX ADMIN — FAMOTIDINE 20 MG: 10 INJECTION INTRAVENOUS at 14:04

## 2023-08-23 RX ADMIN — FOLIC ACID: 5 INJECTION, SOLUTION INTRAMUSCULAR; INTRAVENOUS; SUBCUTANEOUS at 02:00

## 2023-08-23 RX ADMIN — GABAPENTIN 900 MG: 300 CAPSULE ORAL at 16:38

## 2023-08-23 RX ADMIN — FERROUS SULFATE TAB 325 MG (65 MG ELEMENTAL FE) 325 MG: 325 (65 FE) TAB at 10:01

## 2023-08-23 RX ADMIN — Medication 500 MG: at 19:45

## 2023-08-23 RX ADMIN — CLONIDINE HYDROCHLORIDE 0.1 MG: 0.1 TABLET ORAL at 01:48

## 2023-08-23 RX ADMIN — GABAPENTIN 900 MG: 300 CAPSULE ORAL at 06:49

## 2023-08-23 RX ADMIN — CLONIDINE HYDROCHLORIDE 0.1 MG: 0.1 TABLET ORAL at 23:55

## 2023-08-23 RX ADMIN — ACETAMINOPHEN 650 MG: 325 TABLET ORAL at 09:59

## 2023-08-23 RX ADMIN — Medication 500 MG: at 14:04

## 2023-08-23 RX ADMIN — PAROXETINE HYDROCHLORIDE 20 MG: 20 TABLET, FILM COATED ORAL at 10:03

## 2023-08-23 RX ADMIN — DIAZEPAM 10 MG: 5 TABLET ORAL at 11:24

## 2023-08-23 RX ADMIN — FAMOTIDINE 20 MG: 10 INJECTION INTRAVENOUS at 01:52

## 2023-08-23 RX ADMIN — GABAPENTIN 900 MG: 300 CAPSULE ORAL at 23:55

## 2023-08-23 RX ADMIN — OLANZAPINE 5 MG: 5 TABLET, ORALLY DISINTEGRATING ORAL at 10:38

## 2023-08-23 RX ADMIN — DIAZEPAM 10 MG: 5 TABLET ORAL at 01:50

## 2023-08-23 RX ADMIN — NICOTINE 7 MG/24 HR DAILY TRANSDERMAL PATCH 1 PATCH: at 10:05

## 2023-08-23 RX ADMIN — GABAPENTIN 1200 MG: 600 TABLET, FILM COATED ORAL at 01:49

## 2023-08-23 RX ADMIN — SODIUM CHLORIDE: 9 INJECTION, SOLUTION INTRAVENOUS at 22:10

## 2023-08-23 RX ADMIN — ACETAMINOPHEN 650 MG: 325 TABLET ORAL at 01:48

## 2023-08-23 RX ADMIN — CLONIDINE HYDROCHLORIDE 0.1 MG: 0.1 TABLET ORAL at 16:38

## 2023-08-23 RX ADMIN — FAMOTIDINE 20 MG: 10 INJECTION INTRAVENOUS at 23:55

## 2023-08-23 RX ADMIN — CLONIDINE HYDROCHLORIDE 0.1 MG: 0.1 TABLET ORAL at 09:59

## 2023-08-23 RX ADMIN — DIAZEPAM 5 MG: 5 INJECTION, SOLUTION INTRAMUSCULAR; INTRAVENOUS at 09:48

## 2023-08-23 RX ADMIN — DIAZEPAM 10 MG: 5 INJECTION, SOLUTION INTRAMUSCULAR; INTRAVENOUS at 10:40

## 2023-08-23 RX ADMIN — DIAZEPAM 10 MG: 5 TABLET ORAL at 07:10

## 2023-08-23 ASSESSMENT — ACTIVITIES OF DAILY LIVING (ADL)
ADLS_ACUITY_SCORE: 37
DIFFICULTY_EATING/SWALLOWING: NO
CHANGE_IN_FUNCTIONAL_STATUS_SINCE_ONSET_OF_CURRENT_ILLNESS/INJURY: NO
DRESSING/BATHING_DIFFICULTY: NO
ADLS_ACUITY_SCORE: 37
VISION_MANAGEMENT: GLASSES
ADLS_ACUITY_SCORE: 37
ADLS_ACUITY_SCORE: 35
ADLS_ACUITY_SCORE: 22
ADLS_ACUITY_SCORE: 37
TOILETING_ISSUES: NO
ADLS_ACUITY_SCORE: 35
ADLS_ACUITY_SCORE: 35
FALL_HISTORY_WITHIN_LAST_SIX_MONTHS: NO
CONCENTRATING,_REMEMBERING_OR_MAKING_DECISIONS_DIFFICULTY: NO
ADLS_ACUITY_SCORE: 37
DOING_ERRANDS_INDEPENDENTLY_DIFFICULTY: NO
ADLS_ACUITY_SCORE: 33
ADLS_ACUITY_SCORE: 35
WEAR_GLASSES_OR_BLIND: YES
WALKING_OR_CLIMBING_STAIRS_DIFFICULTY: NO
ADLS_ACUITY_SCORE: 37

## 2023-08-23 NOTE — PLAN OF CARE
Problem: Alcohol Withdrawal  Goal: Alcohol Withdrawal Symptom Control  Outcome: Progressing    Patient boarding in ED. A&Ox4. VSS, on RA. C/o H/A, ABD and back pain. PRN Tylenol given. CIWA's on shift, scored 12. PRN Valium given. Down to a 4. Vitamin bag infusing into PIV. On CLD. Up with A1 to BSC. Discharge pending.

## 2023-08-23 NOTE — PLAN OF CARE
Problem: Alcohol Withdrawal  Goal: Alcohol Withdrawal Symptom Control  Outcome: Progressing  Goal: Optimal Neurologic Function  Outcome: Progressing  Goal: Readiness for Change Identified  Outcome: Progressing   Goal Outcome Evaluation:       Took pt on from 900 am to 1215. Pt A&Ox4 and VSS. Pt CIWA was 15 and 5 mg IV valium given.r Recheck was a 16 and 10 mg IV valium given with 5 mg PO Zyprexa. pt having visual hallucination and auditory hallucinations.That recheck was a 10 and PO 10 mg valium given. Pt c/o pain in abdominal region 6/10 and tylenol given for pain, pt decreased to 0/10. Rounded with BFM. Pt changed to regular diet. Educated pt to call before getting up. Call light within reach.

## 2023-08-23 NOTE — H&P
[facility]    History and Physical - Hospitalist Service       Date of Admission:  8/22/2023    Assessment & Plan      Alisia Lin is a 42 year old female with a past medical history significant for MDD, AUD, seizures with alcohol withdrawal presenting for admitted on 8/22/2023 alcohol withdrawal.      Alcohol use disorder  History of withdrawal seizures  Acute alcohol withdrawal  Patient presenting in withdrawal, last alcoholic drink was 8/21 approx 6PM.  Typical use is 3 L of dayanara daily. Patient presented afebrile, initially tachy to 110 which normalized.  BP initially elevated 184/112 and has since decreased.  Breathing well on RA.  CMP with normal electrolytes, , .  Lipase normal.  Glucose normal.  CBC unremarkable.  Head CT with no acute process. In the ED received 1 L bolus LR, IV pantoprazole, lorazepam 2 mg.  -Admit to inpatient  -CIWA protocol with lorazepam  -IV vitamins  -Zofran as needed  - ml/hr  -Fall precautions   -AM CMP  -AM CBC  -Day team can consider social work consult when pt clinically improving, currently desiring discussion of treatment options    HTN  Patient has been normotensive. Patient has had low adherence to medicines with multiple barriers.   Holding PTA Lasix, losartan, propranolol, reassess in AM    MDD: Continue PTA paroxetine. Will hold hydroxyzine PRN given multiple other medicines onboard.            Diet: Advance Diet as Tolerated: Clear Liquid Diet  DVT Prophylaxis: Pneumatic Compression Devices  Cortez Catheter: Not present  Fluids: IVF 125ml/hr  Lines: None     Cardiac Monitoring: None  Code Status: Full Code         The patient's care was discussed with the Attending Physician, Dr. Delia Castillo. Patient will be seen in AM by Dr. Yissel Collazo .      Cinda Sagastume MD  Hospitalist Service  [facility]  Securely message with [employer] (more info)  Text page via [employer] Paging/[employer]    ______________________________________________________________________    Chief Complaint   Alcohol withdrawal    History is obtained from the patient    History of Present Illness   Alisia Lin is a 42 year old female with a past medical history significant for MDD, AUD, seizures with alcohol withdrawal presenting for alcohol withdrawal.      Patient reports her last drink was on 8/21 approximately 6 PM.  Patient reports having an unwitnessed loss of consciousness at home, loss of bladder control when she woke up indicating likely withdrawal seizure which is happened previously for her.  Patient reports some nausea, no emesis.  Some nonbloody diarrhea.  No abdominal pain.  No hallucinations.    Patient desires abstinence from alcohol.  Has not had previous treatment though would be open to discussing options.    Reports vaping, no other drug use.  Lives with a cousin and another roommate.      ED course:  Patient presented afebrile, initially tachy to 110 which normalized.  BP initially elevated 184/112 and has since decreased.  Breathing well on RA.  CMP with normal electrolytes, , .  Lipase normal.  Glucose normal.  CBC unremarkable.  Head CT with no acute process.    In the ED patient received 1 L bolus LR, IV pantoprazole, lorazepam 2 mg      Past Medical History    Past Medical History:   Diagnosis Date    Acute stress reaction 5/31/2014    Anxiety     Arthritis     Asthma     Flovent BID    Back pain 1/26/2016    Chemical dependency (H) 06/05/2014    heroin, Norco    Chronic low back pain 3/19/2012    Influenza A 10/17/2016    with influenza pneumonia.  Hospitalized [facility]    Low TSH level 8/29/2017    Migraine with aura, without mention of intractable migraine without mention of status migrainosus     occas, Last one 11/2013. 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    Other, mixed, or unspecified nondependent drug abuse, in remission 1/1/07    Treatment for narcotic use     Papanicolaou smear of cervix with atypical squamous cells of undetermined significance (ASC-US) 3/2008    colp - WNL, HPV 53    Pyelonephritis 12/23/2015    Tobacco use disorder     1-1.5 ppd, zyban unsuccessful    Unspecified contraceptive management     ortho tricyclen       Past Surgical History   Past Surgical History:   Procedure Laterality Date    HC ENLARGE BREAST WITH IMPLANT  2002    BILATERAL    HC REMOVAL OF TONSILS,<13 Y/O      Description: Tonsillectomy;  Recorded: 10/03/2011;  Annotations: SHE THINKS IT WAS 5 OR 6 YEARS AGO    HC REMOVAL OF TONSILS,<13 Y/O      Description: Tonsillectomy;  Recorded: 10/03/2011;    HC REMOVE TONSILS/ADENOIDS,12+ Y/O  2006    HC TOOTH EXTRACTION W/FORCEP  16 yo    wisdom teeth    IUD PARAGARD  3/3/08    Removed with mirena placed. Mirena has now been removed as well.     LEEP TX, CERVICAL  age 15?    ORTHOPEDIC SURGERY      Lumbar fusion 2009    [facility] ARTHRODESIS ANT INTERBODY MIN DISCECTOMY,LUMBAR      Description: Lumbar Vertebral Fusion;  Recorded: 10/03/2011;    [facility]      Description: Breast Surgery Enlargement Procedure;  Recorded: 10/03/2011;  Annotations: DATE 2009    [facility]      Description: Breast Surgery Enlargement Procedure Bilateral;  Recorded: 10/03/2011;       Prior to Admission Medications   Prior to Admission Medications   Prescriptions Last Dose Informant Patient Reported? Taking?   PARoxetine (PAXIL) 20 MG tablet 8/22/2023  No Yes   Sig: Take 1 tablet (20 mg) by mouth every morning   acetaminophen (TYLENOL) 500 MG tablet   Yes Yes   Sig: Take 1,000 mg by mouth 3 times daily as needed for mild pain   ferrous sulfate (FEROSUL) 325 (65 Fe) MG tablet   Yes Yes   Sig: Take 325 mg by mouth daily (with breakfast)   furosemide  (LASIX) 20 MG tablet More than a month  Yes Yes   Sig: Take 20 mg by mouth daily   gabapentin (NEURONTIN) 600 MG tablet 8/22/2023  Yes Yes   Sig: Take 600 mg by mouth daily   hydrOXYzine (VISTARIL) 50 MG capsule 8/22/2023  Yes Yes   Sig: Take 50 mg by mouth 4 times daily as needed for itching   losartan (COZAAR) 25 MG tablet 8/22/2023  No Yes   Sig: Take 1 tablet (25 mg) by mouth daily   norethindrone-ethinyl estradiol (MICROGESTIN 1/20) 1-20 MG-MCG tablet 8/22/2023  No Yes   Sig: Take 1 tablet by mouth daily   propranolol (INDERAL) 10 MG tablet 8/22/2023 at x 1  No Yes   Sig: Take 1 tablet (10 mg) by mouth 3 times daily      Facility-Administered Medications: None           Physical Exam   Vital Signs: Temp: 98.1  F (36.7  C) Temp src: Oral BP: (!) 148/72 Pulse: 86   Resp: 16 SpO2: 98 % O2 Device: None (Room air)    Weight: 0 lbs 0 oz      Physical Exam  Constitutional:       Comments: Laying down in bed, nauseous appearing   HENT:      Mouth/Throat:      Mouth: Mucous membranes are moist.      Pharynx: Oropharynx is clear.   Eyes:      Extraocular Movements: Extraocular movements intact.      Conjunctiva/sclera: Conjunctivae normal.   Cardiovascular:      Rate and Rhythm: Normal rate and regular rhythm.      Pulses: Normal pulses.      Heart sounds: Normal heart sounds.   Pulmonary:      Effort: Pulmonary effort is normal. No respiratory distress.      Breath sounds: Normal breath sounds.   Abdominal:      General: Bowel sounds are normal. There is no distension.      Palpations: Abdomen is soft.      Tenderness: There is no abdominal tenderness.   Musculoskeletal:         General: Normal range of motion.      Cervical back: Normal range of motion.      Right lower leg: No edema.      Left lower leg: No edema.   Skin:     General: Skin is warm and dry.   Neurological:      General: No focal deficit present.      Mental Status: She is alert and oriented to person, place, and time.      Comments: Slightly tremoulous  to fingertips bilaterally   Psychiatric:         Mood and Affect: Mood normal.         Behavior: Behavior normal.         Thought Content: Thought content normal.           Data     I have personally reviewed the following data over the past 24 hrs:    9.1  \   13.2   / 320     143 99 8 /  79   3.7 22 0.79 \     ALT: 264 (H) AST: 298 (H) AP: 102 TBILI: 1.1 (H)   ALB: 4.9 TOT PROTEIN: 8.4 (H) LIPASE: 16     Imaging results reviewed over the past 24 hrs:   Recent Results (from the past 24 hour(s))   Head CT w/o contrast    Narrative    EXAM: CT HEAD W/O CONTRAST  LOCATION: [facility]  DATE: 8/22/2023    INDICATION: withdrawl seizure  COMPARISON: 08/20/2023  TECHNIQUE: Routine CT Head without IV contrast. Multiplanar reformats. Dose reduction techniques were used.    FINDINGS:  INTRACRANIAL CONTENTS: No intracranial hemorrhage, extraaxial collection, or mass effect.  No CT evidence of acute infarct. Normal parenchymal attenuation. Normal ventricles and sulci.     VISUALIZED ORBITS/SINUSES/MASTOIDS: No intraorbital abnormality. No paranasal sinus mucosal disease. No middle ear or mastoid effusion.    BONES/SOFT TISSUES: No acute abnormality.      Impression    IMPRESSION:  1.  No acute intracranial process.

## 2023-08-23 NOTE — PROGRESS NOTES
Infection Prevention Progress Note  8/23/2023      Patient Name: Alisia Lin [id number]  Admit Date: 8/22/2023    Infection Status as of 8/23/2023 2:11 PM: MRSA  Isolation Status as of 8/23/2023 2:11 PM: Contact     MDRO Discontinuation  Infection Prevention has reviewed this patient's chart per the MDRO D/C Policy and have taken the following action:    The patient does not qualify for discontinuation as patient does not have the required negative results. When non-qualifying reason(s) no longer apply, please contact Infection Prevention for re-evaluation.      Patient requires one negative culture from nares prior to continuing discontinuation evaluation. Surveillance culture orders placed, date: 8/23/23    Please place an infection prevention consult for MRSA flag removal if nares swab results negative.    Contact Precautions continued for the following MDRO(s): MRSA    If you have any questions, please contact Infection Prevention.    Gabrielle Newberry, Infection Prevention

## 2023-08-23 NOTE — PROGRESS NOTES
[facility]    Progress Note - Hospitalist Service       Date of Admission:  8/22/2023    Assessment & Plan   Alisia Lin is a 42 year old female with a past medical history significant for MDD, AUD, seizures with alcohol withdrawal presenting for admitted on 8/22/2023 alcohol withdrawal.       Alcohol use disorder  History of withdrawal seizures  Acute alcohol withdrawal  Patient presenting in withdrawal, last alcoholic drink was 8/21 approx 6PM.  Typical use is 3 L of daynaara daily. Patient presented afebrile, initially tachy to 110 which normalized.  BP initially elevated 184/112 and has since decreased.  Breathing well on RA.  CMP with normal electrolytes, , .  Lipase normal.  Glucose normal.  CBC unremarkable.  Head CT with no acute process. In the ED received 1 L bolus LR, IV pantoprazole, lorazepam 2 mg.  -Admit to inpatient  -WA protocol with lorazepam  -IV vitamins and folate   -IV thiamine 500mg 3 days started 08/23/23   -Zofran as needed  - ml/hr  -Fall precautions   -Social work consult placed, patient interested in inpatient treatment      HTN  Patient has been normotensive. Patient has had low adherence to medicines with multiple barriers.   Holding PTA Lasix, losartan, propranolol, reassess in AM  -will monitor patient's      MDD: Continue PTA paroxetine. Will hold hydroxyzine PRN given multiple other medicines onboard.         Diet: Regular Diet Adult    DVT Prophylaxis: Pneumatic Compression Devices  Cortez Catheter: Not present  Fluids:  ml/hr  Lines: None     Cardiac Monitoring: None  Code Status: Full Code           # Anion Gap Metabolic Acidosis: Highest Anion Gap = 22 mmol/L in last 2 days, will monitor and treat as appropriate  # Hypoalbuminemia: Lowest albumin = 3.3 g/dL at 8/23/2023  7:39 AM, will monitor as appropriate     # Hypertension: Home medication list includes antihypertensive(s)          # Asthma: noted on problem list         Disposition Plan      Expected Discharge Date: 08/25/2023                The patient's care was discussed with the Attending Physician, Dr. Collazo .    NICOLE VACA MD  Hospitalist Service  [facility]  Securely message with [employer] (more info)  Text page via [facility] Paging/Directory   ______________________________________________________________________    Interval History   Patient presenting in withdrawal, last alcoholic drink was 8/21 approx 6PM. Patient drinks approximately 3 L of dayanara daily. Patient is feeling ready to make a change and states that she would like help getting sober. Patient is feeling anxious here in the hospital.     Physical Exam   Vital Signs: Temp: 97.8  F (36.6  C) Temp src: Oral BP: 133/71 Pulse: 89   Resp: 18 SpO2: 98 % O2 Device: None (Room air)    Weight: 0 lbs 0 oz    PHYSICAL EXAM:  GENERAL: Awake, alert, sitting upright in bed. No acute distress.   HEENT: No scleral icterus or conjunctival injection. Oral cavity moist and pink with no ulcers, exudate, or thrush present. No cervical or supraclavicular lymphadenopathy.  SKIN: Warm and dry. No bruises, rashes, or skin lesions.  LUNGS: Normal work of breathing with no use of accessory muscles. Clear breath sounds in all lung fields bilaterally with no wheezes or crackles appreciated.  CARDIAC: RRR. Normal S1 and S2. No murmurs, clicks, or rubs appreciated. No JVD. Radial and dorsalis pedis pulses intact bilaterally. No peripheral edema.  ABDOMEN: Non-distended. Bowel sounds present in 4 quadrants. Soft and non-tender throughout with no masses or organomegaly.  NEUROLOGIC: Alert and oriented. Sensation to light touch involving upper and lower extremities intact bilaterally. 5/5 muscle strength present throughout upper and lower extremities.   EXTREMITIES: No gross deformity or peripheral edema. Appear well-perfused.          Data     I have personally reviewed the following data over the past 24  hrs:    8.1  \   12.7   / 244     135 (L) 105 12 /  94   3.4 (L) 19 (L) 0.75 \     ALT: 20 AST: 18 AP: 60 TBILI: 1.0   ALB: 3.3 (L) TOT PROTEIN: 6.6 LIPASE: 16       Imaging results reviewed over the past 24 hrs:   Recent Results (from the past 24 hour(s))   Head CT w/o contrast    Narrative    EXAM: CT HEAD W/O CONTRAST  LOCATION: [facility]  DATE: 8/22/2023    INDICATION: withdrawl seizure  COMPARISON: 08/20/2023  TECHNIQUE: Routine CT Head without IV contrast. Multiplanar reformats. Dose reduction techniques were used.    FINDINGS:  INTRACRANIAL CONTENTS: No intracranial hemorrhage, extraaxial collection, or mass effect.  No CT evidence of acute infarct. Normal parenchymal attenuation. Normal ventricles and sulci.     VISUALIZED ORBITS/SINUSES/MASTOIDS: No intraorbital abnormality. No paranasal sinus mucosal disease. No middle ear or mastoid effusion.    BONES/SOFT TISSUES: No acute abnormality.      Impression    IMPRESSION:  1.  No acute intracranial process.

## 2023-08-23 NOTE — PHARMACY-ADMISSION MEDICATION HISTORY
Pharmacist Admission Medication History    Admission medication history is complete. The information provided in this note is only as accurate as the sources available at the time of the update.    Medication reconciliation/reorder completed by provider prior to medication history? No    Information Source(s): Patient, Clinic records, and CareEverywhere/SureScripts via in-person    Pertinent Information:     JOSE G patient states she hasn't taken her furosemide in over a month as she ran out of refills on her prescription and hasn't gotten it renewed yet. She also was instructed to take iron supplement daily but hasn't taken this either as the cost is not covered by her insurance company.     Changes made to PTA medication list:  Added: gabapentin  Deleted: aripiprazole  Changed: None    Medication History Completed By: Rowan Rebolledo, PharmD, [facility] 8/22/2023 9:14 PM    Prior to Admission medications    Medication Sig Last Dose Taking? Auth Provider Long Term End Date   acetaminophen (TYLENOL) 500 MG tablet Take 1,000 mg by mouth 3 times daily as needed for mild pain  Yes Unknown, Entered By History No    ferrous sulfate (FEROSUL) 325 (65 Fe) MG tablet Take 325 mg by mouth daily (with breakfast)  Yes Unknown, Entered By History     furosemide (LASIX) 20 MG tablet Take 20 mg by mouth daily More than a month Yes Unknown, Entered By History No    gabapentin (NEURONTIN) 600 MG tablet Take 600 mg by mouth daily 8/22/2023 Yes Unknown, Entered By History No    hydrOXYzine (VISTARIL) 50 MG capsule Take 50 mg by mouth 4 times daily as needed for itching 8/22/2023 Yes Unknown, Entered By History     losartan (COZAAR) 25 MG tablet Take 1 tablet (25 mg) by mouth daily 8/22/2023 Yes Patricia Esquivel APRN CNP Yes    norethindrone-ethinyl estradiol (MICROGESTIN 1/20) 1-20 MG-MCG tablet Take 1 tablet by mouth daily 8/22/2023 Yes Patricia Esquivel APRN CNP Yes    PARoxetine (PAXIL) 20 MG tablet Take 1 tablet (20 mg) by mouth every  morning 8/22/2023 Yes Patricia Esquivel APRN CNP Yes    propranolol (INDERAL) 10 MG tablet Take 1 tablet (10 mg) by mouth 3 times daily 8/22/2023 at x 1 Yes Patricia Esquivel APRN CNP Yes

## 2023-08-23 NOTE — UTILIZATION REVIEW
Admission Status; Secondary Review Determination   Under the authority of the Utilization Management Committee, the utilization review process indicated a secondary review on Alisia Lin. The review outcome is based on review of the medical records, discussions with staff, and applying clinical experience noted on the date of the review.   (x) Inpatient Status Appropriate - This patient's medical care is consistent with medical management for inpatient care and reasonable inpatient medical practice.     RATIONALE FOR DETERMINATION   Alisia Lin is a 42 yr old female with hx MDD, AUD, seizures with alcohol withdrawal who presented 8/22/23 with acute alcohol withdrawal.  Initially improved after IVF and Lorazepam in ED and improved however this AM CIWA score up to 16.  Has received further doses of diazepam x 2 as well as olanzapine.  Not stable for discharge with ongoing withdrawal.    At the time of admission with the information available to the attending physician more than 2 nights Hospital complex care was anticipated, based on patient risk of adverse outcome if treated as outpatient and complex care required. Inpatient admission is appropriate based on the Medicare guidelines.   The information on this document is developed by the utilization review team in order for the business office to ensure compliance. This only denotes the appropriateness of proper admission status and does not reflect the quality of care rendered.   The definitions of Inpatient Status and Observation Status used in making the determination above are those provided in the CMS Coverage Manual, Chapter 1 and Chapter 6, section 70.4.   Sincerely,   Andreea Alexander MD  Utilization Review  Physician Advisor  [facility]

## 2023-08-24 LAB
ANION GAP SERPL CALCULATED.3IONS-SCNC: 13 MMOL/L (ref 5–18)
BUN SERPL-MCNC: 13 MG/DL (ref 8–22)
CALCIUM SERPL-MCNC: 8.1 MG/DL (ref 8.5–10.5)
CHLORIDE BLD-SCNC: 107 MMOL/L (ref 98–107)
CO2 SERPL-SCNC: 19 MMOL/L (ref 22–31)
CREAT SERPL-MCNC: 0.78 MG/DL (ref 0.6–1.1)
GFR SERPL CREATININE-BSD FRML MDRD: >90 ML/MIN/1.73M2
GLUCOSE BLD-MCNC: 122 MG/DL (ref 70–125)
POTASSIUM BLD-SCNC: 3.3 MMOL/L (ref 3.5–5)
SODIUM SERPL-SCNC: 139 MMOL/L (ref 136–145)

## 2023-08-24 PROCEDURE — 250N000011 HC RX IP 250 OP 636: Mod: JZ | Performed by: STUDENT IN AN ORGANIZED HEALTH CARE EDUCATION/TRAINING PROGRAM

## 2023-08-24 PROCEDURE — 99232 SBSQ HOSP IP/OBS MODERATE 35: CPT | Mod: GC | Performed by: FAMILY MEDICINE

## 2023-08-24 PROCEDURE — 250N000013 HC RX MED GY IP 250 OP 250 PS 637: Performed by: STUDENT IN AN ORGANIZED HEALTH CARE EDUCATION/TRAINING PROGRAM

## 2023-08-24 PROCEDURE — 250N000013 HC RX MED GY IP 250 OP 250 PS 637

## 2023-08-24 PROCEDURE — 80048 BASIC METABOLIC PNL TOTAL CA: CPT

## 2023-08-24 PROCEDURE — 36415 COLL VENOUS BLD VENIPUNCTURE: CPT

## 2023-08-24 PROCEDURE — 210N000002 HC R&B HEART CARE

## 2023-08-24 PROCEDURE — 258N000003 HC RX IP 258 OP 636: Performed by: STUDENT IN AN ORGANIZED HEALTH CARE EDUCATION/TRAINING PROGRAM

## 2023-08-24 RX ORDER — ACETAMINOPHEN 325 MG/1
325 TABLET ORAL EVERY 4 HOURS PRN
Status: DISCONTINUED | OUTPATIENT
Start: 2023-08-24 | End: 2023-08-25 | Stop reason: HOSPADM

## 2023-08-24 RX ORDER — FUROSEMIDE 20 MG
20 TABLET ORAL DAILY
Status: DISCONTINUED | OUTPATIENT
Start: 2023-08-24 | End: 2023-08-25 | Stop reason: HOSPADM

## 2023-08-24 RX ADMIN — Medication 500 MG: at 19:12

## 2023-08-24 RX ADMIN — MULTIPLE VITAMINS W/ MINERALS TAB 1 TABLET: TAB at 08:38

## 2023-08-24 RX ADMIN — FERROUS SULFATE TAB 325 MG (65 MG ELEMENTAL FE) 325 MG: 325 (65 FE) TAB at 08:38

## 2023-08-24 RX ADMIN — GABAPENTIN 900 MG: 300 CAPSULE ORAL at 21:33

## 2023-08-24 RX ADMIN — DIAZEPAM 10 MG: 5 TABLET ORAL at 13:10

## 2023-08-24 RX ADMIN — DIAZEPAM 10 MG: 5 TABLET ORAL at 00:17

## 2023-08-24 RX ADMIN — Medication 500 MG: at 08:38

## 2023-08-24 RX ADMIN — CLONIDINE HYDROCHLORIDE 0.1 MG: 0.1 TABLET ORAL at 15:41

## 2023-08-24 RX ADMIN — FAMOTIDINE 20 MG: 10 INJECTION INTRAVENOUS at 12:56

## 2023-08-24 RX ADMIN — FAMOTIDINE 20 MG: 10 INJECTION INTRAVENOUS at 21:35

## 2023-08-24 RX ADMIN — FOLIC ACID 1 MG: 1 TABLET ORAL at 08:38

## 2023-08-24 RX ADMIN — NICOTINE 7 MG/24 HR DAILY TRANSDERMAL PATCH 1 PATCH: at 00:40

## 2023-08-24 RX ADMIN — GABAPENTIN 900 MG: 300 CAPSULE ORAL at 15:40

## 2023-08-24 RX ADMIN — DIAZEPAM 10 MG: 5 TABLET ORAL at 15:54

## 2023-08-24 RX ADMIN — DIAZEPAM 10 MG: 5 INJECTION, SOLUTION INTRAMUSCULAR; INTRAVENOUS at 22:18

## 2023-08-24 RX ADMIN — NICOTINE 7 MG/24 HR DAILY TRANSDERMAL PATCH 1 PATCH: at 21:35

## 2023-08-24 RX ADMIN — Medication 500 MG: at 12:57

## 2023-08-24 RX ADMIN — SODIUM CHLORIDE: 9 INJECTION, SOLUTION INTRAVENOUS at 06:37

## 2023-08-24 RX ADMIN — ACETAMINOPHEN 650 MG: 325 TABLET ORAL at 01:27

## 2023-08-24 RX ADMIN — CLONIDINE HYDROCHLORIDE 0.1 MG: 0.1 TABLET ORAL at 08:39

## 2023-08-24 RX ADMIN — CLONIDINE HYDROCHLORIDE 0.1 MG: 0.1 TABLET ORAL at 21:34

## 2023-08-24 RX ADMIN — FUROSEMIDE 20 MG: 20 TABLET ORAL at 12:57

## 2023-08-24 RX ADMIN — DIAZEPAM 10 MG: 5 TABLET ORAL at 04:23

## 2023-08-24 RX ADMIN — GABAPENTIN 900 MG: 300 CAPSULE ORAL at 08:39

## 2023-08-24 RX ADMIN — PAROXETINE HYDROCHLORIDE 20 MG: 20 TABLET, FILM COATED ORAL at 08:39

## 2023-08-24 RX ADMIN — DIAZEPAM 10 MG: 5 TABLET ORAL at 21:34

## 2023-08-24 ASSESSMENT — ACTIVITIES OF DAILY LIVING (ADL)
ADLS_ACUITY_SCORE: 22
DEPENDENT_IADLS:: INDEPENDENT
ADLS_ACUITY_SCORE: 22

## 2023-08-24 NOTE — CONSULTS
Care Management Initial Consult    General Information  Assessment completed with: Patient,    Type of CM/SW Visit: Initial Assessment    Primary Care Provider verified and updated as needed:     Readmission within the last 30 days: no previous admission in last 30 days      Reason for Consult: discharge planning  Advance Care Planning:            Communication Assessment  Patient's communication style: spoken language (English or Bilingual)    Hearing Difficulty or Deaf: no   Wear Glasses or Blind: yes    Cognitive  Cognitive/Neuro/Behavioral: WDL     Arousal Level: arouses to voice  Orientation: oriented x 4     Best Language: 0 - No aphasia       Living Environment:   People in home:  (cousin)     Current living Arrangements: house      Able to return to prior arrangements: yes       Family/Social Support:  Care provided by: self  Provides care for: no one  Marital Status: Single   (cousin)          Description of Support System: Supportive, Involved         Current Resources:   Patient receiving home care services: No     Community Resources: None  Equipment currently used at home: none  Supplies currently used at home: None    Employment/Financial:  Employment Status: employed full-time        Financial Concerns: No concerns identified   Referral to Financial Worker: No    Lifestyle & Psychosocial Needs:  Social Determinants of Health     Tobacco Use: High Risk (8/22/2023)    Patient History     Smoking Tobacco Use: Former     Smokeless Tobacco Use: Current     Passive Exposure: Not on file   Alcohol Use: Not on file   Financial Resource Strain: Not on file   Food Insecurity: Not on file   Transportation Needs: Not on file   Physical Activity: Not on file   Stress: Not on file   Social Connections: Not on file   Intimate Partner Violence: Not on file   Depression: At risk (11/28/2022)    PHQ-2     PHQ-2 Score: 4   Housing Stability: Not on file       Functional Status:  Prior to admission patient needed  "assistance:   Dependent ADLs:: Independent  Dependent IADLs:: Independent       Mental Health Status:  Mental Health Status: Current Concern       Chemical Dependency Status:  Chemical Dependency Status: Current Concern             Values/Beliefs:  Spiritual, Cultural Beliefs, Scientology Practices, Values that affect care: no  Description of Beliefs that Will Affect Care: no            Additional Information:  VITOR introduced self and CM role to Pt.  Pt reports living in a private residence with her cousin.  At baseline, Pt reports independence with all I/ADLs.  Pt works full time as a .  VITOR discussed history chemical dependency with Pt.  Pt declines any resources.  Pt reports, \"I have my plan in my head\".  Pt reports she has not been to treatment in the past.  Pt reports seeing a therapist in the past and plan to reestablish care with a therapist.  Pt plans to attend AA meetings.  VITOR encouraged Pt to reach out if she is interested in any CD resources.  Pt states understanding.     Pt will need assistance scheduling a cab ride to return home.     ELVER Glynn      "

## 2023-08-24 NOTE — PROGRESS NOTES
SPIRITUAL HEALTH SERVICES Progress Note      Saw pt Alisia A Prebish per admission screening    Patient/Family Understanding of Illness and Goals of Care - Alisia shared she admitted for symptoms related to alcohol withdrawal     Distress and Loss - Alisia shared she has had multiple traumatic losses in her life from a brother murdered in front of her to the death of a .     Strengths, Coping, and Resources - Alisia makes a connection to her on addressed trauma and her history of substance abuse     Meaning, Beliefs, and Spirituality - She identifies as Zoroastrian and named a desire to be move active at Scientologist.   she was raised Gnosticism and continues to practice Catholicism and named finding peace in the sanctuary.  She has an active prayer life      Plan of Care - [facility] will cont to follow through JOAQUIN Cordova M.Div., [facility]  Staff    [phone]

## 2023-08-24 NOTE — PROGRESS NOTES
"[facility]    Progress Note - Hospitalist Service       Date of Admission:  8/22/2023    Assessment & Plan   Alisia Lin is a 42 year old female with a past medical history significant for MDD, AUD, seizures with alcohol withdrawal presenting for admitted on 8/22/2023 alcohol withdrawal.       Alcohol use disorder  History of withdrawal seizures  Acute alcohol withdrawal  Patient presenting in withdrawal, last alcoholic drink was 8/21 approx 6PM.  Typical use is 3 L of dayanara daily. Patient presented afebrile, initially tachy to 110 which normalized.  BP initially elevated 184/112 and has since decreased.  Breathing well on RA.  CMP with normal electrolytes, , .  Lipase normal.  Glucose normal.  CBC unremarkable.  Head CT with no acute process. In the ED received 1 L bolus LR, IV pantoprazole, lorazepam 2 mg.  -Admit to inpatient  -WA protocol with lorazepam  -2 diazepam   -IV vitamins and folate   -IV thiamine 500mg 3 days started 08/23/23   -Zofran as needed  - ml/hr  -Fall precautions   -Social work consult placed, patient interested in inpatient treatment      HTN  Patient has been normotensive. Patient has had low adherence to medicines with multiple barriers. Patient feeling \"puffy\" today and bloated.   -Restarting patient's Lasix 20 mg tablet 08/24/23   Holding PTA Losartan, propranolol     MDD: Continue PTA paroxetine. Will hold hydroxyzine PRN given multiple other medicines onboard.         Diet: Regular Diet Adult    DVT Prophylaxis: Pneumatic Compression Devices  Cortez Catheter: Not present  Fluids:  ml/hr  Lines: None     Cardiac Monitoring: None  Code Status: Full Code      # Hypokalemia: Lowest K = 3.3 mmol/L in last 2 days, will replace as needed   # Hypocalcemia: Lowest Ca = 8.1 mg/dL in last 2 days, will monitor and replace as appropriate    # Anion Gap Metabolic Acidosis: Highest Anion Gap = 22 mmol/L in last 2 days, will monitor and " treat as appropriate  # Hypoalbuminemia: Lowest albumin = 3.3 g/dL at 8/23/2023  7:39 AM, will monitor as appropriate       # Hypertension: Home medication list includes antihypertensive(s)          # Asthma: noted on problem list          Disposition Plan      Expected Discharge Date: 08/25/2023        Discharge Comments: SW/CD to see        The patient's care was discussed with the Attending Physician, Dr. Collazo .    NICOLE VACA MD  Hospitalist Service  [facility]  Securely message with [employer] (more info)  Text page via [employer] Paging/Directory   ______________________________________________________________________    Interval History   Patient continues to express gratitude and willingness to change. Patient is asking if she could have a medication to help with feeling bloated and puffy. Patient is still feeling anxious and worrying about the future.     Physical Exam   Vital Signs: Temp: 97.8  F (36.6  C) Temp src: Oral BP: 107/59 Pulse: 116   Resp: 18 SpO2: 100 % O2 Device: None (Room air)    Weight: 0 lbs 0 oz    PHYSICAL EXAM:  GENERAL: Awake, alert, sitting upright in bed. No acute distress.   HEENT: No scleral icterus or conjunctival injection. Oral cavity moist and pink with no ulcers, exudate, or thrush present. No cervical or supraclavicular lymphadenopathy. Mild facial puffiness.   SKIN: Warm and dry. No bruises, rashes, or skin lesions.  LUNGS: Normal work of breathing with no use of accessory muscles. Clear breath sounds in all lung fields bilaterally with no wheezes or crackles appreciated.  CARDIAC: RRR. Normal S1 and S2. No murmurs, clicks, or rubs appreciated. No JVD. Radial and dorsalis pedis pulses intact bilaterally. No peripheral edema.  ABDOMEN: Non-distended. Bowel sounds present in 4 quadrants. Soft and non-tender throughout with no masses or organomegaly.  NEUROLOGIC: Alert and oriented. Sensation to light touch involving upper and lower extremities intact  bilaterally. 5/5 muscle strength present throughout upper and lower extremities.   EXTREMITIES: Trace edema noted in lower extremities bilaterally. Appear well-perfused.          Data     I have personally reviewed the following data over the past 24 hrs:    N/A  \   N/A   / N/A     139 107 13 /  122   3.3 (L) 19 (L) 0.78 \     ALT: N/A AST: N/A AP: N/A TBILI: N/A   ALB: N/A TOT PROTEIN: N/A LIPASE: N/A       Imaging results reviewed over the past 24 hrs:   No results found for this or any previous visit (from the past 24 hour(s)).

## 2023-08-24 NOTE — PLAN OF CARE
Goal Outcome Evaluation:       Pt slept well overnight.  PRN diazepam given x2 for CIWA.  Highest CIWA was 13.  Pt more anxious as shift progressed.  VS stable.  Pt c/o some abdominal pain.  PRN tylenol given and effective.

## 2023-08-24 NOTE — PLAN OF CARE
Pt sinus tach on tele with -120. Pt pleasant. Eating, drinking and voiding. Scored a 12 on CIWA at 1305 and given 10mg PO diazepam which was effective. Pt alert and oriented. Calling for assistance. Hourly rounding done.   Problem: Alcohol Withdrawal  Goal: Alcohol Withdrawal Symptom Control  Outcome: Progressing  Goal: Optimal Neurologic Function  Outcome: Progressing  Goal: Readiness for Change Identified  Outcome: Progressing

## 2023-08-24 NOTE — PROGRESS NOTES
This RN found what appeared to be a vape in bed with pt during assessment. Pt hid this item from RN and refused to let RN see.  RN explained that there is a safe in room that pt can place vape, pt refused.  Charge RN, Anne, spoke with pt, and pt allowed vape to be placed in safe.  Pt also admitted to having kept home medications in purse, and allowed Charge RN to place these in safe as well.

## 2023-08-25 VITALS
SYSTOLIC BLOOD PRESSURE: 135 MMHG | OXYGEN SATURATION: 97 % | DIASTOLIC BLOOD PRESSURE: 68 MMHG | RESPIRATION RATE: 20 BRPM | TEMPERATURE: 98.1 F | HEART RATE: 99 BPM

## 2023-08-25 LAB
ANION GAP SERPL CALCULATED.3IONS-SCNC: 9 MMOL/L (ref 5–18)
BUN SERPL-MCNC: 14 MG/DL (ref 8–22)
CALCIUM SERPL-MCNC: 8.9 MG/DL (ref 8.5–10.5)
CHLORIDE BLD-SCNC: 105 MMOL/L (ref 98–107)
CO2 SERPL-SCNC: 27 MMOL/L (ref 22–31)
CREAT SERPL-MCNC: 0.82 MG/DL (ref 0.6–1.1)
GFR SERPL CREATININE-BSD FRML MDRD: 90 ML/MIN/1.73M2
GLUCOSE BLD-MCNC: 105 MG/DL (ref 70–125)
POTASSIUM BLD-SCNC: 3.7 MMOL/L (ref 3.5–5)
SODIUM SERPL-SCNC: 141 MMOL/L (ref 136–145)

## 2023-08-25 PROCEDURE — 250N000013 HC RX MED GY IP 250 OP 250 PS 637

## 2023-08-25 PROCEDURE — 250N000013 HC RX MED GY IP 250 OP 250 PS 637: Performed by: STUDENT IN AN ORGANIZED HEALTH CARE EDUCATION/TRAINING PROGRAM

## 2023-08-25 PROCEDURE — 99238 HOSP IP/OBS DSCHRG MGMT 30/<: CPT | Mod: GC | Performed by: FAMILY MEDICINE

## 2023-08-25 PROCEDURE — 250N000013 HC RX MED GY IP 250 OP 250 PS 637: Performed by: FAMILY MEDICINE

## 2023-08-25 PROCEDURE — 36415 COLL VENOUS BLD VENIPUNCTURE: CPT

## 2023-08-25 PROCEDURE — 82310 ASSAY OF CALCIUM: CPT

## 2023-08-25 PROCEDURE — 250N000011 HC RX IP 250 OP 636: Performed by: STUDENT IN AN ORGANIZED HEALTH CARE EDUCATION/TRAINING PROGRAM

## 2023-08-25 RX ORDER — FAMOTIDINE 20 MG/1
20 TABLET, FILM COATED ORAL 2 TIMES DAILY
Status: DISCONTINUED | OUTPATIENT
Start: 2023-08-25 | End: 2023-08-25 | Stop reason: HOSPADM

## 2023-08-25 RX ORDER — LORATADINE 10 MG/1
10 TABLET ORAL DAILY
Status: DISCONTINUED | OUTPATIENT
Start: 2023-08-25 | End: 2023-08-25 | Stop reason: HOSPADM

## 2023-08-25 RX ORDER — NALTREXONE HYDROCHLORIDE 50 MG/1
50 TABLET, FILM COATED ORAL DAILY
Qty: 30 TABLET | Refills: 1 | Status: SHIPPED | OUTPATIENT
Start: 2023-08-25

## 2023-08-25 RX ADMIN — FAMOTIDINE 20 MG: 20 TABLET ORAL at 12:18

## 2023-08-25 RX ADMIN — MULTIPLE VITAMINS W/ MINERALS TAB 1 TABLET: TAB at 08:48

## 2023-08-25 RX ADMIN — LORATADINE 10 MG: 10 TABLET ORAL at 15:04

## 2023-08-25 RX ADMIN — GABAPENTIN 900 MG: 300 CAPSULE ORAL at 06:04

## 2023-08-25 RX ADMIN — CLONIDINE HYDROCHLORIDE 0.1 MG: 0.1 TABLET ORAL at 06:04

## 2023-08-25 RX ADMIN — FERROUS SULFATE TAB 325 MG (65 MG ELEMENTAL FE) 325 MG: 325 (65 FE) TAB at 08:33

## 2023-08-25 RX ADMIN — DIAZEPAM 10 MG: 5 TABLET ORAL at 00:21

## 2023-08-25 RX ADMIN — ONDANSETRON 4 MG: 4 TABLET, ORALLY DISINTEGRATING ORAL at 00:21

## 2023-08-25 RX ADMIN — Medication 500 MG: at 08:49

## 2023-08-25 RX ADMIN — ACETAMINOPHEN 325 MG: 325 TABLET ORAL at 08:48

## 2023-08-25 RX ADMIN — PAROXETINE HYDROCHLORIDE 20 MG: 20 TABLET, FILM COATED ORAL at 08:34

## 2023-08-25 RX ADMIN — FUROSEMIDE 20 MG: 20 TABLET ORAL at 08:33

## 2023-08-25 RX ADMIN — FOLIC ACID 1 MG: 1 TABLET ORAL at 08:33

## 2023-08-25 ASSESSMENT — ACTIVITIES OF DAILY LIVING (ADL)
ADLS_ACUITY_SCORE: 22

## 2023-08-25 NOTE — PLAN OF CARE
Problem: Alcohol Withdrawal  Goal: Alcohol Withdrawal Symptom Control  Outcome: Met   Goal Outcome Evaluation:    Pt is alert and oriented and able to make all needs known.  Up IND in room.  Tolerating diet.  C/o H/A.  PRN tylenol given.  MD also added claritin for possible allergies.  VSS.  Voiding.  CIWA scores WNL.  Pt is hopeful to discharge later today.  Will need charge to set up cab ride with voucher.   [facility] following and still working on possible discharge.

## 2023-08-25 NOTE — PROGRESS NOTES
Care Management Follow Up    Length of Stay (days): 2    Expected Discharge Date: 08/26/2023     Concerns to be Addressed:  (medical progression)     Patient plan of care discussed at interdisciplinary rounds: Yes    Anticipated Discharge Disposition:  Home     Private pay costs discussed: Not applicable    Additional Information:  Pt discharging home today. Pt unable to secure ride home and does not have funds to pay for a ride at this time.  Information provided to RN to arrange cab ride home for pt.      ELVER Lopes

## 2023-08-25 NOTE — PLAN OF CARE
Goal Outcome Evaluation:      Plan of Care Reviewed With: patient    Overall Patient Progress: no changeOverall Patient Progress: no change    Outcome Evaluation: Pt is AOx4, on RA, VSS. NSR on tele, tachy with activity. CIWA as high as 13 this shift, pt c/o diaphoresis, HA, tremors. PRN Valium given with good response. This am 2nd vape was found by staff, Charge RN able to retrieve and place in safe with pt's other belongings. Up ad keily. No other event.

## 2023-08-25 NOTE — PLAN OF CARE
Problem: Alcohol Withdrawal  Goal: Alcohol Withdrawal Symptom Control  8/24/2023 2208 by Rolo Del Castillo, RN  Outcome: Progressing    Pt resting comfortably for periods throughout shift, CIWA scores fluctuating. She is A&O x4, calls appropriately & makes needs known. Highest score of 21 this shift driven up by anxiety, HA, intermittent nausea, diaphoresis & tremors. She denies any vomiting, visual, auditory or tactile disturbances. Tele reading ST. Nicotine patch moved to [facility].

## 2023-08-25 NOTE — PLAN OF CARE
Pt discharged home via cab at 1530. Belongings accounted for & sent w/ pt, including vapes in safe. AVS & questions addressed, pt verbalized no concerns.

## 2023-08-26 LAB — BACTERIA SPEC CULT: NORMAL

## 2023-08-26 NOTE — DISCHARGE SUMMARY
[facility]  Discharge Summary - Medicine & Pediatrics       Date of Admission:  8/22/2023  Date of Discharge:  8/25/2023  3:35 PM  Discharging Provider: Luis Carlos Delgado MD, Yissel Collazo MD  Discharge Service: Hospitalist Service    Discharge Diagnoses   Alcohol Use Disorder   Acute alcohol withdrawal with seizures   Hypertension     Clinically Significant Risk Factors          Follow-ups Needed After Discharge   Follow-up Appointments     Follow-up and recommended labs and tests       Follow up with primary care provider within 7 days to evaluate medication   change.    Patient interested in weight loss medication, continuing naltrexone,   possible change in diuretic            Unresulted Labs Ordered in the Past 30 Days of this Admission       No orders found from 7/23/2023 to 8/23/2023.            Discharge Disposition   Discharged to home  Condition at discharge: Stable    Hospital Course   Alisia Lin is a 42 year old female with a past medical history significant for MDD, AUD, seizures with alcohol withdrawal presenting for admitted on 8/22/2023 alcohol withdrawal.        Alcohol use disorder  History of withdrawal seizures  Acute alcohol withdrawal  Patient presenting in withdrawal, last alcoholic drink was 8/21 approx 6PM.  Typical use is 3 L of dayanara daily. Patient presented afebrile, initially tachy to 110 which normalized.  Blood pressure 184/112 upon admission although this decreased.  and . Lipase normal. Head CT no acute process. Received 1 L bolus LR in the ED, IV pantoprazole and Lorazepam 2 mg. Patient admitted to the floor with [facility] protocol on lorazepam. Patient did have some hallucinations throughout the early half of hospitalization. Patient placed on IV vitamins, folate and Thiamine. Patient provided with social work consult. Patient remained motivated to change throughout hospitalization. Patient would like to follow up with [facility] in the future.  "Patient discharged on Naltrexone 50 mg.      HTN  Patient feeling \"puffy\" during hospitalization. Restarted patient's Lasix 20 that the patient had been on in the distant past. Discharged on PTA Lasix, losartan, propranolol      MDD: Continued PTA paroxetine.      # Hypokalemia: Lowest K = 3.3 mmol/L in last 2 days, will replace as needed       # Hypoalbuminemia: Lowest albumin = 3.3 g/dL at 8/23/2023  7:39 AM, will monitor as appropriate     # Asthma: noted on problem list    Consultations This Hospital Stay   SOCIAL WORK IP CONSULT    Code Status   Prior       The patient was discussed with Dr. Zay VACA MD  [facility]  [facility] [facility] [address]  Phone: [phone]  Fax: [phone]  ______________________________________________________________________    Physical Exam   Vital Signs:                    Weight: 0 lbs 0 oz  B/P: 135/68, T: 98.1, P: 99, R: 20   PHYSICAL EXAM:  GENERAL: Awake, alert, sitting upright in bed. No acute distress.   HEENT: No scleral icterus or conjunctival injection. Oral cavity moist and pink with no ulcers, exudate, or thrush present. No cervical or supraclavicular lymphadenopathy. Mild facial puffiness.   SKIN: Warm and dry. No bruises, rashes, or skin lesions.  LUNGS: Normal work of breathing with no use of accessory muscles. Clear breath sounds in all lung fields bilaterally with no wheezes or crackles appreciated.  CARDIAC: RRR. Normal S1 and S2. No murmurs, clicks, or rubs appreciated. No JVD. Radial and dorsalis pedis pulses intact bilaterally. No peripheral edema.  ABDOMEN: Non-distended. Bowel sounds present in 4 quadrants. Soft and non-tender throughout with no masses or organomegaly.  NEUROLOGIC: Alert and oriented. Sensation to light touch involving upper and lower extremities intact bilaterally. 5/5 muscle strength present throughout upper and lower extremities.   EXTREMITIES: Edema increased in " lower extremities, restarted patient's PTA lasix         Primary Care Physician   Patricia Esquivel    Discharge Orders      Reason for your hospital stay    Patient treated for alcohol withdrawal     Activity    Your activity upon discharge: activity as tolerated     Follow-up and recommended labs and tests     Follow up with primary care provider within 7 days to evaluate medication change.    Patient interested in weight loss medication, continuing naltrexone, possible change in diuretic     Diet    Follow this diet upon discharge: Orders Placed This Encounter      Regular Diet Adult       Significant Results and Procedures   Most Recent 3 BMP's:  Recent Labs   Lab Test 08/25/23  0557 08/24/23  0524 08/23/23  0739    139 135*   POTASSIUM 3.7 3.3* 3.4*   CHLORIDE 105 107 105   CO2 27 19* 19*   BUN 14 13 12   CR 0.82 0.78 0.75   ANIONGAP 9 13 11   RENÉE 8.9 8.1* 8.6    122 94     Most Recent 2 LFT's:  Recent Labs   Lab Test 08/23/23  0739 08/22/23  1856   AST 18 298*   ALT 20 264*   ALKPHOS 60 102   BILITOTAL 1.0 1.1*   ,   Results for orders placed or performed during the hospital encounter of 08/22/23   Head CT w/o contrast    Narrative    EXAM: CT HEAD W/O CONTRAST  LOCATION: [facility]  DATE: 8/22/2023    INDICATION: withdrawl seizure  COMPARISON: 08/20/2023  TECHNIQUE: Routine CT Head without IV contrast. Multiplanar reformats. Dose reduction techniques were used.    FINDINGS:  INTRACRANIAL CONTENTS: No intracranial hemorrhage, extraaxial collection, or mass effect.  No CT evidence of acute infarct. Normal parenchymal attenuation. Normal ventricles and sulci.     VISUALIZED ORBITS/SINUSES/MASTOIDS: No intraorbital abnormality. No paranasal sinus mucosal disease. No middle ear or mastoid effusion.    BONES/SOFT TISSUES: No acute abnormality.      Impression    IMPRESSION:  1.  No acute intracranial process.       Discharge Medications   Discharge Medication List as of 8/25/2023   3:00 PM        START taking these medications    Details   naltrexone (DEPADE/REVIA) 50 MG tablet Take 1 tablet (50 mg) by mouth daily, Disp-30 tablet, R-1, E-Prescribe           CONTINUE these medications which have NOT CHANGED    Details   acetaminophen (TYLENOL) 500 MG tablet Take 1,000 mg by mouth 3 times daily as needed for mild pain, Historical      ferrous sulfate (FEROSUL) 325 (65 Fe) MG tablet Take 325 mg by mouth daily (with breakfast), Historical      furosemide (LASIX) 20 MG tablet Take 20 mg by mouth daily, Historical      gabapentin (NEURONTIN) 600 MG tablet Take 600 mg by mouth daily, Historical      hydrOXYzine (VISTARIL) 50 MG capsule Take 50 mg by mouth 4 times daily as needed for itching, Historical      losartan (COZAAR) 25 MG tablet Take 1 tablet (25 mg) by mouth daily, Disp-90 tablet, R-3, E-Prescribe      norethindrone-ethinyl estradiol (MICROGESTIN 1/20) 1-20 MG-MCG tablet Take 1 tablet by mouth daily, Disp-84 tablet, R-3, E-Prescribe      PARoxetine (PAXIL) 20 MG tablet Take 1 tablet (20 mg) by mouth every morning, Disp-90 tablet, R-1, E-Prescribe      propranolol (INDERAL) 10 MG tablet Take 1 tablet (10 mg) by mouth 3 times daily, Disp-240 tablet, R-1, E-Prescribe           Allergies   Allergies   Allergen Reactions    Compazine      Heart Problems/ Body went completley stiff for 8 hrs.    Nortriptyline     Skelaxin [Metaxalone]

## 2023-08-28 ENCOUNTER — PATIENT OUTREACH (OUTPATIENT)
Dept: FAMILY MEDICINE | Facility: CLINIC | Age: 43
End: 2023-08-28
Payer: COMMERCIAL

## 2023-08-28 NOTE — TELEPHONE ENCOUNTER
Encounter opened in error.    Please disregard.  BIENVENIDO RoyN, RN-BC  [facility]

## 2023-08-31 ENCOUNTER — TELEPHONE (OUTPATIENT)
Dept: FAMILY MEDICINE | Facility: CLINIC | Age: 43
End: 2023-08-31
Payer: COMMERCIAL

## 2023-08-31 NOTE — TELEPHONE ENCOUNTER
[facility] phone call message- general phone call:    Reason for call: Pt saw you over at [facility].  She scheduled an appt with you on 09/19.  She has some questions about going on some diet medication.  She also wanted to know, if you get a cancellation, would you call her. The problem is, she is a hospital follow up and needs to be scheduled with a Pharm D also.  Your schedule and theirs do not collaborate until 09/19.    Return call needed: Yes    OK to leave a message on voice mail? Yes    Primary language: English      needed? No    Call taken on August 31, 2023 at 10:22 AM by Randall Dhillon

## 2023-08-31 NOTE — TELEPHONE ENCOUNTER
Pt would like a blood pressure med with a diuretic since she is no longer drinking.  She states that it was stopped before due to her drinking and low potassium.    She would like to talk with Dr Delgado about Naltrexone as she has noticed that she can't focus and loses her train of thought.  She states that she has h/o ADD and was on Adderall and Ritalin in the past (last took 7 yrs ago).  She feels like she needs the medications again but does not really want to take them and wants to continue Naltrexone. She is wondering if this is nl and what Dr Delgado recommends?    Pt would like to start the wt loss shots (Wegovy) due to her high BP and borderline DM.  Assisted pt with scheduling an appt for 9/12/23 at 10:40 AM with Dr Delgado.  Routed note to Dr Delgado./Shea Fan RN BSN

## 2023-09-21 NOTE — ED TRIAGE NOTES
Pt to the ED via EMS with a c/o tachycardia and chest pain while watching TV about 1.-2 hours ago. EMS noted: hr of 170, they tried vagal maneuver - not successful, gave: 6mg adenosine, HR: 137, IV 18g placed in left hand, pt given 100 mcg fetanyl, she was given 381 ASA, and 1 nitroglycerine. Pt states pain was in her chest/back/jaws/arms. AT this time - pt is calm, cooperative, anxious, she states had heart attack 6 mo ago - was at [employer] or [employer]. Pt c/o constant chest/upper back/left arm/neck pain. Pt is breathing with smooth even respirations. Her skin is warm and dry. She is talking with no increased distress.   
.

## 2023-11-16 ENCOUNTER — TELEPHONE (OUTPATIENT)
Dept: BEHAVIORAL HEALTH | Facility: CLINIC | Age: 43
End: 2023-11-16
Payer: COMMERCIAL

## 2023-11-16 NOTE — TELEPHONE ENCOUNTER
[facility] and reviewed chart to determine eligibility for [facility] Social Work Care Coordination (SW CC).  CC called pt and left a message requesting a call back to discuss the program. No patient or program identifiers were stated in voicemail. [facility] also sent pt program information via [employer] message     ELVER Pryor  [facility] [place] ([facility])   [facility]  [phone]

## 2023-12-12 ENCOUNTER — PATIENT OUTREACH (OUTPATIENT)
Dept: GASTROENTEROLOGY | Facility: CLINIC | Age: 43
End: 2023-12-12
Payer: COMMERCIAL

## 2024-01-17 ENCOUNTER — TELEPHONE (OUTPATIENT)
Dept: BEHAVIORAL HEALTH | Facility: CLINIC | Age: 44
End: 2024-01-17
Payer: COMMERCIAL

## 2024-01-17 NOTE — TELEPHONE ENCOUNTER
[facility] SWCC received ED alert for patient and reviewed chart to determine eligibility for [facility] Social Work Care Coordination (SW CC). SW CC called pt  with intent of offering [facility] enrollment. Call was not answered. Writer was unable to leave voicemail.     Gianna Keita, ELVER  [facility] [place] ([facility])   [facility]  [phone]

## 2024-01-22 ENCOUNTER — TELEPHONE (OUTPATIENT)
Dept: BEHAVIORAL HEALTH | Facility: CLINIC | Age: 44
End: 2024-01-22
Payer: COMMERCIAL

## 2024-01-22 NOTE — TELEPHONE ENCOUNTER
[facility] received ED alert for patient and reviewed chart to determine eligibility for [facility] Social Work Care Coordination (SW CC). SW CC called pt. Pt did not answer. [facility] CC unable to leave voicemail. [facility] CC sent pt [facility] information via mail.     ELVER Pryor  [facility] ([facility])   [facility]  [phone]

## 2024-03-16 ENCOUNTER — HEALTH MAINTENANCE LETTER (OUTPATIENT)
Age: 44
End: 2024-03-16

## 2024-08-07 NOTE — ED NOTES
Patient assisted to waiting room following triage, she asked if she can walk around the waiting room to which writer told her it was ok as long as she was steady on her feet; patient got up and was very steady with ambulation.  Is currently pacing the waiting room.    Plan: Apply SPF 30 or greater to sun exposed area twice daily Detail Level: Zone

## 2024-08-14 NOTE — ED AVS SNAPSHOT
[facility], [place], Emergency Department  [address]  [facility] [address]  Phone:  [phone]  Fax:  [phone]                                    Alisia Lin   MRN: [id number]    Department:  [facility], Emergency Department   Date of Visit:  9/5/2020           After Visit Summary Signature Page    I have received my discharge instructions, and my questions have been answered. I have discussed any challenges I see with this plan with the nurse or doctor.    ..........................................................................................................................................  Patient/Patient Representative Signature      ..........................................................................................................................................  Patient Representative Print Name and Relationship to Patient    ..................................................               ................................................  Date                                   Time    ..........................................................................................................................................  Reviewed by Signature/Title    ...................................................              ..............................................  Date                                               Time          22EPIC Rev 08/18        complains of pain/discomfort

## 2024-08-26 DIAGNOSIS — F41.9 ANXIETY: ICD-10-CM

## 2024-08-26 DIAGNOSIS — R00.0 TACHYCARDIA: ICD-10-CM

## 2024-08-26 RX ORDER — PROPRANOLOL HYDROCHLORIDE 10 MG/1
10 TABLET ORAL 3 TIMES DAILY
Qty: 240 TABLET | Refills: 1 | OUTPATIENT
Start: 2024-08-26

## 2024-10-12 ENCOUNTER — HEALTH MAINTENANCE LETTER (OUTPATIENT)
Age: 44
End: 2024-10-12

## 2025-01-07 ENCOUNTER — HOSPITAL ENCOUNTER (EMERGENCY)
Facility: CLINIC | Age: 45
Discharge: HOME | End: 2025-01-07
Attending: EMERGENCY MEDICINE | Admitting: EMERGENCY MEDICINE
Payer: COMMERCIAL

## 2025-01-07 ENCOUNTER — APPOINTMENT (OUTPATIENT)
Dept: GENERAL RADIOLOGY | Facility: CLINIC | Age: 45
End: 2025-01-07
Attending: EMERGENCY MEDICINE
Payer: COMMERCIAL

## 2025-01-07 VITALS
TEMPERATURE: 98.2 F | HEART RATE: 87 BPM | BODY MASS INDEX: 41.06 KG/M2 | WEIGHT: 198.3 LBS | OXYGEN SATURATION: 99 % | DIASTOLIC BLOOD PRESSURE: 85 MMHG | SYSTOLIC BLOOD PRESSURE: 140 MMHG | RESPIRATION RATE: 16 BRPM

## 2025-01-07 DIAGNOSIS — R53.1 GENERALIZED WEAKNESS: ICD-10-CM

## 2025-01-07 DIAGNOSIS — R00.2 PALPITATIONS: ICD-10-CM

## 2025-01-07 DIAGNOSIS — E83.42 HYPOMAGNESEMIA: ICD-10-CM

## 2025-01-07 LAB
ALBUMIN SERPL BCG-MCNC: 4.2 G/DL (ref 3.5–5.2)
ALBUMIN UR-MCNC: 100 MG/DL
ALP SERPL-CCNC: 49 U/L (ref 40–150)
ALT SERPL W P-5'-P-CCNC: 14 U/L (ref 0–50)
ANION GAP SERPL CALCULATED.3IONS-SCNC: 17 MMOL/L (ref 7–15)
APPEARANCE UR: CLEAR
AST SERPL W P-5'-P-CCNC: 22 U/L (ref 0–45)
ATRIAL RATE - MUSE: 102 BPM
BASOPHILS # BLD AUTO: 0.1 10E3/UL (ref 0–0.2)
BASOPHILS NFR BLD AUTO: 1 %
BILIRUB SERPL-MCNC: 0.5 MG/DL
BILIRUB UR QL STRIP: ABNORMAL
BUN SERPL-MCNC: 14.2 MG/DL (ref 6–20)
CALCIUM SERPL-MCNC: 9 MG/DL (ref 8.8–10.4)
CHLORIDE SERPL-SCNC: 94 MMOL/L (ref 98–107)
COLOR UR AUTO: YELLOW
CREAT SERPL-MCNC: 0.76 MG/DL (ref 0.51–0.95)
DIASTOLIC BLOOD PRESSURE - MUSE: NORMAL MMHG
EGFRCR SERPLBLD CKD-EPI 2021: >90 ML/MIN/1.73M2
EOSINOPHIL # BLD AUTO: 0.1 10E3/UL (ref 0–0.7)
EOSINOPHIL NFR BLD AUTO: 1 %
ERYTHROCYTE [DISTWIDTH] IN BLOOD BY AUTOMATED COUNT: 12.3 % (ref 10–15)
ETHANOL SERPL-MCNC: <0.01 G/DL
FLUAV RNA SPEC QL NAA+PROBE: NEGATIVE
FLUBV RNA RESP QL NAA+PROBE: NEGATIVE
GLUCOSE SERPL-MCNC: 131 MG/DL (ref 70–99)
GLUCOSE UR STRIP-MCNC: NEGATIVE MG/DL
HCO3 SERPL-SCNC: 23 MMOL/L (ref 22–29)
HCT VFR BLD AUTO: 36.3 % (ref 35–47)
HGB BLD-MCNC: 13 G/DL (ref 11.7–15.7)
HGB UR QL STRIP: NEGATIVE
IMM GRANULOCYTES # BLD: 0 10E3/UL
IMM GRANULOCYTES NFR BLD: 1 %
INTERPRETATION ECG - MUSE: NORMAL
KETONES UR STRIP-MCNC: ABNORMAL MG/DL
LEUKOCYTE ESTERASE UR QL STRIP: NEGATIVE
LIPASE SERPL-CCNC: 33 U/L (ref 13–60)
LYMPHOCYTES # BLD AUTO: 3.5 10E3/UL (ref 0.8–5.3)
LYMPHOCYTES NFR BLD AUTO: 42 %
MAGNESIUM SERPL-MCNC: 1.1 MG/DL (ref 1.7–2.3)
MCH RBC QN AUTO: 31 PG (ref 26.5–33)
MCHC RBC AUTO-ENTMCNC: 35.8 G/DL (ref 31.5–36.5)
MCV RBC AUTO: 87 FL (ref 78–100)
MONOCYTES # BLD AUTO: 0.8 10E3/UL (ref 0–1.3)
MONOCYTES NFR BLD AUTO: 10 %
MUCOUS THREADS #/AREA URNS LPF: PRESENT /LPF
NEUTROPHILS # BLD AUTO: 3.8 10E3/UL (ref 1.6–8.3)
NEUTROPHILS NFR BLD AUTO: 46 %
NITRATE UR QL: NEGATIVE
NRBC # BLD AUTO: 0 10E3/UL
NRBC BLD AUTO-RTO: 0 /100
NT-PROBNP SERPL-MCNC: <36 PG/ML (ref 0–450)
P AXIS - MUSE: 60 DEGREES
PH UR STRIP: 6.5 [PH] (ref 5–7)
PLATELET # BLD AUTO: 294 10E3/UL (ref 150–450)
POTASSIUM SERPL-SCNC: 3.1 MMOL/L (ref 3.4–5.3)
PR INTERVAL - MUSE: 134 MS
PROT SERPL-MCNC: 6.8 G/DL (ref 6.4–8.3)
QRS DURATION - MUSE: 90 MS
QT - MUSE: 342 MS
QTC - MUSE: 445 MS
R AXIS - MUSE: 36 DEGREES
RBC # BLD AUTO: 4.19 10E6/UL (ref 3.8–5.2)
RBC URINE: 1 /HPF
RSV RNA SPEC NAA+PROBE: NEGATIVE
SARS-COV-2 RNA RESP QL NAA+PROBE: NEGATIVE
SODIUM SERPL-SCNC: 134 MMOL/L (ref 135–145)
SP GR UR STRIP: 1.02 (ref 1–1.03)
SQUAMOUS EPITHELIAL: 18 /HPF
SYSTOLIC BLOOD PRESSURE - MUSE: NORMAL MMHG
T AXIS - MUSE: 24 DEGREES
TROPONIN T SERPL HS-MCNC: <6 NG/L
TSH SERPL DL<=0.005 MIU/L-ACNC: 2.65 UIU/ML (ref 0.3–4.2)
UROBILINOGEN UR STRIP-MCNC: 0.2 MG/DL
VENTRICULAR RATE- MUSE: 102 BPM
WBC # BLD AUTO: 8.2 10E3/UL (ref 4–11)
WBC URINE: 2 /HPF

## 2025-01-07 PROCEDURE — 250N000013 HC RX MED GY IP 250 OP 250 PS 637: Performed by: FAMILY MEDICINE

## 2025-01-07 PROCEDURE — 93005 ELECTROCARDIOGRAM TRACING: CPT | Performed by: EMERGENCY MEDICINE

## 2025-01-07 PROCEDURE — 84484 ASSAY OF TROPONIN QUANT: CPT | Performed by: EMERGENCY MEDICINE

## 2025-01-07 PROCEDURE — 99285 EMERGENCY DEPT VISIT HI MDM: CPT | Mod: 25 | Performed by: EMERGENCY MEDICINE

## 2025-01-07 PROCEDURE — 85004 AUTOMATED DIFF WBC COUNT: CPT | Performed by: EMERGENCY MEDICINE

## 2025-01-07 PROCEDURE — 80053 COMPREHEN METABOLIC PANEL: CPT | Performed by: EMERGENCY MEDICINE

## 2025-01-07 PROCEDURE — 99284 EMERGENCY DEPT VISIT MOD MDM: CPT | Performed by: EMERGENCY MEDICINE

## 2025-01-07 PROCEDURE — 96368 THER/DIAG CONCURRENT INF: CPT | Performed by: EMERGENCY MEDICINE

## 2025-01-07 PROCEDURE — 96365 THER/PROPH/DIAG IV INF INIT: CPT | Performed by: EMERGENCY MEDICINE

## 2025-01-07 PROCEDURE — 96366 THER/PROPH/DIAG IV INF ADDON: CPT | Performed by: EMERGENCY MEDICINE

## 2025-01-07 PROCEDURE — 83880 ASSAY OF NATRIURETIC PEPTIDE: CPT | Performed by: EMERGENCY MEDICINE

## 2025-01-07 PROCEDURE — 83690 ASSAY OF LIPASE: CPT | Performed by: EMERGENCY MEDICINE

## 2025-01-07 PROCEDURE — 87637 SARSCOV2&INF A&B&RSV AMP PRB: CPT | Performed by: EMERGENCY MEDICINE

## 2025-01-07 PROCEDURE — 85014 HEMATOCRIT: CPT | Performed by: EMERGENCY MEDICINE

## 2025-01-07 PROCEDURE — 81003 URINALYSIS AUTO W/O SCOPE: CPT | Performed by: EMERGENCY MEDICINE

## 2025-01-07 PROCEDURE — 36415 COLL VENOUS BLD VENIPUNCTURE: CPT | Performed by: EMERGENCY MEDICINE

## 2025-01-07 PROCEDURE — 93010 ELECTROCARDIOGRAM REPORT: CPT | Performed by: EMERGENCY MEDICINE

## 2025-01-07 PROCEDURE — 83735 ASSAY OF MAGNESIUM: CPT | Performed by: EMERGENCY MEDICINE

## 2025-01-07 PROCEDURE — 250N000011 HC RX IP 250 OP 636: Performed by: FAMILY MEDICINE

## 2025-01-07 PROCEDURE — 84443 ASSAY THYROID STIM HORMONE: CPT | Performed by: EMERGENCY MEDICINE

## 2025-01-07 PROCEDURE — 82077 ASSAY SPEC XCP UR&BREATH IA: CPT | Performed by: EMERGENCY MEDICINE

## 2025-01-07 PROCEDURE — 71046 X-RAY EXAM CHEST 2 VIEWS: CPT

## 2025-01-07 RX ORDER — POTASSIUM CHLORIDE 1.5 G/1.58G
40 POWDER, FOR SOLUTION ORAL ONCE
Status: COMPLETED | OUTPATIENT
Start: 2025-01-07 | End: 2025-01-07

## 2025-01-07 RX ORDER — POTASSIUM CHLORIDE 7.45 MG/ML
10 INJECTION INTRAVENOUS ONCE
Status: COMPLETED | OUTPATIENT
Start: 2025-01-07 | End: 2025-01-07

## 2025-01-07 RX ORDER — MAGNESIUM SULFATE HEPTAHYDRATE 40 MG/ML
2 INJECTION, SOLUTION INTRAVENOUS ONCE
Status: COMPLETED | OUTPATIENT
Start: 2025-01-07 | End: 2025-01-07

## 2025-01-07 RX ADMIN — MAGNESIUM SULFATE HEPTAHYDRATE 2 G: 40 INJECTION, SOLUTION INTRAVENOUS at 08:13

## 2025-01-07 RX ADMIN — POTASSIUM CHLORIDE 10 MEQ: 7.45 INJECTION INTRAVENOUS at 08:14

## 2025-01-07 RX ADMIN — POTASSIUM CHLORIDE 40 MEQ: 1.5 POWDER, FOR SOLUTION ORAL at 08:12

## 2025-01-07 ASSESSMENT — COLUMBIA-SUICIDE SEVERITY RATING SCALE - C-SSRS
2. HAVE YOU ACTUALLY HAD ANY THOUGHTS OF KILLING YOURSELF IN THE PAST MONTH?: NO
1. IN THE PAST MONTH, HAVE YOU WISHED YOU WERE DEAD OR WISHED YOU COULD GO TO SLEEP AND NOT WAKE UP?: NO
6. HAVE YOU EVER DONE ANYTHING, STARTED TO DO ANYTHING, OR PREPARED TO DO ANYTHING TO END YOUR LIFE?: NO

## 2025-01-07 ASSESSMENT — ACTIVITIES OF DAILY LIVING (ADL)
ADLS_ACUITY_SCORE: 48

## 2025-01-07 NOTE — ED PROVIDER NOTES
ED Provider Note  [facility]      History     Chief Complaint   Patient presents with    Palpitations     Pt c/o feeling like magnesium is low. Pt c/o Brain fog. Also c/o  SOB and feeling heavy and carrying weight. Pt c/o feeling over exerted. Also c/o dry cough      HPI  Alisia Lin is a 44 year old female who presents with multiple complaints.  She feels fatigued and with brain fog.  She has a history of hypomagnesemia and takes 400 mg magnesium sulfate 3 times daily.  She is worried that her magnesium might be low and she will generally tired and weak.  She has noticed feeling tired with activity and gets short of breath with walking up and down the stairs.  She is currently working with the endocrinologist through [employer] system for serial cortisol testing and workup for possible Cushing's syndrome, but she has not completed all this testing ordered on the follow-up yet.    Awoke this AM with the sensation of palpitations this AM, that has resolved.     Also notes that she has been struggling to sleep. Drinks 32 oz of beer + 1 shot, or 5 shots each night to help her sleep.     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  Hospitalized [facility]    Low TSH level 8/29/2017    Migraine with aura, without mention of intractable migraine without mention of status migrainosus     occas, Last one 11/2013. 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    Other, mixed, or unspecified nondependent drug abuse,  in remission 1/1/07    Treatment for narcotic use     Papanicolaou smear of cervix with atypical squamous cells of undetermined significance (ASC-US) 3/2008    colp - WNL, HPV 53    Pyelonephritis 12/23/2015    Tobacco use disorder     1-1.5 ppd, zyban unsuccessful    Unspecified contraceptive management     ortho tricyclen     Past Surgical History:   Procedure Laterality Date    HC ENLARGE BREAST WITH IMPLANT  2002    BILATERAL    HC REMOVAL OF TONSILS,<11 Y/O      Description: Tonsillectomy;  Recorded: 10/03/2011;  Annotations: SHE THINKS IT WAS 5 OR 6 YEARS AGO    HC REMOVAL OF TONSILS,<11 Y/O      Description: Tonsillectomy;  Recorded: 10/03/2011;    HC REMOVE TONSILS/ADENOIDS,12+ Y/O  2006    HC TOOTH EXTRACTION W/FORCEP  16 yo    wisdom teeth    IUD PARAGARD  3/3/08    Removed with mirena placed. Mirena has now been removed as well.     LEEP TX, CERVICAL  age 15?    ORTHOPEDIC SURGERY      Lumbar fusion 2009    [place] ARTHRODESIS ANT INTERBODY MIN DISCECTOMY,LUMBAR      Description: Lumbar Vertebral Fusion;  Recorded: 10/03/2011;    [facility] ENLARGE BREAST      Description: Breast Surgery Enlargement Procedure;  Recorded: 10/03/2011;  Annotations: DATE 2009    [facility] ENLARGE BREAST      Description: Breast Surgery Enlargement Procedure Bilateral;  Recorded: 10/03/2011;     acetaminophen (TYLENOL) 500 MG tablet  ferrous sulfate (FEROSUL) 325 (65 Fe) MG tablet  furosemide (LASIX) 20 MG tablet  gabapentin (NEURONTIN) 600 MG tablet  hydrOXYzine (VISTARIL) 50 MG capsule  losartan (COZAAR) 25 MG tablet  naltrexone (DEPADE/REVIA) 50 MG tablet  norethindrone-ethinyl estradiol (MICROGESTIN 1/20) 1-20 MG-MCG tablet  PARoxetine (PAXIL) 20 MG tablet  propranolol (INDERAL) 10 MG tablet      Allergies   Allergen Reactions    Compazine      Heart Problems/ Body went completley stiff for 8 hrs.    Nortriptyline     Skelaxin [Metaxalone]      Family History  Family History   Problem Relation Age of Onset    Hypertension Mother      Alcohol/Drug Mother         alcohol, sober for 19 years    Depression Mother         on medication    Lipids Mother         on medication    Breast Cancer Mother     Alcohol/Drug Father         alcohol, still actively drinking    Lipids Father         on medication    Anxiety Disorder Father     Substance Abuse Father     C.A.D. Paternal Grandmother     Diabetes Paternal Grandmother     Breast Cancer Paternal Grandmother     Arthritis Paternal Grandmother     Cancer Paternal Grandmother         breast cancer    Cancer Paternal Grandfather         colon cancer    Asthma Daughter         x2    Thyroid Disease No family hx of      Social History   Social History     Tobacco Use    Smoking status: Former     Types: Cigarettes    Smokeless tobacco: Current    Tobacco comments:     less than 1/2 ppd   Vaping Use    Vaping status: Every Day    Substances: Nicotine   Substance Use Topics    Alcohol use: Yes     Comment: only drinking wine since 10/8/22    Drug use: No     Comment: clean for 18 days      A medically appropriate review of systems was performed with pertinent positives and negatives noted in the HPI, and all other systems negative.    Physical Exam   BP: (!) 144/89  Pulse: 106  Temp: 98.2  F (36.8  C)  Resp: 16  Weight: 89.9 kg (198 lb 4.8 oz)  SpO2: 97 %    Physical Exam  Gen:A&Ox3, no acute distress  HEENT:PERRL, no facial tenderness or wounds, head atraumatic, oropharynx clear, mucous membranes moist, TMs clear bilaterally  Neck:no bony tenderness or step offs, no JVD, trachea midline  Back: no CVA tenderness, no midline bony tenderness  CV:RRR without murmurs  PULM:Clear to auscultation bilaterally  Abd:soft, nontender, nondistended. Bowel sounds present and normal  UE:No traumatic injuries, skin normal  LE:no traumatic injuries, skin normal, no LE edema  Neuro:CN II-XII intact, strength 5/5 throughout  Skin: no rashes or ecchymoses    ED Course, Procedures, & Data      Procedures            EKG  Interpretation:      Interpreted by Dara Fallon MD  Time reviewed: 6:26 AM  Symptoms at time of EKG: palpitations  Rhythm: sinus tachycardia  Rate: 102  Axis: normal  Ectopy: none  Conduction: normal  ST Segments/ T Waves: No ST-T wave changes  Q Waves: none  Comparison to prior: No old EKG available    Clinical Impression: sinus tachycardia         Results for orders placed or performed during the hospital encounter of 01/07/25   Chest XR,  PA & LAT     Status: None    Narrative    EXAM: XR CHEST 2 VIEWS  LOCATION: [facility]  DATE: 1/7/2025    INDICATION: Dyspnea.  COMPARISON: None.      Impression    IMPRESSION: Negative chest. Lungs are clear. Normal heart size.     Influenza A/B, RSV and SARS-CoV2 PCR (COVID-19) Nose     Status: Normal    Specimen: Nose; Swab   Result Value Ref Range    Influenza A PCR Negative Negative    Influenza B PCR Negative Negative    RSV PCR Negative Negative    SARS CoV2 PCR Negative Negative    Narrative    Testing was performed using the Xpert Xpress CoV2/Flu/RSV Assay on the [employer] GeneXpert Instrument. This test should be ordered for the detection of SARS-CoV2, influenza, and RSV viruses in individuals with signs and symptoms of respiratory tract infection. This test is for in vitro diagnostic use under the US FDA for laboratories certified under CLIA to perform high or moderate complexity testing. This test has been [place] FDA cleared. A negative result does not rule out the presence of PCR inhibitors in the specimen or target RNA in concentration below the limit of detection for the assay. If only one viral target is positive but coinfection with multiple targets is suspected, the sample should be re-tested with another FDA cleared, approved, or authorized test, if coninfection would change clinical management. This test was validated by the [facility]. These laboratories are certified under the Clinical  Laboratory Improvement Amendments of 1988 (CLIA-88) as qualified to perfom high complexity laboratory testing.   Troponin T, High Sensitivity     Status: Normal   Result Value Ref Range    Troponin T, High Sensitivity <6 <=14 ng/L   Magnesium     Status: Abnormal   Result Value Ref Range    Magnesium 1.1 (L) 1.7 - 2.3 mg/dL   Nt probnp inpatient     Status: Normal   Result Value Ref Range    N terminal Pro BNP Inpatient <36 0 - 450 pg/mL   TSH with free T4 reflex     Status: Normal   Result Value Ref Range    TSH 2.65 0.30 - 4.20 uIU/mL   Alcohol level blood     Status: Normal   Result Value Ref Range    Alcohol ethyl <0.01 <=0.01 g/dL   Comprehensive metabolic panel     Status: Abnormal   Result Value Ref Range    Sodium 134 (L) 135 - 145 mmol/L    Potassium 3.1 (L) 3.4 - 5.3 mmol/L    Carbon Dioxide (CO2) 23 22 - 29 mmol/L    Anion Gap 17 (H) 7 - 15 mmol/L    Urea Nitrogen 14.2 6.0 - 20.0 mg/dL    Creatinine 0.76 0.51 - 0.95 mg/dL    GFR Estimate >90 >60 mL/min/1.73m2    Calcium 9.0 8.8 - 10.4 mg/dL    Chloride 94 (L) 98 - 107 mmol/L    Glucose 131 (H) 70 - 99 mg/dL    Alkaline Phosphatase 49 40 - 150 U/L    AST 22 0 - 45 U/L    ALT 14 0 - 50 U/L    Protein Total 6.8 6.4 - 8.3 g/dL    Albumin 4.2 3.5 - 5.2 g/dL    Bilirubin Total 0.5 <=1.2 mg/dL   Lipase     Status: Normal   Result Value Ref Range    Lipase 33 13 - 60 U/L   CBC with platelets and differential     Status: None   Result Value Ref Range    WBC Count 8.2 4.0 - 11.0 10e3/uL    RBC Count 4.19 3.80 - 5.20 10e6/uL    Hemoglobin 13.0 11.7 - 15.7 g/dL    Hematocrit 36.3 35.0 - 47.0 %    MCV 87 78 - 100 fL    MCH 31.0 26.5 - 33.0 pg    MCHC 35.8 31.5 - 36.5 g/dL    RDW 12.3 10.0 - 15.0 %    Platelet Count 294 150 - 450 10e3/uL    % Neutrophils 46 %    % Lymphocytes 42 %    % Monocytes 10 %    % Eosinophils 1 %    % Basophils 1 %    % Immature Granulocytes 1 %    NRBCs per 100 WBC 0 <1 /100    Absolute Neutrophils 3.8 1.6 - 8.3 10e3/uL    Absolute Lymphocytes  3.5 0.8 - 5.3 10e3/uL    Absolute Monocytes 0.8 0.0 - 1.3 10e3/uL    Absolute Eosinophils 0.1 0.0 - 0.7 10e3/uL    Absolute Basophils 0.1 0.0 - 0.2 10e3/uL    Absolute Immature Granulocytes 0.0 <=0.4 10e3/uL    Absolute NRBCs 0.0 10e3/uL   UA with Microscopic reflex to Culture     Status: Abnormal    Specimen: Urine, Clean Catch   Result Value Ref Range    Color Urine Yellow Colorless, Straw, Light Yellow, Yellow    Appearance Urine Clear Clear    Glucose Urine Negative Negative mg/dL    Bilirubin Urine Small (A) Negative    Ketones Urine Trace (A) Negative mg/dL    Specific Gravity Urine 1.025 1.003 - 1.035    Blood Urine Negative Negative    pH Urine 6.5 5.0 - 7.0    Protein Albumin Urine 100 (A) Negative mg/dL    Urobilinogen Urine 0.2 0.2, 1.0 mg/dL    Nitrite Urine Negative Negative    Leukocyte Esterase Urine Negative Negative    Mucus Urine Present (A) None Seen /LPF    RBC Urine 1 <=2 /HPF    WBC Urine 2 <=5 /HPF    Squamous Epithelials Urine 18 (H) <=1 /HPF    Narrative    Urine Culture not indicated   EKG 12 lead     Status: None   Result Value Ref Range    Systolic Blood Pressure  mmHg    Diastolic Blood Pressure  mmHg    Ventricular Rate 102 BPM    Atrial Rate 102 BPM    VT Interval 134 ms    QRS Duration 90 ms     ms    QTc 445 ms    P Axis 60 degrees    R AXIS 36 degrees    T Axis 24 degrees    Interpretation ECG       Sinus tachycardia  Otherwise normal ECG  Unconfirmed report - interpretation of this ECG is computer generated - see medical record for final interpretation  Confirmed by - EMERGENCY ROOM, PHYSICIAN ([id number]),  CHRISTINE ZAMARRIPA ([id number]) on 1/7/2025 7:24:38 AM     CBC with platelets differential     Status: None    Narrative    The following orders were created for panel order CBC with platelets differential.  Procedure                               Abnormality         Status                     ---------                               -----------         ------                      CBC with platelets and d...[[id number]]                      Final result                 Please view results for these tests on the individual orders.     Medications   magnesium sulfate 2 g in 50 mL sterile water intermittent infusion (0 g Intravenous Stopped 1/7/25 1013)   potassium chloride 10 mEq in 100 mL sterile water infusion (0 mEq Intravenous Stopped 1/7/25 1013)   potassium chloride (KLOR-CON) Packet 40 mEq (40 mEq Oral $Given 1/7/25 0812)     Labs Ordered and Resulted from Time of ED Arrival to Time of ED Departure   MAGNESIUM - Abnormal       Result Value    Magnesium 1.1 (*)    COMPREHENSIVE METABOLIC PANEL - Abnormal    Sodium 134 (*)     Potassium 3.1 (*)     Carbon Dioxide (CO2) 23      Anion Gap 17 (*)     Urea Nitrogen 14.2      Creatinine 0.76      GFR Estimate >90      Calcium 9.0      Chloride 94 (*)     Glucose 131 (*)     Alkaline Phosphatase 49      AST 22      ALT 14      Protein Total 6.8      Albumin 4.2      Bilirubin Total 0.5     ROUTINE UA WITH MICROSCOPIC REFLEX TO CULTURE - Abnormal    Color Urine Yellow      Appearance Urine Clear      Glucose Urine Negative      Bilirubin Urine Small (*)     Ketones Urine Trace (*)     Specific Gravity Urine 1.025      Blood Urine Negative      pH Urine 6.5      Protein Albumin Urine 100 (*)     Urobilinogen Urine 0.2      Nitrite Urine Negative      Leukocyte Esterase Urine Negative      Mucus Urine Present (*)     RBC Urine 1      WBC Urine 2      Squamous Epithelials Urine 18 (*)    INFLUENZA A/B, RSV AND SARS-COV2 PCR - Normal    Influenza A PCR Negative      Influenza B PCR Negative      RSV PCR Negative      SARS CoV2 PCR Negative     TROPONIN T, HIGH SENSITIVITY - Normal    Troponin T, High Sensitivity <6     NT PROBNP INPATIENT - Normal    N terminal Pro BNP Inpatient <36     TSH WITH FREE T4 REFLEX - Normal    TSH 2.65     ETHYL ALCOHOL LEVEL - Normal    Alcohol ethyl <0.01     LIPASE - Normal    Lipase 33     CBC WITH PLATELETS AND  DIFFERENTIAL    WBC Count 8.2      RBC Count 4.19      Hemoglobin 13.0      Hematocrit 36.3      MCV 87      MCH 31.0      MCHC 35.8      RDW 12.3      Platelet Count 294      % Neutrophils 46      % Lymphocytes 42      % Monocytes 10      % Eosinophils 1      % Basophils 1      % Immature Granulocytes 1      NRBCs per 100 WBC 0      Absolute Neutrophils 3.8      Absolute Lymphocytes 3.5      Absolute Monocytes 0.8      Absolute Eosinophils 0.1      Absolute Basophils 0.1      Absolute Immature Granulocytes 0.0      Absolute NRBCs 0.0       Chest XR,  PA & LAT   Final Result   IMPRESSION: Negative chest. Lungs are clear. Normal heart size.                Critical care was not performed.     Medical Decision Making  The patient's presentation was of high complexity (a chronic illness severe exacerbation, progression, or side effect of treatment).    The patient's evaluation involved:  review of external note(s) from 1 sources (endocrinology note)  ordering and/or review of 3+ test(s) in this encounter (see separate area of note for details)    The patient's management necessitated further care after sign-out to Dr. Horne (see their note for further management).    Assessment & Plan    45 yo F presenting with palpations and generalized weakness  EKG with sinus tachycardia without ischemic abnormalities.   Monitored on telemetry and no arrhythmias were noted.   IV access obtained and lab testing included CBC, CMP, lipase, TSH, Mg, troponin, BNP.  CXR   COVID/flu/RSV    Labs and imaging pending at time of sign out with disposition pending.     I have reviewed the nursing notes. I have reviewed the findings, diagnosis, plan and need for follow up with the patient.    Discharge Medication List as of 1/7/2025 10:14 AM          Final diagnoses:   Palpitations   Hypomagnesemia   Generalized weakness       Dara Fallon MD   [facility] EMERGENCY DEPARTMENT  1/7/2025     Dara Fallon MD  01/07/25  [id number]       Dara Fallon MD  01/07/25 8922

## 2025-01-07 NOTE — ED PROVIDER NOTES
"Emergency Department I-PASS Sign-out      Illness Severity: \"Watcher\"    Patient Summary:  44 year old female with pertinent PMH of recent workup in place for diagnosis of Cushing's syndrome, prior difficulty with electrolyte disturbance specifically history of hypomagnesemia.  Who presented with feeling tired and weak, reporting \"brain fog, having palpitations.  Admitted daily alcohol use    ED Course/treatment plan: Patient's EKG chest x-ray troponin and BNP were all negative.  Her electrolytes were pending at the time of signout.  Has not received any other treatment.    Clinical Impression:  No diagnosis found.      Action List:  -To do:  Follow-up labs  Labs: Labs do reveal hypomagnesemia and hypokalemia, electrolyte replacement was ordered.    Situational Awareness & Contingency Planning:  Code Status (Most recent):  Prior    Disposition:  likely to be discharged      Synthesis & Events after sign-out:  Patient noted to be hypokalemic and hypomagnesemic.  This is acute on chronic.  She was given electrolyte replacement.  Was feeling somewhat better.  Her workup is otherwise negative.  She has no current plan primary care follow-up until April.  Recommend that she continue her magnesium supplement, increase dietary potassium, and follow-up within the next week to 10 days for recheck regarding her palpitations and electrolyte disturbance.  Based on the clinical findings and the entire clinical scenario, the patient appears stable at this time to be treated symptomatically, and to follow-up as an outpatient for any further evaluation and treatment.  Discussed expected course, need for follow up, and indications for return with the patient.  See discharge instructions.          Ton Horne MD   Emergency Medicine     Ton Horne MD  01/07/25 1003    "

## 2025-01-07 NOTE — DISCHARGE INSTRUCTIONS
Thank you for choosing [facility].     Please closely monitor for further symptoms. Return to the Emergency Department if you develop any new or worsening signs or symptoms.    If you received any opiate pain medications or sedatives during your visit, please do not drive for at least 8 hours.     Labs, cultures or final xray interpretations may still need to be reviewed.  We will call you if your plan of care needs to be changed.    Please follow up with your primary care physician or clinic in the next 7 days for recheck, consider recheck electrolytes.  Continue your magnesium supplement and increase your dietary potassium..

## 2025-01-07 NOTE — LETTER
January 7, 2025      To Whom It May Concern:      Alisia Lin was seen in our Emergency Department today, 01/07/25.  I expect her condition to improve over the next 1-2 days.  She may return to work/school when improved.    Sincerely,        Ton Horne MD  Electronically signed

## 2025-01-17 NOTE — GROUP NOTE
Psychotherapy Group Note    PATIENT'S NAME: Alisia Lin  MRN:   [id number]  :   1980  ACCT. NUMBER: [id number]  DATE OF SERVICE: 20  START TIME: 12:00 PM  END TIME: 12:50 PM  FACILITATOR: Chanel Singh [facility]  TOPIC:  EBP Group: Behavioral Activation  Adult Dual Diagnosis Day Treatment  TRACK: 3    NUMBER OF PARTICIPANTS: 5    Telemedicine Visit: The patient's condition can be safely assessed and treated via synchronous audio and visual telemedicine encounter.      Reason for Telemedicine Visit: Services only offered telehealth    Originating Site (Patient Location): Patient's home    Distant Site (Provider Location): Provider Remote Setting    Consent:  The patient/guardian has verbally consented to: the potential risks and benefits of telemedicine (video visit) versus in person care; bill my insurance or make self-payment for services provided; and responsibility for payment of non-covered services.     Mode of Communication:  Video Conference via [employer]ia    As the provider I attest to compliance with applicable laws and regulations related to telemedicine.      Summary of Group / Topics Discussed:  Behavioral Activation: Motivation and Procrastination: Patients explored how they currently spend their time, identifying thoughts and feelings that are motivating and serve to increase desired behaviors.  They also examined behaviors that contribute to procrastination.  Different types of procrastination behaviors were identified, and strategies to reduce individual procrastination and increase motivation were explored and practiced.  Patients identified ways to increase goal-directed activities to enhance mood and reduce symptoms.        Patient Session Goals / Objectives:    Identify current patterns of procrastination behavior and how they influence thoughts and moods, and inhibit motivation.    Identify behaviors that can be implemented that contribute to improving thoughts and feelings,  motivation, and reduce symptoms.    Identify and develop a plan to increase activities that promote a sense of accomplishment and competence.    Practice scheduling positive activities / behaviors into daily routines.      Patient Participation / Response:  Fully participated with the group by sharing personal reflections / insights and openly received / provided feedback with other participants.    Demonstrated understanding of topics discussed through group discussion and participation, Expressed understanding of the relationship between behaviors, thoughts, and feelings, Shared experiences and challenges with making behavioral changes and Identified barriers to change    Treatment Plan:  Patient has a current master individualized treatment plan.  See Epic treatment plan for more information.    Chanel Singh, [facility]          with patient

## 2025-02-25 ENCOUNTER — HOSPITAL ENCOUNTER (INPATIENT)
Facility: CLINIC | Age: 45
LOS: 1 days | Discharge: LEFT BEFORE DISCHARGE | End: 2025-02-27
Attending: EMERGENCY MEDICINE | Admitting: PSYCHIATRY & NEUROLOGY

## 2025-02-25 DIAGNOSIS — F10.939 ALCOHOL WITHDRAWAL SEIZURE WITH COMPLICATION (H): Primary | ICD-10-CM

## 2025-02-25 DIAGNOSIS — F10.220 ALCOHOL DEPENDENCE WITH UNCOMPLICATED INTOXICATION (H): ICD-10-CM

## 2025-02-25 DIAGNOSIS — R56.9 ALCOHOL WITHDRAWAL SEIZURE WITH COMPLICATION (H): Primary | ICD-10-CM

## 2025-02-25 DIAGNOSIS — F33.1 MODERATE RECURRENT MAJOR DEPRESSION (H): ICD-10-CM

## 2025-02-25 LAB
ALBUMIN SERPL BCG-MCNC: 4.4 G/DL (ref 3.5–5.2)
ALCOHOL BREATH TEST: 0.3 (ref 0–0.01)
ALP SERPL-CCNC: 89 U/L (ref 40–150)
ALT SERPL W P-5'-P-CCNC: 65 U/L (ref 0–50)
AMPHETAMINES UR QL SCN: ABNORMAL
ANION GAP SERPL CALCULATED.3IONS-SCNC: 15 MMOL/L (ref 7–15)
AST SERPL W P-5'-P-CCNC: 89 U/L (ref 0–45)
BARBITURATES UR QL SCN: ABNORMAL
BASOPHILS # BLD AUTO: 0.1 10E3/UL (ref 0–0.2)
BASOPHILS NFR BLD AUTO: 1 %
BENZODIAZ UR QL SCN: ABNORMAL
BILIRUB SERPL-MCNC: 0.3 MG/DL
BUN SERPL-MCNC: 9.1 MG/DL (ref 6–20)
BZE UR QL SCN: ABNORMAL
CALCIUM SERPL-MCNC: 8.6 MG/DL (ref 8.8–10.4)
CANNABINOIDS UR QL SCN: ABNORMAL
CHLORIDE SERPL-SCNC: 101 MMOL/L (ref 98–107)
CREAT SERPL-MCNC: 0.65 MG/DL (ref 0.51–0.95)
EGFRCR SERPLBLD CKD-EPI 2021: >90 ML/MIN/1.73M2
EOSINOPHIL # BLD AUTO: 0.1 10E3/UL (ref 0–0.7)
EOSINOPHIL NFR BLD AUTO: 1 %
ERYTHROCYTE [DISTWIDTH] IN BLOOD BY AUTOMATED COUNT: 14.1 % (ref 10–15)
FENTANYL UR QL: ABNORMAL
GLUCOSE SERPL-MCNC: 108 MG/DL (ref 70–99)
HCG UR QL: NEGATIVE
HCO3 SERPL-SCNC: 26 MMOL/L (ref 22–29)
HCT VFR BLD AUTO: 37.6 % (ref 35–47)
HGB BLD-MCNC: 13.5 G/DL (ref 11.7–15.7)
IMM GRANULOCYTES # BLD: 0.1 10E3/UL
IMM GRANULOCYTES NFR BLD: 1 %
LYMPHOCYTES # BLD AUTO: 3.3 10E3/UL (ref 0.8–5.3)
LYMPHOCYTES NFR BLD AUTO: 55 %
MAGNESIUM SERPL-MCNC: 1.3 MG/DL (ref 1.7–2.3)
MCH RBC QN AUTO: 31.6 PG (ref 26.5–33)
MCHC RBC AUTO-ENTMCNC: 35.9 G/DL (ref 31.5–36.5)
MCV RBC AUTO: 88 FL (ref 78–100)
MONOCYTES # BLD AUTO: 0.5 10E3/UL (ref 0–1.3)
MONOCYTES NFR BLD AUTO: 9 %
NEUTROPHILS # BLD AUTO: 2 10E3/UL (ref 1.6–8.3)
NEUTROPHILS NFR BLD AUTO: 33 %
NRBC # BLD AUTO: 0 10E3/UL
NRBC BLD AUTO-RTO: 0 /100
OPIATES UR QL SCN: ABNORMAL
PCP QUAL URINE (ROCHE): ABNORMAL
PLATELET # BLD AUTO: 210 10E3/UL (ref 150–450)
POTASSIUM SERPL-SCNC: 3 MMOL/L (ref 3.4–5.3)
PROT SERPL-MCNC: 7.1 G/DL (ref 6.4–8.3)
RBC # BLD AUTO: 4.27 10E6/UL (ref 3.8–5.2)
SODIUM SERPL-SCNC: 142 MMOL/L (ref 135–145)
WBC # BLD AUTO: 6 10E3/UL (ref 4–11)

## 2025-02-25 PROCEDURE — 83735 ASSAY OF MAGNESIUM: CPT | Performed by: PHYSICIAN ASSISTANT

## 2025-02-25 PROCEDURE — 99285 EMERGENCY DEPT VISIT HI MDM: CPT | Mod: 25 | Performed by: EMERGENCY MEDICINE

## 2025-02-25 PROCEDURE — 250N000011 HC RX IP 250 OP 636: Performed by: PHYSICIAN ASSISTANT

## 2025-02-25 PROCEDURE — 83036 HEMOGLOBIN GLYCOSYLATED A1C: CPT | Performed by: STUDENT IN AN ORGANIZED HEALTH CARE EDUCATION/TRAINING PROGRAM

## 2025-02-25 PROCEDURE — 82075 ASSAY OF BREATH ETHANOL: CPT | Performed by: EMERGENCY MEDICINE

## 2025-02-25 PROCEDURE — 82374 ASSAY BLOOD CARBON DIOXIDE: CPT | Performed by: PHYSICIAN ASSISTANT

## 2025-02-25 PROCEDURE — 96361 HYDRATE IV INFUSION ADD-ON: CPT | Performed by: EMERGENCY MEDICINE

## 2025-02-25 PROCEDURE — 250N000013 HC RX MED GY IP 250 OP 250 PS 637: Performed by: PHYSICIAN ASSISTANT

## 2025-02-25 PROCEDURE — 84443 ASSAY THYROID STIM HORMONE: CPT | Performed by: STUDENT IN AN ORGANIZED HEALTH CARE EDUCATION/TRAINING PROGRAM

## 2025-02-25 PROCEDURE — 36415 COLL VENOUS BLD VENIPUNCTURE: CPT | Performed by: PHYSICIAN ASSISTANT

## 2025-02-25 PROCEDURE — 84155 ASSAY OF PROTEIN SERUM: CPT | Performed by: PHYSICIAN ASSISTANT

## 2025-02-25 PROCEDURE — 96360 HYDRATION IV INFUSION INIT: CPT | Performed by: EMERGENCY MEDICINE

## 2025-02-25 PROCEDURE — 82977 ASSAY OF GGT: CPT | Performed by: STUDENT IN AN ORGANIZED HEALTH CARE EDUCATION/TRAINING PROGRAM

## 2025-02-25 PROCEDURE — 80307 DRUG TEST PRSMV CHEM ANLYZR: CPT | Performed by: EMERGENCY MEDICINE

## 2025-02-25 PROCEDURE — 81025 URINE PREGNANCY TEST: CPT | Performed by: PHYSICIAN ASSISTANT

## 2025-02-25 PROCEDURE — 84100 ASSAY OF PHOSPHORUS: CPT

## 2025-02-25 PROCEDURE — 85004 AUTOMATED DIFF WBC COUNT: CPT | Performed by: PHYSICIAN ASSISTANT

## 2025-02-25 PROCEDURE — 99285 EMERGENCY DEPT VISIT HI MDM: CPT | Mod: FS | Performed by: EMERGENCY MEDICINE

## 2025-02-25 RX ORDER — MULTIPLE VITAMINS W/ MINERALS TAB 9MG-400MCG
1 TAB ORAL DAILY
Status: DISCONTINUED | OUTPATIENT
Start: 2025-02-26 | End: 2025-02-26

## 2025-02-25 RX ORDER — MAGNESIUM SULFATE HEPTAHYDRATE 40 MG/ML
2 INJECTION, SOLUTION INTRAVENOUS ONCE
Status: COMPLETED | OUTPATIENT
Start: 2025-02-25 | End: 2025-02-25

## 2025-02-25 RX ORDER — FOLIC ACID 1 MG/1
1 TABLET ORAL DAILY
Status: DISCONTINUED | OUTPATIENT
Start: 2025-02-26 | End: 2025-02-26

## 2025-02-25 RX ORDER — DIAZEPAM 5 MG/1
5-20 TABLET ORAL EVERY 30 MIN PRN
Status: DISCONTINUED | OUTPATIENT
Start: 2025-02-25 | End: 2025-02-26

## 2025-02-25 RX ORDER — POTASSIUM CHLORIDE 1.5 G/1.58G
40 POWDER, FOR SOLUTION ORAL ONCE
Status: COMPLETED | OUTPATIENT
Start: 2025-02-25 | End: 2025-02-25

## 2025-02-25 RX ADMIN — POTASSIUM CHLORIDE 40 MEQ: 1.5 POWDER, FOR SOLUTION ORAL at 21:24

## 2025-02-25 RX ADMIN — MAGNESIUM SULFATE HEPTAHYDRATE 2 G: 40 INJECTION, SOLUTION INTRAVENOUS at 21:29

## 2025-02-25 RX ADMIN — NICOTINE POLACRILEX 2 MG: 2 GUM, CHEWING BUCCAL at 20:50

## 2025-02-25 RX ADMIN — DIAZEPAM 10 MG: 5 TABLET ORAL at 22:23

## 2025-02-25 ASSESSMENT — ACTIVITIES OF DAILY LIVING (ADL)
ADLS_ACUITY_SCORE: 48

## 2025-02-25 ASSESSMENT — COLUMBIA-SUICIDE SEVERITY RATING SCALE - C-SSRS
4. HAVE YOU HAD THESE THOUGHTS AND HAD SOME INTENTION OF ACTING ON THEM?: YES
1. IN THE PAST MONTH, HAVE YOU WISHED YOU WERE DEAD OR WISHED YOU COULD GO TO SLEEP AND NOT WAKE UP?: YES
6. HAVE YOU EVER DONE ANYTHING, STARTED TO DO ANYTHING, OR PREPARED TO DO ANYTHING TO END YOUR LIFE?: NO
5. HAVE YOU STARTED TO WORK OUT OR WORKED OUT THE DETAILS OF HOW TO KILL YOURSELF? DO YOU INTEND TO CARRY OUT THIS PLAN?: NO
2. HAVE YOU ACTUALLY HAD ANY THOUGHTS OF KILLING YOURSELF IN THE PAST MONTH?: YES
3. HAVE YOU BEEN THINKING ABOUT HOW YOU MIGHT KILL YOURSELF?: YES

## 2025-02-26 PROBLEM — F10.220 ALCOHOL DEPENDENCE WITH UNCOMPLICATED INTOXICATION (H): Status: ACTIVE | Noted: 2025-02-26

## 2025-02-26 LAB
ALBUMIN SERPL BCG-MCNC: 4 G/DL (ref 3.5–5.2)
ALP SERPL-CCNC: 75 U/L (ref 40–150)
ALT SERPL W P-5'-P-CCNC: 52 U/L (ref 0–50)
ANION GAP SERPL CALCULATED.3IONS-SCNC: 11 MMOL/L (ref 7–15)
AST SERPL W P-5'-P-CCNC: 66 U/L (ref 0–45)
ATRIAL RATE - MUSE: 104 BPM
BILIRUB SERPL-MCNC: 0.5 MG/DL
BUN SERPL-MCNC: 9.9 MG/DL (ref 6–20)
CALCIUM SERPL-MCNC: 8.5 MG/DL (ref 8.8–10.4)
CHLORIDE SERPL-SCNC: 99 MMOL/L (ref 98–107)
CREAT SERPL-MCNC: 0.74 MG/DL (ref 0.51–0.95)
DIASTOLIC BLOOD PRESSURE - MUSE: NORMAL MMHG
EGFRCR SERPLBLD CKD-EPI 2021: >90 ML/MIN/1.73M2
EST. AVERAGE GLUCOSE BLD GHB EST-MCNC: 123 MG/DL
GGT SERPL-CCNC: 497 U/L (ref 5–36)
GLUCOSE SERPL-MCNC: 122 MG/DL (ref 70–99)
HBA1C MFR BLD: 5.9 %
HCO3 SERPL-SCNC: 28 MMOL/L (ref 22–29)
INTERPRETATION ECG - MUSE: NORMAL
MAGNESIUM SERPL-MCNC: 1.2 MG/DL (ref 1.7–2.3)
P AXIS - MUSE: 53 DEGREES
PHOSPHATE SERPL-MCNC: 3.5 MG/DL (ref 2.5–4.5)
POTASSIUM SERPL-SCNC: 3.9 MMOL/L (ref 3.4–5.3)
PR INTERVAL - MUSE: 134 MS
PROT SERPL-MCNC: 6.4 G/DL (ref 6.4–8.3)
QRS DURATION - MUSE: 90 MS
QT - MUSE: 346 MS
QTC - MUSE: 454 MS
R AXIS - MUSE: 53 DEGREES
SODIUM SERPL-SCNC: 138 MMOL/L (ref 135–145)
SYSTOLIC BLOOD PRESSURE - MUSE: NORMAL MMHG
T AXIS - MUSE: 49 DEGREES
TSH SERPL DL<=0.005 MIU/L-ACNC: 2.18 UIU/ML (ref 0.3–4.2)
VENTRICULAR RATE- MUSE: 104 BPM

## 2025-02-26 PROCEDURE — 83735 ASSAY OF MAGNESIUM: CPT

## 2025-02-26 PROCEDURE — 250N000011 HC RX IP 250 OP 636: Performed by: STUDENT IN AN ORGANIZED HEALTH CARE EDUCATION/TRAINING PROGRAM

## 2025-02-26 PROCEDURE — 250N000013 HC RX MED GY IP 250 OP 250 PS 637: Performed by: CLINICAL NURSE SPECIALIST

## 2025-02-26 PROCEDURE — 80053 COMPREHEN METABOLIC PANEL: CPT

## 2025-02-26 PROCEDURE — 250N000013 HC RX MED GY IP 250 OP 250 PS 637: Performed by: STUDENT IN AN ORGANIZED HEALTH CARE EDUCATION/TRAINING PROGRAM

## 2025-02-26 PROCEDURE — 250N000013 HC RX MED GY IP 250 OP 250 PS 637

## 2025-02-26 PROCEDURE — 36415 COLL VENOUS BLD VENIPUNCTURE: CPT

## 2025-02-26 PROCEDURE — HZ2ZZZZ DETOXIFICATION SERVICES FOR SUBSTANCE ABUSE TREATMENT: ICD-10-PCS | Performed by: PSYCHIATRY & NEUROLOGY

## 2025-02-26 PROCEDURE — 250N000013 HC RX MED GY IP 250 OP 250 PS 637: Performed by: PHYSICIAN ASSISTANT

## 2025-02-26 PROCEDURE — 99222 1ST HOSP IP/OBS MODERATE 55: CPT | Mod: AI | Performed by: NURSE PRACTITIONER

## 2025-02-26 PROCEDURE — 99254 IP/OBS CNSLTJ NEW/EST MOD 60: CPT

## 2025-02-26 PROCEDURE — 250N000013 HC RX MED GY IP 250 OP 250 PS 637: Performed by: NURSE PRACTITIONER

## 2025-02-26 PROCEDURE — 128N000004 HC R&B CD ADULT

## 2025-02-26 RX ORDER — TOLTERODINE 2 MG/1
2 CAPSULE, EXTENDED RELEASE ORAL DAILY
Status: ON HOLD | COMMUNITY
End: 2025-02-27

## 2025-02-26 RX ORDER — FUROSEMIDE 20 MG/1
20 TABLET ORAL DAILY
Status: DISCONTINUED | OUTPATIENT
Start: 2025-02-26 | End: 2025-02-26

## 2025-02-26 RX ORDER — BUPROPION HYDROCHLORIDE 300 MG/1
300 TABLET ORAL EVERY MORNING
Status: ON HOLD | COMMUNITY
Start: 2024-08-11 | End: 2025-02-26

## 2025-02-26 RX ORDER — MAGNESIUM HYDROXIDE/ALUMINUM HYDROXICE/SIMETHICONE 120; 1200; 1200 MG/30ML; MG/30ML; MG/30ML
30 SUSPENSION ORAL EVERY 4 HOURS PRN
Status: DISCONTINUED | OUTPATIENT
Start: 2025-02-26 | End: 2025-02-27 | Stop reason: HOSPADM

## 2025-02-26 RX ORDER — FUROSEMIDE 20 MG/1
20 TABLET ORAL DAILY PRN
Status: DISCONTINUED | OUTPATIENT
Start: 2025-02-26 | End: 2025-02-27 | Stop reason: HOSPADM

## 2025-02-26 RX ORDER — QUETIAPINE FUMARATE 25 MG/1
25 TABLET, FILM COATED ORAL AT BEDTIME
Status: DISCONTINUED | OUTPATIENT
Start: 2025-02-26 | End: 2025-02-27 | Stop reason: HOSPADM

## 2025-02-26 RX ORDER — LOPERAMIDE HYDROCHLORIDE 2 MG/1
2 CAPSULE ORAL 4 TIMES DAILY PRN
Status: DISCONTINUED | OUTPATIENT
Start: 2025-02-26 | End: 2025-02-27 | Stop reason: HOSPADM

## 2025-02-26 RX ORDER — PAROXETINE 20 MG/1
20 TABLET, FILM COATED ORAL EVERY MORNING
Status: DISCONTINUED | OUTPATIENT
Start: 2025-02-26 | End: 2025-02-27 | Stop reason: HOSPADM

## 2025-02-26 RX ORDER — IBUPROFEN 200 MG
200 TABLET ORAL EVERY 6 HOURS PRN
Status: DISCONTINUED | OUTPATIENT
Start: 2025-02-26 | End: 2025-02-27 | Stop reason: HOSPADM

## 2025-02-26 RX ORDER — NORETHINDRONE ACETATE AND ETHINYL ESTRADIOL .02; 1 MG/1; MG/1
1 TABLET ORAL DAILY
Status: DISCONTINUED | OUTPATIENT
Start: 2025-02-26 | End: 2025-02-27 | Stop reason: HOSPADM

## 2025-02-26 RX ORDER — ONDANSETRON 4 MG/1
4 TABLET, ORALLY DISINTEGRATING ORAL EVERY 6 HOURS PRN
Status: DISCONTINUED | OUTPATIENT
Start: 2025-02-26 | End: 2025-02-27 | Stop reason: HOSPADM

## 2025-02-26 RX ORDER — DIAZEPAM 5 MG/1
5-20 TABLET ORAL EVERY 30 MIN PRN
Status: DISCONTINUED | OUTPATIENT
Start: 2025-02-26 | End: 2025-02-27 | Stop reason: HOSPADM

## 2025-02-26 RX ORDER — OLANZAPINE 10 MG/2ML
10 INJECTION, POWDER, FOR SOLUTION INTRAMUSCULAR 3 TIMES DAILY PRN
Status: DISCONTINUED | OUTPATIENT
Start: 2025-02-26 | End: 2025-02-27 | Stop reason: HOSPADM

## 2025-02-26 RX ORDER — MULTIPLE VITAMINS W/ MINERALS TAB 9MG-400MCG
1 TAB ORAL DAILY
Status: DISCONTINUED | OUTPATIENT
Start: 2025-02-26 | End: 2025-02-27 | Stop reason: HOSPADM

## 2025-02-26 RX ORDER — HYDROXYZINE HYDROCHLORIDE 25 MG/1
25 TABLET, FILM COATED ORAL EVERY 4 HOURS PRN
Status: DISCONTINUED | OUTPATIENT
Start: 2025-02-26 | End: 2025-02-27 | Stop reason: HOSPADM

## 2025-02-26 RX ORDER — TRAZODONE HYDROCHLORIDE 50 MG/1
50 TABLET ORAL
Status: DISCONTINUED | OUTPATIENT
Start: 2025-02-26 | End: 2025-02-27 | Stop reason: HOSPADM

## 2025-02-26 RX ORDER — DIAZEPAM 5 MG/1
2.5-5 TABLET ORAL EVERY 6 HOURS PRN
COMMUNITY

## 2025-02-26 RX ORDER — OLANZAPINE 10 MG/1
10 TABLET ORAL 3 TIMES DAILY PRN
Status: DISCONTINUED | OUTPATIENT
Start: 2025-02-26 | End: 2025-02-27 | Stop reason: HOSPADM

## 2025-02-26 RX ORDER — MAGNESIUM OXIDE 400 MG/1
800 TABLET ORAL 3 TIMES DAILY
Status: DISCONTINUED | OUTPATIENT
Start: 2025-02-26 | End: 2025-02-27 | Stop reason: HOSPADM

## 2025-02-26 RX ORDER — PROPRANOLOL HYDROCHLORIDE 10 MG/1
10 TABLET ORAL 3 TIMES DAILY
Status: DISCONTINUED | OUTPATIENT
Start: 2025-02-26 | End: 2025-02-27 | Stop reason: HOSPADM

## 2025-02-26 RX ORDER — CHLORTHALIDONE 25 MG/1
25 TABLET ORAL DAILY
Status: ON HOLD | COMMUNITY
End: 2025-02-26

## 2025-02-26 RX ORDER — ATENOLOL 50 MG/1
50 TABLET ORAL DAILY PRN
Status: DISCONTINUED | OUTPATIENT
Start: 2025-02-26 | End: 2025-02-27 | Stop reason: HOSPADM

## 2025-02-26 RX ORDER — AMOXICILLIN 250 MG
1 CAPSULE ORAL 2 TIMES DAILY PRN
Status: DISCONTINUED | OUTPATIENT
Start: 2025-02-26 | End: 2025-02-27 | Stop reason: HOSPADM

## 2025-02-26 RX ORDER — FOLIC ACID 1 MG/1
1 TABLET ORAL DAILY
Status: DISCONTINUED | OUTPATIENT
Start: 2025-02-26 | End: 2025-02-27 | Stop reason: HOSPADM

## 2025-02-26 RX ORDER — QUETIAPINE FUMARATE 25 MG/1
12.5-25 TABLET, FILM COATED ORAL 2 TIMES DAILY PRN
Status: ON HOLD | COMMUNITY
End: 2025-02-27

## 2025-02-26 RX ORDER — LOSARTAN POTASSIUM 25 MG/1
25 TABLET ORAL DAILY
Status: DISCONTINUED | OUTPATIENT
Start: 2025-02-26 | End: 2025-02-27 | Stop reason: HOSPADM

## 2025-02-26 RX ORDER — NICOTINE 21 MG/24HR
1 PATCH, TRANSDERMAL 24 HOURS TRANSDERMAL DAILY
Status: DISCONTINUED | OUTPATIENT
Start: 2025-02-26 | End: 2025-02-27 | Stop reason: HOSPADM

## 2025-02-26 RX ADMIN — THIAMINE HCL TAB 100 MG 100 MG: 100 TAB at 09:20

## 2025-02-26 RX ADMIN — TRAZODONE HYDROCHLORIDE 50 MG: 50 TABLET ORAL at 03:37

## 2025-02-26 RX ADMIN — MAGNESIUM OXIDE TAB 400 MG (241.3 MG ELEMENTAL MG) 800 MG: 400 (241.3 MG) TAB at 13:59

## 2025-02-26 RX ADMIN — DIAZEPAM 10 MG: 5 TABLET ORAL at 03:37

## 2025-02-26 RX ADMIN — DIAZEPAM 10 MG: 5 TABLET ORAL at 09:20

## 2025-02-26 RX ADMIN — NICOTINE 1 PATCH: 21 PATCH, EXTENDED RELEASE TRANSDERMAL at 14:48

## 2025-02-26 RX ADMIN — FUROSEMIDE 20 MG: 20 TABLET ORAL at 13:01

## 2025-02-26 RX ADMIN — Medication 1 TABLET: at 09:20

## 2025-02-26 RX ADMIN — DIAZEPAM 10 MG: 5 TABLET ORAL at 20:18

## 2025-02-26 RX ADMIN — HYDROXYZINE HYDROCHLORIDE 25 MG: 25 TABLET, FILM COATED ORAL at 14:35

## 2025-02-26 RX ADMIN — DIAZEPAM 10 MG: 5 TABLET ORAL at 05:32

## 2025-02-26 RX ADMIN — QUETIAPINE FUMARATE 25 MG: 25 TABLET ORAL at 20:18

## 2025-02-26 RX ADMIN — FOLIC ACID 1 MG: 1 TABLET ORAL at 09:20

## 2025-02-26 RX ADMIN — NICOTINE POLACRILEX 4 MG: 4 LOZENGE ORAL at 14:48

## 2025-02-26 RX ADMIN — HYDROXYZINE HYDROCHLORIDE 25 MG: 25 TABLET, FILM COATED ORAL at 16:26

## 2025-02-26 RX ADMIN — TRAZODONE HYDROCHLORIDE 50 MG: 50 TABLET ORAL at 20:20

## 2025-02-26 RX ADMIN — IBUPROFEN 200 MG: 200 TABLET, FILM COATED ORAL at 16:27

## 2025-02-26 RX ADMIN — DIAZEPAM 10 MG: 5 TABLET ORAL at 01:26

## 2025-02-26 RX ADMIN — LOSARTAN POTASSIUM 25 MG: 25 TABLET, FILM COATED ORAL at 09:20

## 2025-02-26 RX ADMIN — DIAZEPAM 10 MG: 5 TABLET ORAL at 11:51

## 2025-02-26 RX ADMIN — PROPRANOLOL HYDROCHLORIDE 10 MG: 10 TABLET ORAL at 13:01

## 2025-02-26 RX ADMIN — DIAZEPAM 5 MG: 5 TABLET ORAL at 16:27

## 2025-02-26 RX ADMIN — LOPERAMIDE HYDROCHLORIDE 2 MG: 2 CAPSULE ORAL at 16:26

## 2025-02-26 RX ADMIN — HYDROXYZINE HYDROCHLORIDE 25 MG: 25 TABLET, FILM COATED ORAL at 09:20

## 2025-02-26 RX ADMIN — ONDANSETRON 4 MG: 4 TABLET, ORALLY DISINTEGRATING ORAL at 03:41

## 2025-02-26 RX ADMIN — NORETHINDRONE ACETATE/ETHINYL ESTRADIOL 1 TABLET: KIT at 11:47

## 2025-02-26 RX ADMIN — PAROXETINE HYDROCHLORIDE 20 MG: 20 TABLET, FILM COATED ORAL at 13:01

## 2025-02-26 RX ADMIN — MAGNESIUM OXIDE TAB 400 MG (241.3 MG ELEMENTAL MG) 800 MG: 400 (241.3 MG) TAB at 20:18

## 2025-02-26 ASSESSMENT — ACTIVITIES OF DAILY LIVING (ADL)
ADLS_ACUITY_SCORE: 48
ADLS_ACUITY_SCORE: 25
ADLS_ACUITY_SCORE: 25
ADLS_ACUITY_SCORE: 48
ORAL_HYGIENE: INDEPENDENT
ORAL_HYGIENE: INDEPENDENT
ADLS_ACUITY_SCORE: 25
ADLS_ACUITY_SCORE: 25
DRESS: INDEPENDENT
ADLS_ACUITY_SCORE: 25
LAUNDRY: WITH SUPERVISION
ADLS_ACUITY_SCORE: 25
ADLS_ACUITY_SCORE: 25
ADLS_ACUITY_SCORE: 48
ADLS_ACUITY_SCORE: 25
HYGIENE/GROOMING: INDEPENDENT
LAUNDRY: WITH SUPERVISION
ADLS_ACUITY_SCORE: 25
ADLS_ACUITY_SCORE: 25
ADLS_ACUITY_SCORE: 48
ADLS_ACUITY_SCORE: 25
ADLS_ACUITY_SCORE: 25
ADLS_ACUITY_SCORE: 48
ADLS_ACUITY_SCORE: 25
ADLS_ACUITY_SCORE: 25
DRESS: INDEPENDENT
ADLS_ACUITY_SCORE: 25
ADLS_ACUITY_SCORE: 48
HYGIENE/GROOMING: INDEPENDENT
ADLS_ACUITY_SCORE: 48
ADLS_ACUITY_SCORE: 25

## 2025-02-26 NOTE — PSYCH
called this MD to request chemical detox admission for 44yoF who presented for alcohol detox, endorsed consumption of 1500ml of dayanara daily, last use just prior to arrival, hx of withdrawal seizures, no hx of DTs. Labs notable for hypomagnesemia=1.3 and hypokalemia=3.0 which have been repleted. CMP also showed mild elevation in AST and ALT, 89 and 65, respectively. Per , patient had been medically cleared. Orders have been signed and held.

## 2025-02-26 NOTE — PLAN OF CARE
02/26/25 0349   Patient Belongings   Patient Belongings remains on inpatient care area;other (see comments)   Patient Belongings Put in Hospital Secure Location (Security or Locker, etc.) cash/credit card;cell phone/electronics;clothing;keys;money (see comment);purse/wallet;shoes;wallet;other (see comments)   Belongings Search Yes   Clothing Search Yes     .A               Admission:  I am responsible for any personal items that are not sent to the safe or pharmacy.  [facility] is not responsible for loss, theft or damage of any property in my possession.    Signature:  _________________________________ Date: _______  Time: _____                                              Staff Signature:  ____________________________ Date: ________  Time: _____      2nd Staff person, if patient is unable/unwilling to sign:    Signature: ________________________________ Date: ________  Time: _____     Discharge:  [facility] has returned all of my personal belongings:    Signature: _________________________________ Date: ________  Time: _____                                          Staff Signature:  ____________________________ Date: ________  Time: _____        Pt's Belongings:  2 chargers, 1 adult diaper/pad, 2 pants, 1 vape, 1 phone, 1 hand gel cream, 1 car key, 4 chap sticks, 7 bank or credit cards, 1 Social Security Card, 1 license, 1 purse, 1 nail clipper, 1 slippers, 1 bra, 1 shirt, 1 hair tie, $9.92 in cash and change.    Goal Outcome Evaluation:
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
## 2025-02-26 NOTE — CONSULTS
[facility]  Consult Note - Hospitalist Service  Date of Admission:  2/25/2025  Consult Requested by: Psychiatry; Dr. Breanna Medina  Reason for Consult: Detox    Assessment & Plan   Alisia Lin is a 44 year old female with PMH notable for AUD c/b withdrawal seizure, HCV, HTN MDD, anxiety admitted on 2/25/2025 for alcohol withdrawal. Medicine was consulted for co-management.     #Acute alcohol withdrawal  #Alcohol use disorder  LAKE on admission 0.30. Last drink AM of 2/25. Reports drinking 1500ml dayanara daily. Endorses h/o withdrawal seizure in 2024 and also being admitted to the ICU previously but isn't sure it was for withdrawal complications. Does endorse visual and auditory hallucinations that have occurred previously with withdrawal and resolved. No command hallucinations. Utox screen positive for benzos.  Labs on admission otherwise notable for hypokalemia, hypomagnesemia, elevated transaminases.  - Management per Psychiatry  - Agree w/ MSSA w/ diazepam   - Agree w/ folate, MVI, thiamine  - PRNs for withdrawal symptom management per Psychiatry    #Alcoholic hepatitis  #Hepatic steatosis  AST 89, ALT 65, suspicious for alcoholic hepatitis. Recently WNL, though does have hx of mild elevation. Trending down on repeat CMP.   Pt expressed concern about having cirrhosis, states she was told recently at a different hospital that she does have cirrhosis. Per my review, pt had US abomen in March 2023 which demonstrated diffuse fatty infiltration of the liver but no mention of fibrosis. FIB-4 Index of 2.42, which suggests further investigation is warranted. However, this FIB-4 score is based on acutely elevated AST/ALT so may have lower score when at baseline. Regardless, no urgent inpatient workup warranted.  - Recommend PCP follow up to discuss GI referral (if not already following) and potential further cirrhosis workup  - No further workup or  monitoring needed at this  time    #HTN  - PTA losartan 25mg daily  - PTA furosemide 20mg daily prn  - PTA propranolol 40mg BID    #H/o elevated cortisol (2/2 [place] vs alcohol use)  Pt being evaluated by Endocrine. Pt w/ weight gain, fatigue, elevated cortisol levels. Had a 24- hour urinary free cortisol in November which was not elevated. Has endocrinology follow up in April 2025 for further assessment. Difficulty to discern if symptoms are driven by [place] vs related to alcohol use.   - No intervention or workup indicated at this time  - Follow up with Endocrinology OP as scheduled    #Hypomagnesemia  #Hypokalemia, resolved  S/p replacement. Phos WNL on admission. K normalized following replacement.   - Recheck Mg in AM  - PTA mag oxide 800mg TID  - Alcohol cessation    #Reactive HCV antibody  Reactive HCV test in March and October 2022. Follow up HCV RNA quant not detected, so no concern for active HCV infection at that time.   - Follow up with PCP for routine screening instruction    #Anxiety  #MDD  On PTA paxil 40mg, seroquel 12.5- 25mg at bedtime, diazepam 5mg prn for anxiety, hydroxyzine 50mg TID PRN and propranolol 40mg BID.   - Management per Psychiatry    #Overactive bladder: HOLD PTA tolterodine 2mg daily. Pt thinks she may have less problems with urinary leakage if she is sober as leakage usually occurs when she is drinking.   #Mild, intermittent asthma: Does not appear to be on PTA medications    Medicine will continue to follow along peripherally for hypomagnesemia.  Recommendations relayed to primary team via this progress note.  Thank you for the opportunity to be involved in this patient's care.      The patient's care was discussed with the Bedside Nurse and Patient.    Clinically Significant Risk Factors Present on Admission        # Hypokalemia: Lowest K = 3 mmol/L in last 2 days, will replace as needed    # Hypocalcemia: Lowest Ca = 8.6 mg/dL in last 2 days, will monitor and replace as appropriate   # Hypomagnesemia:  "Lowest Mg = 1.3 mg/dL in last 2 days, will replace as needed       # Hypertension: Home medication list includes antihypertensive(s)           # Severe Obesity: Estimated body mass index is 43.89 kg/m  as calculated from the following:    Height as of this encounter: 1.473 m (4' 10\").    Weight as of this encounter: 95.3 kg (210 lb).       # Asthma: noted on problem list        Romulo Ferguson PA-C  Hospitalist Service  Securely message with [employer] (more info)  Text page via [facility] Paging/Directory   ______________________________________________________________________    Chief Complaint   Alcohol detox    History is obtained from the patient and electronic health record    History of Present Illness   Alisia Lin is a 44 year old female with PMH notable for AUD c/b withdrawal seizure, HCV, HTN MDD, anxiety admitted on 2/25/2025 for alcohol withdrawal. Medicine was consulted for co-management.     Patient states she is not doing very well today.  She is having withdrawal symptoms that are making her feel unwell.  States she is having hot flashes, visual and auditory hallucinations, anxiety, shakiness, emotional lability.  States she has had visual and auditory hallucinations with withdrawal previously.  States she is just seeing mostly shadows and is unsure if her auditory hallucinations are real versus if she is living in and out of sleep and possibly dreaming.  Regardless, she is not having any command or ego-dystonic auditory hallucinations.  Denies chest pain, denies shortness of breath, denies abdominal pain.  States that she would like to get in touch with support in the outpatient setting and someone to talk to.  Discussed that social work would be a great resource for her and she is looking forward to speaking with them.  No other questions or concerns today.      Past Medical History    Past Medical History:   Diagnosis Date    Acute stress reaction 5/31/2014    Anxiety     Arthritis     Asthma     " Flovent BID    Back pain 1/26/2016    Chemical dependency (H) 06/05/2014    heroin, Norco    Chronic low back pain 3/19/2012    Influenza A 10/17/2016    with influenza pneumonia.  Hospitalized [facility]    Low TSH level 8/29/2017    Migraine with aura, without mention of intractable migraine without mention of status migrainosus     occas, Last one 11/2013. 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    Other, mixed, or unspecified nondependent drug abuse, in remission 1/1/07    Treatment for narcotic use     Papanicolaou smear of cervix with atypical squamous cells of undetermined significance (ASC-US) 3/2008    colp - WNL, HPV 53    Pyelonephritis 12/23/2015    Tobacco use disorder     1-1.5 ppd, zyban unsuccessful    Unspecified contraceptive management     ortho tricyclen       Past Surgical History   Past Surgical History:   Procedure Laterality Date    HC ENLARGE BREAST WITH IMPLANT  2002    BILATERAL    HC REMOVAL OF TONSILS,<13 Y/O      Description: Tonsillectomy;  Recorded: 10/03/2011;  Annotations: SHE THINKS IT WAS 5 OR 6 YEARS AGO    HC REMOVAL OF TONSILS,<13 Y/O      Description: Tonsillectomy;  Recorded: 10/03/2011;    HC REMOVE TONSILS/ADENOIDS,12+ Y/O  2006    HC TOOTH EXTRACTION W/FORCEP  14 yo    wisdom teeth    IUD PARAGARD  3/3/08    Removed with mirena placed. Mirena has now been removed as well.     LEEP TX, CERVICAL  age 15?    ORTHOPEDIC SURGERY      Lumbar fusion 2009    [place] ARTHRODESIS ANT INTERBODY MIN DISCECTOMY,LUMBAR      Description: Lumbar Vertebral Fusion;  Recorded: 10/03/2011;    [facility] ENLARGE BREAST      Description: Breast Surgery Enlargement Procedure;  Recorded: 10/03/2011;  Annotations: DATE 2009    [facility] ENLARGE BREAST      Description: Breast Surgery  Enlargement Procedure Bilateral;  Recorded: 10/03/2011;       Medications   I have reviewed this patient's current medications  Current Facility-Administered Medications   Medication Dose Route Frequency Provider Last Rate Last Admin    alum & mag hydroxide-simethicone (MAALOX) suspension 30 mL  30 mL Oral Q4H PRN Breanna Medina MD        atenolol (TENORMIN) tablet 50 mg  50 mg Oral Daily PRN Breanna Medina MD        diazepam (VALIUM) tablet 5-20 mg  5-20 mg Oral Q30 Min PRN Breanna Medina MD   10 mg at 02/26/25 1151    folic acid (FOLVITE) tablet 1 mg  1 mg Oral Daily Breanna Medina MD   1 mg at 02/26/25 0920    furosemide (LASIX) tablet 20 mg  20 mg Oral Daily Romulo Ferguson PA-C   20 mg at 02/26/25 1301    hydrOXYzine HCl (ATARAX) tablet 25 mg  25 mg Oral Q4H PRN Breanna Medina MD   25 mg at 02/26/25 0920    ibuprofen (ADVIL/MOTRIN) tablet 200 mg  200 mg Oral Q6H PRN Breanna Medina MD        loperamide (IMODIUM) capsule 2 mg  2 mg Oral 4x Daily PRN Breanna Medina MD        losartan (COZAAR) tablet 25 mg  25 mg Oral Daily Romulo Ferguson PA-C   25 mg at 02/26/25 0920    magnesium oxide (MAG-OX) tablet 800 mg  800 mg Oral TID Romulo Ferguson PA-C        multivitamin w/minerals (THERA-VIT-M) tablet 1 tablet  1 tablet Oral Daily Breanna Medina MD   1 tablet at 02/26/25 0920    norethindrone-ethinyl estradiol (MICROGESTIN 1/20) 1-20 MG-MCG per tablet 1 tablet  1 tablet Oral Daily Romulo Ferguson PA-C   1 tablet at 02/26/25 1147    OLANZapine (zyPREXA) tablet 10 mg  10 mg Oral TID PRN Breanna Medina MD        Or    OLANZapine (zyPREXA) injection 10 mg  10 mg Intramuscular TID PRN Breanna Medina MD        ondansetron (ZOFRAN ODT) ODT tab 4 mg  4 mg Oral Q6H PRN Breanna Medina MD   4 mg at 02/26/25 0341    PARoxetine (PAXIL) tablet 20 mg  20 mg Oral Frankie Kirby APRN CNP   20 mg at 02/26/25 1301    propranolol (INDERAL) tablet 10 mg  10 mg Oral TID Shania,  SONJA Renteria CNP   10 mg at 02/26/25 1301    QUEtiapine (SEROquel) tablet 25 mg  25 mg Oral At Bedtime ShaniaMariannethom SONJA Mejia CNP        senna-docusate (SENOKOT-S/PERICOLACE) 8.6-50 MG per tablet 1 tablet  1 tablet Oral BID PRN Breanna Medina MD        thiamine (B-1) tablet 100 mg  100 mg Oral Daily Breanna Medina MD   100 mg at 02/26/25 0920    traZODone (DESYREL) tablet 50 mg  50 mg Oral At Bedtime PRN Breanna Medina MD   50 mg at 02/26/25 0337          Review of Systems    The 5 point Review of Systems is negative other than noted in the HPI or here.     Allergies   Allergies   Allergen Reactions    Compazine      Heart Problems/ Body went completley stiff for 8 hrs.    Nortriptyline     Skelaxin [Metaxalone]      ------------------------------------------------------------------------     Physical Exam   Vital Signs: Temp: 98.4  F (36.9  C) Temp src: Tympanic BP: 129/83 Pulse: 93   Resp: 16 SpO2: 97 % O2 Device: None (Room air)    Weight: 210 lbs 0 oz    General Appearance: Patient is somewhat tired appearing, but overall well-appearing and in no acute distress.  Normal gait.  Respiratory: Breathing comfortably on room air, lungs CTAB.  Cardiovascular: Regular rate and rhythm, no murmur appreciated.   GI: Abdomen nondistended, no guarding.  Skin: Warm and dry.  Other: Patient is alert and oriented.  She is interactive and cooperative.  Slightly depressed mood, but overall appropriate affect.    Medical Decision Making       45 MINUTES SPENT BY ME on the date of service doing chart review, history, exam, documentation & further activities per the note.      Data   ------------------------- PAST 24 HR DATA REVIEWED -----------------------------------------------    I have personally reviewed the following data over the past 24 hrs:    6.0  \   13.5   / 210     138 99 9.9 /  122 (H)   3.9 28 0.74 \     ALT: 52 (H) AST: 66 (H) AP: 75 TBILI: 0.5   ALB: 4.0 TOT PROTEIN: 6.4 LIPASE: N/A      TSH: 2.18 T4: N/A A1C: 5.9 (H)       Imaging results reviewed over the past 24 hrs:   No results found for this or any previous visit (from the past 24 hours).

## 2025-02-26 NOTE — PHARMACY-ADMISSION MEDICATION HISTORY
Pharmacist Admission Medication History    Admission medication history is complete. The information provided in this note is only as accurate as the sources available at the time of the update.    Information Source(s): Patient and CareEverywhere/SureScripts via in-person    Pertinent Information: Alisia confirmed her home medications. She reported she may no longer need Detrol La when sober.    Changes made to PTA medication list:  Added: diazepam, Seroquel, Detrol LA  Deleted: Wellbutrin, chlorthalidone, ferrous sulfate, gabapentin, naltrexone  Changed:    Furosemide -> 20 mg po daily PRN  Hydroxyzine -> 50 mg po TID PRN  Paxil -> 40 mg po daily    Allergies reviewed with patient and updates made in EHR: yes    Medication History Completed By: Sabrina Nicholson [facility] 2/26/2025 2:04 PM    PTA Med List   Medication Sig Last Dose/Taking    acetaminophen (TYLENOL) 500 MG tablet Take 1,000 mg by mouth 3 times daily as needed for mild pain Taking As Needed    diazepam (VALIUM) 5 MG tablet Take 2.5-5 mg by mouth every 6 hours as needed for anxiety. Taking As Needed    furosemide (LASIX) 20 MG tablet Take 20 mg by mouth daily as needed (leg swelling). Taking As Needed    hydrOXYzine (VISTARIL) 50 MG capsule Take 50 mg by mouth 3 times daily as needed for anxiety. Taking As Needed    losartan (COZAAR) 25 MG tablet Take 1 tablet (25 mg) by mouth daily Taking    norethindrone-ethinyl estradiol (MICROGESTIN 1/20) 1-20 MG-MCG tablet Take 1 tablet by mouth daily Taking    PARoxetine (PAXIL) 20 MG tablet Take 40 mg by mouth every morning. Taking Differently    propranolol (INDERAL) 10 MG tablet Take 1 tablet (10 mg) by mouth 3 times daily Taking    QUEtiapine (SEROQUEL) 25 MG tablet Take 12.5-25 mg by mouth 2 times daily as needed (anxiety). Taking As Needed    tolterodine ER (DETROL LA) 2 MG 24 hr capsule Take 2 mg by mouth daily. Taking

## 2025-02-26 NOTE — PLAN OF CARE
Goal Outcome Evaluation:         S = Situation:  Alisia Lin is a 44-year-old female with a chief complaint of Alcohol Use Disorder. Voluntary admission to [facility] for Alcohol use disorder.  B: Based on the ED notes, RN-to-RN report, and the assessment interview, 44-year-old F with primary hx anxiety, depression, HCV, alcohol abuse c/b withdrawal seizure (2023) presents with alcohol intoxication requesting detox. She states she drinks 1500ml of dayanara daily, with her last drink just before arrival. She denies other drug use. She denies SI or HI. She denies acute complaints, reporting that she does not yet feel she is withdrawing.   B  = Background:  The presents for ETOH detox.   Currently reports drinking 1500 ml dayanara daily for months.    The Patient reports that the last use was on the way to the ED.  Pt LAKE: .30  Pt  denies hx of DT  The endorses hx of seizures. Last seizure:  2023  The Patient endorses the following symptoms of withdrawal:  none at this time. She has hx of MRSA, and Hep CPt presents for ETOH detox.   Currently reports drinking 1500 ml dayanara daily for months.    The Patient reports that the last use was on the way to the ED.  Pt LAKE: .30  Pt  denies hx of DT  The Patient endorses hx of seizures. Last seizure:  2023  The Patient endorsed the following symptoms of withdrawal:  Treminuos and diaphoresis.    A  =  Assessment:  During the admission interview, the patient effect was pleasant and cooperative in the safety search and admission process. She endorsed Anxiety 8/10 and depression 5/10. Denied SI/SIB/HI and contracted for safety. She reported drinking 1500ml of dayanara every day for the past few months. She denied any other substance used. The Patient denied a history of DTS. She endorsed a history of withdrawal seizures, the last seizure in 2023. MSSA of 13 during the assessment. RN gave valium 10mg  at this time. Vital signs on admission were:  Blood pressure 126/84, pulse 102, temperature  98.4 F (36.7  C), temperature source Oral, resp. rate 16, SpO2 96%. She endorsed hx MRSA and HepC;  She does not need Isolation at this time per ED Provider. She is on her menstrual period now.           R =   Request or Recommendation:  The team will continue to monitor for Alcohol withdrawal and treat using MSSA with valium as needed. She will meet with psychiatry, internal medicine, and case management for further evaluation and treatment.

## 2025-02-26 NOTE — PLAN OF CARE
"  Problem: Alcohol Withdrawal  Goal: Alcohol Withdrawal Symptom Control  Outcome: Progressing   Goal Outcome Evaluation:       Patient is A&O x 4, flat affect, anxious moods. Fair to good appetite, ate 75% of breakfast, encouraged fluids.Denies SI/HI/SIB, contracts for safety on unit. Pt noticed interacting with peers, did not attend the group was sleeping.  Pt was tremulous, sweaty/chills  MSSA =8 upon morning withdrawal assessment, Valium 10 mg given for withdrawal sx.  /83 (BP Location: Left arm, Patient Position: Supine, Cuff Size: Adult Large)   Pulse 93   Temp 98.4  F (36.9  C) (Tympanic)   Resp 16   Ht 1.473 m (4' 10\")   Wt 95.3 kg (210 lb)   LMP 12/10/2024 (Approximate)   SpO2 97%   BMI 43.89 kg/m      1200 Assessment:   Patient woke up for lunch, still in withdrawals, tremors , sweats anxiety 6/10 tachy.Pt reported vapes  a lot requesting for higher dose of nicotine as well as a lozenge. NP Deb Naegele  notified , ordered a nicotine patch applied at Rt arm and nicotine lozenge given.  MSSA= 9, Valium 10 mg given for withdrawal sx. Encouraged fluids. Pt ate 75% at lunch   BP (!) 152/96 (BP Location: Left arm)   Pulse 109   Temp 97.5  F (36.4  C) (Temporal)   Resp 16   Ht 1.473 m (4' 10\")   Wt 95.3 kg (210 lb)   LMP 12/10/2024 (Approximate)   SpO2 97%   BMI 43.89 kg/m             "

## 2025-02-26 NOTE — PLAN OF CARE
Problem: Alcohol Withdrawal  Goal: Alcohol Withdrawal Symptom Control  Outcome: Progressing   Goal Outcome Evaluation:    Plan of Care Reviewed With: patient      Presents with blunted affect, anxious beginning 5269-2021 shift. Is polite and cooperative. States she had a phone call regarding insurance and family conflicts that have been weighing on her mind. Active listening, validated stress, talked through steps. MSSA scores 9 and 9. Given 5mg, and 10mg Valium respectively.

## 2025-02-26 NOTE — DISCHARGE INSTRUCTIONS
Behavioral Discharge Planning and Instructions  THANK YOU FOR CHOOSING [facility]  3AW  [phone]    Summary: You were admitted to Station [facility] on 02/26/2025 for detoxification from alcohol.  A medical exam was performed that included lab work. You have met with a [facility] Counselor and opted to return home and attend support groups.  Please take care and make your recovery a daily priority, Alisia!  It was a pleasure working with you and the entire treatment team here wishes you the very best in your recovery!     Recommendation:  Please abstain from the use of all mood altering chemicals. Please complete your application for [place]. Please contact [facility] center if you decide you need chemical health services at [phone].      National [place] of Mental Health -  provides many resources for free services including individual therapy and support groups.     namimn.org    Main Diagnoses:    303.90 (F10.20) Alcohol Use Disorder Severe    Major Treatments, Procedures and Findings:  You were treated for alcohol detoxification using Valium. You have met with a [facility] counselor to develop a treatment plan for discharge. You had labs drawn and those results were reviewed with you. Please take a copy of your lab work with you to your next primary care provider appointment.    Symptoms to Report:  If you experience more anxiety, confusion, sleeplessness, deep sadness or thoughts of suicide, notify your treatment team or notify your primary care provider. IF ANY OF THE SYMPTOMS YOU ARE EXPERIENCING ARE A MEDICAL EMERGENCY CALL 911 IMMEDIATELY.     Lifestyle Adjustment: Adjust your lifestyle to get enough sleep, relaxation, exercise and good nutrition. Continue to develop healthy coping skills to decrease stress and promote a sober living environment. Do not use mood altering substances including alcohol, illegal drugs or addictive medications other than what is currently prescribed.      Disposition: Return home    Facts about COVID19 at www.cdc.gov/COVID19 and www.[place].gov/covid19    Keeping hands clean is one of the most important steps we can take to avoid getting sick and spreading germs to others.  Please wash your hands frequently and lather with soap for at least 20 seconds!    Follow-up Appointment:   Follow up with outpatient Primary Provider for any needed medication refills. Also recommend post hospitalization follow up within a week of discharge.     Recovery apps for your phone for educational purposes and to locate in person and zoom recovery meetings  Everything AA -  tommy is a great resource  12 Step Toolkit - educational purpose learning about the 12 steps to recovery  Amory Cloud - meeting tommy  AA  - meeting tommy  Meeting guide - meeting tommy  Quick NA meeting - meeting tommy  Sejal- has various apps    Patient Navigation Hub  [facility] Navigators work to be your point-of-contact for trustworthy and compassionate care from Inpatient services to [facility] Programmatic Care. We will provide resources and communication to help guide you into programmatic care. Ultimately, our goal is to be the one-stop-shop of communication, coordination, and support for your journey to programmatic care.  Phone: [phone]    Resources:  AA/NA meetings have returned to in-person or a hybrid combination of zoom/in-person therefore please check online to verify*  Need Support Now? If you or someone you know is struggling or in crisis, help is available. Call or text 988 or chat 985Drip Inline.org  AA meetings search for them at: https://aa-intergroup.org (worldwide meeting listings)  AA meetings for [place] area can be found online at: https://[place].org (click local online meetings listings)  NA meetings for [place] area can be found online at: https://www.nam[place].org  (click find a meeting)  AA and NA Sponsors are excellent resources for support and you can find one at any  "support group meeting.   Alcoholics Anonymous (https://aa.org/): for information 24 hours/day  AA Intergroup service office in [place] (http://www.aastpaul.org/) [phone]  AA Intergroup service office in [facility]: [phone]. (http://www.a[place].org/)  Narcotics Anonymous (www.nam[place].org) (491) 563-8424  https://aafairviewriverside.org/meetings  SMART Recovery - self management for addiction recovery:  www.smartrecovery.org  Pathways ~ A Health Crisis Resource & Support Center:  497.468.7328.  https://prescribetoprevent.org/patient-education/videos/  http://www.[employer] Pharmaceuticalsreduction.org  Crisis Text Line  Text 892931  You will be connected with a trained live crisis counselor to provide support. Por espanol, texto  AV a 912969 o texto a 442-AYUDAME en WhatsApp  [facility] Line  1.941.SUICIDE [6360292]  [facility] [phone]  Support Group:  AA/NA and Sponsor/support.  Fast Tracker  Linking people to mental health and substance use disorder resources  [employer].org   [place] [facility] Warm Line  Peer to peer support  Monday thru Saturday, 12 pm to 10 pm  006.237.0105 or 2.028.705.9082  Text \"Support\" to 50582  National [place] on Mental Illness (SAGAR)  672.056.5744 or 1.888.SAGAR.HELPS  Alcoholics Anonymous (www.alcoholics-anonymous.org): Check your phone book for your local chapter.  Suicide Awareness Voices of Education (SAVE) (www.save.org): 575-928-ZEHU (3434)  National Suicide Prevention Line (www.mentalhealthmn.org): 173-698-SVDN (7043)  Mental Health Consumer/Survivor Network of [place] (www.mhcsn.net): 187.924.1736 or 475-829-4578  Mental Health Association of [place] (www.mentalhealth.org): 364.978.9642 or 283-670-5401   Substance Abuse and Mental Health Services (www.samhsa.gov)  [place] Opioid Prevention Coalition: www.opioidcoalition.org    [place] Recovery Connection (MRC)  MRC connects people seeking recovery to resources that help foster and " sustain long-term recovery.  Whether you are seeking resources for treatment, transportation, housing, job training, education, health care or other pathways to recovery, [facility] is a great place to start.  330.344.2495.  www.[facility].org    Great Pod casts for nutrition and wellness  Listen on Apple Podcasts  Dishing Up Nutrition   [employer]   Nutrition       Understand the connection between what you eat and how you feel. Hosted by licensed nutritionists and dietitians from [employer] we share practical, real-life solutions for healthier living through nutrition.     General Medication Instructions:   See your medication sheet(s) for instructions.   Take all medications as prescribed.  Make no changes unless your primary care provider suggests them.   Go to all your primary care provider visits.  Be sure to have all your required lab tests. This way, your medicines can be refilled on time.  Do not use any forms of alcohol.    Please Note: If you have any questions at anytime after you discharged please call [facility] detox unit 3A at [phone].    Here are a list of additional numbers you can call if you are wanting to resume services through [facility]:  [facility] Assessment Intake: [phone]  [facility] Adult VIOLET Intensive Outpatient  call: [phone]  [facility] Admissions [phone]    Recovery Clinic call: [phone]  [facility]: [phone]  Medical Records call: [phone]  Billing Department call: [phone]    Please remember to take all of your behavioral discharge planning and lab paperwork to any follow up appointments, it contains your lab results, diagnosis, medication list and discharge recommendations.      THANK YOU FOR CHOOSING [facility]

## 2025-02-26 NOTE — PROGRESS NOTES
"[address]     Case Management Encounter: Met with team, discussed patient progress, discharge plan and any impediments to discharge.    Writer met with pt to discuss discharge and aftercare planning. Pt shared she has attended treatment in the past for \"pills.\" She shared she would like to be set up with a therapist to start working on why she is trying to \"numb things out with alcohol.\" Per pt's chart her insurance is inactive. Pt will speak with financial counseling and hopefull be able to reinstate her health coverage prior to discharge.      Insurance: No active insurance in [facility]. Pt reports having [employer]    Legal Status:  Voluntary   County: NA  File Number: NA  Start and expiration date of commitment: NA    SUDs Assessment Status: Not needed     ROIs on file: None     Living Situation: Lives alone in [facility].     Current Providers, Supports & Collateral:  PCP    Current Plan/Referral Status: Individual therapy     Safety Plan Status: In process      RICO Martinez  [address] - Adult Inpatient Addiction Psychiatry Unit           "

## 2025-02-26 NOTE — ED PROVIDER NOTES
--    ED Attending Physician Attestation    I RAMANA ARAGON MD, MD, cared for this patient with the Advanced Practice Provider (SAM). I personally provided a substantive portion of the care for this patient, including approving the care plan for the number and complexity of problems addressed and taking responsibility related to the risk of complications and/or morbidity or mortality of patient management. Please see the SAM's documentation for full details.    Summary of HPI, PE, ED Course   Patient is a 44 year old female evaluated in the emergency department for alcohol detox. Exam and ED course notable for clinically intoxicated.  Mild tachycardia otherwise normal vital signs.  Awake, alert, and conversant.  Ambulatory with a normal gait pattern.  No mental health concerns.  Labs with potassium of 3, magnesium 1.2.  Received IV magnesium sulfate as well as oral potassium chloride.  She appears medically stable for detox admission after the completion of care in the emergency department, the patient was admitted to inpatient.      RAMANA ARAGON MD, MD  Emergency Medicine       Ramana Aragon MD  02/26/25 0016

## 2025-02-26 NOTE — ED NOTES
Patient is upset that vape was taken upon arrival. Patient was smoking it in the ambulance. Writer told patient that she will get it at discharge and patient states she will leave right now then

## 2025-02-26 NOTE — PLAN OF CARE
Behavioral Team Discussion: (2/26/2025)    Continued Stay Criteria/Rationale: Patient admitted for Chemical Use Issues.  Plan: The following services will be provided to the patient; psychiatric assessment, medication management, therapeutic milieu, individual and group support, and skills groups.   Participants: 3A Provider: Dr. Martínez Weinstein MD; 3A RN: Jia Sagastume RN; 3A CM's:  RICO Martinez .  Summary/Recommendation: Providers will assess today for treatment recommendations, discharge planning, and aftercare plans. CM will meet with pt for discharge planning.   Medical/Physical: No new medical concerns reported.  Precautions:   Behavioral Orders   Procedures    Code 1 - Restrict to Unit    Routine Programming     As clinically indicated    Seizure precautions    Status 15     Every 15 minutes.    Withdrawal precautions     Rationale for change in precautions or plan: N/A  Progress: Initial.    ASAM Dimension Scale Ratings:  Dimension 1: 3 Client tolerates and aleena with withdrawal discomfort poorly. Client has severe intoxication, such that the client endangers self or others, or intoxication has not abated with less intensive levels of services. Client displays severe signs and symptoms; or risk of severe, but manageable withdrawal; or withdrawal worsening despite detox at less intensive level.  Dimension 2: 1 Client tolerates and aleena with physical discomfort and is able to get the services that the client needs.  Dimension 3: 2 Client has difficulty with impulse control and lacks coping skills. Client has thoughts of suicide or harm to others without means; however, the thoughts may interfere with participation in some treatment activities. Client has difficulty functioning in significant life areas. Client has moderate symptoms of emotional, behavioral, or cognitive problems. Client is able to participate in most treatment activities.  Dimension 4: 2 Client displays verbal compliance, but lacks  consistent behaviors; has low motivation for change; and is passively involved in treatment.  Dimension 5: 4 No awareness of the negative impact of mental health problems or substance abuse. No coping skills to arrest mental health or addiction illnesses, or prevent relapse.  Dimension 6: 3 Client is not engaged in structured, meaningful activity and the client's peers, family, significant other, and living environment are unsupportive, or there is significant criminal justice system involvement.

## 2025-02-26 NOTE — ED TRIAGE NOTES
Patient was at home drinking, called 911 because she thought she was going through withdrawals, had a beer while ems was assessing her at home. Patient want wanted to come to the ed because she thinks she is going through withdrawals. Patient last drink was pta. Patient is agitated per ems.

## 2025-02-26 NOTE — ED PROVIDER NOTES
ED Provider Note  [facility]      History     Chief Complaint   Patient presents with    Alcohol Intoxication     HPI  43yo F pmhx anxiety, depression, HCV, alcohol abuse c/b withdrawal seizure (2023) presents with alcohol intoxication requesting detox.  She states that she drinks 1500ml of dayanara daily, with last drink just prior to arrival.  She denies other drug use.  No SI or HI.  Denies acute complaints at present, reporting that she is does not yet feeling she is withdrawing.    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  Hospitalized [facility]    Low TSH level 8/29/2017    Migraine with aura, without mention of intractable migraine without mention of status migrainosus     occas, Last one 11/2013. 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    Other, mixed, or unspecified nondependent drug abuse, in remission 1/1/07    Treatment for narcotic use     Papanicolaou smear of cervix with atypical squamous cells of undetermined significance (ASC-US) 3/2008    colp - WNL, HPV 53    Pyelonephritis 12/23/2015    Tobacco use disorder     1-1.5 ppd, zyban unsuccessful    Unspecified contraceptive management     ortho tricyclen     Past Surgical History:   Procedure Laterality Date    HC ENLARGE BREAST WITH IMPLANT  2002    BILATERAL    HC REMOVAL OF TONSILS,<13 Y/O      Description: Tonsillectomy;  Recorded: 10/03/2011;  Annotations: SHE THINKS IT WAS 5 OR 6 YEARS AGO    HC REMOVAL OF TONSILS,<13 Y/O       Description: Tonsillectomy;  Recorded: 10/03/2011;    HC REMOVE TONSILS/ADENOIDS,12+ Y/O  2006    HC TOOTH EXTRACTION W/FORCEP  14 yo    wisdom teeth    IUD PARAGARD  3/3/08    Removed with mirena placed. Mirena has now been removed as well.     LEEP TX, CERVICAL  age 15?    ORTHOPEDIC SURGERY      Lumbar fusion 2009    CO ARTHRODESIS ANT INTERBODY MIN DISCECTOMY,LUMBAR      Description: Lumbar Vertebral Fusion;  Recorded: 10/03/2011;    [facility] ENLARGE BREAST      Description: Breast Surgery Enlargement Procedure;  Recorded: 10/03/2011;  Annotations: DATE 2009    [facility] ENLARGE BREAST      Description: Breast Surgery Enlargement Procedure Bilateral;  Recorded: 10/03/2011;     acetaminophen (TYLENOL) 500 MG tablet  ferrous sulfate (FEROSUL) 325 (65 Fe) MG tablet  furosemide (LASIX) 20 MG tablet  gabapentin (NEURONTIN) 600 MG tablet  hydrOXYzine (VISTARIL) 50 MG capsule  losartan (COZAAR) 25 MG tablet  naltrexone (DEPADE/REVIA) 50 MG tablet  norethindrone-ethinyl estradiol (MICROGESTIN 1/20) 1-20 MG-MCG tablet  PARoxetine (PAXIL) 20 MG tablet  propranolol (INDERAL) 10 MG tablet      Allergies   Allergen Reactions    Compazine      Heart Problems/ Body went completley stiff for 8 hrs.    Nortriptyline     Skelaxin [Metaxalone]      Family History  Family History   Problem Relation Age of Onset    Hypertension Mother     Alcohol/Drug Mother         alcohol, sober for 19 years    Depression Mother         on medication    Lipids Mother         on medication    Breast Cancer Mother     Alcohol/Drug Father         alcohol, still actively drinking    Lipids Father         on medication    Anxiety Disorder Father     Substance Abuse Father     C.A.D. Paternal Grandmother     Diabetes Paternal Grandmother     Breast Cancer Paternal Grandmother     Arthritis Paternal Grandmother     Cancer Paternal Grandmother         breast cancer    Cancer Paternal Grandfather         colon cancer    Asthma Daughter         x2    Thyroid Disease  "No family hx of      Social History   Social History     Tobacco Use    Smoking status: Former     Types: Cigarettes    Smokeless tobacco: Current    Tobacco comments:     less than 1/2 ppd   Vaping Use    Vaping status: Every Day    Substances: Nicotine   Substance Use Topics    Alcohol use: Yes     Comment: only drinking wine since 10/8/22    Drug use: No     Comment: clean for 18 days      A medically appropriate review of systems was performed with pertinent positives and negatives noted in the HPI, and all other systems negative.    Physical Exam   BP: (!) 134/92  Pulse: 106  Temp: 98  F (36.7  C)  Resp: 16  Height: 147.3 cm (4' 10\")  Weight: 95.3 kg (210 lb)  SpO2: 97 %  Physical Exam  Constitutional:       General: She is not in acute distress.     Appearance: Normal appearance. She is not diaphoretic.   HENT:      Head: Atraumatic.      Mouth/Throat:      Mouth: Mucous membranes are moist.   Eyes:      Conjunctiva/sclera: Conjunctivae normal.   Cardiovascular:      Rate and Rhythm: Normal rate.   Pulmonary:      Effort: No respiratory distress.   Abdominal:      General: Abdomen is flat.   Musculoskeletal:      Cervical back: Neck supple.   Skin:     General: Skin is warm.   Neurological:      Mental Status: She is alert.      Comments: No hand tremors or tongue fasciculations           ED Course, Procedures, & Data      Procedures                Results for orders placed or performed during the hospital encounter of 02/25/25   HCG qualitative urine     Status: Normal   Result Value Ref Range    hCG Urine Qualitative Negative Negative   Comprehensive metabolic panel     Status: Abnormal   Result Value Ref Range    Sodium 142 135 - 145 mmol/L    Potassium 3.0 (L) 3.4 - 5.3 mmol/L    Carbon Dioxide (CO2) 26 22 - 29 mmol/L    Anion Gap 15 7 - 15 mmol/L    Urea Nitrogen 9.1 6.0 - 20.0 mg/dL    Creatinine 0.65 0.51 - 0.95 mg/dL    GFR Estimate >90 >60 mL/min/1.73m2    Calcium 8.6 (L) 8.8 - 10.4 mg/dL    Chloride " 101 98 - 107 mmol/L    Glucose 108 (H) 70 - 99 mg/dL    Alkaline Phosphatase 89 40 - 150 U/L    AST 89 (H) 0 - 45 U/L    ALT 65 (H) 0 - 50 U/L    Protein Total 7.1 6.4 - 8.3 g/dL    Albumin 4.4 3.5 - 5.2 g/dL    Bilirubin Total 0.3 <=1.2 mg/dL   Magnesium     Status: Abnormal   Result Value Ref Range    Magnesium 1.3 (L) 1.7 - 2.3 mg/dL   CBC with platelets and differential     Status: None   Result Value Ref Range    WBC Count 6.0 4.0 - 11.0 10e3/uL    RBC Count 4.27 3.80 - 5.20 10e6/uL    Hemoglobin 13.5 11.7 - 15.7 g/dL    Hematocrit 37.6 35.0 - 47.0 %    MCV 88 78 - 100 fL    MCH 31.6 26.5 - 33.0 pg    MCHC 35.9 31.5 - 36.5 g/dL    RDW 14.1 10.0 - 15.0 %    Platelet Count 210 150 - 450 10e3/uL    % Neutrophils 33 %    % Lymphocytes 55 %    % Monocytes 9 %    % Eosinophils 1 %    % Basophils 1 %    % Immature Granulocytes 1 %    NRBCs per 100 WBC 0 <1 /100    Absolute Neutrophils 2.0 1.6 - 8.3 10e3/uL    Absolute Lymphocytes 3.3 0.8 - 5.3 10e3/uL    Absolute Monocytes 0.5 0.0 - 1.3 10e3/uL    Absolute Eosinophils 0.1 0.0 - 0.7 10e3/uL    Absolute Basophils 0.1 0.0 - 0.2 10e3/uL    Absolute Immature Granulocytes 0.1 <=0.4 10e3/uL    Absolute NRBCs 0.0 10e3/uL   Alcohol breath test POCT     Status: Abnormal   Result Value Ref Range    Alcohol Breath Test 0.30 (A) 0.00 - 0.01   Urine Drug Screen     Status: None (In process)    Narrative    The following orders were created for panel order Urine Drug Screen.  Procedure                               Abnormality         Status                     ---------                               -----------         ------                     Urine Drug Screen Panel[[id number]]                          In process                   Please view results for these tests on the individual orders.   CBC with platelets differential     Status: None    Narrative    The following orders were created for panel order CBC with platelets differential.  Procedure                                Abnormality         Status                     ---------                               -----------         ------                     CBC with platelets and d...[[id number]]                      Final result                 Please view results for these tests on the individual orders.     Medications   magnesium sulfate 2 g in 50 mL sterile water intermittent infusion (has no administration in time range)   potassium chloride (KLOR-CON) Packet 40 mEq (has no administration in time range)   diazepam (VALIUM) tablet 5-20 mg (has no administration in time range)   thiamine (B-1) tablet 100 mg (has no administration in time range)   folic acid (FOLVITE) tablet 1 mg (has no administration in time range)   multivitamin w/minerals (THERA-VIT-M) tablet 1 tablet (has no administration in time range)   nicotine (NICORETTE) gum 2 mg (2 mg Buccal $Given 2/25/25 2050)     Labs Ordered and Resulted from Time of ED Arrival to Time of ED Departure   COMPREHENSIVE METABOLIC PANEL - Abnormal       Result Value    Sodium 142      Potassium 3.0 (*)     Carbon Dioxide (CO2) 26      Anion Gap 15      Urea Nitrogen 9.1      Creatinine 0.65      GFR Estimate >90      Calcium 8.6 (*)     Chloride 101      Glucose 108 (*)     Alkaline Phosphatase 89      AST 89 (*)     ALT 65 (*)     Protein Total 7.1      Albumin 4.4      Bilirubin Total 0.3     MAGNESIUM - Abnormal    Magnesium 1.3 (*)    ALCOHOL BREATH TEST POCT - Abnormal    Alcohol Breath Test 0.30 (*)    HCG QUALITATIVE URINE - Normal    hCG Urine Qualitative Negative     CBC WITH PLATELETS AND DIFFERENTIAL    WBC Count 6.0      RBC Count 4.27      Hemoglobin 13.5      Hematocrit 37.6      MCV 88      MCH 31.6      MCHC 35.9      RDW 14.1      Platelet Count 210      % Neutrophils 33      % Lymphocytes 55      % Monocytes 9      % Eosinophils 1      % Basophils 1      % Immature Granulocytes 1      NRBCs per 100 WBC 0      Absolute Neutrophils 2.0      Absolute Lymphocytes 3.3       Absolute Monocytes 0.5      Absolute Eosinophils 0.1      Absolute Basophils 0.1      Absolute Immature Granulocytes 0.1      Absolute NRBCs 0.0     URINE DRUG SCREEN PANEL     No orders to display          Critical care was not performed.     Medical Decision Making  The patient's presentation was of high complexity (a chronic illness severe exacerbation, progression, or side effect of treatment).    The patient's evaluation involved:  review of external note(s) from 3+ sources (see separate area of note for details)  ordering and/or review of 3+ test(s) in this encounter (see separate area of note for details)  discussion of management or test interpretation with another health professional (see separate area of note for details)    The patient's management necessitated high risk (a decision regarding hospitalization).    Assessment & Plan    45yo F pmhx anxiety, depression, HCV, alcohol abuse c/b withdrawal seizure presents with alcohol intoxication requesting detox.  Drinks 1500ml of dayanara daily, with last drink just prior to arrival.  Denies other drug use.  No SI or HI.  Denies acute complaints at present, reporting that she is does not yet feeling she is withdrawing.  In ED patient is HDS/AF.  She appears intoxicated, no active signs of withdrawal.  Breathalyzer 0.30.  Detox screening labs revealing for hypomagnesemia (1.3) and hypokalemia (3) which have been repleted.  CMP also shows mild elevations in AST and ALT, 89 and 65, respectively.  No other electrolyte abnormalities.  CBC unremarkable.  Pregnancy negative.  Patiently placed on [facility] protocol for medical management of her withdrawal once her symptoms began.  Thiamine, folate, multivitamin administered.  She will be admitted to detox.    I have reviewed the nursing notes. I have reviewed the findings, diagnosis, plan and need for follow up with the patient.    New Prescriptions    No medications on file       Final diagnoses:   Alcohol dependence with  uncomplicated intoxication (H)         Ramírez Cotter PA-C  [facility] EMERGENCY DEPARTMENT  2/25/2025     Ramírez Cotter PA-C  02/25/25 2122

## 2025-02-27 VITALS
HEART RATE: 96 BPM | WEIGHT: 210 LBS | HEIGHT: 58 IN | SYSTOLIC BLOOD PRESSURE: 106 MMHG | TEMPERATURE: 98.6 F | RESPIRATION RATE: 16 BRPM | BODY MASS INDEX: 44.08 KG/M2 | DIASTOLIC BLOOD PRESSURE: 75 MMHG | OXYGEN SATURATION: 98 %

## 2025-02-27 LAB
HOLD SPECIMEN: NORMAL
MAGNESIUM SERPL-MCNC: 1.4 MG/DL (ref 1.7–2.3)

## 2025-02-27 PROCEDURE — 250N000013 HC RX MED GY IP 250 OP 250 PS 637

## 2025-02-27 PROCEDURE — 36415 COLL VENOUS BLD VENIPUNCTURE: CPT

## 2025-02-27 PROCEDURE — 99238 HOSP IP/OBS DSCHRG MGMT 30/<: CPT | Performed by: NURSE PRACTITIONER

## 2025-02-27 PROCEDURE — 250N000013 HC RX MED GY IP 250 OP 250 PS 637: Performed by: STUDENT IN AN ORGANIZED HEALTH CARE EDUCATION/TRAINING PROGRAM

## 2025-02-27 PROCEDURE — 250N000013 HC RX MED GY IP 250 OP 250 PS 637: Performed by: NURSE PRACTITIONER

## 2025-02-27 PROCEDURE — 83735 ASSAY OF MAGNESIUM: CPT

## 2025-02-27 PROCEDURE — 250N000013 HC RX MED GY IP 250 OP 250 PS 637: Performed by: CLINICAL NURSE SPECIALIST

## 2025-02-27 RX ORDER — PAROXETINE 20 MG/1
40 TABLET, FILM COATED ORAL EVERY MORNING
Status: SHIPPED
Start: 2025-02-27

## 2025-02-27 RX ORDER — MAGNESIUM OXIDE 400 MG/1
800 TABLET ORAL 3 TIMES DAILY
Status: SHIPPED
Start: 2025-02-27

## 2025-02-27 RX ORDER — TRAZODONE HYDROCHLORIDE 50 MG/1
50 TABLET ORAL
Qty: 30 TABLET | Refills: 0 | Status: SHIPPED | OUTPATIENT
Start: 2025-02-27

## 2025-02-27 RX ORDER — QUETIAPINE FUMARATE 25 MG/1
25 TABLET, FILM COATED ORAL AT BEDTIME
Status: SHIPPED
Start: 2025-02-27

## 2025-02-27 RX ADMIN — FOLIC ACID 1 MG: 1 TABLET ORAL at 09:02

## 2025-02-27 RX ADMIN — PROPRANOLOL HYDROCHLORIDE 10 MG: 10 TABLET ORAL at 09:02

## 2025-02-27 RX ADMIN — NICOTINE 1 PATCH: 21 PATCH, EXTENDED RELEASE TRANSDERMAL at 09:02

## 2025-02-27 RX ADMIN — NORETHINDRONE ACETATE/ETHINYL ESTRADIOL 1 TABLET: KIT at 09:07

## 2025-02-27 RX ADMIN — LOPERAMIDE HYDROCHLORIDE 2 MG: 2 CAPSULE ORAL at 09:02

## 2025-02-27 RX ADMIN — THIAMINE HCL TAB 100 MG 100 MG: 100 TAB at 09:02

## 2025-02-27 RX ADMIN — MAGNESIUM OXIDE TAB 400 MG (241.3 MG ELEMENTAL MG) 800 MG: 400 (241.3 MG) TAB at 09:02

## 2025-02-27 RX ADMIN — LOSARTAN POTASSIUM 25 MG: 25 TABLET, FILM COATED ORAL at 09:02

## 2025-02-27 RX ADMIN — Medication 1 TABLET: at 09:02

## 2025-02-27 RX ADMIN — PAROXETINE HYDROCHLORIDE 20 MG: 20 TABLET, FILM COATED ORAL at 09:02

## 2025-02-27 ASSESSMENT — ACTIVITIES OF DAILY LIVING (ADL)
ADLS_ACUITY_SCORE: 25
DRESS: INDEPENDENT
ORAL_HYGIENE: INDEPENDENT
ADLS_ACUITY_SCORE: 25
HYGIENE/GROOMING: INDEPENDENT
ADLS_ACUITY_SCORE: 25
ADLS_ACUITY_SCORE: 25
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
## 2025-02-27 NOTE — PROGRESS NOTES
Discharge note:  Pt discharged at this time (Against medical advice). Pt learned today that she does not have active health insurance so for financial reasons request to discharge today.   Disposition-home.     Discharge instructions reviewed including aftercare recommendations and medication list. Pt verbalizes understanding. Discharge medications filled and provided. Pt aware to follow up with OP Provider for medication refills as needed.     All personal belongings returned.   Pt denies SI/HI or other safety concerns at this time.

## 2025-02-27 NOTE — PLAN OF CARE
"  Problem: Alcohol Withdrawal  Goal: Alcohol Withdrawal Symptom Control  Outcome: Progressing     Problem: Sleep Disturbance  Goal: Adequate Sleep/Rest  Outcome: Progressing   Goal Outcome Evaluation:Ongoing       The patient was monitored overnight for acute alcohol withdrawal, scoring 5 and 3 on the MSSA. The registered nurse did not administer any prn (as needed) medication, and the patient appeared comfortable while sleeping, demonstrating quiet and spontaneous breathing. There were no reported safety concerns or behavioral issues. The team will continue to monitor the patient.    VS @0010: Blood pressure 135/86, pulse 100, temperature 98.5  F (36.9  C), temperature source Oral, resp. rate 16, height 1.473 m (4' 10\"), weight 95.3 kg (210 lb), last menstrual period 12/10/2024, SpO2 94%, not currently breastfeeding.     VS@ 0410 Blood pressure 109/74, pulse 80, temperature 97.5  F (36.4  C), temperature source Oral, resp. rate 16, height 1.473 m (4' 10\"), weight 95.3 kg (210 lb), last menstrual period 12/10/2024, SpO2 96%, not currently breastfeeding.             "

## 2025-05-03 ENCOUNTER — HEALTH MAINTENANCE LETTER (OUTPATIENT)
Age: 45
End: 2025-05-03